# Patient Record
Sex: MALE | Race: WHITE
[De-identification: names, ages, dates, MRNs, and addresses within clinical notes are randomized per-mention and may not be internally consistent; named-entity substitution may affect disease eponyms.]

---

## 2019-06-17 ENCOUNTER — HOSPITAL ENCOUNTER (EMERGENCY)
Dept: HOSPITAL 8 - ED | Age: 59
LOS: 1 days | Discharge: HOME | End: 2019-06-18
Payer: MEDICARE

## 2019-06-17 VITALS — BODY MASS INDEX: 31.15 KG/M2 | HEIGHT: 70.5 IN | WEIGHT: 220.02 LBS

## 2019-06-17 VITALS — DIASTOLIC BLOOD PRESSURE: 86 MMHG | SYSTOLIC BLOOD PRESSURE: 130 MMHG

## 2019-06-17 DIAGNOSIS — J45.30: Primary | ICD-10-CM

## 2019-06-17 DIAGNOSIS — F17.200: ICD-10-CM

## 2019-06-17 PROCEDURE — 93005 ELECTROCARDIOGRAM TRACING: CPT

## 2019-06-17 PROCEDURE — 71046 X-RAY EXAM CHEST 2 VIEWS: CPT

## 2019-06-17 PROCEDURE — 99283 EMERGENCY DEPT VISIT LOW MDM: CPT

## 2019-06-17 NOTE — NUR
DR. BOYD HAS BEEN IN TO EVAL PT. AND DISCUSS POC.  PT. RESTING ON GURNEY WITH 
NADN.  EVEN, NON-LABORED RESPIRATIONS VISIBLE.  PT. HAS CALL LIGHT IN REACH.  
SKIN PWD.  PT. AWAITNG X-RAY.

## 2019-08-08 ENCOUNTER — HOSPITAL ENCOUNTER (OUTPATIENT)
Dept: HOSPITAL 8 - CFH | Age: 59
Discharge: HOME | End: 2019-08-08
Attending: NEUROLOGICAL SURGERY
Payer: MEDICARE

## 2019-08-08 DIAGNOSIS — M48.03: ICD-10-CM

## 2019-08-08 DIAGNOSIS — M47.813: Primary | ICD-10-CM

## 2019-08-08 PROCEDURE — 72050 X-RAY EXAM NECK SPINE 4/5VWS: CPT

## 2019-08-08 PROCEDURE — 72141 MRI NECK SPINE W/O DYE: CPT

## 2020-01-21 ENCOUNTER — HOSPITAL ENCOUNTER (EMERGENCY)
Dept: HOSPITAL 8 - ED | Age: 60
Discharge: HOME | End: 2020-01-21
Payer: MEDICARE

## 2020-01-21 VITALS — BODY MASS INDEX: 30.24 KG/M2 | HEIGHT: 70 IN | WEIGHT: 211.2 LBS

## 2020-01-21 VITALS — DIASTOLIC BLOOD PRESSURE: 79 MMHG | SYSTOLIC BLOOD PRESSURE: 116 MMHG

## 2020-01-21 DIAGNOSIS — M54.41: ICD-10-CM

## 2020-01-21 DIAGNOSIS — M54.42: Primary | ICD-10-CM

## 2020-01-21 DIAGNOSIS — F17.210: ICD-10-CM

## 2020-01-21 DIAGNOSIS — J45.909: ICD-10-CM

## 2020-01-21 PROCEDURE — 96372 THER/PROPH/DIAG INJ SC/IM: CPT

## 2020-01-21 PROCEDURE — 99283 EMERGENCY DEPT VISIT LOW MDM: CPT

## 2020-01-21 NOTE — NUR
PT STATES "MY SCIATICA IS BOTHERING ME AND I CAN'T SLEEP". PT STATES HE HAS A 
HISTORY OF LOW BACK PAIN AND DOES NOT HAVE A PCP AT THIS TIME.  per triage note

## 2020-01-25 ENCOUNTER — HOSPITAL ENCOUNTER (EMERGENCY)
Dept: HOSPITAL 8 - ED | Age: 60
Discharge: HOME | End: 2020-01-25
Payer: MEDICAID

## 2020-01-25 VITALS — HEIGHT: 68 IN | BODY MASS INDEX: 31.78 KG/M2 | WEIGHT: 209.66 LBS

## 2020-01-25 VITALS — SYSTOLIC BLOOD PRESSURE: 126 MMHG | DIASTOLIC BLOOD PRESSURE: 75 MMHG

## 2020-01-25 DIAGNOSIS — J45.909: ICD-10-CM

## 2020-01-25 DIAGNOSIS — F17.200: ICD-10-CM
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
## 2020-01-25 DIAGNOSIS — I10: ICD-10-CM
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
## 2020-01-25 DIAGNOSIS — S39.012A: Primary | ICD-10-CM

## 2020-01-25 DIAGNOSIS — Z76.0: ICD-10-CM

## 2020-01-25 PROCEDURE — 99283 EMERGENCY DEPT VISIT LOW MDM: CPT

## 2020-02-05 ENCOUNTER — HOSPITAL ENCOUNTER (EMERGENCY)
Dept: HOSPITAL 8 - ED | Age: 60
Discharge: HOME | End: 2020-02-05
Payer: MEDICARE

## 2020-02-05 VITALS — DIASTOLIC BLOOD PRESSURE: 83 MMHG | SYSTOLIC BLOOD PRESSURE: 136 MMHG

## 2020-02-05 VITALS — WEIGHT: 214.51 LBS | BODY MASS INDEX: 30.71 KG/M2 | HEIGHT: 70 IN

## 2020-02-05 DIAGNOSIS — J45.909: ICD-10-CM

## 2020-02-05 DIAGNOSIS — S39.012A: Primary | ICD-10-CM

## 2020-02-05 DIAGNOSIS — G89.29: ICD-10-CM
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
## 2020-02-05 PROCEDURE — 96372 THER/PROPH/DIAG INJ SC/IM: CPT

## 2020-02-05 PROCEDURE — 99283 EMERGENCY DEPT VISIT LOW MDM: CPT

## 2020-03-11 ENCOUNTER — HOSPITAL ENCOUNTER (EMERGENCY)
Dept: HOSPITAL 8 - ED | Age: 60
Discharge: HOME | End: 2020-03-11
Payer: MEDICARE

## 2020-03-11 VITALS — BODY MASS INDEX: 30.25 KG/M2 | HEIGHT: 71 IN | WEIGHT: 216.05 LBS

## 2020-03-11 VITALS — SYSTOLIC BLOOD PRESSURE: 133 MMHG | DIASTOLIC BLOOD PRESSURE: 82 MMHG

## 2020-03-11 DIAGNOSIS — M51.16: Primary | ICD-10-CM

## 2020-03-11 DIAGNOSIS — J45.909: ICD-10-CM

## 2020-03-11 DIAGNOSIS — F17.200: ICD-10-CM

## 2020-03-11 DIAGNOSIS — I10: ICD-10-CM

## 2020-03-11 PROCEDURE — 72110 X-RAY EXAM L-2 SPINE 4/>VWS: CPT

## 2020-03-11 PROCEDURE — 99283 EMERGENCY DEPT VISIT LOW MDM: CPT

## 2020-03-14 ENCOUNTER — HOSPITAL ENCOUNTER (EMERGENCY)
Dept: HOSPITAL 8 - ED | Age: 60
Discharge: HOME | End: 2020-03-14
Payer: MEDICARE

## 2020-03-14 VITALS — WEIGHT: 214.95 LBS | HEIGHT: 67 IN | BODY MASS INDEX: 33.74 KG/M2

## 2020-03-14 VITALS — SYSTOLIC BLOOD PRESSURE: 133 MMHG | DIASTOLIC BLOOD PRESSURE: 80 MMHG

## 2020-03-14 DIAGNOSIS — Z88.1: ICD-10-CM
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
## 2020-03-14 DIAGNOSIS — J45.909: ICD-10-CM
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
## 2020-03-14 DIAGNOSIS — I10: ICD-10-CM
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
## 2020-03-14 DIAGNOSIS — S39.012A: Primary | ICD-10-CM

## 2020-03-14 PROCEDURE — 99283 EMERGENCY DEPT VISIT LOW MDM: CPT

## 2020-03-14 PROCEDURE — 96372 THER/PROPH/DIAG INJ SC/IM: CPT

## 2020-03-14 NOTE — NUR
PT BROUGHT BACK FROM TRIAGE WITH CHIEF COMPLAINT OF LEFT LOWER BACK PAIN 
WORSENING OVER THREE DAYS. HERE AT [facility] ED 3/11.

## 2020-11-07 NOTE — NUR
Pt c/o "buzzing" in is head and states he has neck pain from titatnium rods. Pt 
denies chest pain, shortness of breath or N/V/diarrhea.

## 2023-03-03 ENCOUNTER — HOSPITAL ENCOUNTER (EMERGENCY)
Facility: MEDICAL CENTER | Age: 63
End: 2023-03-03
Attending: EMERGENCY MEDICINE
Payer: COMMERCIAL

## 2023-03-03 ENCOUNTER — APPOINTMENT (OUTPATIENT)
Dept: RADIOLOGY | Facility: MEDICAL CENTER | Age: 63
End: 2023-03-03
Attending: EMERGENCY MEDICINE
Payer: COMMERCIAL

## 2023-03-03 VITALS
WEIGHT: 192.68 LBS | HEART RATE: 92 BPM | RESPIRATION RATE: 18 BRPM | BODY MASS INDEX: 27.58 KG/M2 | DIASTOLIC BLOOD PRESSURE: 93 MMHG | HEIGHT: 70 IN | SYSTOLIC BLOOD PRESSURE: 136 MMHG | OXYGEN SATURATION: 96 % | TEMPERATURE: 97.5 F

## 2023-03-03 DIAGNOSIS — M54.12 CERVICAL RADICULOPATHY: ICD-10-CM

## 2023-03-03 DIAGNOSIS — M54.2 NECK PAIN: ICD-10-CM

## 2023-03-03 PROCEDURE — 99283 EMERGENCY DEPT VISIT LOW MDM: CPT

## 2023-03-03 PROCEDURE — 72040 X-RAY EXAM NECK SPINE 2-3 VW: CPT

## 2023-03-03 PROCEDURE — 700101 HCHG RX REV CODE 250: Performed by: EMERGENCY MEDICINE

## 2023-03-03 RX ORDER — LIDOCAINE 50 MG/G
1 PATCH TOPICAL EVERY 24 HOURS
Qty: 10 PATCH | Refills: 0 | Status: SHIPPED | OUTPATIENT
Start: 2023-03-03 | End: 2023-03-03 | Stop reason: SDUPTHER

## 2023-03-03 RX ORDER — METHYLPREDNISOLONE 4 MG/1
TABLET ORAL
Qty: 21 EACH | Refills: 0 | Status: SHIPPED | OUTPATIENT
Start: 2023-03-03 | End: 2023-03-03 | Stop reason: SDUPTHER

## 2023-03-03 RX ORDER — LIDOCAINE 50 MG/G
2 PATCH TOPICAL ONCE
Status: DISCONTINUED | OUTPATIENT
Start: 2023-03-03 | End: 2023-03-03 | Stop reason: HOSPADM

## 2023-03-03 RX ORDER — CYCLOBENZAPRINE HCL 10 MG
10 TABLET ORAL 3 TIMES DAILY PRN
Qty: 30 TABLET | Refills: 0 | Status: SHIPPED | OUTPATIENT
Start: 2023-03-03 | End: 2023-03-03 | Stop reason: SDUPTHER

## 2023-03-03 RX ORDER — LIDOCAINE 50 MG/G
1 PATCH TOPICAL EVERY 24 HOURS
Qty: 10 PATCH | Refills: 0 | Status: SHIPPED | OUTPATIENT
Start: 2023-03-03 | End: 2023-07-03

## 2023-03-03 RX ORDER — METHYLPREDNISOLONE 4 MG/1
TABLET ORAL
Qty: 21 EACH | Refills: 0 | Status: SHIPPED | OUTPATIENT
Start: 2023-03-03 | End: 2023-07-03

## 2023-03-03 RX ORDER — CYCLOBENZAPRINE HCL 10 MG
10 TABLET ORAL 3 TIMES DAILY PRN
Qty: 30 TABLET | Refills: 0 | Status: SHIPPED | OUTPATIENT
Start: 2023-03-03 | End: 2023-07-03

## 2023-03-03 RX ADMIN — LIDOCAINE 2 PATCH: 50 PATCH TOPICAL at 17:19

## 2023-03-03 ASSESSMENT — FIBROSIS 4 INDEX: FIB4 SCORE: 1.31

## 2023-03-04 NOTE — ED NOTES
MRI x-ray and imaging order paper was faxed and sent to MRI  Document showing confirmation of fax placed into chart, original order paper given to pt upon discharge     Pt aox4, vss, nad, ambulatory steady  Understood all dc info and when to seek medical care, no further questions

## 2023-03-04 NOTE — ED TRIAGE NOTES
"Chief Complaint   Patient presents with    Neck Pain     Described as pinching sensation. Pt states he's having \"electric pulse sensations\" through both arms and fingers.       /86   Pulse (!) 101   Temp 36.2 °C (97.1 °F) (Temporal)   Resp 18   Ht 1.778 m (5' 10\")   Wt 87.4 kg (192 lb 10.9 oz)   SpO2 95%   BMI 27.65 kg/m²     "

## 2023-03-04 NOTE — ED PROVIDER NOTES
"ED Provider Note    CHIEF COMPLAINT  Chief Complaint   Patient presents with    Neck Pain     Described as pinching sensation. Pt states he's having \"electric pulse sensations\" through both arms and fingers.       EXTERNAL RECORDS REVIEWED  Inpatient Notes that the patient had a C3-4 through C6 fusion with Dr. Espinosa in 2012    HPI/ROS  LIMITATION TO HISTORY   Select: : None  OUTSIDE HISTORIAN(S):  none    Brian Durham is a 62 y.o. male who presents eating that he has a pinching painful sensation in his neck and upper shoulders that started about 4 days ago.  He has been taking Tylenol and ibuprofen with minimal relief.  State patient states that he gets electric shock sensations down both of his arms into his hands and fingers.  He denies any weakness in his hands or loss of  strength.  He denies any leg weakness.  He denies any falls or trauma.  The patient states that he did have neck surgery about 10 years ago at this hospital where he had a titanium plate placed in his neck.  He is not sure exactly what kind of surgery was performed.  Patient states that he has no primary care physician for follow-up.    PAST MEDICAL HISTORY   has a past medical history of Arthritis, Cold (1/1/2012), Degenerative disc disease, Hepatitis B, Pain (12-30-11), and Psychiatric disorder.    SURGICAL HISTORY   has a past surgical history that includes other and cervical disk and fusion anterior (1/16/2012).    FAMILY HISTORY  No family history on file.    SOCIAL HISTORY  Social History     Tobacco Use    Smoking status: Every Day     Packs/day: 0.25     Years: 37.00     Pack years: 9.25     Types: Cigarettes     Last attempt to quit: 6/7/2012     Years since quitting: 10.7    Smokeless tobacco: Never    Tobacco comments:     1/2 pack a day.   Substance and Sexual Activity    Alcohol use: No    Drug use: No    Sexual activity: Not on file       CURRENT MEDICATIONS  Home Medications       Reviewed by Ezequiel Gillette R.N. " "(Registered Nurse) on 03/03/23 at 1813  Med List Status: Not Addressed     Medication Last Dose Status   amphetamine-dextroamphetamine (ADDERALL) 20 MG TABS  Active   DULoxetine HCl (CYMBALTA PO)  Active   haloperidol (HALDOL) 5 MG TABS  Active   hydrocodone-acetaminophen (NORCO) 7.5-325 MG per tablet  Active   ibuprofen (MOTRIN) 800 MG TABS  Active   therapeutic multivitamin-minerals (THERAGRAN-M) TABS  Active                    ALLERGIES  Allergies   Allergen Reactions    Paxil [Paroxetine]      \"swollen throat\"    Shellfish Allergy Rash     \"My heart - very painful.\"       PHYSICAL EXAM  VITAL SIGNS: BP (!) 136/93   Pulse 92   Temp 36.4 °C (97.5 °F) (Temporal)   Resp 18   Ht 1.778 m (5' 10\")   Wt 87.4 kg (192 lb 10.9 oz)   SpO2 96%   BMI 27.65 kg/m²    Constitutional: Well developed, Well nourished, No acute respiratory distress, Non-toxic appearance.   HENT: Normocephalic, Atraumatic, Bilateral external ears normal,  Eyes: Conjunctiva normal, No discharge. No icterus.  Neck: Normal range of motion. Supple.  Positive paraspinous muscle tenderness to the C-spine without C-spine tenderness bony step-offs or crepitance is midline  Lymphatic: No cervical lymphadenopathy noted.   Skin: Warm, Dry, No erythema, No rash.   Extremities: Intact distal pulses, No edema, No tenderness  Musculoskeletal: Good range of motion in all major joints. Normal gait.  Neurologic: Alert & oriented x 3. No focal deficits noted.  Patient has equal  5 out of 5 strength and sensation upper and lower extremities bilaterally  Psych: Alert normal affect      DIAGNOSTIC STUDIES / PROCEDURES  EKG  I have independently interpreted this EKG  none    LABS  none    RADIOLOGY  I have independently interpreted the diagnostic imaging associated with this visit and am waiting the final reading from the radiologist.   My preliminary interpretation is as follows: xray c spine: Hardware in place no fracture  Radiologist interpretation:   DX-CERVICAL " SPINE-2 OR 3 VIEWS   Final Result      1.  C4-C6 ACDF.   2.  C3-C4 and C7 moderate disc degeneration.   3.  No acute abnormality.            COURSE & MEDICAL DECISION MAKING    ED Observation Status? No; Patient does not meet criteria for ED Observation.     INITIAL ASSESSMENT, COURSE AND PLAN  Care Narrative: This is a 62-year-old male who has a history of previous neck surgery 10 years ago with a titanium plate in his neck complaining of pinching painful sensation in his neck with shooting pain down both of his arms for the last 4 days.  He has no falls or trauma.  On physical exam here he has no C-spine tenderness step-offs or crepitance or loss of upper extremity strength.  I have ordered a C-spine x-ray to rule out hardware dislocation or fracture.  I have also given the patient Lidoderm patches to his neck.  He could have worsening arthritis or disc disease in another area above or below his previous surgery.        ADDITIONAL PROBLEM LIST  arthritis  DISPOSITION AND DISCUSSIONS    6:01 PM upon recheck the patient feels improved after the lidocaine patches.  I will discharge him home with a Medrol Dosepak and more lidocaine patches.  Will write for an outpatient MRI of his neck and refer him to Dr. Brewster as well as neurosurgery on-call Dr. Mcdaniel for repeat evaluation of his symptoms.  Patient understands to return for worsening weakness of  strength or worsening symptoms.  I have discussed management of the patient with the following physicians and LEA's:  none    Discussion of management with other QHP or appropriate source(s): None     Escalation of care considered, and ultimately not performed:acute inpatient care management, however at this time, the patient is most appropriate for outpatient management    Barriers to care at this time, including but not limited to: Patient does not have established PCP.     Decision tools and prescription drugs considered including, but not limited to: Pain  Medications medrol and lidoderm .    The patient will return for new or worsening symptoms and is stable at the time of discharge.    The patient is referred to a primary physician for blood pressure management, diabetic screening, and for all other preventative health concerns.      DISPOSITION:  Patient will be discharged home in stable condition.    FOLLOW UP:  Jose Alejandro Mcdaniel M.D.  [address] [place] [address]  [phone]    Call in 2 days  to establish care, for recheck    Osvaldo Brewster M.D.  [address] [facility] [address]  [place] [id number] [phone]    In 1 day  to establish care, for recheck      OUTPATIENT MEDICATIONS:  Discharge Medication List as of 3/3/2023  6:24 PM      Flexeril, Lidoderm and Medrol Dosepak       FINAL DIAGNOSIS  1. Neck pain    2. Cervical radiculopathy           Electronically signed by: Shabnam Miller M.D., 3/3/2023 4:54 PM

## 2023-04-15 ENCOUNTER — APPOINTMENT (OUTPATIENT)
Dept: RADIOLOGY | Facility: MEDICAL CENTER | Age: 63
End: 2023-04-15
Attending: EMERGENCY MEDICINE
Payer: COMMERCIAL

## 2023-04-15 ENCOUNTER — HOSPITAL ENCOUNTER (EMERGENCY)
Facility: MEDICAL CENTER | Age: 63
End: 2023-04-15
Attending: EMERGENCY MEDICINE
Payer: COMMERCIAL

## 2023-04-15 VITALS
BODY MASS INDEX: 27.87 KG/M2 | TEMPERATURE: 98.2 F | RESPIRATION RATE: 18 BRPM | HEART RATE: 86 BPM | DIASTOLIC BLOOD PRESSURE: 70 MMHG | WEIGHT: 194.67 LBS | HEIGHT: 70 IN | OXYGEN SATURATION: 94 % | SYSTOLIC BLOOD PRESSURE: 118 MMHG

## 2023-04-15 DIAGNOSIS — S39.012A STRAIN OF LUMBAR REGION, INITIAL ENCOUNTER: ICD-10-CM

## 2023-04-15 DIAGNOSIS — S20.211A CONTUSION OF RIGHT CHEST WALL, INITIAL ENCOUNTER: ICD-10-CM

## 2023-04-15 LAB
ALBUMIN SERPL BCP-MCNC: 4.3 G/DL (ref 3.2–4.9)
ALBUMIN/GLOB SERPL: 1.4 G/DL
ALP SERPL-CCNC: 96 U/L (ref 30–99)
ALT SERPL-CCNC: 20 U/L (ref 2–50)
ANION GAP SERPL CALC-SCNC: 12 MMOL/L (ref 7–16)
AST SERPL-CCNC: 18 U/L (ref 12–45)
BASOPHILS # BLD AUTO: 0.5 % (ref 0–1.8)
BASOPHILS # BLD: 0.05 K/UL (ref 0–0.12)
BILIRUB SERPL-MCNC: 0.2 MG/DL (ref 0.1–1.5)
BUN SERPL-MCNC: 12 MG/DL (ref 8–22)
CALCIUM ALBUM COR SERPL-MCNC: 9.1 MG/DL (ref 8.5–10.5)
CALCIUM SERPL-MCNC: 9.3 MG/DL (ref 8.5–10.5)
CHLORIDE SERPL-SCNC: 101 MMOL/L (ref 96–112)
CO2 SERPL-SCNC: 25 MMOL/L (ref 20–33)
CREAT SERPL-MCNC: 0.81 MG/DL (ref 0.5–1.4)
EKG IMPRESSION: NORMAL
EOSINOPHIL # BLD AUTO: 0.23 K/UL (ref 0–0.51)
EOSINOPHIL NFR BLD: 2.4 % (ref 0–6.9)
ERYTHROCYTE [DISTWIDTH] IN BLOOD BY AUTOMATED COUNT: 43.1 FL (ref 35.9–50)
GFR SERPLBLD CREATININE-BSD FMLA CKD-EPI: 101 ML/MIN/1.73 M 2
GLOBULIN SER CALC-MCNC: 3 G/DL (ref 1.9–3.5)
GLUCOSE SERPL-MCNC: 90 MG/DL (ref 65–99)
HCT VFR BLD AUTO: 43.2 % (ref 42–52)
HGB BLD-MCNC: 14.8 G/DL (ref 14–18)
IMM GRANULOCYTES # BLD AUTO: 0.04 K/UL (ref 0–0.11)
IMM GRANULOCYTES NFR BLD AUTO: 0.4 % (ref 0–0.9)
LYMPHOCYTES # BLD AUTO: 2.57 K/UL (ref 1–4.8)
LYMPHOCYTES NFR BLD: 27.3 % (ref 22–41)
MCH RBC QN AUTO: 33 PG (ref 27–33)
MCHC RBC AUTO-ENTMCNC: 34.3 G/DL (ref 33.7–35.3)
MCV RBC AUTO: 96.2 FL (ref 81.4–97.8)
MONOCYTES # BLD AUTO: 1.11 K/UL (ref 0–0.85)
MONOCYTES NFR BLD AUTO: 11.8 % (ref 0–13.4)
NEUTROPHILS # BLD AUTO: 5.43 K/UL (ref 1.82–7.42)
NEUTROPHILS NFR BLD: 57.6 % (ref 44–72)
NRBC # BLD AUTO: 0 K/UL
NRBC BLD-RTO: 0 /100 WBC
PLATELET # BLD AUTO: 286 K/UL (ref 164–446)
PMV BLD AUTO: 8.1 FL (ref 9–12.9)
POTASSIUM SERPL-SCNC: 4.7 MMOL/L (ref 3.6–5.5)
PROT SERPL-MCNC: 7.3 G/DL (ref 6–8.2)
RBC # BLD AUTO: 4.49 M/UL (ref 4.7–6.1)
SODIUM SERPL-SCNC: 138 MMOL/L (ref 135–145)
TROPONIN T SERPL-MCNC: 15 NG/L (ref 6–19)
WBC # BLD AUTO: 9.4 K/UL (ref 4.8–10.8)

## 2023-04-15 PROCEDURE — 84484 ASSAY OF TROPONIN QUANT: CPT

## 2023-04-15 PROCEDURE — 80053 COMPREHEN METABOLIC PANEL: CPT

## 2023-04-15 PROCEDURE — 93005 ELECTROCARDIOGRAM TRACING: CPT

## 2023-04-15 PROCEDURE — 85025 COMPLETE CBC W/AUTO DIFF WBC: CPT

## 2023-04-15 PROCEDURE — 93005 ELECTROCARDIOGRAM TRACING: CPT | Performed by: EMERGENCY MEDICINE

## 2023-04-15 PROCEDURE — 96372 THER/PROPH/DIAG INJ SC/IM: CPT

## 2023-04-15 PROCEDURE — 72131 CT LUMBAR SPINE W/O DYE: CPT

## 2023-04-15 PROCEDURE — 99284 EMERGENCY DEPT VISIT MOD MDM: CPT

## 2023-04-15 PROCEDURE — 71250 CT THORAX DX C-: CPT

## 2023-04-15 PROCEDURE — 700111 HCHG RX REV CODE 636 W/ 250 OVERRIDE (IP): Performed by: EMERGENCY MEDICINE

## 2023-04-15 RX ORDER — KETOROLAC TROMETHAMINE 30 MG/ML
30 INJECTION, SOLUTION INTRAMUSCULAR; INTRAVENOUS ONCE
Status: COMPLETED | OUTPATIENT
Start: 2023-04-15 | End: 2023-04-15

## 2023-04-15 RX ADMIN — KETOROLAC TROMETHAMINE 30 MG: 30 INJECTION, SOLUTION INTRAMUSCULAR at 19:47

## 2023-04-15 ASSESSMENT — FIBROSIS 4 INDEX: FIB4 SCORE: 1.31

## 2023-04-15 ASSESSMENT — PAIN DESCRIPTION - PAIN TYPE: TYPE: ACUTE PAIN

## 2023-04-16 NOTE — DISCHARGE INSTRUCTIONS
Your scans here were normal.  There are no broken ribs, and no injuries to the bones in your back.  For your bruised chest wall and low back strain, you can alternate every 3 hours between Tylenol and ibuprofen, and use heat packs for your low back.  Follow-up with your primary care clinic.  If you do not have one, use one of the clinics listed here.

## 2023-04-16 NOTE — ED TRIAGE NOTES
"Chief Complaint   Patient presents with    Fall Less than 10 Feet     Pt fell three feet off a ladder yesterday morning. C/o low back and hip pain. Pt ambulatory but states it's hard to walk around.     Chest Wall Pain     C/o right sided chest pain for the last four days       /78   Pulse 91   Temp 36.6 °C (97.8 °F) (Temporal)   Resp 18   Ht 1.778 m (5' 10\")   Wt 88.3 kg (194 lb 10.7 oz)   SpO2 99%   BMI 27.93 kg/m²     "

## 2023-04-16 NOTE — ED NOTES
Pt discharged to ED Lobby. GCS 15. Pt in possession of belongings. Pt provided discharge education and information pertaining to medications and follow up appointments. Pt received copy of discharge instructions and verbalized understanding.     Vitals:    04/15/23 2104   BP: 118/70   Pulse: 86   Resp: 18   Temp: 36.8 °C (98.2 °F)   SpO2: 94%

## 2023-04-16 NOTE — ED PROVIDER NOTES
ER Provider Note    Scribed for Og Naidu M.d. by Rafat Briggs. 4/15/2023  7:20 PM    Primary Care Provider: Pcp Pt States None    CHIEF COMPLAINT  Chief Complaint   Patient presents with    Fall Less than 10 Feet     Pt fell three feet off a ladder yesterday morning. C/o low back and hip pain. Pt ambulatory but states it's hard to walk around.     Chest Wall Pain     C/o right sided chest pain for the last four days     EXTERNAL RECORDS REVIEWED  Outpatient Notes Patient was seen in the [facility] ED on 3/3/2023 for neck pain.      HPI/ROS  LIMITATION TO HISTORY   Select: : None  OUTSIDE HISTORIAN(S):  None    Brian Durham is a 62 y.o. male who presents to the ED for evaluation of a fall (about three feet) off of a ladder while at work onset yesterday morning. Patient is sleeping and resting comfortably upon arrival to bedside. Brian reports a history of chronic back pain, but states that this has worsened since the onset of his fall. He reports associated lower back pain, leg pain, and hip pain. He denies any new upper back pain. Patient adds that he having some right sided chest pain, but he was diagnosed with a respiratory infection about a week ago. Brian always has upper back pain, but denies any worsening fallowing his fall.     PAST MEDICAL HISTORY  Past Medical History:   Diagnosis Date    Arthritis     Cold 1/1/2012    2 weeks ago    Degenerative disc disease     Hepatitis B     Pain 12-30-11    neck, 6/10    Psychiatric disorder     bipolar, ADHD       SURGICAL HISTORY  Past Surgical History:   Procedure Laterality Date    CERVICAL DISK AND FUSION ANTERIOR  1/16/2012    Performed by REMY FRANKS at [facility] ORS    OTHER      benign tumor removed from chest       FAMILY HISTORY  History reviewed. No pertinent family history.    SOCIAL HISTORY   reports that he has been smoking cigarettes. He has a 9.25 pack-year smoking history. He has never used smokeless tobacco. He  "reports that he does not drink alcohol and does not use drugs.    CURRENT MEDICATIONS  Discharge Medication List as of 4/15/2023  9:05 PM        CONTINUE these medications which have NOT CHANGED    Details   methylPREDNISolone (MEDROL DOSEPAK) 4 MG Tablet Therapy Pack Take as directed, Disp-21 Each, R-0, Normal      lidocaine (LIDODERM) 5 % Patch Place 1 Patch on the skin every 24 hours., Disp-10 Patch, R-0, Normal      cyclobenzaprine (FLEXERIL) 10 mg Tab Take 1 Tablet by mouth 3 times a day as needed for Muscle Spasms., Disp-30 Tablet, R-0, Normal      ibuprofen (MOTRIN) 800 MG TABS Take 1 Tab by mouth every 8 hours as needed (pain)., Disp-20 Each, R-0, Print Rx Paper      hydrocodone-acetaminophen (NORCO) 7.5-325 MG per tablet Take 1 Tab by mouth every 6 hours as needed (pain)., Disp-12 Each, R-0, Print Rx Paper      amphetamine-dextroamphetamine (ADDERALL) 20 MG TABS Take 30 mg by mouth 2 Times a Day., Historical Med      DULoxetine HCl (CYMBALTA PO) Take 30 mg by mouth every day., Historical Med      haloperidol (HALDOL) 5 MG TABS Take 2.5 mg by mouth every day. \"1 in the morning and 1 in the afternoon\", Historical Med      therapeutic multivitamin-minerals (THERAGRAN-M) TABS Take 1 Tab by mouth every day., Historical Med             ALLERGIES  Paxil [paroxetine] and Shellfish allergy    PHYSICAL EXAM  VITAL SIGNS: /80   Pulse 85   Temp 36.6 °C (97.8 °F) (Temporal)   Resp 18   Ht 1.778 m (5' 10\")   Wt 88.3 kg (194 lb 10.7 oz)   SpO2 96%   BMI 27.93 kg/m²   Pulse ox interpretation: I interpret this pulse ox as normal.  Constitutional: Alert in no apparent distress.  HENT: No signs of trauma, Bilateral external ears normal, Nose normal.   Eyes: Pupils are equal and reactive, Conjunctiva normal, Non-icteric.   Neck: Normal range of motion, Supple, No stridor.    Cardiovascular: Normal peripheral perfusion  Thorax & Lungs: Unlabored respirations, equal chest expansion, no accessory muscle use  Abdomen: " Non-distended  Skin:  No erythema, No rash.   Back: Normal alignment and ROM  Extremities: No gross deformity  Musculoskeletal: Good range of motion in all major joints.   Neurologic: Alert, Normal motor function, No focal deficits noted.   Psychiatric: Affect normal, Judgment normal, Mood normal.     DIAGNOSTIC STUDIES    Labs:   Results for orders placed or performed during the hospital encounter of 04/15/23   CBC with Differential   Result Value Ref Range    WBC 9.4 4.8 - 10.8 K/uL    RBC 4.49 (L) 4.70 - 6.10 M/uL    Hemoglobin 14.8 14.0 - 18.0 g/dL    Hematocrit 43.2 42.0 - 52.0 %    MCV 96.2 81.4 - 97.8 fL    MCH 33.0 27.0 - 33.0 pg    MCHC 34.3 33.7 - 35.3 g/dL    RDW 43.1 35.9 - 50.0 fL    Platelet Count 286 164 - 446 K/uL    MPV 8.1 (L) 9.0 - 12.9 fL    Neutrophils-Polys 57.60 44.00 - 72.00 %    Lymphocytes 27.30 22.00 - 41.00 %    Monocytes 11.80 0.00 - 13.40 %    Eosinophils 2.40 0.00 - 6.90 %    Basophils 0.50 0.00 - 1.80 %    Immature Granulocytes 0.40 0.00 - 0.90 %    Nucleated RBC 0.00 /100 WBC    Neutrophils (Absolute) 5.43 1.82 - 7.42 K/uL    Lymphs (Absolute) 2.57 1.00 - 4.80 K/uL    Monos (Absolute) 1.11 (H) 0.00 - 0.85 K/uL    Eos (Absolute) 0.23 0.00 - 0.51 K/uL    Baso (Absolute) 0.05 0.00 - 0.12 K/uL    Immature Granulocytes (abs) 0.04 0.00 - 0.11 K/uL    NRBC (Absolute) 0.00 K/uL   Complete Metabolic Panel (CMP)   Result Value Ref Range    Sodium 138 135 - 145 mmol/L    Potassium 4.7 3.6 - 5.5 mmol/L    Chloride 101 96 - 112 mmol/L    Co2 25 20 - 33 mmol/L    Anion Gap 12.0 7.0 - 16.0    Glucose 90 65 - 99 mg/dL    Bun 12 8 - 22 mg/dL    Creatinine 0.81 0.50 - 1.40 mg/dL    Calcium 9.3 8.5 - 10.5 mg/dL    AST(SGOT) 18 12 - 45 U/L    ALT(SGPT) 20 2 - 50 U/L    Alkaline Phosphatase 96 30 - 99 U/L    Total Bilirubin 0.2 0.1 - 1.5 mg/dL    Albumin 4.3 3.2 - 4.9 g/dL    Total Protein 7.3 6.0 - 8.2 g/dL    Globulin 3.0 1.9 - 3.5 g/dL    A-G Ratio 1.4 g/dL   Troponins NOW   Result Value Ref Range     Troponin T 15 6 - 19 ng/L   CORRECTED CALCIUM   Result Value Ref Range    Correct Calcium 9.1 8.5 - 10.5 mg/dL   ESTIMATED GFR   Result Value Ref Range    GFR (CKD-EPI) 101 >60 mL/min/1.73 m 2   EKG   Result Value Ref Range    Report       [facility]    Test Date:  2023-04-15  Pt Name:    SADI GUERRERO                Department: ER  MRN:        [id number]                      Room:  Gender:     Male                         Technician: [id number]  :        1960                   Requested By:ER TRIAGE PROTOCOL  Order #:    [id number]                    Reading MD: JAYRO PONCE MD    Measurements  Intervals                                Axis  Rate:       89                           P:          76  NH:         147                          QRS:        69  QRSD:       83                           T:          48  QT:         355  QTc:        432    Interpretive Statements  Sinus rhythm  Compared to ECG 2011 12:51:49  No significant changes  Electronically Signed On 4- 20:49:29 PDT by JAYRO PONCE MD          EKG:   I have independently interpreted this EKG  12 Lead EKG: interpreted by me as above.      Radiology:   The attending emergency physician has independently interpreted the diagnostic imaging associated with this visit and am waiting the final reading from the radiologist.     CT-LSPINE W/O PLUS RECONS   Final Result         1. No acute fracture or malalignment appreciated in the lumbar spine         CT-CHEST (THORAX) W/O   Final Result         1. No displaced rib fracture.   2. No pulmonary contusion. No pleural effusion or pneumothorax.               Preliminary interpretation is a follows: No obvious acute injuries  Radiologist interpretation: Noted above.    COURSE & MEDICAL DECISION MAKING     ED Observation Status? Yes; I am placing the patient in to an observation status due to a diagnostic uncertainty as well as therapeutic intensity. Patient  placed in observation status at 7:29 PM, 4/15/2023.     Observation plan is as follows: Reevaluate patient after analyzing imaging.     Upon Reevaluation, the patient's condition has: Improved; and will be discharged.    Patient discharged from ED Observation status at 8:52 PM (4/15/2023).    INITIAL ASSESSMENT, COURSE AND PLAN  Care Narrative: 7:29 PM - Patient seen and examined at bedside. Discussed plan of care, including imaging his back to evaluate for any fractures. Patient agrees to the plan of care. The patient will be medicated with Toradol 30 mg. Ordered for EKG, Troponin, CMP, CBC with differential, CT-Chest, and CT-L spine without plus recons to evaluate his symptoms.      8:52 PM - I reevaluated the patient at bedside. I discussed the patient's diagnostic study results which show no acute fractures. I discussed plan for discharge and follow up as outlined below. The patient is stable for discharge at this time and will return for any new or worsening symptoms. Patient verbalizes understanding and support with my plan for discharge.      HTN/IDDM FOLLOW UP:  The patient is referred to a primary physician for blood pressure management, diabetic screening, and for all other preventive health concerns    ADDITIONAL PROBLEM LIST  History of chronic as well as acute low back pain, recent respiratory infection complicating current diagnosis.    DISPOSITION AND DISCUSSIONS  I have discussed management of the patient with the following physicians and LEA's:  None.  Escalation of care considered, and ultimately not performed: acute inpatient care management, however at this time, the patient is most appropriate for outpatient management.    Barriers to care at this time, including but not limited to: Patient does not have established PCP.     Decision tools and prescription drugs considered including, but not limited to: Pain Medications , but I think narcotics would increase risk of falls and injuries, and this  patient shows no behavioral signs of discomfort, and has no radiologic evidence of acute injury .    Patient will be discharged home.    FOLLOW UP:  [facility]  [address]  [facility] [address]        [facility] - Primary Care and Family Medicine  [address] [facility] [address]  [phone]        [facility]  [address]  [facility] [address]  [phone]          FINAL DIANGOSIS  1. Strain of lumbar region, initial encounter    2. Contusion of right chest wall, initial encounter           The note accurately reflects work and decisions made by me.  Og Naidu M.D.  4/15/2023  9:44 PM     Rafat ULLOA (Scribe), am scribing for, and in the presence of, Og Naidu M.D..    Electronically signed by: Rafat Briggs (Tayla), 4/15/2023    Og ULLOA M.D. personally performed the services described in this documentation, as scribed by Rafat Briggs in my presence, and it is both accurate and complete.

## 2023-04-16 NOTE — ED NOTES
Patient report received from SHANTI Domínguez, checked on pt bedside, pt resting in [facility]. AAO X4, respirations even and unlabored.

## 2023-07-03 ENCOUNTER — APPOINTMENT (OUTPATIENT)
Dept: RADIOLOGY | Facility: MEDICAL CENTER | Age: 63
DRG: 072 | End: 2023-07-03
Attending: EMERGENCY MEDICINE
Payer: COMMERCIAL

## 2023-07-03 ENCOUNTER — HOSPITAL ENCOUNTER (INPATIENT)
Facility: MEDICAL CENTER | Age: 63
LOS: 5 days | DRG: 072 | End: 2023-07-08
Attending: EMERGENCY MEDICINE | Admitting: INTERNAL MEDICINE
Payer: COMMERCIAL

## 2023-07-03 ENCOUNTER — APPOINTMENT (OUTPATIENT)
Dept: RADIOLOGY | Facility: MEDICAL CENTER | Age: 63
DRG: 072 | End: 2023-07-03
Attending: INTERNAL MEDICINE
Payer: COMMERCIAL

## 2023-07-03 DIAGNOSIS — G24.9 DYSTONIA: ICD-10-CM

## 2023-07-03 DIAGNOSIS — R41.0 ACUTE DELIRIUM: ICD-10-CM

## 2023-07-03 DIAGNOSIS — R53.81 DEBILITY: ICD-10-CM

## 2023-07-03 DIAGNOSIS — R41.0 DELIRIUM: ICD-10-CM

## 2023-07-03 DIAGNOSIS — F20.9 SCHIZOPHRENIA, UNSPECIFIED TYPE (HCC): ICD-10-CM

## 2023-07-03 LAB
ALBUMIN SERPL BCP-MCNC: 4.6 G/DL (ref 3.2–4.9)
ALBUMIN/GLOB SERPL: 1.6 G/DL
ALP SERPL-CCNC: 76 U/L (ref 30–99)
ALT SERPL-CCNC: 27 U/L (ref 2–50)
AMPHET UR QL SCN: POSITIVE
ANION GAP SERPL CALC-SCNC: 13 MMOL/L (ref 7–16)
APAP SERPL-MCNC: <5 UG/ML (ref 10–30)
APPEARANCE UR: CLEAR
AST SERPL-CCNC: 24 U/L (ref 12–45)
BARBITURATES UR QL SCN: NEGATIVE
BASOPHILS # BLD AUTO: 0.6 % (ref 0–1.8)
BASOPHILS # BLD: 0.06 K/UL (ref 0–0.12)
BENZODIAZ UR QL SCN: NEGATIVE
BILIRUB SERPL-MCNC: 0.6 MG/DL (ref 0.1–1.5)
BILIRUB UR QL STRIP.AUTO: NEGATIVE
BUN SERPL-MCNC: 19 MG/DL (ref 8–22)
BZE UR QL SCN: NEGATIVE
CALCIUM ALBUM COR SERPL-MCNC: 9.1 MG/DL (ref 8.5–10.5)
CALCIUM SERPL-MCNC: 9.6 MG/DL (ref 8.4–10.2)
CANNABINOIDS UR QL SCN: NEGATIVE
CHLORIDE SERPL-SCNC: 106 MMOL/L (ref 96–112)
CO2 SERPL-SCNC: 23 MMOL/L (ref 20–33)
COLOR UR: YELLOW
CREAT SERPL-MCNC: 0.81 MG/DL (ref 0.5–1.4)
EKG IMPRESSION: NORMAL
EOSINOPHIL # BLD AUTO: 0.23 K/UL (ref 0–0.51)
EOSINOPHIL NFR BLD: 2.3 % (ref 0–6.9)
ERYTHROCYTE [DISTWIDTH] IN BLOOD BY AUTOMATED COUNT: 41.9 FL (ref 35.9–50)
ETHANOL BLD-MCNC: <10.1 MG/DL
GFR SERPLBLD CREATININE-BSD FMLA CKD-EPI: 100 ML/MIN/1.73 M 2
GLOBULIN SER CALC-MCNC: 2.8 G/DL (ref 1.9–3.5)
GLUCOSE BLD STRIP.AUTO-MCNC: 95 MG/DL (ref 65–99)
GLUCOSE SERPL-MCNC: 130 MG/DL (ref 65–99)
GLUCOSE UR STRIP.AUTO-MCNC: NEGATIVE MG/DL
HCT VFR BLD AUTO: 45.5 % (ref 42–52)
HGB BLD-MCNC: 15.7 G/DL (ref 14–18)
IMM GRANULOCYTES # BLD AUTO: 0.02 K/UL (ref 0–0.11)
IMM GRANULOCYTES NFR BLD AUTO: 0.2 % (ref 0–0.9)
KETONES UR STRIP.AUTO-MCNC: NEGATIVE MG/DL
LEUKOCYTE ESTERASE UR QL STRIP.AUTO: NEGATIVE
LYMPHOCYTES # BLD AUTO: 3.5 K/UL (ref 1–4.8)
LYMPHOCYTES NFR BLD: 34.8 % (ref 22–41)
MCH RBC QN AUTO: 32.8 PG (ref 27–33)
MCHC RBC AUTO-ENTMCNC: 34.5 G/DL (ref 32.3–36.5)
MCV RBC AUTO: 95.2 FL (ref 81.4–97.8)
METHADONE UR QL SCN: NEGATIVE
MICRO URNS: NORMAL
MONOCYTES # BLD AUTO: 1.36 K/UL (ref 0–0.85)
MONOCYTES NFR BLD AUTO: 13.5 % (ref 0–13.4)
NEUTROPHILS # BLD AUTO: 4.89 K/UL (ref 1.82–7.42)
NEUTROPHILS NFR BLD: 48.6 % (ref 44–72)
NITRITE UR QL STRIP.AUTO: NEGATIVE
NRBC # BLD AUTO: 0 K/UL
NRBC BLD-RTO: 0 /100 WBC (ref 0–0.2)
OPIATES UR QL SCN: NEGATIVE
OXYCODONE UR QL SCN: NEGATIVE
PCP UR QL SCN: NEGATIVE
PH UR STRIP.AUTO: 5.5 [PH] (ref 5–8)
PLATELET # BLD AUTO: 295 K/UL (ref 164–446)
PMV BLD AUTO: 8.6 FL (ref 9–12.9)
POTASSIUM SERPL-SCNC: 4 MMOL/L (ref 3.6–5.5)
PROPOXYPH UR QL SCN: NEGATIVE
PROT SERPL-MCNC: 7.4 G/DL (ref 6–8.2)
PROT UR QL STRIP: NEGATIVE MG/DL
RBC # BLD AUTO: 4.78 M/UL (ref 4.7–6.1)
RBC UR QL AUTO: NEGATIVE
SALICYLATES SERPL-MCNC: <1 MG/DL (ref 15–25)
SODIUM SERPL-SCNC: 142 MMOL/L (ref 135–145)
SP GR UR STRIP.AUTO: >=1.03
WBC # BLD AUTO: 10.1 K/UL (ref 4.8–10.8)

## 2023-07-03 PROCEDURE — 36415 COLL VENOUS BLD VENIPUNCTURE: CPT

## 2023-07-03 PROCEDURE — 700111 HCHG RX REV CODE 636 W/ 250 OVERRIDE (IP): Performed by: EMERGENCY MEDICINE

## 2023-07-03 PROCEDURE — 80143 DRUG ASSAY ACETAMINOPHEN: CPT

## 2023-07-03 PROCEDURE — 96375 TX/PRO/DX INJ NEW DRUG ADDON: CPT

## 2023-07-03 PROCEDURE — 80307 DRUG TEST PRSMV CHEM ANLYZR: CPT

## 2023-07-03 PROCEDURE — 80053 COMPREHEN METABOLIC PANEL: CPT

## 2023-07-03 PROCEDURE — 82077 ASSAY SPEC XCP UR&BREATH IA: CPT

## 2023-07-03 PROCEDURE — 81003 URINALYSIS AUTO W/O SCOPE: CPT

## 2023-07-03 PROCEDURE — 80179 DRUG ASSAY SALICYLATE: CPT

## 2023-07-03 PROCEDURE — 96376 TX/PRO/DX INJ SAME DRUG ADON: CPT

## 2023-07-03 PROCEDURE — 700101 HCHG RX REV CODE 250: Performed by: INTERNAL MEDICINE

## 2023-07-03 PROCEDURE — 96366 THER/PROPH/DIAG IV INF ADDON: CPT

## 2023-07-03 PROCEDURE — 96365 THER/PROPH/DIAG IV INF INIT: CPT

## 2023-07-03 PROCEDURE — 85025 COMPLETE CBC W/AUTO DIFF WBC: CPT

## 2023-07-03 PROCEDURE — 770022 HCHG ROOM/CARE - ICU (200)

## 2023-07-03 PROCEDURE — 82962 GLUCOSE BLOOD TEST: CPT

## 2023-07-03 PROCEDURE — 700111 HCHG RX REV CODE 636 W/ 250 OVERRIDE (IP)

## 2023-07-03 PROCEDURE — 700105 HCHG RX REV CODE 258: Performed by: INTERNAL MEDICINE

## 2023-07-03 PROCEDURE — 93005 ELECTROCARDIOGRAM TRACING: CPT | Performed by: EMERGENCY MEDICINE

## 2023-07-03 PROCEDURE — 700111 HCHG RX REV CODE 636 W/ 250 OVERRIDE (IP): Mod: JZ | Performed by: EMERGENCY MEDICINE

## 2023-07-03 PROCEDURE — 99291 CRITICAL CARE FIRST HOUR: CPT

## 2023-07-03 PROCEDURE — 99291 CRITICAL CARE FIRST HOUR: CPT | Mod: AI | Performed by: INTERNAL MEDICINE

## 2023-07-03 RX ORDER — LORAZEPAM 2 MG/ML
2 INJECTION INTRAMUSCULAR ONCE
Status: COMPLETED | OUTPATIENT
Start: 2023-07-03 | End: 2023-07-03

## 2023-07-03 RX ORDER — SODIUM CHLORIDE 9 MG/ML
INJECTION, SOLUTION INTRAVENOUS CONTINUOUS
Status: DISCONTINUED | OUTPATIENT
Start: 2023-07-03 | End: 2023-07-04

## 2023-07-03 RX ORDER — LORAZEPAM 2 MG/ML
1 INJECTION INTRAMUSCULAR ONCE
Status: COMPLETED | OUTPATIENT
Start: 2023-07-03 | End: 2023-07-03

## 2023-07-03 RX ORDER — LUMATEPERONE 42 MG/1
42 CAPSULE ORAL DAILY
COMMUNITY

## 2023-07-03 RX ORDER — HYDROXYZINE HYDROCHLORIDE 25 MG/1
25 TABLET, FILM COATED ORAL EVERY 6 HOURS PRN
COMMUNITY

## 2023-07-03 RX ORDER — LABETALOL HYDROCHLORIDE 5 MG/ML
10 INJECTION, SOLUTION INTRAVENOUS EVERY 4 HOURS PRN
Status: DISCONTINUED | OUTPATIENT
Start: 2023-07-03 | End: 2023-07-08 | Stop reason: HOSPADM

## 2023-07-03 RX ORDER — ONDANSETRON 2 MG/ML
4 INJECTION INTRAMUSCULAR; INTRAVENOUS EVERY 4 HOURS PRN
Status: DISCONTINUED | OUTPATIENT
Start: 2023-07-03 | End: 2023-07-08 | Stop reason: HOSPADM

## 2023-07-03 RX ORDER — ONDANSETRON 4 MG/1
4 TABLET, ORALLY DISINTEGRATING ORAL EVERY 4 HOURS PRN
Status: DISCONTINUED | OUTPATIENT
Start: 2023-07-03 | End: 2023-07-08 | Stop reason: HOSPADM

## 2023-07-03 RX ORDER — MIDAZOLAM HYDROCHLORIDE 1 MG/ML
2 INJECTION INTRAMUSCULAR; INTRAVENOUS ONCE
Status: COMPLETED | OUTPATIENT
Start: 2023-07-03 | End: 2023-07-03

## 2023-07-03 RX ORDER — DIPHENHYDRAMINE HYDROCHLORIDE 50 MG/ML
50 INJECTION INTRAMUSCULAR; INTRAVENOUS ONCE
Status: COMPLETED | OUTPATIENT
Start: 2023-07-03 | End: 2023-07-03

## 2023-07-03 RX ORDER — CELECOXIB 100 MG/1
100 CAPSULE ORAL DAILY
COMMUNITY

## 2023-07-03 RX ORDER — BENZTROPINE MESYLATE 1 MG/ML
1 INJECTION INTRAMUSCULAR; INTRAVENOUS ONCE
Status: COMPLETED | OUTPATIENT
Start: 2023-07-03 | End: 2023-07-03

## 2023-07-03 RX ORDER — TRIHEXYPHENIDYL HYDROCHLORIDE 5 MG/1
5 TABLET ORAL 2 TIMES DAILY
COMMUNITY

## 2023-07-03 RX ORDER — BUPROPION HYDROCHLORIDE 150 MG/1
150 TABLET, EXTENDED RELEASE ORAL EVERY MORNING
COMMUNITY

## 2023-07-03 RX ORDER — MIDAZOLAM HYDROCHLORIDE 1 MG/ML
INJECTION INTRAMUSCULAR; INTRAVENOUS
Status: DISCONTINUED
Start: 2023-07-03 | End: 2023-07-03

## 2023-07-03 RX ORDER — DEXMEDETOMIDINE HYDROCHLORIDE 4 UG/ML
.1-1.5 INJECTION, SOLUTION INTRAVENOUS CONTINUOUS
Status: DISCONTINUED | OUTPATIENT
Start: 2023-07-03 | End: 2023-07-04

## 2023-07-03 RX ORDER — VORTIOXETINE 10 MG/1
10 TABLET, FILM COATED ORAL DAILY
COMMUNITY

## 2023-07-03 RX ADMIN — LORAZEPAM 2 MG: 2 INJECTION INTRAMUSCULAR; INTRAVENOUS at 21:30

## 2023-07-03 RX ADMIN — LORAZEPAM 2 MG: 2 INJECTION INTRAMUSCULAR; INTRAVENOUS at 18:29

## 2023-07-03 RX ADMIN — LORAZEPAM 1 MG: 2 INJECTION INTRAMUSCULAR; INTRAVENOUS at 19:58

## 2023-07-03 RX ADMIN — BENZTROPINE MESYLATE 1 MG: 1 INJECTION INTRAMUSCULAR; INTRAVENOUS at 20:15

## 2023-07-03 RX ADMIN — SODIUM CHLORIDE: 9 INJECTION, SOLUTION INTRAVENOUS at 22:15

## 2023-07-03 RX ADMIN — MIDAZOLAM HYDROCHLORIDE 1 MG: 1 INJECTION INTRAMUSCULAR; INTRAVENOUS at 21:45

## 2023-07-03 RX ADMIN — DIPHENHYDRAMINE HYDROCHLORIDE 50 MG: 50 INJECTION INTRAMUSCULAR; INTRAVENOUS at 19:07

## 2023-07-03 RX ADMIN — DEXMEDETOMIDINE HYDROCHLORIDE 0.2 MCG/KG/HR: 100 INJECTION, SOLUTION INTRAVENOUS at 21:37

## 2023-07-03 RX ADMIN — DIPHENHYDRAMINE HYDROCHLORIDE 50 MG: 50 INJECTION, SOLUTION INTRAMUSCULAR; INTRAVENOUS at 21:00

## 2023-07-03 RX ADMIN — MIDAZOLAM 1 MG: 1 INJECTION, SOLUTION INTRAMUSCULAR; INTRAVENOUS at 21:45

## 2023-07-03 ASSESSMENT — FIBROSIS 4 INDEX: FIB4 SCORE: 0.84

## 2023-07-04 ENCOUNTER — APPOINTMENT (OUTPATIENT)
Dept: RADIOLOGY | Facility: MEDICAL CENTER | Age: 63
DRG: 072 | End: 2023-07-04
Attending: INTERNAL MEDICINE
Payer: COMMERCIAL

## 2023-07-04 ENCOUNTER — APPOINTMENT (OUTPATIENT)
Dept: RADIOLOGY | Facility: MEDICAL CENTER | Age: 63
DRG: 072 | End: 2023-07-04
Attending: EMERGENCY MEDICINE
Payer: COMMERCIAL

## 2023-07-04 PROBLEM — R73.9 HYPERGLYCEMIA: Status: ACTIVE | Noted: 2023-07-04

## 2023-07-04 PROBLEM — E86.0 DEHYDRATION: Status: ACTIVE | Noted: 2023-07-04

## 2023-07-04 PROBLEM — F20.9 SCHIZOPHRENIA (HCC): Status: ACTIVE | Noted: 2023-07-04

## 2023-07-04 LAB
AMMONIA PLAS-SCNC: 30 UMOL/L (ref 11–45)
ANION GAP SERPL CALC-SCNC: 10 MMOL/L (ref 7–16)
BUN SERPL-MCNC: 17 MG/DL (ref 8–22)
CALCIUM SERPL-MCNC: 8.5 MG/DL (ref 8.4–10.2)
CHLORIDE SERPL-SCNC: 111 MMOL/L (ref 96–112)
CO2 SERPL-SCNC: 21 MMOL/L (ref 20–33)
CREAT SERPL-MCNC: 0.63 MG/DL (ref 0.5–1.4)
ERYTHROCYTE [DISTWIDTH] IN BLOOD BY AUTOMATED COUNT: 41.4 FL (ref 35.9–50)
GFR SERPLBLD CREATININE-BSD FMLA CKD-EPI: 108 ML/MIN/1.73 M 2
GLUCOSE SERPL-MCNC: 115 MG/DL (ref 65–99)
HCT VFR BLD AUTO: 42.1 % (ref 42–52)
HGB BLD-MCNC: 14.5 G/DL (ref 14–18)
MAGNESIUM SERPL-MCNC: 2 MG/DL (ref 1.5–2.5)
MCH RBC QN AUTO: 33 PG (ref 27–33)
MCHC RBC AUTO-ENTMCNC: 34.4 G/DL (ref 32.3–36.5)
MCV RBC AUTO: 95.9 FL (ref 81.4–97.8)
PLATELET # BLD AUTO: 265 K/UL (ref 164–446)
PMV BLD AUTO: 8.7 FL (ref 9–12.9)
POTASSIUM SERPL-SCNC: 3.7 MMOL/L (ref 3.6–5.5)
RBC # BLD AUTO: 4.39 M/UL (ref 4.7–6.1)
SODIUM SERPL-SCNC: 142 MMOL/L (ref 135–145)
T PALLIDUM AB SER QL IA: REACTIVE
TSH SERPL DL<=0.005 MIU/L-ACNC: 0.96 UIU/ML (ref 0.38–5.33)
WBC # BLD AUTO: 9.5 K/UL (ref 4.8–10.8)

## 2023-07-04 PROCEDURE — 700102 HCHG RX REV CODE 250 W/ 637 OVERRIDE(OP): Performed by: INTERNAL MEDICINE

## 2023-07-04 PROCEDURE — 71045 X-RAY EXAM CHEST 1 VIEW: CPT

## 2023-07-04 PROCEDURE — 99291 CRITICAL CARE FIRST HOUR: CPT | Performed by: INTERNAL MEDICINE

## 2023-07-04 PROCEDURE — 80048 BASIC METABOLIC PNL TOTAL CA: CPT

## 2023-07-04 PROCEDURE — 700105 HCHG RX REV CODE 258: Performed by: INTERNAL MEDICINE

## 2023-07-04 PROCEDURE — 770001 HCHG ROOM/CARE - MED/SURG/GYN PRIV*

## 2023-07-04 PROCEDURE — A9270 NON-COVERED ITEM OR SERVICE: HCPCS | Performed by: INTERNAL MEDICINE

## 2023-07-04 PROCEDURE — 84443 ASSAY THYROID STIM HORMONE: CPT

## 2023-07-04 PROCEDURE — 86592 SYPHILIS TEST NON-TREP QUAL: CPT

## 2023-07-04 PROCEDURE — 86780 TREPONEMA PALLIDUM: CPT

## 2023-07-04 PROCEDURE — 51798 US URINE CAPACITY MEASURE: CPT

## 2023-07-04 PROCEDURE — 700101 HCHG RX REV CODE 250: Performed by: INTERNAL MEDICINE

## 2023-07-04 PROCEDURE — 94760 N-INVAS EAR/PLS OXIMETRY 1: CPT

## 2023-07-04 PROCEDURE — 700111 HCHG RX REV CODE 636 W/ 250 OVERRIDE (IP): Performed by: INTERNAL MEDICINE

## 2023-07-04 PROCEDURE — 82140 ASSAY OF AMMONIA: CPT

## 2023-07-04 PROCEDURE — 700111 HCHG RX REV CODE 636 W/ 250 OVERRIDE (IP): Mod: JZ | Performed by: INTERNAL MEDICINE

## 2023-07-04 PROCEDURE — 83735 ASSAY OF MAGNESIUM: CPT

## 2023-07-04 PROCEDURE — 70450 CT HEAD/BRAIN W/O DYE: CPT

## 2023-07-04 PROCEDURE — 85027 COMPLETE CBC AUTOMATED: CPT

## 2023-07-04 RX ORDER — HYDROXYZINE HYDROCHLORIDE 25 MG/1
25 TABLET, FILM COATED ORAL EVERY 6 HOURS PRN
Status: DISCONTINUED | OUTPATIENT
Start: 2023-07-04 | End: 2023-07-08 | Stop reason: HOSPADM

## 2023-07-04 RX ORDER — LORAZEPAM 2 MG/ML
2 INJECTION INTRAMUSCULAR
Status: DISCONTINUED | OUTPATIENT
Start: 2023-07-04 | End: 2023-07-04

## 2023-07-04 RX ORDER — HALOPERIDOL 5 MG/ML
5 INJECTION INTRAMUSCULAR
Status: DISCONTINUED | OUTPATIENT
Start: 2023-07-04 | End: 2023-07-06

## 2023-07-04 RX ORDER — ENOXAPARIN SODIUM 100 MG/ML
40 INJECTION SUBCUTANEOUS DAILY
Status: DISCONTINUED | OUTPATIENT
Start: 2023-07-04 | End: 2023-07-08 | Stop reason: HOSPADM

## 2023-07-04 RX ADMIN — DEXMEDETOMIDINE HYDROCHLORIDE 0.9 MCG/KG/HR: 100 INJECTION, SOLUTION INTRAVENOUS at 09:44

## 2023-07-04 RX ADMIN — HALOPERIDOL LACTATE 5 MG: 5 INJECTION, SOLUTION INTRAMUSCULAR at 15:16

## 2023-07-04 RX ADMIN — SODIUM CHLORIDE: 9 INJECTION, SOLUTION INTRAVENOUS at 09:45

## 2023-07-04 RX ADMIN — SODIUM CHLORIDE: 9 INJECTION, SOLUTION INTRAVENOUS at 14:18

## 2023-07-04 RX ADMIN — SODIUM CHLORIDE: 9 INJECTION, SOLUTION INTRAVENOUS at 05:01

## 2023-07-04 RX ADMIN — LORAZEPAM 2 MG: 2 INJECTION INTRAMUSCULAR; INTRAVENOUS at 05:36

## 2023-07-04 RX ADMIN — SODIUM CHLORIDE: 9 INJECTION, SOLUTION INTRAVENOUS at 17:43

## 2023-07-04 RX ADMIN — SODIUM CHLORIDE: 9 INJECTION, SOLUTION INTRAVENOUS at 00:14

## 2023-07-04 RX ADMIN — SODIUM CHLORIDE: 9 INJECTION, SOLUTION INTRAVENOUS at 21:37

## 2023-07-04 RX ADMIN — DEXMEDETOMIDINE HYDROCHLORIDE 0.6 MCG/KG/HR: 100 INJECTION, SOLUTION INTRAVENOUS at 05:02

## 2023-07-04 RX ADMIN — HYDROXYZINE HYDROCHLORIDE 25 MG: 25 TABLET, FILM COATED ORAL at 16:03

## 2023-07-04 ASSESSMENT — PAIN DESCRIPTION - PAIN TYPE
TYPE: ACUTE PAIN

## 2023-07-04 ASSESSMENT — COGNITIVE AND FUNCTIONAL STATUS - GENERAL
EATING MEALS: A LOT
STANDING UP FROM CHAIR USING ARMS: A LOT
DRESSING REGULAR UPPER BODY CLOTHING: A LOT
DAILY ACTIVITIY SCORE: 12
HELP NEEDED FOR BATHING: A LOT
DRESSING REGULAR LOWER BODY CLOTHING: A LOT
SUGGESTED CMS G CODE MODIFIER DAILY ACTIVITY: CL
TOILETING: A LOT
CLIMB 3 TO 5 STEPS WITH RAILING: A LOT
WALKING IN HOSPITAL ROOM: A LOT
TURNING FROM BACK TO SIDE WHILE IN FLAT BAD: A LOT
MOBILITY SCORE: 12
PERSONAL GROOMING: A LOT
MOVING TO AND FROM BED TO CHAIR: A LOT
MOVING FROM LYING ON BACK TO SITTING ON SIDE OF FLAT BED: A LOT
SUGGESTED CMS G CODE MODIFIER MOBILITY: CL

## 2023-07-04 ASSESSMENT — FIBROSIS 4 INDEX
FIB4 SCORE: .939417387156001244
FIB4 SCORE: 1.05

## 2023-07-04 NOTE — PROGRESS NOTES
Handoff report give to SHANTI Whitaker. Pts clothing and shoes were transferred down with the pt at this time. Pts medications were also sent down to room 104 and given to the nurse.

## 2023-07-04 NOTE — ED NOTES
Med rec completed per meds at bedside.   Preferred pharmacy - [facility]     Carmen Chau, OzzieD

## 2023-07-04 NOTE — PROGRESS NOTES
4 Eyes Skin Assessment Completed by SHANTI Gomez and SHANTI Gallardo.    Head WDL  Ears WDL  Nose WDL  Mouth WDL  Neck WDL  Breast/Chest WDL  Shoulder Blades WDL  Spine WDL  (R) Arm/Elbow/Hand WDL  (L) Arm/Elbow/Hand WDL  Abdomen WDL  Groin WDL  Scrotum/Coccyx/Buttocks Redness and Blanching  (R) Leg WDL  (L) Leg WDL  (R) Heel/Foot/Toe WDL  (L) Heel/Foot/Toe WDL          Devices In Places ECG, Blood Pressure Cuff, Pulse Ox, SCD's, and Nasal Cannula      Interventions In Place NC W/Ear Foams, Pillows, Q2 Turns, Low Air Loss Mattress, Heels Loaded W/Pillows, and Pressure Redistribution Mattress    Possible Skin Injury No    Pictures Uploaded Into Epic N/A  Wound Consult Placed N/A  RN Wound Prevention Protocol Ordered No     Patient arrived to ICU via gurney. 4 point restraints continued with MD order. Precedex titrated down to 0.3 mcg/kg/hr. NS continued at 200/hr. Both IV patent. O2 weaned down to 2L NC. Q15 vitals in place. Patient changed to gown and applied socks and SCDs. Condom catheter in place. Patient noted to have pinpoint and sluggish pupils. Patient withdraws to pain. GCS of 7 upon initial assessment. Patient lightly sedated and unable to answer admission questions.

## 2023-07-04 NOTE — ED NOTES
Patient started to become restless again and agitated. Informed the assigned ER physician. Put patient on siderail pads and safety  socks but patient removed everything attached on him.

## 2023-07-04 NOTE — H&P
Hospital Medicine History & Physical Note    Date of Service  7/3/2023    Primary Care Physician  Pcp Pt States None    Consultants  None    Specialist Names: None    Code Status  Full Code    Chief Complaint  Chief Complaint   Patient presents with    ALOC     Per caregiver, pt has been mumbling incoherently, constant body movements, unable to sit still x 4 days       History of Presenting Illness  Brian Durham is a 62 y.o. male who presented 7/3/2023 with altered mental status.  Patient does have a history of schizophrenia, he is unable to give us any information on his medications however pharmacy did look in the multiple medications that appear new since April with 2 providers ordering them.  Patient does have a caregiver in a group home, states he has not slept in the last 4 nights, has been more more confused over the last couple days.  Initially upon arrival, he was agitated, difficulty holding still however he has drastically worsened.  Patient is significantly delirious.  Patient has received multiple medications at this point in time, remains agitated.  Unable to obtain any information from him, information obtained from the chart.  I did discuss the case including labs and imaging with the ER physician.    I discussed the plan of care with bedside RN.    Review of Systems  Review of Systems   Unable to perform ROS: Acuity of condition       Past Medical History   has a past medical history of Arthritis, Cold (1/1/2012), Degenerative disc disease, Hepatitis B, Pain (12-30-11), and Psychiatric disorder.    Surgical History   has a past surgical history that includes other and cervical disk and fusion anterior (1/16/2012).     Family History  family history is not on file.   Family history reviewed with patient. There is no family history that is pertinent to the chief complaint.     Social History   reports that he has been smoking cigarettes. He has been smoking an average of .25 packs per day. He  "has never used smokeless tobacco. He reports that he does not drink alcohol and does not use drugs.    Allergies  Allergies   Allergen Reactions    Paxil [Paroxetine]      \"swollen throat\"    Shellfish Allergy Rash     \"My heart - very painful.\"       Medications  Prior to Admission Medications   Prescriptions Last Dose Informant Patient Reported? Taking?   Lumateperone Tosylate (CAPLYTA) 42 MG Cap unk  Yes Yes   Sig: Take 42 mg by mouth every day.   Vortioxetine HBr (TRINTELLIX) 10 MG Tab unk  Yes Yes   Sig: Take 10 mg by mouth every day.   amphetamine-dextroamphetamine (ADDERALL) 20 MG TABS unk  Yes No   Sig: Take 30 mg by mouth every morning.   buPROPion SR (WELLBUTRIN-SR) 150 MG TABLET SR 12 HR sustained-release tablet unk  Yes Yes   Sig: Take 150 mg by mouth every morning.   celecoxib (CELEBREX) 100 MG Cap unk  Yes Yes   Sig: Take 100 mg by mouth every day.   hydrOXYzine HCl (ATARAX) 25 MG Tab unk  Yes Yes   Sig: Take 25 mg by mouth every 6 hours as needed for Itching.   ibuprofen (MOTRIN) 800 MG TABS unk  No No   Sig: Take 1 Tab by mouth every 8 hours as needed (pain).   trihexyphenidyl (ARTANE) 5 MG Tab unk  Yes Yes   Sig: Take 5 mg by mouth 2 times a day.      Facility-Administered Medications: None       Physical Exam  Temp:  [36.2 °C (97.2 °F)] 36.2 °C (97.2 °F)  Pulse:  [52-91] 88  Resp:  [19-22] 22  BP: (130-144)/() 139/74  SpO2:  [93 %-100 %] 100 %  Blood Pressure: 139/74   Temperature: 36.2 °C (97.2 °F)   Pulse: 88   Respiration: (!) 22   Pulse Oximetry: 100 %       Physical Exam  Vitals and nursing note reviewed.   Constitutional:       General: He is in acute distress.      Appearance: He is well-developed. He is diaphoretic. He is not toxic-appearing.   HENT:      Head: Normocephalic and atraumatic.      Right Ear: External ear normal.      Left Ear: External ear normal.      Nose: Nose normal. No congestion or rhinorrhea.      Mouth/Throat:      Mouth: Mucous membranes are dry.      Pharynx: " No oropharyngeal exudate.   Eyes:      General:         Right eye: No discharge.         Left eye: No discharge.   Neck:      Trachea: No tracheal deviation.   Cardiovascular:      Rate and Rhythm: Regular rhythm. Tachycardia present.      Heart sounds: No murmur heard.     No friction rub. No gallop.   Pulmonary:      Effort: Pulmonary effort is normal. No respiratory distress.      Breath sounds: Normal breath sounds. No stridor. No wheezing or rales.   Abdominal:      General: Bowel sounds are normal. There is no distension.      Palpations: Abdomen is soft.   Musculoskeletal:      Cervical back: Neck supple. No edema or erythema.      Right lower leg: No edema.      Left lower leg: No edema.   Lymphadenopathy:      Cervical: No cervical adenopathy.   Skin:     General: Skin is warm.      Findings: No erythema or rash.   Neurological:      Comments: Somnolent yet agitated, nonverbal    Psychiatric:         Speech: He is noncommunicative.         Behavior: Behavior is agitated.         Laboratory:  Recent Labs     07/03/23  1823   WBC 10.1   RBC 4.78   HEMOGLOBIN 15.7   HEMATOCRIT 45.5   MCV 95.2   MCH 32.8   MCHC 34.5   RDW 41.9   PLATELETCT 295   MPV 8.6*     Recent Labs     07/03/23  1823   SODIUM 142   POTASSIUM 4.0   CHLORIDE 106   CO2 23   GLUCOSE 130*   BUN 19   CREATININE 0.81   CALCIUM 9.6     Recent Labs     07/03/23  1823   ALTSGPT 27   ASTSGOT 24   ALKPHOSPHAT 76   TBILIRUBIN 0.6   GLUCOSE 130*         No results for input(s): NTPROBNP in the last 72 hours.      No results for input(s): TROPONINT in the last 72 hours.    Imaging:  CT-HEAD W/O    (Results Pending)   DX-CHEST-LIMITED (1 VIEW)    (Results Pending)       EKG:  I have personally reviewed the images and compared with prior images.    Assessment/Plan:  Justification for Admission Status  I anticipate this patient will require at least two midnights for appropriate medical management, necessitating inpatient admission because acutely  room    Patient will need a ICU (Adult and Pediatrics) bed on CARDIOLOGY service .  The need is secondary to acute delirium.    * Acute delirium- (present on admission)  Assessment & Plan  With severe acute encephalopathy, random movements suggesting dystonia  At this time, is difficult to ascertain what medications he is taking and for how long however it does appear there have been multiple new medications since April by 2 providers  We will hold all psychiatric medications at this time  Patient received multiple sedatives, starting to affect his respiratory status, will transition to Precedex drip  Patient did test positive for methamphetamine however he is on Adderall to be causing a false positive, however he did significantly worsen while in the ER, acutely, could potentially also abuse drugs  Discussed the case with pharmacy, will avoid antipsychotics overnight, will use Precedex drip with Ativan pushes, if a second drip is needed, will give low-dose Versed drip, if respiratory status worsens, certainly may need intubation but at this point time he is protecting his airway    Patient is critically ill.   The patient continues to have : acute delirium   The vital organ system that is effected is the: Neuro initially  If untreated there is a high chance of deterioration into: Worsening delirium  The critical care that I am providing today is: Precedex drip  The critical care that has been undertaken is medically complex.   There has been no overlap in critical care time.  Critical care time not including procedures, no overlap: 45 minutes    Dehydration- (present on admission)  Assessment & Plan  Significant dehydration, start IV fluids  Repeat BMP in the morning    Hyperglycemia- (present on admission)  Assessment & Plan  Mild, patient be n.p.o., no need for coverage at this time  Certainly would increase the risk of hypoglycemia and unable to adequately determine if there are any symptoms    Schizophrenia  (HCC)- (present on admission)  Assessment & Plan  Patient is on multiple medications at baseline, all of these will be held for now        VTE prophylaxis: SCDs/TEDs

## 2023-07-04 NOTE — CARE PLAN
The patient is Unstable - High likelihood or risk of patient condition declining or worsening    Shift Goals  Clinical Goals: restraint safety and ROM, wean off O2 and keep spO2>92%, monitor HR keep >55  Patient Goals: TIMOTEO  Family Goals: TIMOTEO    Progress made toward(s) clinical / shift goals:      Problem: Skin Integrity  Goal: Skin integrity is maintained or improved  Description: Target End Date:  Prior to discharge or change in level of care    Document interventions on Skin Risk/Joselito flowsheet groups and corresponding LDA    1.  Assess and monitor skin integrity, appearance and/or temperature  2.  Assess risk factors for impaired skin integrity and/or pressures ulcers  3.  Implement precautions to protect skin integrity in collaboration with interdisciplinary team  4.  Implement pressure ulcer prevention protocol if at risk for skin breakdown  5.  Confirm wound care consult if at risk for skin breakdown  6.  Ensure patient use of pressure relieving devices  (Low air loss bed, waffle overlay, heel protectors, ROHO cushion, etc)  Outcome: Progressing     Problem: Fall Risk  Goal: Patient will remain free from falls  Description: Target End Date:  Prior to discharge or change in level of care    Document interventions on the Nellie Malhotra Fall Risk Assessment    1.  Assess for fall risk factors  2.  Implement fall precautions  Outcome: Progressing     Problem: Hemodynamics  Goal: Patient's hemodynamics, fluid balance and neurologic status will be stable or improve  Description: Target End Date:  Prior to discharge or change in level of care    Document on Assessment and I/O flowsheet templates    1.  Monitor vital signs, pulse oximetry and cardiac monitor per provider order and/or policy  2.  Maintain blood pressure per provider order  3.  Hemodynamic monitoring per provider order  4.  Manage IV fluids and IV infusions  5.  Monitor intake and output  6.  Daily weights per unit policy or provider order  7.  Assess  peripheral pulses and capillary refill  8.  Assess color and body temperature  9.  Position patient for maximum circulation/cardiac output  10. Monitor for signs/symptoms of excessive bleeding  11. Assess mental status, restlessness and changes in level of consciousness  12. Monitor temperature and report fever or hypothermia to provider immediately. Consideration of targeted temperature management.  Outcome: Progressing     Problem: Respiratory  Goal: Patient will achieve/maintain optimum respiratory ventilation and gas exchange  Description: Target End Date:  Prior to discharge or change in level of care    Document on Assessment flowsheet    1.  Assess and monitor rate, rhythm, depth and effort of respiration  2.  Breath sounds assessed qshift and/or as needed  3.  Assess O2 saturation, administer/titrate oxygen as ordered  4.  Position patient for maximum ventilatory efficiency  5.  Turn, cough, and deep breath with splinting to improve effectiveness  6.  Collaborate with RT to administer medication/treatments per order  7.  Encourage use of incentive spirometer and encourage patient to cough after use and utilize splinting techniques if applicable  8.  Airway suctioning  9.  Monitor sputum production for changes in color, consistency and frequency  10. Perform frequent oral hygiene  11. Alternate physical activity with rest periods  Outcome: Progressing     Problem: Safety - Medical Restraint  Goal: Remains free of injury from restraints (Restraint for Interference with Medical Device)  Description: INTERVENTIONS:  1. Determine that other, less restrictive measures have been tried or would not be effective before applying the restraint  2. Evaluate the patient's condition at the time of restraint application  3. Educate patient/family regarding the reason for restraint  4. Q2H: Monitor safety, psychosocial status, comfort, circulation, respiratory status, LOC, nutrition and hydration  Outcome:  Progressing  Goal: Free from restraint(s) (Restraint for Interference with Medical Device)  Description: INTERVENTIONS:  1.  ONCE/SHIFT or MINIMUM Q12H: Assess and document the continuing need for restraints  2.  Q24H: Continued use of restraint requires LIP to perform face to face examination and written order  3.  Identify and implement measures to help patient regain control  4.  Educate patient/family on discontinuation criteria   5.  Assess patient's understanding and retention of education provided  6.  Assess readiness for release & initiate progressive release per protocol  7.  Identify and document criteria for restraints  Outcome: Not Progressing       Patient is not progressing towards the following goals:      Problem: Knowledge Deficit - Standard  Goal: Patient and family/care givers will demonstrate understanding of plan of care, disease process/condition, diagnostic tests and medications  Description: Target End Date:  1-3 days or as soon as patient condition allows    Document in Patient Education    1.  Patient and family/caregiver oriented to unit, equipment, visitation policy and means for communicating concern  2.  Complete/review Learning Assessment  3.  Assess knowledge level of disease process/condition, treatment plan, diagnostic tests and medications  4.  Explain disease process/condition, treatment plan, diagnostic tests and medications  Outcome: Not Progressing     Problem: Psychosocial  Goal: Patient's level of anxiety will decrease  Description: Target End Date:  1-3 days or as soon as patient condition allows    1.  Collaborate with patient and family/caregiver to identify triggers and develop strategies to cope with anxiety  2.  Implement stimuli reduction, calming techniques  3.  Pharmacologic management per provider order  4.  Encourage patient/family/care giver participation  5.  Collaborate with interdisciplinary team including Psychologist or Behavioral Health Team as  needed  Outcome: Not Progressing  Goal: Patient's ability to verbalize feelings about condition will improve  Description: Target End Date:  Prior to discharge or change in level of care    1.  Discuss coping with medical condition and its effects  2.  Encourage patient participation in care  3.  Encourage acknowledgement of body changes and accompanying emotions  4.  Perform depression screening  Outcome: Not Progressing  Goal: Patient's ability to re-evaluate and adapt role responsibilities will improve  Description: Target End Date:  Prior to discharge or change in level of care    1.  Assess family support  2.  Encourage support system participation in care  3.  Encouraged verbalization of feelings regarding caregiver responsibilities  4.  Discuss changes in role and responsibilities caused by patient's condition  Outcome: Not Progressing  Goal: Patient and family will demonstrate ability to cope with life altering diagnosis and/or procedure  Description: Target End Date:  1-3 days or as soon as patient condition allows    1. Expect initial shock and disbelief following diagnosis of cancer and traumatizing procedures (disfiguring surgery, colostomy, amputation).  2.  Assess patient and family/caregiver for stage of grief currently being experienced. Explain process as appropriate.  3.  Provide open, nonjudgmental environment. Use therapeutic communication skills of Active-Listening, acknowledgment, and so on.  4.  Encourage verbalization of thoughts or concerns and accept expressions of sadness, anger, rejection. Acknowledge normality of these feelings  5.  Be aware of mood swings, hostility, and other acting-out behavior. Set limits on inappropriate behavior, redirect negative thinking  6.  Be aware of debilitating depression. Ask patient direct questions about state of mind.  7.  Visit frequently and provide physical contact as appropriate, or provide frequent phone support as appropriate for setting. Arrange  for care provider and support person to stay with patient as needed  8.  Reinforce teaching regarding disease process and treatments and provide information as appropriate about dying. Be honest; do not give false hope while providing emotional support  9.  Review past life experiences, role changes, and coping skills. Talk about things that interest the patient  Outcome: Not Progressing  Goal: Spiritual and cultural needs incorporated into hospitalization  Description: Target End Date:  End of day 1    1.  Encourage verbalization of feelings, concerns, expectations and needs  2.  Collaborate with Case Management/  3.  Collaborate with Pastoral Care to meet spiritual needs  Outcome: Not Progressing     Problem: Safety - Medical Restraint  Goal: Free from restraint(s) (Restraint for Interference with Medical Device)  Description: INTERVENTIONS:  1.  ONCE/SHIFT or MINIMUM Q12H: Assess and document the continuing need for restraints  2.  Q24H: Continued use of restraint requires LIP to perform face to face examination and written order  3.  Identify and implement measures to help patient regain control  4.  Educate patient/family on discontinuation criteria   5.  Assess patient's understanding and retention of education provided  6.  Assess readiness for release & initiate progressive release per protocol  7.  Identify and document criteria for restraints  Outcome: Not Progressing

## 2023-07-04 NOTE — PROGRESS NOTES
12-hour chart check complete.    Monitor Summary  Rhythm: SB   Rate: 56-70  Ectopy: Rare PVC/PAC  Measurements: .18/.10/.44

## 2023-07-04 NOTE — ED NOTES
Started on Precedex infusion from 0.2 mcg/kg/hr then titrate it by 0.2 every 10mins until reach the max rate at 1.2 mcg/kg/hr.

## 2023-07-04 NOTE — PROGRESS NOTES
Update    Patient now awake and following commands  C/o anxiety   RPR is positive    Plan:  -Atarax PRN for anxiety  -resumed one of his home psych meds; others to be re-added sequentially  -PRN Haldol   -RPR was positive; confirmatory test sent reflexively, will need ID consult tomorrow    Dispo: med/surg    Rafa Qureshi D.O.

## 2023-07-04 NOTE — CARE PLAN
The patient is Watcher - Medium risk of patient condition declining or worsening    Shift Goals  Clinical Goals: Titrate down sedative medication  Patient Goals: TIMOTEO  Family Goals: TIMOTEO    Progress made toward(s) clinical / shift goals:    Problem: Skin Integrity  Goal: Skin integrity is maintained or improved  Description: Target End Date:  Prior to discharge or change in level of care    Document interventions on Skin Risk/Joselito flowsheet groups and corresponding LDA    1.  Assess and monitor skin integrity, appearance and/or temperature  2.  Assess risk factors for impaired skin integrity and/or pressures ulcers  3.  Implement precautions to protect skin integrity in collaboration with interdisciplinary team  4.  Implement pressure ulcer prevention protocol if at risk for skin breakdown  5.  Confirm wound care consult if at risk for skin breakdown  6.  Ensure patient use of pressure relieving devices  (Low air loss bed, waffle overlay, heel protectors, ROHO cushion, etc)  Outcome: Progressing     Problem: Fall Risk  Goal: Patient will remain free from falls  Description: Target End Date:  Prior to discharge or change in level of care    Document interventions on the Nellie Malhotra Fall Risk Assessment    1.  Assess for fall risk factors  2.  Implement fall precautions  Outcome: Progressing     Problem: Hemodynamics  Goal: Patient's hemodynamics, fluid balance and neurologic status will be stable or improve  Description: Target End Date:  Prior to discharge or change in level of care    Document on Assessment and I/O flowsheet templates    1.  Monitor vital signs, pulse oximetry and cardiac monitor per provider order and/or policy  2.  Maintain blood pressure per provider order  3.  Hemodynamic monitoring per provider order  4.  Manage IV fluids and IV infusions  5.  Monitor intake and output  6.  Daily weights per unit policy or provider order  7.  Assess peripheral pulses and capillary refill  8.  Assess color and  body temperature  9.  Position patient for maximum circulation/cardiac output  10. Monitor for signs/symptoms of excessive bleeding  11. Assess mental status, restlessness and changes in level of consciousness  12. Monitor temperature and report fever or hypothermia to provider immediately. Consideration of targeted temperature management.  Outcome: Progressing     Problem: Respiratory  Goal: Patient will achieve/maintain optimum respiratory ventilation and gas exchange  Description: Target End Date:  Prior to discharge or change in level of care    Document on Assessment flowsheet    1.  Assess and monitor rate, rhythm, depth and effort of respiration  2.  Breath sounds assessed qshift and/or as needed  3.  Assess O2 saturation, administer/titrate oxygen as ordered  4.  Position patient for maximum ventilatory efficiency  5.  Turn, cough, and deep breath with splinting to improve effectiveness  6.  Collaborate with RT to administer medication/treatments per order  7.  Encourage use of incentive spirometer and encourage patient to cough after use and utilize splinting techniques if applicable  8.  Airway suctioning  9.  Monitor sputum production for changes in color, consistency and frequency  10. Perform frequent oral hygiene  11. Alternate physical activity with rest periods  Outcome: Progressing     Problem: Risk for Aspiration  Goal: Patient's risk for aspiration will be absent or decrease  Description: Target End Date:  Prior to discharge or change in level of care    1.   Complete dysphagia screening on admission  2.   NPO until dysphagia screening complete or medically cleared  3.   Collaborate with Speech Therapy, Clinical Dietitian and interdisciplinary team  4.   Implement aspiration precautions  5.   Assist patient up to chair for meals  6.   Elevate head of bed 90 degrees if patient is unable to get out of bed  7.   Encourage small bites  8.   Ensure foods/liquids are of appropriate consistency  9.    Assess for any signs/symptoms of aspiration  10. Assess breath sounds and vital signs after oral intake  Outcome: Progressing     Problem: Urinary - Renal Perfusion  Goal: Ability to achieve and maintain adequate renal perfusion and functioning will improve  Description: Target End Date:  Prior to discharge or change in level of care    Document on I/O and Assessment flowsheet    1.  Urine output will remain greater than 0.5ml/Kg/HR  2.  Monitor amount and/or characteristics of urine per order/policy. Specific gravity per order/policy  3.  Assess signs and symptoms of renal dysfunction  Outcome: Progressing     Problem: Venous Thromboembolism (VTE) Prevention  Goal: The patient will remain free from venous thromboembolism (VTE)  Description: Target End Date:  Prior to discharge or change in level of care    1.  VTE prophylaxis assessed and initiated by day 1 or 2 of hospital admission (pharmacologic or mechanical). Contraindication documented by provider.  2.  Ensure patient wears mechanical prophylaxis when in bed or chair if ordered  3.  Remove mechanical prophylaxis at least once per shift for skin checks  4.  Encourage patient to perform ankle flex, foot rotation and knee exercises in addition to other prophylactic measures every hour while awake  5.  Encourage ambulation/mobilization at level directed by Physical Therapy in collaboration with interdisciplinary team  6.  Administer prophylactic medication per order  Outcome: Progressing       Patient is not progressing towards the following goals:      Problem: Knowledge Deficit - Standard  Goal: Patient and family/care givers will demonstrate understanding of plan of care, disease process/condition, diagnostic tests and medications  Description: Target End Date:  1-3 days or as soon as patient condition allows    Document in Patient Education    1.  Patient and family/caregiver oriented to unit, equipment, visitation policy and means for communicating  concern  2.  Complete/review Learning Assessment  3.  Assess knowledge level of disease process/condition, treatment plan, diagnostic tests and medications  4.  Explain disease process/condition, treatment plan, diagnostic tests and medications  Outcome: Not Progressing

## 2023-07-04 NOTE — ED NOTES
Patient started to response to precedex infusion. Saturating well at 96% on face mask of 8 L/min. Patient breathing thru his nose.

## 2023-07-04 NOTE — PROGRESS NOTES
"Critical Care Progress Note    Date of admission  7/3/2023    Chief Complaint  From Dr. Hood's note: \"Brian Durham is a 62 y.o. male who presented 7/3/2023 with altered mental status.  Patient does have a history of schizophrenia, he is unable to give us any information on his medications however pharmacy did look in the multiple medications that appear new since April with 2 providers ordering them.  Patient does have a caregiver in a group home, states he has not slept in the last 4 nights, has been more more confused over the last couple days.  Initially upon arrival, he was agitated, difficulty holding still however he has drastically worsened.  Patient is significantly delirious.  Patient has received multiple medications at this point in time, remains agitated.  Unable to obtain any information from him, information obtained from the chart.  I did discuss the case including labs and imaging with the ER physician.\"    Hospital Course  7/3- admitted to ICU for precedex drip    Interval Problem Update  Reviewed last 24 hour events:  24h events: Required heavy sedation overnight. A&Ox0.   Tubes, Lines, Drains: PIVs  Drips: Precedex 1.3  Nutrition: NPO  Notable lab trends: None  CXR: Interstitial prominence   CT head: unremarkable   Tmax: afebrile  ProCal: n/a  Antibiotics: n/a  Cultures: n/a  PPX: Enoxaparin  BM regimen: MiraLAX, senna-docusate, milk magnesia, bisacodyl  Last BM: prior to admit      Review of Systems  Review of Systems   Unable to perform ROS: Mental acuity        Vital Signs for last 24 hours   Temp:  [36.2 °C (97.2 °F)-36.7 °C (98.1 °F)] 36.2 °C (97.2 °F)  Pulse:  [52-96] 70  Resp:  [14-60] 20  BP: ()/() 104/67  SpO2:  [89 %-100 %] 92 %      Physical Exam   Physical Exam    Medications  Current Facility-Administered Medications   Medication Dose Route Frequency Provider Last Rate Last Admin    LORazepam (Ativan) injection 2 mg  2 mg Intravenous Q2HRS PRN Olman Thao, " "D.ODesiree   2 mg at 07/04/23 0536    dexmedetomidine (PRECEDEX) 400 mcg/100mL NS premix infusion  0.1-1.5 mcg/kg/hr (Ideal) Intravenous Continuous Olman Thao D.O. 23.7 mL/hr at 07/04/23 0639 1.3 mcg/kg/hr at 07/04/23 0639    NS infusion   Intravenous Continuous Olman Thao D.O. 200 mL/hr at 07/04/23 0501 New Bag at 07/04/23 0501    labetalol (Normodyne/Trandate) injection 10 mg  10 mg Intravenous Q4HRS PRN Olman Taho D.O.        ondansetron (Zofran) syringe/vial injection 4 mg  4 mg Intravenous Q4HRS PRN Olman Thao D.O.        ondansetron (Zofran ODT) dispertab 4 mg  4 mg Oral Q4HRS PRN Olman Thao D.O.           Fluids    Intake/Output Summary (Last 24 hours) at 7/4/2023 0723  Last data filed at 7/4/2023 0600  Gross per 24 hour   Intake 1453.36 ml   Output 800 ml   Net 653.36 ml       Laboratory          Recent Labs     07/03/23 1823 07/04/23  0255   SODIUM 142 142   POTASSIUM 4.0 3.7   CHLORIDE 106 111   CO2 23 21   BUN 19 17   CREATININE 0.81 0.63   CALCIUM 9.6 8.5     Recent Labs     07/03/23 1823 07/04/23  0255   ALTSGPT 27  --    ASTSGOT 24  --    ALKPHOSPHAT 76  --    TBILIRUBIN 0.6  --    GLUCOSE 130* 115*     Recent Labs     07/03/23 1823 07/04/23  0255   WBC 10.1 9.5   NEUTSPOLYS 48.60  --    LYMPHOCYTES 34.80  --    MONOCYTES 13.50*  --    EOSINOPHILS 2.30  --    BASOPHILS 0.60  --    ASTSGOT 24  --    ALTSGPT 27  --    ALKPHOSPHAT 76  --    TBILIRUBIN 0.6  --      Recent Labs     07/03/23 1823 07/04/23  0255   RBC 4.78 4.39*   HEMOGLOBIN 15.7 14.5   HEMATOCRIT 45.5 42.1   PLATELETCT 295 265       Imaging  CXR personally reviewed: Intervascular prominence may be secondary to vascular crowding from poor inspiratory effort/timing.       Assessment/Plan  Problem list:  #Metabolic encephalopathy - ?drug induced vs. Radha vs. Schizophrenia vs. Other  #Dehydration  #Hyperglycemia  #h/o Schizophrenia      * Acute delirium- (present on admission)  Assessment & Plan  From H&P: \"With " "severe acute encephalopathy, random movements suggesting dystonia  At this time, is difficult to ascertain what medications he is taking and for how long however it does appear there have been multiple new medications since April by 2 providers\"  CT head unremarkable  Patient did test positive for methamphetamine however he is on Adderall   Precedex drip - wean off today and start re-introducing home meds  PRN Haldol if becomes agitated; avoid benzodiazepines   Ammonia and TSH unremarkable; RPR pending     Hyperglycemia- (present on admission)  Assessment & Plan  Mild, patient be n.p.o., no need for coverage at this time  Certainly would increase the risk of hypoglycemia and unable to adequately determine if there are any symptoms     Schizophrenia (HCC)- (present on admission)  Assessment & Plan  Patient is on multiple medications at baseline, all of these will be held for now       I have performed a physical exam and reviewed and updated ROS and Plan today (7/4/2023). In review of yesterday's note (7/3/2023), there are no changes except as documented above.     The patient remains critically ill.  Critical care time = 71 minutes in directly providing and coordinating critical care and extensive data review.  No time overlap and excludes procedures.    Rafa Qureshi, DO  Staff Pulmonologist and Intensivist  [facility]     Please note that this dictation was created using voice recognition software. The accuracy of the dictation is limited to the abilities of the software. I have made every reasonable attempt to correct obvious errors, but I expect that there are errors of grammar and possibly content that I did not discover before finalizing the note.       "

## 2023-07-04 NOTE — ED TRIAGE NOTES
"Chief Complaint   Patient presents with    ALOC     Per caregiver, pt has been mumbling incoherently, constant body movements, unable to sit still x 4 days     /81   Pulse (!) 52   Temp 36.2 °C (97.2 °F) (Temporal)   Resp 19   Ht 1.778 m (5' 10\")   Wt 88.3 kg (194 lb 10.7 oz)   SpO2 97%   BMI 27.93 kg/m²       Pt ambulated to ED w/ caregiver, unable to speak clearly or address Triage questions, mumbling incoherently, having difficulty sitting still.  Hx obtained by caregiver, states has been going on for four days, states is completely abnormal behavior for pt.  Caregiver states pt does not take antibx, \"should not be drinking or taking drugs.\"  Caregiver states went to [facility] today for help, sent to ED for further care.    "

## 2023-07-04 NOTE — PROGRESS NOTES
0300 Back to unit from head CT and CXR, results in chart.    0500 Dr. Thao notified that patient is experiencing urinary retention. Bladder scan: 650 mL. Received orders for pizano catheter. Patient began to sit up in bed and aggressively pull on restraints and attempt to swing at staff. Dr. Thao ordered PRN ativan for severe agitation, restlessness, and aggressiveness. Patient started to be more calm 15 min after ativan administration. Pizano catheter insertion successful with no complications, 800 mL out. Patient's HR remains above 47, on continuous telemetry to monitor. Patient weaned to room air, O2>94%.

## 2023-07-04 NOTE — PROGRESS NOTES
Dr. Qureshi was informed that the pt is syphilis reactive. Dr. Qureshi stated he would be putting in a infectious disease consult. No lesions present at this time per protocol no isolation needed at this time.

## 2023-07-04 NOTE — ASSESSMENT & PLAN NOTE
With severe acute encephalopathy, random movements suggesting dystonia  At this time, is difficult to ascertain what medications he is taking and for how long however it does appear there have been multiple new medications since April by 2 providers  We will hold all psychiatric medications at this time  Patient received multiple sedatives, starting to affect his respiratory status, will transition to Precedex drip  Patient did test positive for methamphetamine however he is on Adderall to be causing a false positive, however he did significantly worsen while in the ER, acutely, could potentially also abuse drugs  Discussed the case with pharmacy, will avoid antipsychotics overnight, will use Precedex drip with Ativan pushes, if a second drip is needed, will give low-dose Versed drip, if respiratory status worsens, certainly may need intubation but at this point time he is protecting his airway    7/5: Patient was transferred out of the ICU yesterday.  The patient is alert and oriented today. The patient was started on his lumateperon yesterday. We will start bupropion and vortioxetine and monitor status.    7/6: I will start the patient on his home dose of Adderal.    7/7: Patient remains alert and oriented.  I have started the patient's home dose of Artane.  Continue to monitor closely.

## 2023-07-04 NOTE — ED NOTES
CT tech informed that patient was combative unable to do the scan. ER doctor informed, Hospitalist  informed and aware and charge also aware.

## 2023-07-04 NOTE — ED PROVIDER NOTES
ED Provider Note    CHIEF COMPLAINT  Chief Complaint   Patient presents with    ALOC     Per caregiver, pt has been mumbling incoherently, constant body movements, unable to sit still x 4 days       EXTERNAL RECORDS REVIEWED  External ED Note ER visit from [facility] and January of this year reviewed.  He was brought in by [facility] for making [place] of wanting to hurt himself.  He has a history of schizoaffective schizophrenia    Patient was seen at [facility] in April for chest wall pain and a fall.  History of smoker.    HPI/ROS  LIMITATION TO HISTORY   Select: Behavior  OUTSIDE HISTORIAN(S):  Caregiver      Brian Durham is a 62 y.o. male who presents for evaluation of altered mental status.  Patient has a history of schizophrenia however he is well controlled.  He takes his medications as prescribed he lives in a group home.  History is obtained from caregiver who is at bedside patient is responsive but really cannot participate in history.  Caregiver says he is really not slept for the last 4 nights.  Maybe an hour or 2 here and there.  He has access to his medications she does not feel that he has taken any erroneously as they are all accounted for.  Unknown if patient did any drugs in the last few days or not.  She does not think so but is unsure.    PAST MEDICAL HISTORY   has a past medical history of Arthritis, Cold (1/1/2012), Degenerative disc disease, Hepatitis B, Pain (12-30-11), and Psychiatric disorder.    SURGICAL HISTORY   has a past surgical history that includes other and cervical disk and fusion anterior (1/16/2012).    FAMILY HISTORY  History reviewed. No pertinent family history.    SOCIAL HISTORY  Social History     Tobacco Use    Smoking status: Every Day     Packs/day: 0.25     Types: Cigarettes    Smokeless tobacco: Never    Tobacco comments:     1/2 pack a day.   Substance and Sexual Activity    Alcohol use: No    Drug use: No    Sexual activity: Not on file  "      CURRENT MEDICATIONS  Home Medications       Reviewed by Carmen Chau PharmD (Pharmacist) on 07/03/23 at 1851  Med List Status: Complete     Medication Last Dose Status   amphetamine-dextroamphetamine (ADDERALL) 20 MG TABS unk Active   buPROPion SR (WELLBUTRIN-SR) 150 MG TABLET SR 12 HR sustained-release tablet unk Active   celecoxib (CELEBREX) 100 MG Cap unk Active   hydrOXYzine HCl (ATARAX) 25 MG Tab unk Active   ibuprofen (MOTRIN) 800 MG TABS unk Active   Lumateperone Tosylate (CAPLYTA) 42 MG Cap unk Active   trihexyphenidyl (ARTANE) 5 MG Tab unk Active   Vortioxetine HBr (TRINTELLIX) 10 MG Tab unk Active                    ALLERGIES  Allergies   Allergen Reactions    Paxil [Paroxetine]      \"swollen throat\"    Shellfish Allergy Rash     \"My heart - very painful.\"       PHYSICAL EXAM  VITAL SIGNS: /81   Pulse (!) 52   Temp 36.2 °C (97.2 °F) (Temporal)   Resp 19   Ht 1.778 m (5' 10\")   Wt 88.3 kg (194 lb 10.7 oz)   SpO2 97%   BMI 27.93 kg/m²    Constitutional: Alert, akathisia was noted throughout the body.  Patient is alert and responsive  HENT: Normocephalic, Atraumatic, Bilateral external ears normal. Nose normal.   Eyes:  Conjunctiva normal, non-icteric.   Heart: Regular rate and rhythm, no murmurs.   Lungs: Non-labored respirations, clear to auscultation  Abdomen: Soft nontender  Skin: Warm, Dry, No erythema, No rash.   Neurologic: Alert, Grossly non-focal.   Psychiatric: Altered constant full body movements but interactive akathisia is noted  Extremities: Intact distal pulses, No edema, No tenderness.       DIAGNOSTIC STUDIES / PROCEDURES  EKG  I have independently interpreted this EKG  Results for orders placed or performed during the hospital encounter of 07/03/23   EKG (NOW)   Result Value Ref Range    Report       [facility] Emergency Dept.    Test Date:  2023-07-03  Pt Name:    SADI GUERRERO                Department: EDSM  MRN:        [id number]                 "      [facility]  Gender:     Male                         Technician: [id number]  :        1960                   Requested By:JERICHO CISNEROS  Order #:    [id number]                    Reading MD: JERICHO CISNEROS    Measurements  Intervals                                Axis  Rate:       83                           P:          80  CT:         153                          QRS:        78  QRSD:       86                           T:          49  QT:         374  QTc:        440    Interpretive Statements  Sinus rhythm  Compared to ECG 04/15/2023 18:45:17  No significant changes  :Impression: Normal sinus QTc is normal.  QRS is normal.  Electronically Signed On 2023 22:21:07 PDT by JERICHO CISNEROS           LABS  Results for orders placed or performed during the hospital encounter of 23   URINE DRUG SCREEN (TRIAGE)   Result Value Ref Range    Amphetamines Urine Positive (A) Negative    Barbiturates Negative Negative    Benzodiazepines Negative Negative    Cocaine Metabolite Negative Negative    Methadone Negative Negative    Opiates Negative Negative    Oxycodone Negative Negative    Phencyclidine -Pcp Negative Negative    Propoxyphene Negative Negative    Cannabinoid Metab Negative Negative   CBC WITH DIFFERENTIAL   Result Value Ref Range    WBC 10.1 4.8 - 10.8 K/uL    RBC 4.78 4.70 - 6.10 M/uL    Hemoglobin 15.7 14.0 - 18.0 g/dL    Hematocrit 45.5 42.0 - 52.0 %    MCV 95.2 81.4 - 97.8 fL    MCH 32.8 27.0 - 33.0 pg    MCHC 34.5 32.3 - 36.5 g/dL    RDW 41.9 35.9 - 50.0 fL    Platelet Count 295 164 - 446 K/uL    MPV 8.6 (L) 9.0 - 12.9 fL    Neutrophils-Polys 48.60 44.00 - 72.00 %    Lymphocytes 34.80 22.00 - 41.00 %    Monocytes 13.50 (H) 0.00 - 13.40 %    Eosinophils 2.30 0.00 - 6.90 %    Basophils 0.60 0.00 - 1.80 %    Immature Granulocytes 0.20 0.00 - 0.90 %    Nucleated RBC 0.00 0.00 - 0.20 /100 WBC    Neutrophils (Absolute) 4.89 1.82 - 7.42 K/uL    Lymphs (Absolute) 3.50 1.00 - 4.80 K/uL     Monos (Absolute) 1.36 (H) 0.00 - 0.85 K/uL    Eos (Absolute) 0.23 0.00 - 0.51 K/uL    Baso (Absolute) 0.06 0.00 - 0.12 K/uL    Immature Granulocytes (abs) 0.02 0.00 - 0.11 K/uL    NRBC (Absolute) 0.00 K/uL   COMP METABOLIC PANEL   Result Value Ref Range    Sodium 142 135 - 145 mmol/L    Potassium 4.0 3.6 - 5.5 mmol/L    Chloride 106 96 - 112 mmol/L    Co2 23 20 - 33 mmol/L    Anion Gap 13.0 7.0 - 16.0    Glucose 130 (H) 65 - 99 mg/dL    Bun 19 8 - 22 mg/dL    Creatinine 0.81 0.50 - 1.40 mg/dL    Calcium 9.6 8.4 - 10.2 mg/dL    AST(SGOT) 24 12 - 45 U/L    ALT(SGPT) 27 2 - 50 U/L    Alkaline Phosphatase 76 30 - 99 U/L    Total Bilirubin 0.6 0.1 - 1.5 mg/dL    Albumin 4.6 3.2 - 4.9 g/dL    Total Protein 7.4 6.0 - 8.2 g/dL    Globulin 2.8 1.9 - 3.5 g/dL    A-G Ratio 1.6 g/dL   Acetaminophen Level   Result Value Ref Range    Acetaminophen -Tylenol <5.0 (L) 10.0 - 30.0 ug/mL   SALICYLATE LEVEL   Result Value Ref Range    Salicylates, Quant. <1.0 (L) 15.0 - 25.0 mg/dL   ETHYL ALCOHOL (BLOOD)   Result Value Ref Range    Diagnostic Alcohol <10.1 <10.1 mg/dL   CORRECTED CALCIUM   Result Value Ref Range    Correct Calcium 9.1 8.5 - 10.5 mg/dL   ESTIMATED GFR   Result Value Ref Range    GFR (CKD-EPI) 100 >60 mL/min/1.73 m 2   EKG (NOW)   Result Value Ref Range    Report       [facility] Emergency Dept.    Test Date:  2023  Pt Name:    SADI GUERRERO                Department: [facility]  MRN:        [id number]                      [facility]  Gender:     Male                         Technician: [id number]  :        1960                   Requested By:JERICHO CISNEROS  Order #:    [id number]                    Reading MD: JERICHO CISNEROS    Measurements  Intervals                                Axis  Rate:       83                           P:          80  NC:         153                          QRS:        78  QRSD:       86                           T:          49  QT:         374  QTc:         440    Interpretive Statements  Sinus rhythm  Compared to ECG 04/15/2023 18:45:17  No significant changes  :Impression: Normal sinus QTc is normal.  QRS is normal.  Electronically Signed On 07- 22:21:07 PDT by JERICHO CISNEROS     POCT glucose device results   Result Value Ref Range    POC Glucose, Blood 95 65 - 99 mg/dL           COURSE & MEDICAL DECISION MAKING    ED Observation Status? Yes; I am placing the patient in to an observation status due to a diagnostic uncertainty as well as therapeutic intensity. Patient placed in observation status at 6:00 PM, 7/3/2023.     Observation plan is as follows: labs and ekg    Upon Reevaluation, the patient's condition has: not improved; and will be escalated to hospitalization.    Patient discharged from ED Observation status at 10:50 PM (Time) 7/3/2023 (Date).     INITIAL ASSESSMENT, COURSE AND PLAN  Care Narrative: This is a 62-year-old gentleman with a history of schizophrenia presenting with altered mental status and acute agitation over the last 4 days.  On exam he initially could say words yes no and cooperate except he was constantly moving and appeared dystonic.  His medications were reviewed by myself I spoke to pharmacy reviewed his meds as well he is on multiple antipsychotics prescribed by 2 different prescribers.  It is unclear when these were started or if the prescriber know there are multiple prescribers.  He is in a group home he is getting his medications I was initially more concerned for extraparametal side effects from his medications.  He was not hypoglycemic.  EKG did not show prolonged QT.    He was initially given Ativan and Benadryl he had some improvement for short period of time but then got even acutely worse.  He got another dose of Ativan and Benadryl and was still writhing around kicking his legs so hard he needed soft restraints.  He bruised his legs on the side rails of the bed from hitting them quite hard.  He is at risk of hurting  himself hence the soft restraints were placed.  Labs are otherwise unremarkable.  His electrolytes and CBC are normal alcohol Tylenol salicylates are normal.  He does have a positive amphetamines on his drug green however he also takes Adderall.  This could be falsely positive.    He was given an additional 1 mg of Versed with really no improvement.  He was writhing around sweating becoming less responsive.  I talked to Dr. Thao, hospitalist for hospitalization in the ICU given his worsening agitation despite a large amount of sedative pain medications.  He placed him on a Precedex drip and he has significant improvement of his agitation on the Precedex drip.  I will obtain head CT on his way to the ICU although I am again more concerned for either ingestion or extraparametal effects of his medications.  I do think medications need to be stopped temporarily and he needs to metabolize these medications and reevaluate him.  He may need a psychiatric evaluation to review his medications.    CRITICAL CARE  The very real possibilty of a deterioration of this patient's condition required the highest level of my preparedness for sudden, emergent intervention.  I provided critical care services, which included medication orders, frequent reevaluations of the patient's condition and response to treatment, ordering and reviewing test results, and discussing the case with various consultants.  The critical care time associated with the care of the patient was 30 minutes. Review chart for interventions. This time is exclusive of any other billable procedures.          DISPOSITION AND DISCUSSIONS  I have discussed management of the patient with the following physicians and LEA's:  Keesha    Discussion of management with other [facility] or appropriate source(s): Pharmacy          FINAL DIAGNOSIS  1. Delirium    2. Dystonia           Electronically signed by: Bushra Mata M.D., 7/3/2023 5:55 PM

## 2023-07-04 NOTE — PROGRESS NOTES
12 hour chart check complete.    Monitor summary:    Rhythm: SB with BBB    Heart Rate: 49-58    Ectopy: rare PVCs and PACs    Measurements: 0.18/0.10/0.44

## 2023-07-04 NOTE — ASSESSMENT & PLAN NOTE
7/5: Patient was transferred out of the ICU yesterday.  The patient is alert and oriented today. The patient was started on his lumateperon yesterday. We will start bupropion and vortioxetine and monitor status.    7/6: We have started the patient's home dose of Adderall     7/7: We have started the patient's home dose of Artane.

## 2023-07-05 PROBLEM — A53.0 POSITIVE RPR TEST: Status: ACTIVE | Noted: 2023-07-05

## 2023-07-05 LAB — HIV 1+2 AB+HIV1 P24 AG SERPL QL IA: NORMAL

## 2023-07-05 PROCEDURE — 94760 N-INVAS EAR/PLS OXIMETRY 1: CPT

## 2023-07-05 PROCEDURE — A9270 NON-COVERED ITEM OR SERVICE: HCPCS | Performed by: INTERNAL MEDICINE

## 2023-07-05 PROCEDURE — 700111 HCHG RX REV CODE 636 W/ 250 OVERRIDE (IP): Mod: JZ | Performed by: INTERNAL MEDICINE

## 2023-07-05 PROCEDURE — 700102 HCHG RX REV CODE 250 W/ 637 OVERRIDE(OP): Performed by: INTERNAL MEDICINE

## 2023-07-05 PROCEDURE — 99233 SBSQ HOSP IP/OBS HIGH 50: CPT | Performed by: INTERNAL MEDICINE

## 2023-07-05 PROCEDURE — 87389 HIV-1 AG W/HIV-1&-2 AB AG IA: CPT

## 2023-07-05 PROCEDURE — 97166 OT EVAL MOD COMPLEX 45 MIN: CPT

## 2023-07-05 PROCEDURE — 770001 HCHG ROOM/CARE - MED/SURG/GYN PRIV*

## 2023-07-05 PROCEDURE — 36415 COLL VENOUS BLD VENIPUNCTURE: CPT

## 2023-07-05 RX ORDER — BUPROPION HYDROCHLORIDE 150 MG/1
150 TABLET, EXTENDED RELEASE ORAL EVERY MORNING
Status: DISCONTINUED | OUTPATIENT
Start: 2023-07-05 | End: 2023-07-08 | Stop reason: HOSPADM

## 2023-07-05 RX ADMIN — HYDROXYZINE HYDROCHLORIDE 25 MG: 25 TABLET, FILM COATED ORAL at 15:34

## 2023-07-05 RX ADMIN — HALOPERIDOL LACTATE 5 MG: 5 INJECTION, SOLUTION INTRAMUSCULAR at 01:09

## 2023-07-05 RX ADMIN — HYDROXYZINE HYDROCHLORIDE 25 MG: 25 TABLET, FILM COATED ORAL at 01:09

## 2023-07-05 RX ADMIN — BUPROPION HYDROCHLORIDE 150 MG: 150 TABLET, EXTENDED RELEASE ORAL at 08:28

## 2023-07-05 ASSESSMENT — COGNITIVE AND FUNCTIONAL STATUS - GENERAL
STANDING UP FROM CHAIR USING ARMS: A LITTLE
DAILY ACTIVITIY SCORE: 17
MOBILITY SCORE: 21
SUGGESTED CMS G CODE MODIFIER DAILY ACTIVITY: CK
WALKING IN HOSPITAL ROOM: A LITTLE
CLIMB 3 TO 5 STEPS WITH RAILING: A LITTLE
SUGGESTED CMS G CODE MODIFIER MOBILITY: CJ
TOILETING: A LITTLE
DAILY ACTIVITIY SCORE: 20
SUGGESTED CMS G CODE MODIFIER DAILY ACTIVITY: CJ
DRESSING REGULAR LOWER BODY CLOTHING: A LITTLE
EATING MEALS: A LITTLE
HELP NEEDED FOR BATHING: A LITTLE
DRESSING REGULAR UPPER BODY CLOTHING: A LITTLE
TOILETING: A LITTLE
PERSONAL GROOMING: A LITTLE
PERSONAL GROOMING: A LITTLE
DRESSING REGULAR LOWER BODY CLOTHING: A LITTLE
HELP NEEDED FOR BATHING: A LOT

## 2023-07-05 ASSESSMENT — ENCOUNTER SYMPTOMS
FEVER: 0
BLURRED VISION: 0
VOMITING: 0
DOUBLE VISION: 0
DIZZINESS: 0
FALLS: 0
CHILLS: 0
ABDOMINAL PAIN: 0
HEADACHES: 0
COUGH: 0
HEARTBURN: 0
SHORTNESS OF BREATH: 0
BACK PAIN: 0
NERVOUS/ANXIOUS: 0
PALPITATIONS: 0

## 2023-07-05 ASSESSMENT — PATIENT HEALTH QUESTIONNAIRE - PHQ9
4. FEELING TIRED OR HAVING LITTLE ENERGY: SEVERAL DAYS
8. MOVING OR SPEAKING SO SLOWLY THAT OTHER PEOPLE COULD HAVE NOTICED. OR THE OPPOSITE, BEING SO FIGETY OR RESTLESS THAT YOU HAVE BEEN MOVING AROUND A LOT MORE THAN USUAL: SEVERAL DAYS
SUM OF ALL RESPONSES TO PHQ9 QUESTIONS 1 AND 2: 0
1. LITTLE INTEREST OR PLEASURE IN DOING THINGS: SEVERAL DAYS
3. TROUBLE FALLING OR STAYING ASLEEP OR SLEEPING TOO MUCH: SEVERAL DAYS
9. THOUGHTS THAT YOU WOULD BE BETTER OFF DEAD, OR OF HURTING YOURSELF: NOT AT ALL
1. LITTLE INTEREST OR PLEASURE IN DOING THINGS: NOT AT ALL
7. TROUBLE CONCENTRATING ON THINGS, SUCH AS READING THE NEWSPAPER OR WATCHING TELEVISION: SEVERAL DAYS
2. FEELING DOWN, DEPRESSED, IRRITABLE, OR HOPELESS: SEVERAL DAYS
SUM OF ALL RESPONSES TO PHQ QUESTIONS 1-9: 7
2. FEELING DOWN, DEPRESSED, IRRITABLE, OR HOPELESS: NOT AT ALL
SUM OF ALL RESPONSES TO PHQ9 QUESTIONS 1 AND 2: 2
5. POOR APPETITE OR OVEREATING: NOT AT ALL
6. FEELING BAD ABOUT YOURSELF - OR THAT YOU ARE A FAILURE OR HAVE LET YOURSELF OR YOUR FAMILY DOWN: SEVERAL DAYS

## 2023-07-05 ASSESSMENT — GAIT ASSESSMENTS: DISTANCE (FEET): 100

## 2023-07-05 ASSESSMENT — PAIN DESCRIPTION - PAIN TYPE
TYPE: ACUTE PAIN;CHRONIC PAIN
TYPE: ACUTE PAIN;CHRONIC PAIN
TYPE: ACUTE PAIN

## 2023-07-05 ASSESSMENT — LIFESTYLE VARIABLES
AVERAGE NUMBER OF DAYS PER WEEK YOU HAVE A DRINK CONTAINING ALCOHOL: 0
TOTAL SCORE: 0
HOW MANY TIMES IN THE PAST YEAR HAVE YOU HAD 5 OR MORE DRINKS IN A DAY: 0
TOTAL SCORE: 0
ALCOHOL_USE: NO
EVER FELT BAD OR GUILTY ABOUT YOUR DRINKING: NO
EVER HAD A DRINK FIRST THING IN THE MORNING TO STEADY YOUR NERVES TO GET RID OF A HANGOVER: NO
ON A TYPICAL DAY WHEN YOU DRINK ALCOHOL HOW MANY DRINKS DO YOU HAVE: 0
SUBSTANCE_ABUSE: 0
HAVE PEOPLE ANNOYED YOU BY CRITICIZING YOUR DRINKING: NO
CONSUMPTION TOTAL: NEGATIVE
TOTAL SCORE: 0
HAVE YOU EVER FELT YOU SHOULD CUT DOWN ON YOUR DRINKING: NO

## 2023-07-05 ASSESSMENT — ACTIVITIES OF DAILY LIVING (ADL): TOILETING: INDEPENDENT

## 2023-07-05 NOTE — PROGRESS NOTES
Received report from night shift RN. Assumed pt care 0700  Pt is A&Ox4, resting comfortably in bed. Pt is calm and cooperative, no agitation noted. Pt intermittently rests in between interactions with nursing. Calm quiet, dimmed lights environment provided.   Plan of care reviewed for activities and goals this shift. Medication eduction provided.  Pt verbalizes understanding of plan of care for this shift. Provider at bedside this am and reviewed lab results, medications and plan of care for stay.   Pt on room air; no signs of SOB/respiratory distress.Pt denies pain and/or nausea at this this am.   Breakfast tray provided and pt tolerated 100% of meal.   Urinary foleys dc'd - pt tolerated well ( will monitor post void, urinal at bedside.)  Pt able to sit to edge of bed and stand, steady on feet.   Patients needs attended well. Fall precautions in place. Bed at lowest position. Call light and personal belongings within reach.   Hourly rounding in progress.Pt within visual sight of RN station  Fall prevention protocol in place.   1. Bed in low position  2. Call light within reach  3. Belongings and over bed table nearby  4. As needed assistive devices within reach   5. Appropriate hospital attire for fall prevention    Pt fall prevention education provided. Pt/Family verbalize understanding.

## 2023-07-05 NOTE — CARE PLAN
The patient is Stable - Low risk of patient condition declining or worsening    Shift Goals  Clinical Goals: monitor mentation  Patient Goals: remove pizano  Family Goals: TIMOTEO    Progress made toward(s) clinical / shift goals:    Problem: Fall Risk  Goal: Patient will remain free from falls  Outcome: Progressing  Note: Bed and strip alarm are active and in place.        Patient is not progressing towards the following goals:      Problem: Knowledge Deficit - Standard  Goal: Patient and family/care givers will demonstrate understanding of plan of care, disease process/condition, diagnostic tests and medications  Outcome: Not Progressing  Note: Patient is confused and requires reorientation. Questions encouraged and de-escalation techniques utilized.

## 2023-07-05 NOTE — DISCHARGE PLANNING
SW asked to complete NOK Search    Results Listed Below    Jenna Durham 1st Degree  : Sep 1975   Age: 47   [phone]   [email or web address]   [address] [place] [id number]  (2022 - CURRENT)     Jay Sorto  : 1969     Rafa Gil  : Dec 1945   Age: 77   [phone]   None found   [address] [place] [address]  (2020 - CURRENT)     VIVI updated RN Case manager

## 2023-07-05 NOTE — PROGRESS NOTES
Pt arrived to unit sleeping, non labored breathing Pulse ox stable.   Does not open eyes to voice or touch.   Removed soft wrist restraints - skin intact.    Right arm PIV patent IVF restarted. PIV site clean, dry inact. Bilateral SCDs on.   Bed alarm on and active bod strip alarm and bed alarm.   Fall prevention protocol in place.   1. Bed in low position  2. Call light within reach  3. Appropriate hospital attire for fall prevention    4. Close to RN station for close observation   Pt fall prevention education provided. Pt/Family verbalize understanding.   1830-Pt sleeping still does not open eyes to voice. Care giver at bedside for a short time.   VS remain stable 91% room air pulse ox.   Fall precaution in place for shift change

## 2023-07-05 NOTE — PROGRESS NOTES
4 Eyes Skin Assessment Completed by Stacy RN and Delano RN.    Head WDL  Ears WDL  Nose WDL  Mouth WDL  Neck WDL  Breast/Chest WDL  Shoulder Blades WDL  Spine WDL  (R) Arm/Elbow/Hand WDL  (L) Arm/Elbow/Hand WDL  Abdomen WDL  Groin WDL  Scrotum/Coccyx/Buttocks WDL  (R) Leg WDL  (L) Leg WDL  (R) Heel/Foot/Toe WDL  (L) Heel/Foot/Toe WDL      Devices In Places Pulse Ox and SCD's      Interventions In Place Pillows    Possible Skin Injury No    Pictures Uploaded Into Epic N/A  Wound Consult Placed N/A  RN Wound Prevention Protocol Ordered No

## 2023-07-05 NOTE — PROGRESS NOTES
1849 received bedside report and assumed patient care. Patient sleeping with unlabored breathing. The patient is not in restraints at this time.     2138 patient assessment completed. Patient responds to verbal stimulation, but becomes agitated and confused when woken. Patient needs are addressed at this time.     2348 patient sleeping with unlabored breathing.     0114 patient became increasing agitated and received medication.     0310 patient sleeping with unlabored breathing.     0528 patient sleeping with unlabored breathing.    LV 9-  [place]

## 2023-07-05 NOTE — PROGRESS NOTES
"Hospital Medicine Daily Progress Note    Date of Service  7/5/2023    Chief Complaint  Brian Durham is a 62 y.o. male admitted 7/3/2023 with altered mental status.    Hospital Course  As per chart review:  \"62 y.o. male who presented 7/3/2023 with altered mental status.  Patient does have a history of schizophrenia, he is unable to give us any information on his medications however pharmacy did look in the multiple medications that appear new since April with 2 providers ordering them.  Patient does have a caregiver in a group home, states he has not slept in the last 4 nights, has been more more confused over the last couple days.  Initially upon arrival, he was agitated, difficulty holding still however he has drastically worsened.  Patient is significantly delirious.  Patient has received multiple medications at this point in time, remains agitated.  Unable to obtain any information from him, information obtained from the chart.\"    Interval Problem Update  7/5: Patient was initially transferred to the ICU for further care, however the patient's mentation quickly improved.  Today the time of my evaluation the patient is alert and oriented.  Currently not complaining of pain.  It seems the patient had a RPR test positive, we are pending confirmatory test.  If positive we will consider consulting ID.  Continue to monitor closely.    I have discussed this patient's plan of care and discharge plan at IDT rounds today with Case Management, Nursing, Nursing leadership, and other members of the IDT team.    Consultants/Specialty  None for now.    Code Status  Full Code    Disposition  The patient is not medically cleared for discharge to home or a post-acute facility.      I have placed the appropriate orders for post-discharge needs.    Review of Systems  Review of Systems   Constitutional:  Negative for chills and fever.   HENT:  Negative for hearing loss and nosebleeds.    Eyes:  Negative for blurred vision " and double vision.   Respiratory:  Negative for cough and shortness of breath.    Cardiovascular:  Negative for chest pain and palpitations.   Gastrointestinal:  Negative for abdominal pain, heartburn and vomiting.   Genitourinary:  Negative for dysuria and urgency.   Musculoskeletal:  Negative for back pain and falls.   Skin:  Negative for itching and rash.   Neurological:  Negative for dizziness and headaches.   Psychiatric/Behavioral:  Negative for substance abuse. The patient is not nervous/anxious.    All other systems reviewed and are negative.       Physical Exam  Temp:  [37.2 °C (98.9 °F)-37.2 °C (99 °F)] 37.2 °C (99 °F)  Pulse:  [46-88] 88  Resp:  [14-47] 15  BP: (100-128)/(62-83) 114/68  SpO2:  [88 %-94 %] 93 %    Physical Exam  Vitals and nursing note reviewed.   Constitutional:       Appearance: Normal appearance.   HENT:      Head: Normocephalic and atraumatic.      Right Ear: External ear normal.      Left Ear: External ear normal.      Nose: Nose normal.      Mouth/Throat:      Mouth: Mucous membranes are moist.      Pharynx: Oropharynx is clear.      Comments: Poor dentition  Eyes:      General:         Right eye: No discharge.         Left eye: No discharge.      Extraocular Movements: Extraocular movements intact.      Pupils: Pupils are equal, round, and reactive to light.   Cardiovascular:      Rate and Rhythm: Normal rate and regular rhythm.      Heart sounds: No murmur heard.  Pulmonary:      Effort: Pulmonary effort is normal. No respiratory distress.      Breath sounds: Normal breath sounds.   Abdominal:      General: Abdomen is flat. Bowel sounds are normal. There is no distension.      Palpations: Abdomen is soft.      Tenderness: There is no abdominal tenderness.   Musculoskeletal:      Cervical back: Normal range of motion and neck supple.      Right lower leg: No edema.      Left lower leg: No edema.   Skin:     General: Skin is warm.   Neurological:      General: No focal deficit  present.      Mental Status: He is alert and oriented to person, place, and time.   Psychiatric:         Mood and Affect: Mood normal.         Behavior: Behavior normal.         Fluids    Intake/Output Summary (Last 24 hours) at 7/5/2023 0807  Last data filed at 7/5/2023 0542  Gross per 24 hour   Intake 1277.85 ml   Output 840 ml   Net 437.85 ml       Laboratory  Recent Labs     07/03/23 1823 07/04/23  0255   WBC 10.1 9.5   RBC 4.78 4.39*   HEMOGLOBIN 15.7 14.5   HEMATOCRIT 45.5 42.1   MCV 95.2 95.9   MCH 32.8 33.0   MCHC 34.5 34.4   RDW 41.9 41.4   PLATELETCT 295 265   MPV 8.6* 8.7*     Recent Labs     07/03/23 1823 07/04/23  0255   SODIUM 142 142   POTASSIUM 4.0 3.7   CHLORIDE 106 111   CO2 23 21   GLUCOSE 130* 115*   BUN 19 17   CREATININE 0.81 0.63   CALCIUM 9.6 8.5                   Imaging  DX-CHEST-PORTABLE (1 VIEW)   Final Result         1.  Interstitial pulmonary parenchymal prominence suggest chronic underlying lung disease, component of interstitial edema and/or infiltrates not excluded.      CT-HEAD W/O   Final Result         1.  No acute intracranial abnormality.              Assessment/Plan  * Acute delirium- (present on admission)  Assessment & Plan  With severe acute encephalopathy, random movements suggesting dystonia  At this time, is difficult to ascertain what medications he is taking and for how long however it does appear there have been multiple new medications since April by 2 providers  We will hold all psychiatric medications at this time  Patient received multiple sedatives, starting to affect his respiratory status, will transition to Precedex drip  Patient did test positive for methamphetamine however he is on Adderall to be causing a false positive, however he did significantly worsen while in the ER, acutely, could potentially also abuse drugs  Discussed the case with pharmacy, will avoid antipsychotics overnight, will use Precedex drip with Ativan pushes, if a second drip is needed,  will give low-dose Versed drip, if respiratory status worsens, certainly may need intubation but at this point time he is protecting his airway    7/5: Patient was transferred out of the ICU yesterday.  The patient is alert and oriented today. The patient was started on his lumateperon yesterday. We will start bupropion and vortioxetine and monitor status.    Positive RPR test  Assessment & Plan  We are pending confirmatory test  If positive we will consider consulting ID    Dehydration- (present on admission)  Assessment & Plan  We will encourage PO intake now    Hyperglycemia- (present on admission)  Assessment & Plan  Mild  Could be reactive  monitor    Schizophrenia (HCC)- (present on admission)  Assessment & Plan  7/5: Patient was transferred out of the ICU yesterday.  The patient is alert and oriented today. The patient was started on his lumateperon yesterday. We will start bupropion and vortioxetine and monitor status.         VTE prophylaxis: enoxaparin ppx    I have performed a physical exam and reviewed and updated ROS and Plan today (7/5/2023). In review of yesterday's note (7/4/2023), there are no changes except as documented above.      Spend least 51 minutes providing care for this patient.  This included face-to-face interview, physical examination.  Review of lab work including previous CBC, BMP and magnesium.  Review of previous notes including critical care notes.  Discussing with multidisciplinary team including nursing staff, case management.  Creating plan of care, reviewing orders.

## 2023-07-05 NOTE — DISCHARGE PLANNING
Care Transition Team Assessment    Information Source  Orientation Level: Oriented to place, Oriented to situation, Oriented to person  Information Given By:  (Chart Review/group home owner)  Informant's Name: Alida  Who is responsible for making decisions for patient? : Patient    Readmission Evaluation  Is this a readmission?: No    Elopement Risk  Legal Hold: No  Ambulatory or Self Mobile in Wheelchair: No-Not an Elopement Risk  Elopement Risk: Not at Risk for Elopement    Interdisciplinary Discharge Planning  Does Admitting Nurse Feel This Could be a Complex Discharge?: No  Primary Care Physician: Dr. Natasha West [address] [place], [place]  Lives with - Patient's Self Care Capacity: Attendant / Paid Care Giver  Patient or legal guardian wants to designate a caregiver: No  Support Systems: Other (Comments) (Group Home caregivers)  Housing / Facility: 2 Story Apartment / Condo (lives in ground apt.)  Name of Care Facility: [facility]  Do You Take your Prescribed Medications Regularly: Yes  Able to Return to Previous ADL's: Yes  Mobility Issues: No  Prior Services: None  Patient Prefers to be Discharged to:: Home  Assistance Needed: Yes  Durable Medical Equipment: Not Applicable    Discharge Preparedness  What is your plan after discharge?: [facility]  What are your discharge supports?: Other (comment) (Group home caregivers)  Prior Functional Level: Ambulatory, Independent with Activities of Daily Living, Needs Assist with Medication Management  Difficulity with ADLs: None  Difficulity with IADLs: Cooking, Driving, Laundry, Keeping track of finances, Managing medication, Shopping  Difficulity with IADL Comments: Group Home assists with all ADLs    Functional Assesment  Prior Functional Level: Ambulatory, Independent with Activities of Daily Living, Needs Assist with Medication Management    Finances  Financial Barriers to Discharge: No (Income $920/month)  Prescription Coverage: Yes    Vision / Hearing  Impairment  Vision Impairment : No  Hearing Impairment : No    Values / Beliefs / Concerns  Values / Beliefs Concerns : No    Advance Directive  Advance Directive?: None  Advance Directive offered?: AD Booklet refused    Domestic Abuse  Have you ever been the victim of abuse or violence?: No    Psychological Assessment  History of Substance Abuse: Other (comment) (unknown patient unable to answer)  History of Psychiatric Problems: Yes  Non-compliant with Treatment: No  Newly Diagnosed Illness: No    Discharge Risks or Barriers  Discharge risks or barriers?: Mental health  Patient risk factors: Mental health    Anticipated Discharge Information  Discharge Disposition: Discharged to home/self care (01)  Discharge Address: [facility] [address] [address]  Discharge Contact Phone Number: [phone]

## 2023-07-05 NOTE — THERAPY
"Occupational Therapy   Initial Evaluation     Patient Name: Brian Durham  Age:  62 y.o., Sex:  male  Medical Record #: [id number]  Today's Date: 7/5/2023     Precautions  Precautions: Fall Risk  Comments: fall risk 2/2 impaired balance and impulsivity    Assessment  Patient is 62 y.o. male admit 7/3 with AMS.  Pt with acute delirium, hyperglycemia, agitation requiring initial admit to ICU.  Pt has h/o Schizophrenia, lives in a group home.  Pt is currently limited by decreased functional mobility, activity tolerance, cognition, balance,  and pain which are affecting his ability to complete ADLs/IADLs at baseline. See grid for details. Pt will benefit from home with HH if he continues to progress well with ADL's and mobility. Will continue to follow.     Plan    Occupational Therapy Initial Treatment Plan   Treatment Interventions: Self Care / Activities of Daily Living, Adaptive Equipment, Neuro Re-Education / Balance, Therapeutic Exercises, Therapeutic Activity  Treatment Frequency: 3 Times per Week  Duration: Until Therapy Goals Met    DC Equipment Recommendations: Unable to determine at this time  Discharge Recommendations: Recommend home health for continued occupational therapy services (depending on progress and how much assist group home staff can provide)     Subjective    \"I just need to sleep\"     Objective       07/05/23 1028   Prior Living Situation   Prior Services Intermittent Physical Support for ADL Per Service   Housing / Facility Group Home   Lives with - Patient's Self Care Capacity Attendant / Paid Care Giver   Comments Pt limited historian.  Not able to elaborate on set-up of group home bathroom or stairs etc.  Denied using a device to walk previously, and reports being ind with self care tasks.   Prior Level of ADL Function   Self Feeding Independent   Grooming / Hygiene Independent   Bathing Independent   Dressing Independent   Toileting Independent   Comments per pt report, questionable " "accuracy   Prior Level of IADL Function   Medication Management Unable To Determine At This Time   Laundry Unable To Determine At This Time   Kitchen Mobility Unable To Determine At This Time   Finances Unable To Determine At This Time   Home Management Unable To Determine At This Time   Shopping Unable To Determine At This Time   Prior Level Of Mobility Independent Without Device in Home   Driving / Transportation Relatives / Others Provide Transportation   Occupation (Pre-Hospital Vocational) Not Employed   Leisure Interests Unable To Determine At This Time   Comments pt unable to provide this information   Precautions   Precautions Fall Risk   Comments fall risk 2/2 impaired balance and impulsivity   Vitals   O2 Delivery Device None - Room Air   Pain 0 - 10 Group   Location Back   Description Aching   Therapist Pain Assessment Prior to Activity;During Activity;Nurse Notified   Cognition    Cognition / Consciousness X   Speech/ Communication Slurred   Orientation Level   (Oriented to self,)   Level of Consciousness Responds to voice   Ability To Follow Commands 1 Step  (50-75% consistency)   Safety Awareness Impaired;Impulsive   Sequencing Impaired   Comments Tangential, impulsive with decreased safety awareness and insight to limitations.  Slurred, garbled speech difficult to understand at times. not following direct commands (\"brush your teeth\") but instead used mouthwash to rinse mouth. *RN reports pt told her earlier that he hadn't selpt well for days at home due to new roommate that snores and other noisy residents.  May benefit from some type of noise cancelling headphones or earplugs to wear at night if he would tolerate them   Active ROM Upper Body   Active ROM Upper Body  WDL   Strength Upper Body   Upper Body Strength  WDL   Coordination Upper Body   Comments grossly intact   Balance Assessment   Sitting Balance (Static) Fair +   Sitting Balance (Dynamic) Fair +   Standing Balance (Static) Fair - "   Standing Balance (Dynamic) Poor +   Weight Shift Sitting Fair   Weight Shift Standing Fair   Comments unsteady, ataxic.  Req close CGA for safety, refusing to use a device this session   Bed Mobility    Supine to Sit Modified Independent   Sit to Supine Modified Independent  (climbs in on hands and knees)   Scooting Modified Independent   ADL Assessment   Grooming Contact Guard Assist;Standing   Lower Body Dressing Standby Assist  (socks in long sitting in bed)   Toileting Contact Guard Assist  (stands up for hygiene post BM, unsteady on feet)   How much help from another person does the patient currently need...   Putting on and taking off regular lower body clothing? 3   Bathing (including washing, rinsing, and drying)? 2   Toileting, which includes using a toilet, bedpan, or urinal? 3   Putting on and taking off regular upper body clothing? 3   Taking care of personal grooming such as brushing teeth? 3   Eating meals? 3   6 Clicks Daily Activity Score 17   Functional Mobility   Sit to Stand Contact Guard Assist   Bed, Chair, Wheelchair Transfer Contact Guard Assist   Toilet Transfers Contact Guard Assist   Transfer Method Stand Step   Mobility Close CGA (Hand held) without device in room and in buchanan   Distance (Feet) 100   # of Times Distance was Traveled 1   Comments fall risk 2/2 impaired balance and impulsivity   Visual Perception   Comments appears WFL   Activity Tolerance   Sitting Edge of Bed 2 min   Standing 10 min   Comments fatigued   Patient / Family Goals   Patient / Family Goal #1 u/a to state   Short Term Goals   Short Term Goal # 1 pt will dress supervised in 3 visits   Short Term Goal # 2 pt will toilet supervised in 3 visits   Short Term Goal # 3 Pt will groom standing supervised 10 min in 3 visits   Education Group   Education Provided Role of Occupational Therapist   Role of Occupational Therapist Patient Response Patient;Acceptance;Explanation;Reinforcement Needed

## 2023-07-05 NOTE — ASSESSMENT & PLAN NOTE
We are pending confirmatory test  If positive we will consider consulting ID    7/6: Pending confirmatory test, we have ordered HIV, G/C test    7/7: Titer was reported as non-reactive, patient with positive antibiotics. We will discuss with ID the need for treatment. Monitor.

## 2023-07-05 NOTE — PROGRESS NOTES
Rounding     1925 Patient sleeping   2028 Patient sleeping   2226 vitals check   0007 Patient sleeping   0208 Patient sleeping   0432 patient sleeping   0542 Vitals check   0608 catheter emptied

## 2023-07-05 NOTE — DOCUMENTATION QUERY
[facility]                                                                       Query Response Note      PATIENT:               SADI GUERRERO  ACCT #:                  [id number]  MRN:                     [id number]  :                      1960  ADMIT DATE:       7/3/2023 5:43 PM  DISCH DATE:          RESPONDING  PROVIDER #:        [id number]           QUERY TEXT:    Patient with history of schizophrenia, presents with AMS and agitation x 4 days  Given Ativan/Benadryl for dystonia, which worsened symptoms.  On 7-8L O2 via oxymask while in the ED, had received multiple sedatives which affected respiratory status, transitioned to Precedex drip.  Once transferred to ICU quickly titrated back down to RA with SpO2 97%.  Based on the clinical indicators documented, can a diagnosis be made?      The patient's clinical indicators include:  7/3 ED arrival - RR 19, SpO2 97% on RA, HR 52  7/3 ED - RR [18-60], SpO2 [93 - 100%] on 7-8L oxymask   ICU @ 0010 SpO2 99% on 2L -> @ 0045 SpO2 97% on RA    ED RN note @ 2227 - Saturating well at 96% on face mask of 8 L/min  ED note - received multiple sedatives, starting to affect his respiratory status, will transition to Precedex drip    Treatment - up to 8L O2 oxymask    Risk factors - Administration of multiple sedatives, AMS    Thank you,  Emily Wyatt BSN  Clinical   Connect via [employer]  Options provided:   -- Acute respiratory failure with hypoxia, present after arrival   -- Hypoxia, present after arrival   -- Findings of no clinical significance   -- Other explanation, (please specify the other explanation)   -- Unable to determine      Query created by: Emily Wyatt on 2023 10:31 AM    RESPONSE TEXT:    Findings of no clinical significance          Electronically signed by:  DAVON LOPEZ MD 2023 11:00 AM

## 2023-07-05 NOTE — DISCHARGE PLANNING
Case Management Discharge Planning    Admission Date: 7/3/2023  GMLOS: 2.3  ALOS: 2    6-Clicks ADL Score: 17  6-Clicks Mobility Score: 12  PT and/or OT Eval ordered: Yes  Post-acute Referrals Ordered: No  Post-acute Choice Obtained: No  Has referral(s) been sent to post-acute provider:  No      Anticipated Discharge Dispo: Discharge Disposition: Discharged to home/self care (01)    DME Needed: No    Action(s) Taken: Updated Provider/Nurse on Discharge Plan    Escalations Completed: Provider and Bedside RN    Medically Clear: No    Next Steps: Patient discussed during morning IDT rounds with team. Patient is not medically cleared for discharge at this time. During chart review, CM was unable to find NOK or even caregiver information. CM reached out to RN to see if patient could recall caregiver information or where his living. CM reached out to ER CM to run NOK search. 3 names came back as possible NOK leads. CM called Jenna Durham at [phone], but she is not related to patient. No working numbers for other 2 persons listed. CM will continue to follow for discharge planning needs and will update with any new changes.     [address] [place] [address] Home   Call for discharge when ready   [phone]   Alida        Barriers to Discharge: Medical clearance    Is the patient up for discharge tomorrow: No

## 2023-07-05 NOTE — PROGRESS NOTES
Pt resting in bed. Woke up for lunch meal. Pt remains calm and cooperative through out this shift.  1500- pt a little restless, medications administered.    1600-Pt awake in bed. Encouraged fluids. Pt verbalizes that he he does not feel restless or anxious pr any agitation. He verbalizes that he is doing fine. Pt resting in bed. Fall precaustions in place. Declines to wear socks while in bed. All other fall precautions in place bed alarm active

## 2023-07-06 PROBLEM — E87.6 HYPOKALEMIA: Status: ACTIVE | Noted: 2023-07-06

## 2023-07-06 PROBLEM — D64.9 ANEMIA: Status: ACTIVE | Noted: 2023-07-06

## 2023-07-06 PROBLEM — E83.42 HYPOMAGNESEMIA: Status: ACTIVE | Noted: 2023-07-06

## 2023-07-06 LAB
ALBUMIN SERPL BCP-MCNC: 3.7 G/DL (ref 3.2–4.9)
ALBUMIN/GLOB SERPL: 1.6 G/DL
ALP SERPL-CCNC: 67 U/L (ref 30–99)
ALT SERPL-CCNC: 23 U/L (ref 2–50)
ANION GAP SERPL CALC-SCNC: 14 MMOL/L (ref 7–16)
AST SERPL-CCNC: 19 U/L (ref 12–45)
BILIRUB SERPL-MCNC: 0.4 MG/DL (ref 0.1–1.5)
BUN SERPL-MCNC: 11 MG/DL (ref 8–22)
C TRACH DNA SPEC QL NAA+PROBE: NEGATIVE
CALCIUM ALBUM COR SERPL-MCNC: 8.9 MG/DL (ref 8.5–10.5)
CALCIUM SERPL-MCNC: 8.7 MG/DL (ref 8.4–10.2)
CHLORIDE SERPL-SCNC: 107 MMOL/L (ref 96–112)
CO2 SERPL-SCNC: 20 MMOL/L (ref 20–33)
CREAT SERPL-MCNC: 0.76 MG/DL (ref 0.5–1.4)
ERYTHROCYTE [DISTWIDTH] IN BLOOD BY AUTOMATED COUNT: 41.8 FL (ref 35.9–50)
GFR SERPLBLD CREATININE-BSD FMLA CKD-EPI: 102 ML/MIN/1.73 M 2
GLOBULIN SER CALC-MCNC: 2.3 G/DL (ref 1.9–3.5)
GLUCOSE SERPL-MCNC: 89 MG/DL (ref 65–99)
HCT VFR BLD AUTO: 40.3 % (ref 42–52)
HGB BLD-MCNC: 13.8 G/DL (ref 14–18)
MAGNESIUM SERPL-MCNC: 1.8 MG/DL (ref 1.5–2.5)
MCH RBC QN AUTO: 32.7 PG (ref 27–33)
MCHC RBC AUTO-ENTMCNC: 34.2 G/DL (ref 32.3–36.5)
MCV RBC AUTO: 95.5 FL (ref 81.4–97.8)
N GONORRHOEA DNA SPEC QL NAA+PROBE: NEGATIVE
PLATELET # BLD AUTO: 268 K/UL (ref 164–446)
PMV BLD AUTO: 8.8 FL (ref 9–12.9)
POTASSIUM SERPL-SCNC: 3.3 MMOL/L (ref 3.6–5.5)
PROT SERPL-MCNC: 6 G/DL (ref 6–8.2)
RBC # BLD AUTO: 4.22 M/UL (ref 4.7–6.1)
RPR SER QL: NON REACTIVE
RPR SER QL: NON REACTIVE
SODIUM SERPL-SCNC: 141 MMOL/L (ref 135–145)
SPECIMEN SOURCE: NORMAL
T PALLIDUM AB SER QL AGGL: REACTIVE
WBC # BLD AUTO: 6.4 K/UL (ref 4.8–10.8)

## 2023-07-06 PROCEDURE — 80053 COMPREHEN METABOLIC PANEL: CPT

## 2023-07-06 PROCEDURE — 700102 HCHG RX REV CODE 250 W/ 637 OVERRIDE(OP): Performed by: INTERNAL MEDICINE

## 2023-07-06 PROCEDURE — A9270 NON-COVERED ITEM OR SERVICE: HCPCS | Performed by: INTERNAL MEDICINE

## 2023-07-06 PROCEDURE — 87491 CHLMYD TRACH DNA AMP PROBE: CPT

## 2023-07-06 PROCEDURE — 99233 SBSQ HOSP IP/OBS HIGH 50: CPT | Performed by: INTERNAL MEDICINE

## 2023-07-06 PROCEDURE — 97161 PT EVAL LOW COMPLEX 20 MIN: CPT

## 2023-07-06 PROCEDURE — 87591 N.GONORRHOEAE DNA AMP PROB: CPT

## 2023-07-06 PROCEDURE — 85027 COMPLETE CBC AUTOMATED: CPT

## 2023-07-06 PROCEDURE — 83735 ASSAY OF MAGNESIUM: CPT

## 2023-07-06 PROCEDURE — 36415 COLL VENOUS BLD VENIPUNCTURE: CPT

## 2023-07-06 PROCEDURE — 770001 HCHG ROOM/CARE - MED/SURG/GYN PRIV*

## 2023-07-06 PROCEDURE — 700111 HCHG RX REV CODE 636 W/ 250 OVERRIDE (IP): Performed by: INTERNAL MEDICINE

## 2023-07-06 PROCEDURE — 94760 N-INVAS EAR/PLS OXIMETRY 1: CPT

## 2023-07-06 RX ORDER — MAGNESIUM SULFATE 1 G/100ML
1 INJECTION INTRAVENOUS ONCE
Status: COMPLETED | OUTPATIENT
Start: 2023-07-06 | End: 2023-07-06

## 2023-07-06 RX ORDER — ALPRAZOLAM 1 MG/1
1 TABLET ORAL 4 TIMES DAILY
COMMUNITY

## 2023-07-06 RX ORDER — ALPRAZOLAM 0.5 MG/1
1 TABLET ORAL 2 TIMES DAILY
Status: DISCONTINUED | OUTPATIENT
Start: 2023-07-06 | End: 2023-07-08 | Stop reason: HOSPADM

## 2023-07-06 RX ORDER — ALPRAZOLAM 0.5 MG/1
1 TABLET ORAL 2 TIMES DAILY
Status: DISCONTINUED | OUTPATIENT
Start: 2023-07-06 | End: 2023-07-06

## 2023-07-06 RX ORDER — DEXTROAMPHETAMINE SACCHARATE, AMPHETAMINE ASPARTATE, DEXTROAMPHETAMINE SULFATE AND AMPHETAMINE SULFATE 2.5; 2.5; 2.5; 2.5 MG/1; MG/1; MG/1; MG/1
30 TABLET ORAL EVERY MORNING
Status: DISCONTINUED | OUTPATIENT
Start: 2023-07-06 | End: 2023-07-08 | Stop reason: HOSPADM

## 2023-07-06 RX ORDER — POTASSIUM CHLORIDE 20 MEQ/1
20 TABLET, EXTENDED RELEASE ORAL ONCE
Status: COMPLETED | OUTPATIENT
Start: 2023-07-06 | End: 2023-07-06

## 2023-07-06 RX ADMIN — MAGNESIUM SULFATE IN DEXTROSE 1 G: 10 INJECTION, SOLUTION INTRAVENOUS at 11:48

## 2023-07-06 RX ADMIN — BUPROPION HYDROCHLORIDE 150 MG: 150 TABLET, EXTENDED RELEASE ORAL at 04:42

## 2023-07-06 RX ADMIN — ALPRAZOLAM 1 MG: 0.5 TABLET ORAL at 13:33

## 2023-07-06 RX ADMIN — HYDROXYZINE HYDROCHLORIDE 25 MG: 25 TABLET, FILM COATED ORAL at 18:11

## 2023-07-06 RX ADMIN — HYDROXYZINE HYDROCHLORIDE 25 MG: 25 TABLET, FILM COATED ORAL at 11:28

## 2023-07-06 RX ADMIN — POTASSIUM CHLORIDE 20 MEQ: 1500 TABLET, EXTENDED RELEASE ORAL at 11:43

## 2023-07-06 RX ADMIN — DEXTROAMPHETAMINE SACCHARATE, AMPHETAMINE ASPARTATE, DEXTROAMPHETAMINE SULFATE, AMPHETAMINE SULFATE TABLETS, 10 MG,CLL 30 MG: 2.5; 2.5; 2.5; 2.5 TABLET ORAL at 11:42

## 2023-07-06 ASSESSMENT — ENCOUNTER SYMPTOMS
NERVOUS/ANXIOUS: 0
BLURRED VISION: 0
ABDOMINAL PAIN: 0
HEARTBURN: 0
DIZZINESS: 0
FALLS: 0
VOMITING: 0
BACK PAIN: 0
FEVER: 0
DOUBLE VISION: 0
COUGH: 0
PALPITATIONS: 0
HEADACHES: 0
SHORTNESS OF BREATH: 0
CHILLS: 0

## 2023-07-06 ASSESSMENT — PATIENT HEALTH QUESTIONNAIRE - PHQ9
7. TROUBLE CONCENTRATING ON THINGS, SUCH AS READING THE NEWSPAPER OR WATCHING TELEVISION: NOT AT ALL
8. MOVING OR SPEAKING SO SLOWLY THAT OTHER PEOPLE COULD HAVE NOTICED. OR THE OPPOSITE, BEING SO FIGETY OR RESTLESS THAT YOU HAVE BEEN MOVING AROUND A LOT MORE THAN USUAL: NOT AT ALL
1. LITTLE INTEREST OR PLEASURE IN DOING THINGS: NOT AT ALL
3. TROUBLE FALLING OR STAYING ASLEEP OR SLEEPING TOO MUCH: NOT AT ALL
9. THOUGHTS THAT YOU WOULD BE BETTER OFF DEAD, OR OF HURTING YOURSELF: NOT AT ALL
4. FEELING TIRED OR HAVING LITTLE ENERGY: NOT AT ALL
6. FEELING BAD ABOUT YOURSELF - OR THAT YOU ARE A FAILURE OR HAVE LET YOURSELF OR YOUR FAMILY DOWN: NOT AL ALL
5. POOR APPETITE OR OVEREATING: NOT AT ALL
SUM OF ALL RESPONSES TO PHQ QUESTIONS 1-9: 0
2. FEELING DOWN, DEPRESSED, IRRITABLE, OR HOPELESS: NOT AT ALL
SUM OF ALL RESPONSES TO PHQ9 QUESTIONS 1 AND 2: 0

## 2023-07-06 ASSESSMENT — COGNITIVE AND FUNCTIONAL STATUS - GENERAL
SUGGESTED CMS G CODE MODIFIER MOBILITY: CH
MOBILITY SCORE: 24

## 2023-07-06 ASSESSMENT — GAIT ASSESSMENTS
GAIT LEVEL OF ASSIST: SUPERVISED
DEVIATION: STEP TO
DISTANCE (FEET): 200

## 2023-07-06 ASSESSMENT — PAIN DESCRIPTION - PAIN TYPE
TYPE: ACUTE PAIN
TYPE: ACUTE PAIN

## 2023-07-06 ASSESSMENT — LIFESTYLE VARIABLES: SUBSTANCE_ABUSE: 0

## 2023-07-06 NOTE — PROGRESS NOTES
2205H Pt resting in bed, eyes closed, respirations even and unlabored      2345H Pt up to the bathroom    0120H Pt resting in bed,eyes closed, respirations even and unlabored, arouses easily    0354H Pt resting in bed,eyes closed, respirations even and unlabored, arouses easily    0600H Pt resting in bed,eyes closed, respirations even and unlabored, arouses easily

## 2023-07-06 NOTE — PROGRESS NOTES
Bedside report received from night RN. Assumed care of patient. Daily plan of care discussed. Pt resting comfortably in bed with no signs of distress noted. Call light and belongings within place. Pt refusing to wear non-slip socks this AM. 12 hour chart check complete. Hourly rounding in place.

## 2023-07-06 NOTE — THERAPY
Physical Therapy   Initial Evaluation     Patient Name: Brian Durham  Age:  62 y.o., Sex:  male  Medical Record #: [id number]  Today's Date: 7/6/2023     Precautions  Comments: (P) none    Assessment  Patient is 62 y.o. male with a diagnosis of acute delirium. Pt lives at a group home and is active.Pt is safe with bed mob,transfers and ambulation.He has no equipment or acute PT needs     Plan    DC Equipment Recommendations: (P) None  Discharge Recommendations: (P) Anticipate that the patient will have no further physical therapy needs after discharge from the hospital     07/06/23 1400   Charge Group   PT Evaluation PT Evaluation Low   Total Time Spent   PT Evaluation Time Spent (Mins) 25   Initial Contact Note    Initial Contact Note Order Received and Verified, Physical Therapy Evaluation in Progress with Full Report to Follow.   Precautions   Comments none   Prior Living Situation   Prior Services None   Housing / Facility Group Home   Equipment Owned None   Lives with - Patient's Self Care Capacity Attendant / Paid Care Giver   Prior Level of Functional Mobility   Bed Mobility Independent   Transfer Status Independent   Ambulation Independent   Ambulation Distance community amb   Assistive Devices Used None   History of Falls   History of Falls No   Cognition    Cognition / Consciousness WDL   Passive ROM Lower Body   Passive ROM Lower Body WDL   Active ROM Lower Body    Active ROM Lower Body  WDL   Strength Lower Body   Lower Body Strength  WDL   Sensation Lower Body   Lower Extremity Sensation   WDL   Coordination Lower Body    Coordination Lower Body  WDL   Balance Assessment   Sitting Balance (Static) Good   Sitting Balance (Dynamic) Good   Standing Balance (Static) Good   Standing Balance (Dynamic) Good   Weight Shift Sitting Good   Weight Shift Standing Good   Bed Mobility    Supine to Sit Independent   Sit to Supine Independent   Scooting Independent   Gait Analysis   Gait Level Of Assist  Supervised   Assistive Device None   Distance (Feet) 200   # of Times Distance was Traveled 1   Deviation Step To   Weight Bearing Status full   Functional Mobility   Sit to Stand Supervised   Bed, Chair, Wheelchair Transfer Supervised   Transfer Method Stand Step   How much difficulty does the patient currently have...   Turning over in bed (including adjusting bedclothes, sheets and blankets)? 4   Sitting down on and standing up from a chair with arms (e.g., wheelchair, bedside commode, etc.) 4   Moving from lying on back to sitting on the side of the bed? 4   How much help from another person does the patient currently need...   Moving to and from a bed to a chair (including a wheelchair)? 4   Need to walk in a hospital room? 4   Climbing 3-5 steps with a railing? 4   6 clicks Mobility Score 24   Activity Tolerance   Sitting Edge of Bed 5   Standing 10   Patient / Family Goals    Patient / Family Goal #1 not stated   Anticipated Discharge Equipment and Recommendations   DC Equipment Recommendations None   Discharge Recommendations Anticipate that the patient will have no further physical therapy needs after discharge from the hospital   Interdisciplinary Plan of Care Collaboration   IDT Collaboration with  Nursing   Session Information   Date / Session Number  7/6   Priority 0       Objective

## 2023-07-06 NOTE — PROGRESS NOTES
"0800 Hourly round complete. Pt given new gown and assisted to bathroom.    0900 Hourly round complete. Pt resting comfortably in bed with no signs of distress noted.    1000 Hourly round complete. Pt resting comfortably in bed with no signs of distress noted.    1100 Hourly round complete. Pt resting in bed, watching TV. Pt states his \"heart is racing\" and requesting medication for anxiety. Prn Atarax given.    1200 Hourly round complete. Pt reports that he feels \"calmed down\" after medication administration. Pt assisted up to bathroom.    1300 Hourly round complete. Pt requesting additional anti-anxiety medication. Pt stated he takes Xanax at home. Hospitalist informed. Med rec updated by pharmacy tech, new orders received.    1400 Hourly round complete. Pt reports subjective improvement in anxiety with administration of prn Xanax.    1500 Hourly round complete. Pt resting in bed with no needs identified.    1600 Hourly round complete. Pt assisted to bathroom.    1700 Hourly round complete. Set up shower for pt. Pt showered with no difficulty reported.    1800 Hourly round complete. Pt given prn Atarax for anxiety. Refused Lovenox.        "

## 2023-07-06 NOTE — PROGRESS NOTES
"Hospital Medicine Daily Progress Note    Date of Service  7/6/2023    Chief Complaint  Brian Durham is a 62 y.o. male admitted 7/3/2023 with altered mental status.    Hospital Course  As per chart review:  \"62 y.o. male who presented 7/3/2023 with altered mental status.  Patient does have a history of schizophrenia, he is unable to give us any information on his medications however pharmacy did look in the multiple medications that appear new since April with 2 providers ordering them.  Patient does have a caregiver in a group home, states he has not slept in the last 4 nights, has been more more confused over the last couple days.  Initially upon arrival, he was agitated, difficulty holding still however he has drastically worsened.  Patient is significantly delirious.  Patient has received multiple medications at this point in time, remains agitated.  Unable to obtain any information from him, information obtained from the chart.\"    Interval Problem Update  7/5: Patient was initially transferred to the ICU for further care, however the patient's mentation quickly improved.  Today the time of my evaluation the patient is alert and oriented.  Currently not complaining of pain.  It seems the patient had a RPR test positive, we are pending confirmatory test.  If positive we will consider consulting ID.  Continue to monitor closely.    7/6: Patient seen at bedside this morning.  Patient seems to be alert and oriented.  We will start the patient home dose of Adderall today.  We are pending confirmatory test of syphilis.  We have added HIV gonorrhea and chlamydia testing.  Continue to monitor closely.    I have discussed this patient's plan of care and discharge plan at IDT rounds today with Case Management, Nursing, Nursing leadership, and other members of the IDT team.    Consultants/Specialty  None for now.    Code Status  Full Code    Disposition  The patient is not medically cleared for discharge to home " or a post-acute facility.      I have placed the appropriate orders for post-discharge needs.    Review of Systems  Review of Systems   Constitutional:  Negative for chills and fever.   HENT:  Negative for hearing loss and nosebleeds.    Eyes:  Negative for blurred vision and double vision.   Respiratory:  Negative for cough and shortness of breath.    Cardiovascular:  Negative for chest pain and palpitations.   Gastrointestinal:  Negative for abdominal pain, heartburn and vomiting.   Genitourinary:  Negative for dysuria and urgency.   Musculoskeletal:  Negative for back pain and falls.   Skin:  Negative for itching and rash.   Neurological:  Negative for dizziness and headaches.   Psychiatric/Behavioral:  Negative for substance abuse. The patient is not nervous/anxious.    All other systems reviewed and are negative.       Physical Exam  Temp:  [36.6 °C (97.8 °F)-37.2 °C (98.9 °F)] 37 °C (98.6 °F)  Pulse:  [66-79] 75  Resp:  [14-18] 17  BP: ()/(63-70) 112/70  SpO2:  [91 %-97 %] 97 %    Physical Exam  Vitals and nursing note reviewed.   Constitutional:       Appearance: Normal appearance.   HENT:      Head: Normocephalic and atraumatic.      Right Ear: External ear normal.      Left Ear: External ear normal.      Nose: Nose normal.      Mouth/Throat:      Mouth: Mucous membranes are moist.      Pharynx: Oropharynx is clear.      Comments: Poor dentition  Eyes:      General:         Right eye: No discharge.         Left eye: No discharge.      Extraocular Movements: Extraocular movements intact.      Pupils: Pupils are equal, round, and reactive to light.   Cardiovascular:      Rate and Rhythm: Normal rate and regular rhythm.      Heart sounds: No murmur heard.  Pulmonary:      Effort: Pulmonary effort is normal. No respiratory distress.      Breath sounds: Normal breath sounds.   Abdominal:      General: Abdomen is flat. Bowel sounds are normal. There is no distension.      Palpations: Abdomen is soft.       Tenderness: There is no abdominal tenderness.   Musculoskeletal:      Cervical back: Normal range of motion and neck supple.      Right lower leg: No edema.      Left lower leg: No edema.   Skin:     General: Skin is warm.   Neurological:      General: No focal deficit present.      Mental Status: He is alert and oriented to person, place, and time.   Psychiatric:         Mood and Affect: Mood normal.         Behavior: Behavior normal.         Fluids    Intake/Output Summary (Last 24 hours) at 7/6/2023 1135  Last data filed at 7/6/2023 0900  Gross per 24 hour   Intake 1000 ml   Output --   Net 1000 ml         Laboratory  Recent Labs     07/03/23 1823 07/04/23 0255 07/06/23  0139   WBC 10.1 9.5 6.4   RBC 4.78 4.39* 4.22*   HEMOGLOBIN 15.7 14.5 13.8*   HEMATOCRIT 45.5 42.1 40.3*   MCV 95.2 95.9 95.5   MCH 32.8 33.0 32.7   MCHC 34.5 34.4 34.2   RDW 41.9 41.4 41.8   PLATELETCT 295 265 268   MPV 8.6* 8.7* 8.8*       Recent Labs     07/03/23 1823 07/04/23 0255 07/06/23  0139   SODIUM 142 142 141   POTASSIUM 4.0 3.7 3.3*   CHLORIDE 106 111 107   CO2 23 21 20   GLUCOSE 130* 115* 89   BUN 19 17 11   CREATININE 0.81 0.63 0.76   CALCIUM 9.6 8.5 8.7                     Imaging  DX-CHEST-PORTABLE (1 VIEW)   Final Result         1.  Interstitial pulmonary parenchymal prominence suggest chronic underlying lung disease, component of interstitial edema and/or infiltrates not excluded.      CT-HEAD W/O   Final Result         1.  No acute intracranial abnormality.                Assessment/Plan  * Acute delirium- (present on admission)  Assessment & Plan  With severe acute encephalopathy, random movements suggesting dystonia  At this time, is difficult to ascertain what medications he is taking and for how long however it does appear there have been multiple new medications since April by 2 providers  We will hold all psychiatric medications at this time  Patient received multiple sedatives, starting to affect his respiratory  status, will transition to Precedex drip  Patient did test positive for methamphetamine however he is on Adderall to be causing a false positive, however he did significantly worsen while in the ER, acutely, could potentially also abuse drugs  Discussed the case with pharmacy, will avoid antipsychotics overnight, will use Precedex drip with Ativan pushes, if a second drip is needed, will give low-dose Versed drip, if respiratory status worsens, certainly may need intubation but at this point time he is protecting his airway    7/5: Patient was transferred out of the ICU yesterday.  The patient is alert and oriented today. The patient was started on his lumateperon yesterday. We will start bupropion and vortioxetine and monitor status.    7/6: I will start the patient on his home dose of Adderal.    Anemia  Assessment & Plan  No signs of overt bleeding  monitor    Hypomagnesemia  Assessment & Plan  Replace IV today  monitor    Hypokalemia  Assessment & Plan  Replace as needed  monitor    Positive RPR test  Assessment & Plan  We are pending confirmatory test  If positive we will consider consulting ID    7/6: Pending confirmatory test, we have ordered HIV, G/C test    Dehydration- (present on admission)  Assessment & Plan  We will encourage PO intake now    Hyperglycemia- (present on admission)  Assessment & Plan  Mild  Could be reactive  Monitor    7/6: Resolved    Schizophrenia (HCC)- (present on admission)  Assessment & Plan  7/5: Patient was transferred out of the ICU yesterday.  The patient is alert and oriented today. The patient was started on his lumateperon yesterday. We will start bupropion and vortioxetine and monitor status.    7/6: We have started the patient's home dose of Adderall          VTE prophylaxis: enoxaparin ppx    I have performed a physical exam and reviewed and updated ROS and Plan today (7/6/2023). In review of yesterday's note (7/5/2023), there are no changes except as documented  above.      Spend least 52 minutes providing care for this patient.  This included face-to-face interview, physical examination.  Review of lab work including previous CBC, CMP and magnesium.  Dicussing with ID pharmacy. Discussing with multidisciplinary team including nursing staff, case management.  Creating plan of care, reviewing orders.

## 2023-07-06 NOTE — CARE PLAN
Problem: Knowledge Deficit - Standard  Goal: Patient and family/care givers will demonstrate understanding of plan of care, disease process/condition, diagnostic tests and medications  Outcome: Progressing     Problem: Fall Risk  Goal: Patient will remain free from falls  Outcome: Progressing     Problem: Safety - Medical Restraint  Goal: Remains free of injury from restraints (Restraint for Interference with Medical Device)  Outcome: Progressing     The patient is Stable - Low risk of patient condition declining or worsening    Shift Goals  Clinical Goals: Pt will remain free from falls or injury throughout shift  Patient Goals: Rest  Family Goals: n/a    Progress made toward(s) clinical / shift goals:  Pt remained alert and oriented throughout shift. Pt reports feeling intermittent anxiety; medicated with prn regimen. Pt able to ambulate to bathroom multiple times this shift with standby assist and minimal difficulty. Bed alarm remains activated.    Patient is not progressing towards the following goals: n/a

## 2023-07-06 NOTE — CARE PLAN
The patient is Stable - Low risk of patient condition declining or worsening    Shift Goals  Clinical Goals: free from injury  Patient Goals: sleept at least 8 hours  Family Goals: TIMOTEO    Progress made toward(s) clinical / shift goals:  Pt was seen sleeping in between rounds. Call light within reach. Bed alarm on. Fall precautions in placed.     Patient is not progressing towards the following goals: N/A

## 2023-07-07 PROBLEM — R53.81 DEBILITY: Status: ACTIVE | Noted: 2023-07-07

## 2023-07-07 LAB
ANION GAP SERPL CALC-SCNC: 11 MMOL/L (ref 7–16)
BUN SERPL-MCNC: 9 MG/DL (ref 8–22)
CALCIUM SERPL-MCNC: 8.8 MG/DL (ref 8.4–10.2)
CHLORIDE SERPL-SCNC: 105 MMOL/L (ref 96–112)
CO2 SERPL-SCNC: 23 MMOL/L (ref 20–33)
CREAT SERPL-MCNC: 0.74 MG/DL (ref 0.5–1.4)
ERYTHROCYTE [DISTWIDTH] IN BLOOD BY AUTOMATED COUNT: 40.7 FL (ref 35.9–50)
GFR SERPLBLD CREATININE-BSD FMLA CKD-EPI: 103 ML/MIN/1.73 M 2
GLUCOSE SERPL-MCNC: 96 MG/DL (ref 65–99)
HCT VFR BLD AUTO: 39.3 % (ref 42–52)
HGB BLD-MCNC: 13.5 G/DL (ref 14–18)
MAGNESIUM SERPL-MCNC: 1.9 MG/DL (ref 1.5–2.5)
MCH RBC QN AUTO: 32.5 PG (ref 27–33)
MCHC RBC AUTO-ENTMCNC: 34.4 G/DL (ref 32.3–36.5)
MCV RBC AUTO: 94.7 FL (ref 81.4–97.8)
PLATELET # BLD AUTO: 265 K/UL (ref 164–446)
PMV BLD AUTO: 8.6 FL (ref 9–12.9)
POTASSIUM SERPL-SCNC: 3.5 MMOL/L (ref 3.6–5.5)
RBC # BLD AUTO: 4.15 M/UL (ref 4.7–6.1)
SODIUM SERPL-SCNC: 139 MMOL/L (ref 135–145)
WBC # BLD AUTO: 7 K/UL (ref 4.8–10.8)

## 2023-07-07 PROCEDURE — 83735 ASSAY OF MAGNESIUM: CPT

## 2023-07-07 PROCEDURE — 94760 N-INVAS EAR/PLS OXIMETRY 1: CPT

## 2023-07-07 PROCEDURE — 80048 BASIC METABOLIC PNL TOTAL CA: CPT

## 2023-07-07 PROCEDURE — 36415 COLL VENOUS BLD VENIPUNCTURE: CPT

## 2023-07-07 PROCEDURE — 700102 HCHG RX REV CODE 250 W/ 637 OVERRIDE(OP): Performed by: INTERNAL MEDICINE

## 2023-07-07 PROCEDURE — 85027 COMPLETE CBC AUTOMATED: CPT

## 2023-07-07 PROCEDURE — A9270 NON-COVERED ITEM OR SERVICE: HCPCS | Performed by: INTERNAL MEDICINE

## 2023-07-07 PROCEDURE — 700111 HCHG RX REV CODE 636 W/ 250 OVERRIDE (IP): Performed by: INTERNAL MEDICINE

## 2023-07-07 PROCEDURE — 770001 HCHG ROOM/CARE - MED/SURG/GYN PRIV*

## 2023-07-07 PROCEDURE — 99233 SBSQ HOSP IP/OBS HIGH 50: CPT | Performed by: INTERNAL MEDICINE

## 2023-07-07 RX ORDER — POTASSIUM CHLORIDE 20 MEQ/1
40 TABLET, EXTENDED RELEASE ORAL ONCE
Status: COMPLETED | OUTPATIENT
Start: 2023-07-07 | End: 2023-07-07

## 2023-07-07 RX ORDER — MAGNESIUM SULFATE 1 G/100ML
1 INJECTION INTRAVENOUS ONCE
Status: COMPLETED | OUTPATIENT
Start: 2023-07-07 | End: 2023-07-07

## 2023-07-07 RX ORDER — TRIHEXYPHENIDYL HYDROCHLORIDE 2 MG/1
5 TABLET ORAL 2 TIMES DAILY
Status: DISCONTINUED | OUTPATIENT
Start: 2023-07-07 | End: 2023-07-08 | Stop reason: HOSPADM

## 2023-07-07 RX ADMIN — DEXTROAMPHETAMINE SACCHARATE, AMPHETAMINE ASPARTATE, DEXTROAMPHETAMINE SULFATE, AMPHETAMINE SULFATE TABLETS, 10 MG,CLL 30 MG: 2.5; 2.5; 2.5; 2.5 TABLET ORAL at 04:44

## 2023-07-07 RX ADMIN — ALPRAZOLAM 1 MG: 0.5 TABLET ORAL at 04:49

## 2023-07-07 RX ADMIN — BUPROPION HYDROCHLORIDE 150 MG: 150 TABLET, EXTENDED RELEASE ORAL at 04:44

## 2023-07-07 RX ADMIN — HYDROXYZINE HYDROCHLORIDE 25 MG: 25 TABLET, FILM COATED ORAL at 00:20

## 2023-07-07 RX ADMIN — HYDROXYZINE HYDROCHLORIDE 25 MG: 25 TABLET, FILM COATED ORAL at 20:25

## 2023-07-07 RX ADMIN — TRIHEXYPHENIDYL HYDROCHLORIDE 5 MG: 2 TABLET ORAL at 17:29

## 2023-07-07 RX ADMIN — TRIHEXYPHENIDYL HYDROCHLORIDE 5 MG: 2 TABLET ORAL at 09:34

## 2023-07-07 RX ADMIN — ALPRAZOLAM 1 MG: 0.5 TABLET ORAL at 17:29

## 2023-07-07 RX ADMIN — MAGNESIUM SULFATE HEPTAHYDRATE 1 G: 10 INJECTION, SOLUTION INTRAVENOUS at 09:25

## 2023-07-07 RX ADMIN — POTASSIUM CHLORIDE 40 MEQ: 1500 TABLET, EXTENDED RELEASE ORAL at 09:10

## 2023-07-07 ASSESSMENT — ENCOUNTER SYMPTOMS
FEVER: 0
DOUBLE VISION: 0
NERVOUS/ANXIOUS: 0
BLURRED VISION: 0
HEADACHES: 0
CHILLS: 0
COUGH: 0
SHORTNESS OF BREATH: 0
BACK PAIN: 0
ABDOMINAL PAIN: 0
VOMITING: 0
PALPITATIONS: 0
HEARTBURN: 0
FALLS: 0
DIZZINESS: 0

## 2023-07-07 ASSESSMENT — PAIN DESCRIPTION - PAIN TYPE
TYPE: ACUTE PAIN
TYPE: ACUTE PAIN

## 2023-07-07 ASSESSMENT — PATIENT HEALTH QUESTIONNAIRE - PHQ9
4. FEELING TIRED OR HAVING LITTLE ENERGY: NOT AT ALL
8. MOVING OR SPEAKING SO SLOWLY THAT OTHER PEOPLE COULD HAVE NOTICED. OR THE OPPOSITE, BEING SO FIGETY OR RESTLESS THAT YOU HAVE BEEN MOVING AROUND A LOT MORE THAN USUAL: NOT AT ALL
4. FEELING TIRED OR HAVING LITTLE ENERGY: NOT AT ALL
1. LITTLE INTEREST OR PLEASURE IN DOING THINGS: NOT AT ALL
9. THOUGHTS THAT YOU WOULD BE BETTER OFF DEAD, OR OF HURTING YOURSELF: NOT AT ALL
7. TROUBLE CONCENTRATING ON THINGS, SUCH AS READING THE NEWSPAPER OR WATCHING TELEVISION: NOT AT ALL
6. FEELING BAD ABOUT YOURSELF - OR THAT YOU ARE A FAILURE OR HAVE LET YOURSELF OR YOUR FAMILY DOWN: NOT AL ALL
SUM OF ALL RESPONSES TO PHQ9 QUESTIONS 1 AND 2: 0
7. TROUBLE CONCENTRATING ON THINGS, SUCH AS READING THE NEWSPAPER OR WATCHING TELEVISION: NOT AT ALL
5. POOR APPETITE OR OVEREATING: NOT AT ALL
9. THOUGHTS THAT YOU WOULD BE BETTER OFF DEAD, OR OF HURTING YOURSELF: NOT AT ALL
SUM OF ALL RESPONSES TO PHQ9 QUESTIONS 1 AND 2: 0
3. TROUBLE FALLING OR STAYING ASLEEP OR SLEEPING TOO MUCH: NOT AT ALL
SUM OF ALL RESPONSES TO PHQ QUESTIONS 1-9: 0
6. FEELING BAD ABOUT YOURSELF - OR THAT YOU ARE A FAILURE OR HAVE LET YOURSELF OR YOUR FAMILY DOWN: NOT AL ALL
SUM OF ALL RESPONSES TO PHQ QUESTIONS 1-9: 0
3. TROUBLE FALLING OR STAYING ASLEEP OR SLEEPING TOO MUCH: NOT AT ALL
2. FEELING DOWN, DEPRESSED, IRRITABLE, OR HOPELESS: NOT AT ALL
2. FEELING DOWN, DEPRESSED, IRRITABLE, OR HOPELESS: NOT AT ALL
8. MOVING OR SPEAKING SO SLOWLY THAT OTHER PEOPLE COULD HAVE NOTICED. OR THE OPPOSITE, BEING SO FIGETY OR RESTLESS THAT YOU HAVE BEEN MOVING AROUND A LOT MORE THAN USUAL: NOT AT ALL
1. LITTLE INTEREST OR PLEASURE IN DOING THINGS: NOT AT ALL
5. POOR APPETITE OR OVEREATING: NOT AT ALL

## 2023-07-07 ASSESSMENT — LIFESTYLE VARIABLES: SUBSTANCE_ABUSE: 0

## 2023-07-07 NOTE — CARE PLAN
The patient is Stable - Low risk of patient condition declining or worsening    Shift Goals  Clinical Goals: Safety, free from falls  Patient Goals: rest and comfort  Family Goals: NA    Progress made toward(s) clinical / shift goals: Pt is up self to the bathroom and back to bed. Pt remained safe through the shift. Hourly rounding in place.     Patient is not progressing towards the following goals:NA

## 2023-07-07 NOTE — PROGRESS NOTES
Bedside report received from night RN. Assumed care of patient. Alert and oriented, wakes easily to voice. VSS. On RA, no SOB/respiratory distress noted. Daily plan of care discussed. Pt denies intolerable pain at this time. Call light within reach. Hourly rounding in place.     0925 IV ABT started  1113 Pt lying in bed watching TV  1316 Pt sitting in bed still working on his lunch tray    1536 pt in the bathroom having a shower    1615 Pt seen in  bed resting watching TV   1733 Pt sitting in bed resting, medications given per MAR    1900 Report given to NOC RN  *Chart check done.

## 2023-07-07 NOTE — CARE PLAN
The patient is Stable - Low risk of patient condition declining or worsening    Shift Goals  Clinical Goals: free from injury  Patient Goals: sleep at least 8 hours  Family Goals: n/a    Progress made toward(s) clinical / shift goals:  Pt ambulated to bathroom, tolerated well. Pt received Atarax as prn dose during this shift. Call light within reach. Fall precautions in placed. Hourly rounding in progress    Patient is not progressing towards the following goals:N/A

## 2023-07-07 NOTE — PROGRESS NOTES
2200H Pt awake, watching tv, O2 sat 94% on room air    2347H Pt resting in bed, watching tv    0215H Pt resting in bed,eyes closed, respirations even and unlabored, arouses easily    0408H Pt resting in bed,eyes closed, respirations even and unlabored, arouses easily    0600H Pt resting in bed,eyes closed, respirations even and unlabored, arouses easily    0715H Bedside report given

## 2023-07-07 NOTE — PROGRESS NOTES
"Hospital Medicine Daily Progress Note    Date of Service  7/7/2023    Chief Complaint  Brian Durham is a 62 y.o. male admitted 7/3/2023 with altered mental status.    Hospital Course  As per chart review:  \"62 y.o. male who presented 7/3/2023 with altered mental status.  Patient does have a history of schizophrenia, he is unable to give us any information on his medications however pharmacy did look in the multiple medications that appear new since April with 2 providers ordering them.  Patient does have a caregiver in a group home, states he has not slept in the last 4 nights, has been more more confused over the last couple days.  Initially upon arrival, he was agitated, difficulty holding still however he has drastically worsened.  Patient is significantly delirious.  Patient has received multiple medications at this point in time, remains agitated.  Unable to obtain any information from him, information obtained from the chart.\"    Interval Problem Update  7/5: Patient was initially transferred to the ICU for further care, however the patient's mentation quickly improved.  Today the time of my evaluation the patient is alert and oriented.  Currently not complaining of pain.  It seems the patient had a RPR test positive, we are pending confirmatory test.  If positive we will consider consulting ID.  Continue to monitor closely.    7/6: Patient seen at bedside this morning.  Patient seems to be alert and oriented.  We will start the patient home dose of Adderall today.  We are pending confirmatory test of syphilis.  We have added HIV gonorrhea and chlamydia testing.  Continue to monitor closely.    7/7: Patient seen at bedside this morning.  Titer of RPR seems to be nonreactive.  Patient with positive antibodies.  We will discuss with ID the need of further treatment.  The patient states that he had syphilis back in the 80s and treated with penicillin.  We have added the patient's home dose of Artane.  " Continue to monitor closely.    I have discussed this patient's plan of care and discharge plan at IDT rounds today with Case Management, Nursing, Nursing leadership, and other members of the IDT team.    Consultants/Specialty  None for now.    Code Status  Full Code    Disposition  The patient is not medically cleared for discharge to home or a post-acute facility.      I have placed the appropriate orders for post-discharge needs.    Review of Systems  Review of Systems   Constitutional:  Negative for chills and fever.   HENT:  Negative for hearing loss and nosebleeds.    Eyes:  Negative for blurred vision and double vision.   Respiratory:  Negative for cough and shortness of breath.    Cardiovascular:  Negative for chest pain and palpitations.   Gastrointestinal:  Negative for abdominal pain, heartburn and vomiting.   Genitourinary:  Negative for dysuria and urgency.   Musculoskeletal:  Negative for back pain and falls.   Skin:  Negative for itching and rash.   Neurological:  Negative for dizziness and headaches.   Psychiatric/Behavioral:  Negative for substance abuse. The patient is not nervous/anxious.    All other systems reviewed and are negative.       Physical Exam  Temp:  [36.4 °C (97.5 °F)-37.1 °C (98.7 °F)] 36.4 °C (97.5 °F)  Pulse:  [70-78] 70  Resp:  [16-17] 17  BP: (111-129)/(70-77) 111/70  SpO2:  [93 %-97 %] 93 %    Physical Exam  Vitals and nursing note reviewed.   Constitutional:       Appearance: Normal appearance.   HENT:      Head: Normocephalic and atraumatic.      Right Ear: External ear normal.      Left Ear: External ear normal.      Nose: Nose normal.      Mouth/Throat:      Mouth: Mucous membranes are moist.      Pharynx: Oropharynx is clear.      Comments: Poor dentition  Eyes:      General:         Right eye: No discharge.         Left eye: No discharge.      Extraocular Movements: Extraocular movements intact.      Pupils: Pupils are equal, round, and reactive to light.    Cardiovascular:      Rate and Rhythm: Normal rate and regular rhythm.      Heart sounds: No murmur heard.  Pulmonary:      Effort: Pulmonary effort is normal. No respiratory distress.      Breath sounds: Normal breath sounds.   Abdominal:      General: Abdomen is flat. Bowel sounds are normal. There is no distension.      Palpations: Abdomen is soft.      Tenderness: There is no abdominal tenderness.   Musculoskeletal:      Cervical back: Normal range of motion and neck supple.      Right lower leg: No edema.      Left lower leg: No edema.   Skin:     General: Skin is warm.   Neurological:      General: No focal deficit present.      Mental Status: He is alert and oriented to person, place, and time.   Psychiatric:         Mood and Affect: Mood normal.         Behavior: Behavior normal.         Fluids    Intake/Output Summary (Last 24 hours) at 7/7/2023 0825  Last data filed at 7/7/2023 0453  Gross per 24 hour   Intake 1100 ml   Output --   Net 1100 ml         Laboratory  Recent Labs     07/06/23 0139 07/07/23  0109   WBC 6.4 7.0   RBC 4.22* 4.15*   HEMOGLOBIN 13.8* 13.5*   HEMATOCRIT 40.3* 39.3*   MCV 95.5 94.7   MCH 32.7 32.5   MCHC 34.2 34.4   RDW 41.8 40.7   PLATELETCT 268 265   MPV 8.8* 8.6*       Recent Labs     07/06/23  0139 07/07/23  0109   SODIUM 141 139   POTASSIUM 3.3* 3.5*   CHLORIDE 107 105   CO2 20 23   GLUCOSE 89 96   BUN 11 9   CREATININE 0.76 0.74   CALCIUM 8.7 8.8                     Imaging  DX-CHEST-PORTABLE (1 VIEW)   Final Result         1.  Interstitial pulmonary parenchymal prominence suggest chronic underlying lung disease, component of interstitial edema and/or infiltrates not excluded.      CT-HEAD W/O   Final Result         1.  No acute intracranial abnormality.                Assessment/Plan  * Acute delirium- (present on admission)  Assessment & Plan  With severe acute encephalopathy, random movements suggesting dystonia  At this time, is difficult to ascertain what medications he  is taking and for how long however it does appear there have been multiple new medications since April by 2 providers  We will hold all psychiatric medications at this time  Patient received multiple sedatives, starting to affect his respiratory status, will transition to Precedex drip  Patient did test positive for methamphetamine however he is on Adderall to be causing a false positive, however he did significantly worsen while in the ER, acutely, could potentially also abuse drugs  Discussed the case with pharmacy, will avoid antipsychotics overnight, will use Precedex drip with Ativan pushes, if a second drip is needed, will give low-dose Versed drip, if respiratory status worsens, certainly may need intubation but at this point time he is protecting his airway    7/5: Patient was transferred out of the ICU yesterday.  The patient is alert and oriented today. The patient was started on his lumateperon yesterday. We will start bupropion and vortioxetine and monitor status.    7/6: I will start the patient on his home dose of Adderal.    7/7: Patient remains alert and oriented.  I have started the patient's home dose of Artane.  Continue to monitor closely.    Anemia  Assessment & Plan  No signs of overt bleeding  monitor    Hypomagnesemia  Assessment & Plan  7/7: Replace IV today  monitor    Hypokalemia  Assessment & Plan  7/7: Replace as needed  monitor    Positive RPR test  Assessment & Plan  We are pending confirmatory test  If positive we will consider consulting ID    7/6: Pending confirmatory test, we have ordered HIV, G/C test    7/7: Titer was reported as non-reactive, patient with positive antibiotics. We will discuss with ID the need for treatment. Monitor.    Dehydration- (present on admission)  Assessment & Plan  We will encourage PO intake now    Hyperglycemia- (present on admission)  Assessment & Plan  Mild  Could be reactive  Monitor    7/6: Resolved    Schizophrenia (HCC)- (present on  admission)  Assessment & Plan  7/5: Patient was transferred out of the ICU yesterday.  The patient is alert and oriented today. The patient was started on his lumateperon yesterday. We will start bupropion and vortioxetine and monitor status.    7/6: We have started the patient's home dose of Adderall     7/7: We have started the patient's home dose of Artane.         VTE prophylaxis: enoxaparin ppx    I have performed a physical exam and reviewed and updated ROS and Plan today (7/7/2023). In review of yesterday's note (7/6/2023), there are no changes except as documented above.      Spend least 51 minutes providing care for this patient.  This included face-to-face interview, physical examination.  Review of lab work including previous CBC, BMP and magnesium.  Consulting ID. Discussing with multidisciplinary team including nursing staff, case management.  Creating plan of care, reviewing orders.

## 2023-07-07 NOTE — FACE TO FACE
Face to Face Supporting Documentation - Home Health    The encounter with this patient was in whole or in part the primary reason for home health admission.    Date of encounter:   Patient:                    MRN:                       YOB: 2021  Brian Durham  [id number]  1960     Home health to see patient for:  Skilled Nursing care for assessment, interventions & education, Physical Therapy evaluation and treatment, and Occupational therapy evaluation and treatment    Skilled need for:  Exacerbation of Chronic Disease State Schizophrenia with debility    Skilled nursing interventions to include:  Comment: as per PT/OT    Homebound status evidenced by:  Needs the assistance of another person in order to leave the home. Leaving home requires a considerable and taxing effort. There is a normal inability to leave the home.    Community Physician to provide follow up care: Pcp Pt States None     Optional Interventions? Yes, Details: as per PT/OT      I certify the face to face encounter for this home health care referral meets the CMS requirements and the encounter/clinical assessment with the patient was, in whole, or in part, for the medical condition(s) listed above, which is the primary reason for home health care. Based on my clinical findings: the service(s) are medically necessary, support the need for home health care, and the homebound criteria are met.  I certify that this patient has had a face to face encounter by the physician assistant working collaboratively with me.  Nick Darling M.D. - [facility]: [phone]

## 2023-07-08 VITALS
OXYGEN SATURATION: 99 % | HEIGHT: 70 IN | HEART RATE: 75 BPM | RESPIRATION RATE: 15 BRPM | BODY MASS INDEX: 27.71 KG/M2 | DIASTOLIC BLOOD PRESSURE: 69 MMHG | TEMPERATURE: 97.6 F | SYSTOLIC BLOOD PRESSURE: 113 MMHG | WEIGHT: 193.56 LBS

## 2023-07-08 LAB
ANION GAP SERPL CALC-SCNC: 10 MMOL/L (ref 7–16)
BUN SERPL-MCNC: 10 MG/DL (ref 8–22)
CALCIUM SERPL-MCNC: 9.2 MG/DL (ref 8.4–10.2)
CHLORIDE SERPL-SCNC: 106 MMOL/L (ref 96–112)
CO2 SERPL-SCNC: 24 MMOL/L (ref 20–33)
CREAT SERPL-MCNC: 0.68 MG/DL (ref 0.5–1.4)
GFR SERPLBLD CREATININE-BSD FMLA CKD-EPI: 106 ML/MIN/1.73 M 2
GLUCOSE SERPL-MCNC: 76 MG/DL (ref 65–99)
MAGNESIUM SERPL-MCNC: 2.1 MG/DL (ref 1.5–2.5)
POTASSIUM SERPL-SCNC: 4.1 MMOL/L (ref 3.6–5.5)
SODIUM SERPL-SCNC: 140 MMOL/L (ref 135–145)

## 2023-07-08 PROCEDURE — A9270 NON-COVERED ITEM OR SERVICE: HCPCS | Performed by: INTERNAL MEDICINE

## 2023-07-08 PROCEDURE — 80048 BASIC METABOLIC PNL TOTAL CA: CPT

## 2023-07-08 PROCEDURE — 94760 N-INVAS EAR/PLS OXIMETRY 1: CPT

## 2023-07-08 PROCEDURE — 99239 HOSP IP/OBS DSCHRG MGMT >30: CPT | Performed by: INTERNAL MEDICINE

## 2023-07-08 PROCEDURE — 700102 HCHG RX REV CODE 250 W/ 637 OVERRIDE(OP): Performed by: INTERNAL MEDICINE

## 2023-07-08 PROCEDURE — 36415 COLL VENOUS BLD VENIPUNCTURE: CPT

## 2023-07-08 PROCEDURE — 83735 ASSAY OF MAGNESIUM: CPT

## 2023-07-08 RX ADMIN — BUPROPION HYDROCHLORIDE 150 MG: 150 TABLET, EXTENDED RELEASE ORAL at 04:26

## 2023-07-08 RX ADMIN — ALPRAZOLAM 1 MG: 0.5 TABLET ORAL at 04:25

## 2023-07-08 RX ADMIN — HYDROXYZINE HYDROCHLORIDE 25 MG: 25 TABLET, FILM COATED ORAL at 02:26

## 2023-07-08 RX ADMIN — TRIHEXYPHENIDYL HYDROCHLORIDE 5 MG: 2 TABLET ORAL at 04:23

## 2023-07-08 RX ADMIN — DEXTROAMPHETAMINE SACCHARATE, AMPHETAMINE ASPARTATE, DEXTROAMPHETAMINE SULFATE, AMPHETAMINE SULFATE TABLETS, 10 MG,CLL 30 MG: 2.5; 2.5; 2.5; 2.5 TABLET ORAL at 04:27

## 2023-07-08 ASSESSMENT — PATIENT HEALTH QUESTIONNAIRE - PHQ9
2. FEELING DOWN, DEPRESSED, IRRITABLE, OR HOPELESS: NOT AT ALL
1. LITTLE INTEREST OR PLEASURE IN DOING THINGS: NOT AT ALL
SUM OF ALL RESPONSES TO PHQ9 QUESTIONS 1 AND 2: 0

## 2023-07-08 ASSESSMENT — PAIN DESCRIPTION - PAIN TYPE: TYPE: ACUTE PAIN

## 2023-07-08 NOTE — PROGRESS NOTES
Pt discharged with MD order. Discharge instructions, prescriptions and follow-up appointments reviewed. Pt verbalized understanding. Copies of discharge papers given, all questions answered.  Pt ambulatory and refused to be transported via wheelchair. Escorted to ER entrance with CNA.

## 2023-07-08 NOTE — PROGRESS NOTES
Bedside report received from night RN. Assumed care of patient. Alert and oriented, wakes easily to voice. VSS. Daily plan of care discussed. Pt denies pain, nausea and vomiting this time. Fall and safety precautions in place Hourly rounding ongoing.     0900 Called scheduling office to set up appt for pt. Left VM    0923 pt sitting in bed. Discharge instructions discussed.      0945 Pt lying in bed, watching TV.    1230 Pt sitting in bed, eating lunch.    1345 Pt in bed still awaiting for his transport.

## 2023-07-08 NOTE — DISCHARGE SUMMARY
"Discharge Summary    CHIEF COMPLAINT ON ADMISSION  Chief Complaint   Patient presents with    ALOC     Per caregiver, pt has been mumbling incoherently, constant body movements, unable to sit still x 4 days       Reason for Admission  ALOC     Admission Date  7/3/2023    CODE STATUS  Full Code    HPI & HOSPITAL COURSE  As per chart review:  \"62 y.o. male who presented 7/3/2023 with altered mental status.  Patient does have a history of schizophrenia, he is unable to give us any information on his medications however pharmacy did look in the multiple medications that appear new since April with 2 providers ordering them.  Patient does have a caregiver in a group home, states he has not slept in the last 4 nights, has been more more confused over the last couple days.  Initially upon arrival, he was agitated, difficulty holding still however he has drastically worsened.  Patient is significantly delirious.  Patient has received multiple medications at this point in time, remains agitated.  Unable to obtain any information from him, information obtained from the chart.\"    Patient was admitted for further management and care.  Initially the patient's altered mental status quickly resolved.  The patient does take several psychiatric medications.  Upon further interview with the patient he mentions that he has not been sleeping well at home because he has a new roommate that does not let him sleep.  Suspect this is the reason of the patient's altered mental status.    While hospitalized we will reintroduce slowly his psychiatric medications and he is tolerating them well.  The patient remains alert and oriented.  We have ordered home health.    During hospitalization the patient tested positive for RPR, he eventually tested positive for antibodies.  Upon further questioning it seems that the patient had syphilis back in the 80s.  I discussed case with ID and we do not need to do any further treatment at this time.  This " is most likely due to his previous syphilis infection.    The patient feels much better this morning.  He would like to go home.  We will discharge patient home, he will require close follow-up with PCP and psychiatry as an outpatient.  It is unclear if the patient takes his medication from his PCP or psychiatry so we have placed referral to psychiatry.  We have also ordered home health.    Therefore, he is discharged in fair and stable condition to home with close outpatient follow-up.    The patient met 2-midnight criteria for an inpatient stay at the time of discharge.    Discharge Date  07/08/2023    FOLLOW UP ITEMS POST DISCHARGE  Patient will require close follow-up with PCP and psychiatry as an outpatient.    DISCHARGE DIAGNOSES  Principal Problem:    Acute delirium (POA: Yes)  Active Problems:    Schizophrenia (HCC) (POA: Yes)    Hyperglycemia (POA: Yes)    Dehydration (POA: Yes)    Positive RPR test (POA: Unknown)    Hypokalemia (POA: Unknown)    Hypomagnesemia (POA: Unknown)    Anemia (POA: Unknown)    Debility (POA: Unknown)  Resolved Problems:    * No resolved hospital problems. *      FOLLOW UP  Future Appointments   Date Time Provider Department Center   7/12/2023  9:00 AM Natasha West M.D. [facility]     Primary Care Provider    Schedule an appointment as soon as possible for a visit        MEDICATIONS ON DISCHARGE     Medication List        CONTINUE taking these medications        Instructions   ALPRAZolam 1 MG Tabs  Commonly known as: Xanax   Take 1 mg by mouth 4 times a day.  Dose: 1 mg     amphetamine-dextroamphetamine 20 MG Tabs  Commonly known as: Adderall   Take 30 mg by mouth every morning.  Dose: 30 mg     buPROPion  MG Tb12 sustained-release tablet  Commonly known as: Wellbutrin-SR   Take 150 mg by mouth every morning.  Dose: 150 mg     Caplyta 42 MG Caps  Generic drug: Lumateperone Tosylate   Take 42 mg by mouth every day.  Dose: 42 mg     celecoxib 100 MG Caps  Commonly  "known as: CeleBREX   Take 100 mg by mouth every day.  Dose: 100 mg     hydrOXYzine HCl 25 MG Tabs  Commonly known as: Atarax   Take 25 mg by mouth every 6 hours as needed for Itching.  Dose: 25 mg     ibuprofen 800 MG Tabs  Commonly known as: Motrin   Take 1 Tab by mouth every 8 hours as needed (pain).  Dose: 800 mg     trihexyphenidyl 5 MG Tabs  Commonly known as: Artane   Take 5 mg by mouth 2 times a day.  Dose: 5 mg     Trintellix 10 MG Tabs  Generic drug: Vortioxetine HBr   Take 10 mg by mouth every day.  Dose: 10 mg              Allergies  Allergies   Allergen Reactions    Paxil [Paroxetine]      \"swollen throat\"    Shellfish Allergy Rash     \"My heart - very painful.\"       DIET  Orders Placed This Encounter   Procedures    Diet Order Diet: Level 7 - Easy to Chew; Liquid level: Level 0 - Thin     Standing Status:   Standing     Number of Occurrences:   1     Order Specific Question:   Diet:     Answer:   Level 7 - Easy to Chew [22]     Order Specific Question:   Liquid level     Answer:   Level 0 - Thin       ACTIVITY  As tolerated.  Weight bearing as tolerated    CONSULTATIONS  None    PROCEDURES      DX-CHEST-PORTABLE (1 VIEW)   Final Result         1.  Interstitial pulmonary parenchymal prominence suggest chronic underlying lung disease, component of interstitial edema and/or infiltrates not excluded.      CT-HEAD W/O   Final Result         1.  No acute intracranial abnormality.              LABORATORY  Lab Results   Component Value Date    SODIUM 140 07/08/2023    POTASSIUM 4.1 07/08/2023    CHLORIDE 106 07/08/2023    CO2 24 07/08/2023    GLUCOSE 76 07/08/2023    BUN 10 07/08/2023    CREATININE 0.68 07/08/2023    CREATININE 0.7 05/08/2007        Lab Results   Component Value Date    WBC 7.0 07/07/2023    HEMOGLOBIN 13.5 (L) 07/07/2023    HEMATOCRIT 39.3 (L) 07/07/2023    PLATELETCT 265 07/07/2023        Total time of the discharge process exceeds 36 minutes.  "

## 2023-07-08 NOTE — CARE PLAN
The patient is Stable - Low risk of patient condition declining or worsening    Shift Goals  Clinical Goals: sleep at least 8 hours, free from injury  Patient Goals: sleep at least 8 hours  Family Goals: NA    Progress made toward(s) clinical / shift goals:  Pt was seen watching tv. Pt stated he is a bit anxious tonight, Atarax given as prn dose. Fall precautions in placed. Hourly rounding in progress.     Patient is not progressing towards the following goals:

## 2023-07-08 NOTE — DISCHARGE PLANNING
Case Management Discharge Planning    Admission Date: 7/3/2023  GMLOS: 2.3  ALOS: 5    6-Clicks ADL Score: 20  6-Clicks Mobility Score: 24      Anticipated Discharge Dispo: Discharge Disposition: D/T to home under HHA care in anticipation of covered skilled care (06)  Discharge Address: [address] [address]  Discharge Contact Phone Number: [phone]    DME Needed: No    Action(s) Taken: Spoke to pt at bedside regarding HH choice. Received verbal approval for [facility]. Choice form completed and [facility]ed to [facility].    RNCM spoke to Alida from pt's group home to inform of DC today. Per Alida, she can  pt at about 1100 this morning.    Escalations Completed: None    Medically Clear: Yes    Next Steps: f/u with [facility] regarding acceptance    Barriers to Discharge: None

## 2023-07-08 NOTE — PROGRESS NOTES
Updated GH Alida about pt's med, Vortioxetine , as it ran out of supply and we don't have supply in the hospital.

## 2023-07-08 NOTE — PROGRESS NOTES
2200H Pt awake, watching tv, O2 sat 94%    0011H Pt resting in bed, watching tv    0215H Pt resting in bed, eating snacks, phlebotomist at bedside    0349H Pt resting in bed,eyes closed, respirations even and unlabored, arouses easily    0610H Pt resting in bed,eyes closed, respirations even and unlabored, arouses easily O2 sat at 91%       0710H Bedside report given

## 2023-07-10 ENCOUNTER — PATIENT OUTREACH (OUTPATIENT)
Dept: SCHEDULING | Facility: IMAGING CENTER | Age: 63
End: 2023-07-10
Payer: COMMERCIAL

## 2024-04-01 ENCOUNTER — HOSPITAL ENCOUNTER (EMERGENCY)
Facility: MEDICAL CENTER | Age: 64
End: 2024-04-01
Attending: EMERGENCY MEDICINE
Payer: MEDICARE

## 2024-04-01 VITALS
OXYGEN SATURATION: 94 % | HEART RATE: 74 BPM | RESPIRATION RATE: 18 BRPM | HEIGHT: 67 IN | DIASTOLIC BLOOD PRESSURE: 96 MMHG | BODY MASS INDEX: 34.26 KG/M2 | WEIGHT: 218.26 LBS | SYSTOLIC BLOOD PRESSURE: 154 MMHG | TEMPERATURE: 98.4 F

## 2024-04-01 DIAGNOSIS — G89.29 CHRONIC BILATERAL LOW BACK PAIN WITHOUT SCIATICA: ICD-10-CM

## 2024-04-01 DIAGNOSIS — M54.50 CHRONIC BILATERAL LOW BACK PAIN WITHOUT SCIATICA: ICD-10-CM

## 2024-04-01 DIAGNOSIS — K43.9 VENTRAL HERNIA WITHOUT OBSTRUCTION OR GANGRENE: ICD-10-CM

## 2024-04-01 PROCEDURE — 700102 HCHG RX REV CODE 250 W/ 637 OVERRIDE(OP): Mod: UD | Performed by: EMERGENCY MEDICINE

## 2024-04-01 PROCEDURE — 99284 EMERGENCY DEPT VISIT MOD MDM: CPT

## 2024-04-01 PROCEDURE — A9270 NON-COVERED ITEM OR SERVICE: HCPCS | Mod: UD | Performed by: EMERGENCY MEDICINE

## 2024-04-01 RX ORDER — IBUPROFEN 600 MG/1
600 TABLET ORAL ONCE
Status: DISCONTINUED | OUTPATIENT
Start: 2024-04-01 | End: 2024-04-01 | Stop reason: HOSPADM

## 2024-04-01 RX ORDER — ACETAMINOPHEN 325 MG/1
650 TABLET ORAL ONCE
Status: COMPLETED | OUTPATIENT
Start: 2024-04-01 | End: 2024-04-01

## 2024-04-01 RX ADMIN — ACETAMINOPHEN 650 MG: 325 TABLET, FILM COATED ORAL at 12:15

## 2024-04-01 ASSESSMENT — FIBROSIS 4 INDEX: FIB4 SCORE: 0.97

## 2024-04-01 ASSESSMENT — PAIN DESCRIPTION - PAIN TYPE: TYPE: ACUTE PAIN

## 2024-04-01 NOTE — ED PROVIDER NOTES
"ED Provider Note    CHIEF COMPLAINT  Chief Complaint   Patient presents with    Back Pain     Low back and neck pain, 6/10 aching pain, previous C4-C6 spine fracture with titanium plate in 2012 and pt states \"is too tight\", no Rx pain meds       EXTERNAL RECORDS REVIEWED  External ED Note countless emergency department notes from neighboring facilities for various complaints including schizophrenia and amphetamine abuse    HPI/ROS  LIMITATION TO HISTORY     OUTSIDE HISTORIAN(S):      Brian Durham is a 63 y.o. male who presents to the emergency department with chief complaint of abdominal pain.  Patient reports to me that he has complaint of a bulge of his anterior abdomen that occasionally hurts.  Reported to triage nurse low back pain as well as neck tightness.  Previous history of ACDF.  Patient reports no numbness no tingling no weakness in extremities no fevers has had normal p.o. intake normal bowel movements no other acute symptom change or concern at this time.    PAST MEDICAL HISTORY   has a past medical history of Arthritis, Cold (1/1/2012), Degenerative disc disease, Hepatitis B, Pain (12-30-11), and Psychiatric disorder.    SURGICAL HISTORY   has a past surgical history that includes other and cervical disk and fusion anterior (1/16/2012).    FAMILY HISTORY  History reviewed. No pertinent family history.    SOCIAL HISTORY  Social History     Tobacco Use    Smoking status: Former     Current packs/day: 0.25     Types: Cigarettes    Smokeless tobacco: Never    Tobacco comments:     1/2 pack a day.   Vaping Use    Vaping Use: Never used   Substance and Sexual Activity    Alcohol use: No    Drug use: No    Sexual activity: Not on file       CURRENT MEDICATIONS  Home Medications       Reviewed by Diane Higgins R.N. (Registered Nurse) on 04/01/24 at 1030  Med List Status: Partial     Medication Last Dose Status   ALPRAZolam (XANAX) 1 MG Tab  Active   amphetamine-dextroamphetamine (ADDERALL) 20 MG " "TABS  Active   buPROPion SR (WELLBUTRIN-SR) 150 MG TABLET SR 12 HR sustained-release tablet  Active   celecoxib (CELEBREX) 100 MG Cap  Active   hydrOXYzine HCl (ATARAX) 25 MG Tab  Active   ibuprofen (MOTRIN) 800 MG TABS  Active   Lumateperone Tosylate (CAPLYTA) 42 MG Cap  Active   trihexyphenidyl (ARTANE) 5 MG Tab  Active   Vortioxetine HBr (TRINTELLIX) 10 MG Tab  Active                    ALLERGIES  Allergies   Allergen Reactions    Paxil [Paroxetine]      \"swollen throat\"    Shellfish Allergy Rash     \"My heart - very painful.\"       PHYSICAL EXAM  VITAL SIGNS: BP (!) 134/97   Pulse 89   Temp 36.4 °C (97.5 °F) (Temporal)   Resp (!) 22   Ht 1.702 m (5' 7\")   Wt 99 kg (218 lb 4.1 oz)   SpO2 95%   BMI 34.18 kg/m²      Pulse ox interpretation: I interpret this pulse ox as normal.  Constitutional: Alert and oriented x 3, no acute distress  HEENT: Atraumatic normocephalic, pupils are equal round reactive to light extraocular movements are intact. The nares is clear, external ears are normal, mouth shows moist mucous membranes normal dentition for age  Neck: Supple, no JVD no tracheal deviation, no midline or paraspinal tenderness  Cardiovascular: Regular rate and rhythm no murmur rub or gallop 2+ pulses peripherally x4  Thorax & Lungs: No respiratory distress, no wheezes rales or rhonchi, No chest tenderness.   GI: Palpable easily reducible ventral hernia, nontender no distention positive bowel sounds  Skin: Warm dry no acute rash or lesion  Musculoskeletal: Moving all extremities with full range and 5 of 5 strength no acute  deformity no lumbar midline tenderness or step-off  Neurologic: Cranial nerves III through XII are grossly intact no sensory deficit no cerebellar dysfunction   Psychiatric: Appropriate affect for situation at this time      EKG/LABS    I have independently interpreted this EKG    RADIOLOGY  I have independently interpreted the diagnostic imaging associated with this visit and am waiting the " "final reading from the radiologist.   My preliminary interpretation is as follows:     Radiologist interpretation:  No orders to display       COURSE & MEDICAL DECISION MAKING    ASSESSMENT, COURSE AND PLAN  Care Narrative: Patient presents with a variety of complaints.  His exam is extremely benign he has an easily reducible ventral hernia varying complaints throughout time in the ER.  Given a dose of Tylenol and ibuprofen feeling improved will require follow-up with primary care for establishment of care and monitoring of multiple chronic issues however no emergent condition at this time requiring further intervention or evaluation discharged in stable and improved condition.            ADDITIONAL PROBLEMS MANAGED      DISPOSITION AND DISCUSSIONS  I have discussed management of the patient with the following physicians and LEA's:      Discussion of management with other [facility] or appropriate source(s):      Escalation of care considered, and ultimately not performed:diagnostic imaging    Barriers to care at this time, including but not limited to: Patient does not have established PCP.     Decision tools and prescription drugs considered including, but not limited to: .  BP (!) 154/96   Pulse 74   Temp 36.9 °C (98.4 °F) (Temporal)   Resp 18   Ht 1.702 m (5' 7\")   Wt 99 kg (218 lb 4.1 oz)   SpO2 94%   BMI 34.18 kg/m²     [address] [address]  [phone]    for establishment of primary care, for blood pressure management    [facility], Emergency Dept  [address] [facility] [address] [phone]    in 12-24 hours if symptoms persist, immediately If symptoms worsen, or if you develop any other symptoms or concerns      FINAL DIAGNOSIS  1. Chronic bilateral low back pain without sciatica    2. Ventral hernia without obstruction or gangrene           Electronically signed by: Fred Jaquez M.D      "

## 2024-04-01 NOTE — ED NOTES
Patient ambulated to [facility] without incident. Primary assessment complete. Patient oriented to care area. Chart up for ERP.

## 2024-04-01 NOTE — ED TRIAGE NOTES
"Chief Complaint   Patient presents with    Back Pain     Low back and neck pain, 6/10 aching pain, previous C4-C6 spine fracture with titanium plate in 2012 and pt states \"is too tight\", no Rx pain meds        Ambulated to triage for above complaint. Hx DDD, pt states coming to ER for pain meds    Pt educated of triage process and informed to contact staff if situation changes.    BP (!) 134/97   Pulse 89   Temp 36.4 °C (97.5 °F) (Temporal)   Resp (!) 22   Ht 1.702 m (5' 7\")   Wt 99 kg (218 lb 4.1 oz)   SpO2 95%   BMI 34.18 kg/m²      "

## 2024-05-28 ENCOUNTER — HOSPITAL ENCOUNTER (EMERGENCY)
Facility: MEDICAL CENTER | Age: 64
End: 2024-05-28
Attending: EMERGENCY MEDICINE
Payer: MEDICARE

## 2024-05-28 ENCOUNTER — APPOINTMENT (OUTPATIENT)
Dept: RADIOLOGY | Facility: MEDICAL CENTER | Age: 64
End: 2024-05-28
Attending: EMERGENCY MEDICINE
Payer: MEDICARE

## 2024-05-28 ENCOUNTER — PHARMACY VISIT (OUTPATIENT)
Dept: PHARMACY | Facility: MEDICAL CENTER | Age: 64
End: 2024-05-28
Payer: COMMERCIAL

## 2024-05-28 VITALS
OXYGEN SATURATION: 94 % | TEMPERATURE: 99.1 F | WEIGHT: 198.19 LBS | RESPIRATION RATE: 18 BRPM | DIASTOLIC BLOOD PRESSURE: 66 MMHG | HEIGHT: 71 IN | SYSTOLIC BLOOD PRESSURE: 99 MMHG | BODY MASS INDEX: 27.75 KG/M2 | HEART RATE: 98 BPM

## 2024-05-28 VITALS
DIASTOLIC BLOOD PRESSURE: 85 MMHG | HEIGHT: 70 IN | TEMPERATURE: 98 F | HEART RATE: 71 BPM | WEIGHT: 199.52 LBS | RESPIRATION RATE: 14 BRPM | SYSTOLIC BLOOD PRESSURE: 122 MMHG | BODY MASS INDEX: 28.56 KG/M2 | OXYGEN SATURATION: 96 %

## 2024-05-28 DIAGNOSIS — J98.01 COUGH DUE TO BRONCHOSPASM: ICD-10-CM

## 2024-05-28 DIAGNOSIS — G89.29 OTHER CHRONIC PAIN: ICD-10-CM

## 2024-05-28 DIAGNOSIS — M25.50 ARTHRALGIA, UNSPECIFIED JOINT: ICD-10-CM

## 2024-05-28 LAB
FLUAV RNA SPEC QL NAA+PROBE: NEGATIVE
FLUBV RNA SPEC QL NAA+PROBE: NEGATIVE
RSV RNA SPEC QL NAA+PROBE: NEGATIVE
SARS-COV-2 RNA RESP QL NAA+PROBE: NOTDETECTED

## 2024-05-28 PROCEDURE — RXMED WILLOW AMBULATORY MEDICATION CHARGE: Performed by: EMERGENCY MEDICINE

## 2024-05-28 PROCEDURE — 99282 EMERGENCY DEPT VISIT SF MDM: CPT

## 2024-05-28 PROCEDURE — 71045 X-RAY EXAM CHEST 1 VIEW: CPT

## 2024-05-28 PROCEDURE — 99283 EMERGENCY DEPT VISIT LOW MDM: CPT | Mod: 25

## 2024-05-28 PROCEDURE — 0241U HCHG SARS-COV-2 COVID-19 NFCT DS RESP RNA 4 TRGT ED POC: CPT

## 2024-05-28 RX ORDER — ALBUTEROL SULFATE 90 UG/1
2 AEROSOL, METERED RESPIRATORY (INHALATION) EVERY 4 HOURS PRN
Qty: 18 G | Refills: 0 | Status: SHIPPED | OUTPATIENT
Start: 2024-05-28

## 2024-05-28 RX ORDER — PREDNISONE 20 MG/1
60 TABLET ORAL DAILY
Qty: 15 TABLET | Refills: 0 | Status: SHIPPED | OUTPATIENT
Start: 2024-05-28 | End: 2024-06-02

## 2024-05-28 ASSESSMENT — FIBROSIS 4 INDEX
FIB4 SCORE: 0.97
FIB4 SCORE: 0.97

## 2024-05-28 NOTE — ED TRIAGE NOTES
"Chief Complaint   Patient presents with    Back Pain     Pt reports back pain.    Cough     Pt reports that he has been coughing recently, it is worse when he is around smoke.     Pt presents with multiple complaints, arm, leg, knee, neck, back pain. Denies chest pain but states that he is coughing when he is exposed to \"dirty smoke.\"    Covid test performed in triage.     Pt is alert and oriented, speaking in full sentences, follows commands. Resperations are even and unlabored.      Pt placed in lobby. Pt educated on triage process. Pt encouraged to alert staff for any changes.     Patient and staff wearing appropriate PPE.    BP (!) 140/76   Pulse 85   Temp 35.9 °C (96.6 °F) (Temporal)   Resp 16   Ht 1.778 m (5' 10\")   Wt 90.5 kg (199 lb 8.3 oz)   SpO2 94%      "

## 2024-05-28 NOTE — ED NOTES
Bedside report received from off going RN/tech: Carolyne, assumed care of patient.  POC discussed with patient. Call light within reach, all needs addressed at this time.       Fall risk interventions in place: Place socks on patient and Place fall risk sign on patient's door (all applicable per [facility] Fall risk assessment)   Continuous monitoring: Pulse Ox or Blood Pressure  IVF/IV medications: Not Applicable   Oxygen: Room Air  Bedside sitter: Not Applicable   Isolation: Not Applicable

## 2024-05-28 NOTE — ED NOTES
Patient ambulatory from ED lobby to green 23 with steady gait, agree with triage note, changing into hospital gown, chart up for ERP to see.

## 2024-05-28 NOTE — ED PROVIDER NOTES
ED Provider Note    CHIEF COMPLAINT  Chief Complaint   Patient presents with    Back Pain     Pt reports back pain.    Cough     Pt reports that he has been coughing recently, it is worse when he is around smoke.       EXTERNAL RECORDS REVIEWED  Patient's last encounter was an ED visit on April 1, 2024 he was seen for back pain.  Noted history of C4-C6 fracture with titanium plate placed in 2012.  Multiple prior encounters for various complaints including schizophrenia and methamphetamine abuse.    HPI/ROS  LIMITATION TO HISTORY   Select: : None  OUTSIDE HISTORIAN(S):  None    Brian Durham is a 63 y.o. male who presents to the emergency department with a chief complaint of a cough.  Patient also reports chronic symptoms of knee and back pain which she has had for many years.  He is not oxygen dependent and satting well on room air.  He does have a notable cough.  He states a few days ago, his bed fell over on him, it is a metal bed and he suffered abrasions to his bilateral shins but those are healing well and his tetanus is up-to-date.    PAST MEDICAL HISTORY   has a past medical history of Arthritis, Cold (1/1/2012), Degenerative disc disease, Hepatitis B, Pain (12-30-11), and Psychiatric disorder.    SURGICAL HISTORY   has a past surgical history that includes other and cervical disk and fusion anterior (1/16/2012).    FAMILY HISTORY  No family history on file.    SOCIAL HISTORY  Social History     Tobacco Use    Smoking status: Former     Current packs/day: 0.25     Types: Cigarettes    Smokeless tobacco: Never    Tobacco comments:     1/2 pack a day.   Vaping Use    Vaping status: Never Used   Substance and Sexual Activity    Alcohol use: No    Drug use: No    Sexual activity: Not on file       CURRENT MEDICATIONS  Home Medications       Reviewed by Yassine Richardson R.N. (Registered Nurse) on 05/28/24 at 3375  Med List Status: Not Addressed     Medication Last Dose Status   ALPRAZolam (XANAX) 1 MG Tab   "Active   amphetamine-dextroamphetamine (ADDERALL) 20 MG TABS  Active   buPROPion SR (WELLBUTRIN-SR) 150 MG TABLET SR 12 HR sustained-release tablet  Active   celecoxib (CELEBREX) 100 MG Cap  Active   hydrOXYzine HCl (ATARAX) 25 MG Tab  Active   ibuprofen (MOTRIN) 800 MG TABS  Active   Lumateperone Tosylate (CAPLYTA) 42 MG Cap  Active   trihexyphenidyl (ARTANE) 5 MG Tab  Active   Vortioxetine HBr (TRINTELLIX) 10 MG Tab  Active                    ALLERGIES  Allergies   Allergen Reactions    Paxil [Paroxetine]      \"swollen throat\"    Shellfish Allergy Rash     \"My heart - very painful.\"       PHYSICAL EXAM  VITAL SIGNS: /85   Pulse 71   Temp 36.7 °C (98 °F) (Temporal)   Resp 14   Ht 1.778 m (5' 10\")   Wt 90.5 kg (199 lb 8.3 oz)   SpO2 96%   BMI 28.63 kg/m²    Vitals reviewed.  Constitutional: Patient is oriented to person, place, and time. Appears well-developed and well-nourished. No distress.    Head: Normocephalic and atraumatic.   Mouth/Throat: Oropharynx is clear and moist  Eyes: Conjunctivae are normal.  Neck: Normal range of motion.  Cardiovascular: Normal rate, regular rhythm and normal heart sounds.   Pulmonary/Chest: Effort normal and breath sounds normal. No respiratory distress, no wheezes, rhonchi, or rales. Cough om exam.   Abdominal: Soft. Bowel sounds are normal. There is no tenderness  Musculoskeletal: No edema and no tenderness.   Neurological: No cranial nerve deficits. Normal gait. No focal deficits.   Skin: Skin is warm and dry. No erythema. No pallor.   Psychiatric: Patient has a normal mood and affect.     EKG/LABS  Results for orders placed or performed during the hospital encounter of 05/28/24   POC CoV-2, FLU A/B, RSV by PCR   Result Value Ref Range    POC Influenza A RNA, PCR Negative Negative    POC Influenza B RNA, PCR Negative Negative    POC RSV, by PCR Negative Negative    POC SARS-CoV-2, PCR NotDetected        I have independently interpreted this " EKG    RADIOLOGY/PROCEDURES   I have independently interpreted the diagnostic imaging associated with this visit and am waiting the final reading from the radiologist.   My preliminary interpretation is as follows: NAPD, no change from prior    Radiologist interpretation:  DX-CHEST-PORTABLE (1 VIEW)   Final Result         1.  Interstitial pulmonary parenchymal prominence suggest chronic underlying lung disease, component of interstitial edema and/or infiltrates not excluded.        COURSE & MEDICAL DECISION MAKING    ASSESSMENT, COURSE AND PLAN  Care Narrative:     This is a pleasant 63-year-old male who presents with cough and chronic pain complaints.  He is not requesting any treatment for his chronic knee and back pain.  He has reassuring vital signs.  He is afebrile.  He does have a notable wet cough.  I ordered x-ray for further evaluation.  He is concerned about lung pathology.  Patient was swabbed per nursing protocols and is negative for COVID, influenza and RSV.    7:25 AM patient reevaluated the bedside.  He is resting and appears comfortable.  We discussed x-ray findings which show no new infiltrate, patient is relieved by this.  I do think he would benefit from an MDI and he has used one in the past and it has been helpful.  He is well-appearing overall and nontoxic with reassuring vital signs.  He reports improvement, he is discharged home in stable condition.    ADDITIONAL PROBLEMS MANAGED    DISPOSITION AND DISCUSSIONS  I have discussed management of the patient with the following physicians and LEA's:  None    Discussion of management with other Q or appropriate source(s): None     Escalation of care considered, and ultimately not performed:Laboratory analysis    Barriers to care at this time, including but not limited to: Patient does not have established PCP.     Decision tools and prescription drugs considered including, but not limited to: None.    FINAL DIAGNOSIS  1. Cough due to bronchospasm     2. Other chronic pain           Electronically signed by: Lala Granados D.O., 5/28/2024 5:38 AM

## 2024-05-28 NOTE — ED NOTES
Pt d/c home ambulatory. Pt given d/c instructions and signed d/c paper. Pt educated on follow up plan and medication usage, pt verbalized understanding of d/c instructions.  PT vs stable. Pt had all belongings at d/c.

## 2024-05-29 ENCOUNTER — HOSPITAL ENCOUNTER (EMERGENCY)
Facility: MEDICAL CENTER | Age: 64
End: 2024-05-29
Attending: EMERGENCY MEDICINE
Payer: MEDICARE

## 2024-05-29 VITALS
HEIGHT: 71 IN | TEMPERATURE: 97.8 F | BODY MASS INDEX: 27.62 KG/M2 | HEART RATE: 101 BPM | WEIGHT: 197.31 LBS | OXYGEN SATURATION: 91 % | SYSTOLIC BLOOD PRESSURE: 109 MMHG | DIASTOLIC BLOOD PRESSURE: 53 MMHG | RESPIRATION RATE: 18 BRPM

## 2024-05-29 DIAGNOSIS — R25.2 MUSCLE CRAMPS: ICD-10-CM

## 2024-05-29 LAB
ALBUMIN SERPL BCP-MCNC: 4.3 G/DL (ref 3.2–4.9)
ALBUMIN/GLOB SERPL: 1.7 G/DL
ALP SERPL-CCNC: 69 U/L (ref 30–99)
ALT SERPL-CCNC: 28 U/L (ref 2–50)
ANION GAP SERPL CALC-SCNC: 17 MMOL/L (ref 7–16)
AST SERPL-CCNC: 24 U/L (ref 12–45)
BILIRUB SERPL-MCNC: 0.5 MG/DL (ref 0.1–1.5)
BUN SERPL-MCNC: 9 MG/DL (ref 8–22)
CALCIUM ALBUM COR SERPL-MCNC: 9.2 MG/DL (ref 8.5–10.5)
CALCIUM SERPL-MCNC: 9.4 MG/DL (ref 8.5–10.5)
CHLORIDE SERPL-SCNC: 105 MMOL/L (ref 96–112)
CO2 SERPL-SCNC: 20 MMOL/L (ref 20–33)
CREAT SERPL-MCNC: 0.74 MG/DL (ref 0.5–1.4)
GFR SERPLBLD CREATININE-BSD FMLA CKD-EPI: 103 ML/MIN/1.73 M 2
GLOBULIN SER CALC-MCNC: 2.6 G/DL (ref 1.9–3.5)
GLUCOSE SERPL-MCNC: 160 MG/DL (ref 65–99)
POTASSIUM SERPL-SCNC: 3.9 MMOL/L (ref 3.6–5.5)
PROT SERPL-MCNC: 6.9 G/DL (ref 6–8.2)
SODIUM SERPL-SCNC: 142 MMOL/L (ref 135–145)

## 2024-05-29 PROCEDURE — 700102 HCHG RX REV CODE 250 W/ 637 OVERRIDE(OP): Mod: UD | Performed by: EMERGENCY MEDICINE

## 2024-05-29 PROCEDURE — 80053 COMPREHEN METABOLIC PANEL: CPT

## 2024-05-29 PROCEDURE — 99283 EMERGENCY DEPT VISIT LOW MDM: CPT

## 2024-05-29 PROCEDURE — A9270 NON-COVERED ITEM OR SERVICE: HCPCS | Mod: UD | Performed by: EMERGENCY MEDICINE

## 2024-05-29 PROCEDURE — 36415 COLL VENOUS BLD VENIPUNCTURE: CPT

## 2024-05-29 RX ORDER — DIPHENHYDRAMINE HCL 25 MG
50 TABLET ORAL ONCE
Status: COMPLETED | OUTPATIENT
Start: 2024-05-29 | End: 2024-05-29

## 2024-05-29 RX ADMIN — DIPHENHYDRAMINE HYDROCHLORIDE 50 MG: 25 TABLET ORAL at 16:51

## 2024-05-29 ASSESSMENT — FIBROSIS 4 INDEX: FIB4 SCORE: 0.97

## 2024-05-29 NOTE — ED NOTES
Patient given discharge instructions/prescriptions sent to pharmacy of choosing/home care instructions explained, patient verbalized understanding of instructions given/patient understands importance of follow up, patient ambulatory to the pharmacy.

## 2024-05-29 NOTE — ED TRIAGE NOTES
Chief Complaint   Patient presents with    Cramps     Pt states sx for days but worse today.     Pt seen here yesterday for same

## 2024-05-29 NOTE — DISCHARGE INSTRUCTIONS
Take Benadryl 50 mg every 6 hours as needed  Follow-up with your psychiatrist as scheduled and return if any problems or worsening

## 2024-05-29 NOTE — ED TRIAGE NOTES
Chief Complaint   Patient presents with    Muscle Pain     Pt has pain and cramping to bilateral hands and cramping to shoulders today.       Pt to triage with steady gait for above complaint. Presents with pain to hands/joints and shoulders. Pt states he feels as though this is an arthritic flare up.    Pt back to lobby, educated on triage process and encourage to alert staff of any changes.     Vitals:    05/28/24 2116   BP: 121/70   Pulse: 98   Resp: 18   Temp: 36.9 °C (98.4 °F)   SpO2: 93%

## 2024-05-29 NOTE — ED NOTES
PT ambulated back to room with steady gait. PT placed on monitor, call light within reach, and chart is up for ERP.

## 2024-05-29 NOTE — ED PROVIDER NOTES
ER Provider Note    Scribed for Dr. Tia Peterson D.O. by Rafat Briggs. 5/29/2024  3:03 PM    Primary Care Provider: Pcp Pt States None    CHIEF COMPLAINT  Chief Complaint   Patient presents with    Cramps     Pt states sx for days but worse today.       EXTERNAL RECORDS REVIEWED  Outpatient Notes Patient was last seen in the emergency department yesterday for a cough and chronic pain to the knees and back. He was discharged at that encounter but was seen again in the following hour for evaluation of cramping to his hands and shoulders. Patient was prescribed prednisone at that encounter.       HPI/ROS  LIMITATION TO HISTORY   Select: : None    OUTSIDE HISTORIAN(S):  None.    Brian Durham is a 63 y.o. male who presents to the ED for evaluation of upper extremity cramping onset four days ago. Patient reports that his cramps have been in his upper arms today, but have occurred throughout his body. He states that he has also been having delusions of something going into his arm, but acknowledges that this is not real, and just an odd sensation. Patient has been taking potassium pills with minimal cramp relief. He has medication for psychiatric conditions, but has not been taking all of them due to side effects. He has an inhaler that he uses as prescribed, and takes Clonazepam for sleep aid.     PAST MEDICAL HISTORY  Past Medical History:   Diagnosis Date    Arthritis     Cold 1/1/2012    2 weeks ago    Degenerative disc disease     Hepatitis B     Pain 12-30-11    neck, 6/10    Psychiatric disorder     bipolar, ADHD       SURGICAL HISTORY  Past Surgical History:   Procedure Laterality Date    CERVICAL DISK AND FUSION ANTERIOR  1/16/2012    Performed by REMY FRANKS at [facility]    OTHER      benign tumor removed from chest       FAMILY HISTORY  None reported today.     SOCIAL HISTORY   reports that he has quit smoking. His smoking use included cigarettes. He has never used smokeless  "tobacco. He reports that he does not drink alcohol and does not use drugs.    CURRENT MEDICATIONS  Previous Medications    ALBUTEROL 108 (90 BASE) MCG/ACT AERO SOLN INHALATION AEROSOL    Inhale 2 Puffs every four hours as needed (cough due to bronchospasm).    ALPRAZOLAM (XANAX) 1 MG TAB    Take 1 mg by mouth 4 times a day.    AMPHETAMINE-DEXTROAMPHETAMINE (ADDERALL) 20 MG TABS    Take 30 mg by mouth every morning.    BUPROPION SR (WELLBUTRIN-SR) 150 MG TABLET SR 12 HR SUSTAINED-RELEASE TABLET    Take 150 mg by mouth every morning.    CELECOXIB (CELEBREX) 100 MG CAP    Take 100 mg by mouth every day.    HYDROXYZINE HCL (ATARAX) 25 MG TAB    Take 25 mg by mouth every 6 hours as needed for Itching.    IBUPROFEN (MOTRIN) 800 MG TABS    Take 1 Tab by mouth every 8 hours as needed (pain).    LUMATEPERONE TOSYLATE (CAPLYTA) 42 MG CAP    Take 42 mg by mouth every day.    PREDNISONE (DELTASONE) 20 MG TAB    Take 3 Tablets by mouth every day for 5 days.    TRIHEXYPHENIDYL (ARTANE) 5 MG TAB    Take 5 mg by mouth 2 times a day.    VORTIOXETINE HBR (TRINTELLIX) 10 MG TAB    Take 10 mg by mouth every day.       ALLERGIES  Paxil [paroxetine] and Shellfish allergy    PHYSICAL EXAM  /71   Pulse 99   Temp 36.6 °C (97.9 °F) (Temporal)   Resp 16   Ht 1.791 m (5' 10.5\")   Wt 89.5 kg (197 lb 5 oz)   SpO2 93%   BMI 27.91 kg/m²   Constitutional: Patient is well developed, well nourished. Non-toxic appearing. No acute physical distress.   HENT: Normocephalic,  nares are patent and clear oral mucosa is moist.  Eyes: PERRL, EOMI, Conjunctiva without erythema or exudates.   Cardiovascular: Normal heart rate and Regular rhythm. No murmur  Thorax & Lungs: Clear and equal breath sounds with good excursion.  Abdomen: Bowel sounds normal in all four quadrants. Soft,nontender  Skin: Warm, Dry    Extremities: Peripheral pulses 4/4 No rashes or tenderness.  Musculoskeletal: Normal range of motion in all major joints. No tenderness to " palpation or major deformities noted.  Neurologic: Alert & oriented x 3, Normal motor function, Normal sensory function,   Psychiatric: Tangential, paranoid, friendly.     DIAGNOSTIC STUDIES & PROCEDURES    Labs:   Results for orders placed or performed during the hospital encounter of 05/29/24   COMP METABOLIC PANEL   Result Value Ref Range    Sodium 142 135 - 145 mmol/L    Potassium 3.9 3.6 - 5.5 mmol/L    Chloride 105 96 - 112 mmol/L    Co2 20 20 - 33 mmol/L    Anion Gap 17.0 (H) 7.0 - 16.0    Glucose 160 (H) 65 - 99 mg/dL    Bun 9 8 - 22 mg/dL    Creatinine 0.74 0.50 - 1.40 mg/dL    Calcium 9.4 8.5 - 10.5 mg/dL    Correct Calcium 9.2 8.5 - 10.5 mg/dL    AST(SGOT) 24 12 - 45 U/L    ALT(SGPT) 28 2 - 50 U/L    Alkaline Phosphatase 69 30 - 99 U/L    Total Bilirubin 0.5 0.1 - 1.5 mg/dL    Albumin 4.3 3.2 - 4.9 g/dL    Total Protein 6.9 6.0 - 8.2 g/dL    Globulin 2.6 1.9 - 3.5 g/dL    A-G Ratio 1.7 g/dL   ESTIMATED GFR   Result Value Ref Range    GFR (CKD-EPI) 103 >60 mL/min/1.73 m 2      All labs reviewed by me.    COURSE & MEDICAL DECISION MAKING    ED Observation Status? No; Patient does not meet criteria for ED Observation.     INITIAL ASSESSMENT AND PLAN  Care Narrative:       3:03 PM - Patient seen and evaluated at bedside. Patient presents today with cramping to his upper extremities. He is in no physical distress, but has delusions and a history of bipolar disorder. Discussed plan of care, including performing lab work for evaluation. Patient agrees to plan of care. Ordered CMP to evaluate.  Labs show normal potassium, sodium and calcium.  At this time this could be related to his psychiatric medications so I will treat him with Benadryl and I told him to take it every 6 hours.  He is only to take it as needed, warm compresses to his muscles and follow-up with his psychiatrist for readjustment of his medications.  He is discharged in stable and improved condition.    4:47 PM - I reevaluated the patient at  bedside. I discussed the patient's diagnostic study results which show no abnormalities on blood work, but I explained that he will be given benadryl. I discussed plan for discharge and follow up as outlined below. The patient is stable for discharge at this time and will return for any new or worsening symptoms. Patient verbalizes understanding and support with my plan for discharge.                     DISPOSITION AND DISCUSSIONS  I have discussed management of the patient with the following physicians and LEA's: None.     Discussion of management with other [facility] or appropriate source(s): None     Barriers to care at this time, including but not limited to: Patient does not have established PCP.     Decision tools and prescription drugs considered including, but not limited to:  Benadryl .    The patient will return for new or worsening symptoms and is stable at the time of discharge.    DISPOSITION:  Patient will be discharged home in stable condition.    FOLLOW UP:  [address] [place] [id number]  [phone]  Schedule an appointment as soon as possible for a visit in 1 week  For recheck      OUTPATIENT MEDICATIONS:  New Prescriptions    No medications on file        FINAL IMPRESSION   1. Muscle cramps         Rafat ULLOA (Tayla), am scribing for, and in the presence of, Tia Peterson D.O..    Electronically signed by: Rafat Briggs (Tayla), 5/29/2024    Tia ULLOA D.O. personally performed the services described in this documentation, as scribed by Rafat Briggs in my presence, and it is both accurate and complete.    The note accurately reflects work and decisions made by me.  Tia Peterson D.O.  5/29/2024  10:41 PM

## 2024-05-29 NOTE — ED PROVIDER NOTES
ER Provider Note    Scribed for Jack Monae M.D. by Princess Levy. 5/28/2024   10:00 PM    Primary Care Provider: None noted    CHIEF COMPLAINT  Chief Complaint   Patient presents with    Muscle Pain     Pt has pain and cramping to bilateral hands and cramping to shoulders today.     EXTERNAL RECORDS REVIEWED  Review of records shows that he has been seen two times recently at [place]. He was seen and discharged home 3 hours ago for a cough and back pain.     HPI/ROS  Brian Durham is a 63 y.o. male with a history of arthritis who presents to the ED for evaluation of bilateral hand and arm cramping and pain onset today. He states he is having muscle spasms whenever he tries to move his his fingers or shoulder. No alleviating factors attempted.     PAST MEDICAL HISTORY  Past Medical History:   Diagnosis Date    Arthritis     Cold 1/1/2012    2 weeks ago    Degenerative disc disease     Hepatitis B     Pain 12-30-11    neck, 6/10    Psychiatric disorder     bipolar, ADHD       SURGICAL HISTORY  Past Surgical History:   Procedure Laterality Date    CERVICAL DISK AND FUSION ANTERIOR  1/16/2012    Performed by REMY FRANKS at SURGERY [facility] ORS    OTHER      benign tumor removed from chest       FAMILY HISTORY  None noted    SOCIAL HISTORY   reports that he has quit smoking. His smoking use included cigarettes. He has never used smokeless tobacco. He reports that he does not drink alcohol and does not use drugs.    CURRENT MEDICATIONS  Previous Medications    ALBUTEROL 108 (90 BASE) MCG/ACT AERO SOLN INHALATION AEROSOL    Inhale 2 Puffs every four hours as needed (cough due to bronchospasm).    ALPRAZOLAM (XANAX) 1 MG TAB    Take 1 mg by mouth 4 times a day.    AMPHETAMINE-DEXTROAMPHETAMINE (ADDERALL) 20 MG TABS    Take 30 mg by mouth every morning.    BUPROPION SR (WELLBUTRIN-SR) 150 MG TABLET SR 12 HR SUSTAINED-RELEASE TABLET    Take 150 mg by mouth every morning.    CELECOXIB (CELEBREX) 100 MG  "CAP    Take 100 mg by mouth every day.    HYDROXYZINE HCL (ATARAX) 25 MG TAB    Take 25 mg by mouth every 6 hours as needed for Itching.    IBUPROFEN (MOTRIN) 800 MG TABS    Take 1 Tab by mouth every 8 hours as needed (pain).    LUMATEPERONE TOSYLATE (CAPLYTA) 42 MG CAP    Take 42 mg by mouth every day.    TRIHEXYPHENIDYL (ARTANE) 5 MG TAB    Take 5 mg by mouth 2 times a day.    VORTIOXETINE HBR (TRINTELLIX) 10 MG TAB    Take 10 mg by mouth every day.       ALLERGIES  Allergies   Allergen Reactions    Paxil [Paroxetine]      \"swollen throat\"    Shellfish Allergy Rash     \"My heart - very painful.\"        PHYSICAL EXAM  /70   Pulse 98   Temp 36.9 °C (98.4 °F) (Temporal)   Resp 18   Ht 1.803 m (5' 11\")   Wt 89.9 kg (198 lb 3.1 oz)   SpO2 93%   BMI 27.64 kg/m²    Nursing note and vitals reviewed.  Constitutional: Well-developed and well-nourished. No distress.   HENT: Head is normocephalic and atraumatic. Oropharynx is clear and moist without exudate or erythema.   Eyes: Pupils are equal, round, and reactive to light. Conjunctiva are normal.   Cardiovascular: Normal rate and regular rhythm. No murmur heard. Normal radial pulses.  Pulmonary/Chest: Breath sounds normal. No wheezes or rales.   Abdominal: Soft and non-tender. No distention    Musculoskeletal: Extremities exhibit normal range of motion without edema or tenderness.   Neurological: Awake, alert and oriented to person, place, and time. No focal deficits noted.  Skin: Skin is warm and dry. No rash.   Psychiatric: Normal mood and affect. Appropriate for clinical situation    Diffuse arthritic changes of the hands    INITIAL ASSESSMENT AND PLAN    10:00 PM - Patient was evaluated at bedside for muscle spasms. He is noted to be seen earlier to have a cough and now have arthralgias. This is likely related to a viral syndrome. Will treat with Prednisone. Patient verbalizes understanding and agreement to this plan of care.      DISPOSITION AND " DISCUSSIONS    Escalation of care considered, and ultimately not performed: diagnostic imaging.    Barriers to care at this time, including but not limited to: Patient does not have established PCP and Patient is homeless.     Decision tools and prescription drugs considered including, but not limited to: Prednisone.    DISPOSITION:  Patient will be discharged home in stable condition.    FOLLOW UP:  [facility], Emergency Dept  [address] [id number] [phone]    If symptoms worsen      OUTPATIENT MEDICATIONS:  New Prescriptions    PREDNISONE (DELTASONE) 20 MG TAB    Take 3 Tablets by mouth every day for 5 days.       FINAL DIAGNOSIS  1. Arthralgia, unspecified joint         Princess ULLOA (Tayla), am scribing for, and in the presence of, Jack Monae M.D..    Electronically signed by: Princess Levy (Tayla), 5/28/2024    Jack ULLOA M.D. personally performed the services described in this documentation, as scribed by Princess Levy in my presence, and it is both accurate and complete.      The note accurately reflects work and decisions made by me.  Jack Monae M.D.  5/28/2024  10:56 PM

## 2024-05-30 NOTE — ED NOTES
Discharged to follow up with pcp and behavioral health provider as scheduled. Otc benadryl as needed. Continue previous home medications as prescribed.

## 2024-06-13 ENCOUNTER — HOSPITAL ENCOUNTER (EMERGENCY)
Facility: MEDICAL CENTER | Age: 64
End: 2024-06-13
Attending: EMERGENCY MEDICINE
Payer: MEDICARE

## 2024-06-13 VITALS
WEIGHT: 197 LBS | DIASTOLIC BLOOD PRESSURE: 91 MMHG | TEMPERATURE: 97.8 F | BODY MASS INDEX: 28.2 KG/M2 | HEIGHT: 70 IN | HEART RATE: 87 BPM | RESPIRATION RATE: 16 BRPM | OXYGEN SATURATION: 97 % | SYSTOLIC BLOOD PRESSURE: 139 MMHG

## 2024-06-13 DIAGNOSIS — Z13.9 ENCOUNTER FOR MEDICAL SCREENING EXAMINATION: ICD-10-CM

## 2024-06-13 LAB — GLUCOSE BLD STRIP.AUTO-MCNC: 103 MG/DL (ref 65–99)

## 2024-06-13 PROCEDURE — 99284 EMERGENCY DEPT VISIT MOD MDM: CPT

## 2024-06-13 PROCEDURE — 82962 GLUCOSE BLOOD TEST: CPT

## 2024-06-13 PROCEDURE — A9270 NON-COVERED ITEM OR SERVICE: HCPCS | Mod: UD | Performed by: EMERGENCY MEDICINE

## 2024-06-13 PROCEDURE — 700102 HCHG RX REV CODE 250 W/ 637 OVERRIDE(OP): Mod: UD | Performed by: EMERGENCY MEDICINE

## 2024-06-13 RX ORDER — OLANZAPINE 5 MG/1
10 TABLET ORAL ONCE
Status: COMPLETED | OUTPATIENT
Start: 2024-06-13 | End: 2024-06-13

## 2024-06-13 RX ADMIN — OLANZAPINE 10 MG: 5 TABLET, FILM COATED ORAL at 10:32

## 2024-06-13 ASSESSMENT — FIBROSIS 4 INDEX: FIB4 SCORE: 1.06

## 2024-06-13 NOTE — ED TRIAGE NOTES
"Chief Complaint   Patient presents with    ALOC     PT was seen [place], estimated 10' drop. No trauma noted to head or body, PT does express some discomfort in LLE, CMS intact. PT denies SI, states he just wanted a bath. PT erratic in behavior, unable or unwilling to answer most triage questions, does not follow most commands. FSBS 103 upon arrival to ED.       BIB EMS to [facility], pt on monitor, provided call bell and in gown. Wet clothing bagged, warm blankets provided.    Medications given en route:None    /83   Pulse 81   Temp 36.6 °C (97.8 °F) (Temporal)   Resp (!) 21   Ht 1.778 m (5' 10\")   Wt 89.4 kg (197 lb)   SpO2 95%   BMI 28.27 kg/m²     "

## 2024-06-13 NOTE — ED NOTES
Patient moved to G36, report received from Henny MOSER, assuming care.  Patient resting on gurney, restless, erratic behavior, tangential speech and difficult to assess at times.  Patient does deny SI at this time.

## 2024-06-13 NOTE — ED PROVIDER NOTES
"ED Provider Note    CHIEF COMPLAINT  Chief Complaint   Patient presents with    ALOC     PT was seen jumping from bridge into river, estimated 10' drop. No trauma noted to head or body, PT does express some discomfort in LLE, CMS intact. PT denies SI, states he just wanted a bath. PT erratic in behavior, unable or unwilling to answer most triage questions, does not follow most commands. FSBS 103 upon arrival to ED.       HPI  Brian Durham is a 63 y.o. male who presents for evaluation after being witnessed to jump off a 10 foot bridge into the [facility].  Patient did not have any apparent harm to himself but appeared disoriented and was transferred for further evaluation.  Patient notes that he was \"trying to take a bath\" but appears disoriented to time and situation otherwise.  He notes he is not suicidal and has no pain.  EXTERNAL RECORDS REVIEWED  Reviewed five emergency department visits in May of this year.  ROS  Constitutional: Denies fevers or chills.  Feels \"thirsty\"  Skin: No rashes  HEENT: No sore throat, or runny nose  Neck: No neck pain  Chest: No pain or rashes  Pulm: No shortness of breath, cough, wheezing, stridor, or pain with inspiration/expiration  Gastrointestinal: No nausea, vomiting, diarrhea, or abdominal pain.  Genitourinary: No dysuria or hematuria  Musculoskeletal: No pain, swelling, or focal weakness  Neurologic: No sensory or focal motor changes to extremities.  Right  Psych: Nonsuicidal           LIMITATION TO HISTORY   None   OUTSIDE HISTORIAN(S):  none        PAST FAM HISTORY  No family history on file.    PAST MEDICAL HISTORY   has a past medical history of Arthritis, Cold (1/1/2012), Degenerative disc disease, Hepatitis B, Pain (12-30-11), and Psychiatric disorder.    SOCIAL HISTORY  Social History     Tobacco Use    Smoking status: Former     Current packs/day: 0.25     Types: Cigarettes    Smokeless tobacco: Never    Tobacco comments:     1/2 pack a day.   Vaping Use    " "Vaping status: Never Used   Substance and Sexual Activity    Alcohol use: No    Drug use: No    Sexual activity: Not on file       SURGICAL HISTORY   has a past surgical history that includes other and cervical disk and fusion anterior (1/16/2012).    CURRENT MEDICATIONS  Home Medications       Reviewed by Chayo Sanders R.N. (Registered Nurse) on 06/13/24 at 0956  Med List Status: Partial     Medication Last Dose Status   albuterol 108 (90 Base) MCG/ACT Aero Soln inhalation aerosol  Active   ALPRAZolam (XANAX) 1 MG Tab  Active   amphetamine-dextroamphetamine (ADDERALL) 20 MG TABS  Active   buPROPion SR (WELLBUTRIN-SR) 150 MG TABLET SR 12 HR sustained-release tablet  Active   celecoxib (CELEBREX) 100 MG Cap  Active   hydrOXYzine HCl (ATARAX) 25 MG Tab  Active   ibuprofen (MOTRIN) 800 MG TABS  Active   Lumateperone Tosylate (CAPLYTA) 42 MG Cap  Active   trihexyphenidyl (ARTANE) 5 MG Tab  Active   Vortioxetine HBr (TRINTELLIX) 10 MG Tab  Active                     ALLERGIES  Allergies   Allergen Reactions    Paxil [Paroxetine]      \"swollen throat\"    Shellfish Allergy Rash     \"My heart - very painful.\"       PHYSICAL EXAM  VITAL SIGNS: BP (!) 139/91   Pulse 87   Temp 36.6 °C (97.8 °F) (Temporal)   Resp 16   Ht 1.778 m (5' 10\")   Wt 89.4 kg (197 lb)   SpO2 97%   BMI 28.27 kg/m²    Gen: Alert in no apparent distress.  Attentive, slightly restless.  HEENT: No signs of trauma, Bilateral external ears normal, Nose normal. Conjunctiva normal, Non-icteric.  Pupils equal  Neck:  No tenderness, Supple, No masses  Lymphatic: No cervical lymphadenopathy noted.   Cardiovascular: Regular rate and rhythm.  Capillary refill less than 3 seconds to all extremities, 2+ distal pulses.  Thorax & Lungs: Normal breath sounds, No respiratory distress, No wheezing bilateral chest rise  Abdomen: Bowel sounds normal, Soft, No tenderness, No masses, No pulsatile masses. No Guarding or rebound  Skin: Warm, Dry, No erythema, No rash " noted to exposed areas.   Back: No bony tenderness, No CVA tenderness.   Extremities: Intact distal pulses, No edema  Neurologic: Alert , no facial droop, grossly normal coordination and strength    INITIAL IMPRESSION  Patient arrives for evaluation after [place].  Fall was apparently about 10 feet and the wrist was operatively.  EMS was called and transported patient out of concern for for the active self but noted no other findings.  Patient denies feeling suicidal or wanting to harm himself but appears disoriented to time.  Patient has been seen multiple times last month and is noted to have methamphetamine use disorder as well as schizophrenia.  He has other medical issues but is unclear if he is taking any of his medications.  He denies any symptoms currently and will be treated presumptively for a schizophrenic decline, although he will likely be dischargeable she as he is very close to his baseline at this point.  There are no obvious findings to suggest injury or recent illness.  I do not feel labs or imaging will benefit him or .    ED observation? No        ASSESSMENT, COURSE AND PLAN  Care Narrative: Reevaluated at 12:19 PM.  Patient is putting on his close and wants to leave.  He is coordinating his extremities well and appears nondistressed.  As he denied any suicidal intent I suspect that his actions today are related to his underlying schizophrenia.  He does appear to be at baseline and does not meet criteria for an involuntary hold.  He is able to follow commands and is cooperative.  He stated no intent to harm himself and I feel he is safe for discharge although he was encouraged to follow-up with outpatient psychiatric services.              ADDITIONAL PROBLEMS MANAGED  Schizophrenia        I have discussed management of the patient with the following physicians and LEA's: None    Escalation of care considered, and ultimately not performed: Consider labs  and imaging but felt to be unnecessary.  Considered ED behavioral health evaluation but, again, patient appeared to be at baseline and is very unlikely to benefit from the consultation.  He unfortunately may or may not be taking his medications but he does not appear grossly psychotic and is stating no intent for self-harm or harming of others.  His disorientation to time and disorganized description of situation is likely the result of intermittently medicated schizophrenia.  He is better after Zyprexa and again states understanding that he needs to take his medications as prescribed.      Barriers to care at this time, including but not limited to: .  None    Decision tools and Rx drugs considered including, but not limited to : Antipsychotics    Discussion of management with other [facility] or appropriate source(s): None    The patient will not drink alcohol nor drive with prescribed medications. The patient will return for worsening symptoms and is stable at the time of discharge. The patient verbalizes understanding and will comply.    FINAL IMPRESSION  1. Encounter for medical screening examination        Electronically signed by: Sadiq Mckeon M.D., 6/13/2024 10:01 AM

## 2024-06-13 NOTE — ED NOTES
Pt alert sitting up on gurney. Unable to understand what pt is saying at this time, appears to be at baseline.

## 2024-06-13 NOTE — ED NOTES
Patient A&O.  Stood and dressed himself and stated he was ready for discharge.  His plan to go back to the [facility] campus.  ERP notified.  Pt ready for discharge.  Patient left prior to receiving discharge instructions.

## 2024-08-06 ENCOUNTER — PHARMACY VISIT (OUTPATIENT)
Dept: PHARMACY | Facility: MEDICAL CENTER | Age: 64
End: 2024-08-06
Payer: COMMERCIAL

## 2024-08-06 ENCOUNTER — HOSPITAL ENCOUNTER (EMERGENCY)
Facility: MEDICAL CENTER | Age: 64
End: 2024-08-06
Attending: EMERGENCY MEDICINE
Payer: MEDICARE

## 2024-08-06 VITALS
HEART RATE: 67 BPM | DIASTOLIC BLOOD PRESSURE: 67 MMHG | TEMPERATURE: 97.5 F | SYSTOLIC BLOOD PRESSURE: 114 MMHG | BODY MASS INDEX: 26.23 KG/M2 | WEIGHT: 183.2 LBS | OXYGEN SATURATION: 97 % | RESPIRATION RATE: 16 BRPM | HEIGHT: 70 IN

## 2024-08-06 DIAGNOSIS — L29.9 ITCHING: ICD-10-CM

## 2024-08-06 PROCEDURE — 700102 HCHG RX REV CODE 250 W/ 637 OVERRIDE(OP): Mod: UD | Performed by: EMERGENCY MEDICINE

## 2024-08-06 PROCEDURE — RXMED WILLOW AMBULATORY MEDICATION CHARGE: Performed by: EMERGENCY MEDICINE

## 2024-08-06 PROCEDURE — 99283 EMERGENCY DEPT VISIT LOW MDM: CPT

## 2024-08-06 PROCEDURE — A9270 NON-COVERED ITEM OR SERVICE: HCPCS | Mod: UD | Performed by: EMERGENCY MEDICINE

## 2024-08-06 RX ORDER — HYDROXYZINE HYDROCHLORIDE 25 MG/1
25 TABLET, FILM COATED ORAL EVERY 6 HOURS PRN
Qty: 30 TABLET | Refills: 0 | Status: SHIPPED | OUTPATIENT
Start: 2024-08-06

## 2024-08-06 RX ORDER — HYDROXYZINE HYDROCHLORIDE 25 MG/1
25 TABLET, FILM COATED ORAL ONCE
Status: COMPLETED | OUTPATIENT
Start: 2024-08-06 | End: 2024-08-06

## 2024-08-06 RX ADMIN — HYDROXYZINE HYDROCHLORIDE 25 MG: 25 TABLET, FILM COATED ORAL at 14:40

## 2024-08-06 ASSESSMENT — FIBROSIS 4 INDEX: FIB4 SCORE: 1.06

## 2024-08-06 NOTE — ED PROVIDER NOTES
ER Provider Note    Og Naidu M.D.. 8/6/2024  2:25 PM    Primary Care Provider: Pcp Pt States None    CHIEF COMPLAINT  Chief Complaint   Patient presents with    Itchy     Pt c/o generalized itching.  Pt showed his left calf which shows a possible rash with scabs from itching.      EXTERNAL RECORDS REVIEWED  Too numerous to count local area emergency department visits for sequela of homelessness and poorly managed mental health challenges.  Records show a history of presentations for generalized itching, for which she has been prescribed Atarax in the past.    HPI/ROS  LIMITATION TO HISTORY   This patient is a very limited historian.    OUTSIDE HISTORIAN(S):      Brian Durham is a 63 y.o. male who presents to the ED complaining of generalized itching, showing his left calf, with which have shallow nonpenetrating excoriations from itching.  There is no associated infection.  He does not complain of fevers.  There is no skin breakdown other than some small old dry scabs.    PAST MEDICAL HISTORY  Past Medical History:   Diagnosis Date    Arthritis     Cold 1/1/2012    2 weeks ago    Degenerative disc disease     Hepatitis B     Pain 12-30-11    neck, 6/10    Psychiatric disorder     bipolar, ADHD       SURGICAL HISTORY  Past Surgical History:   Procedure Laterality Date    CERVICAL DISK AND FUSION ANTERIOR  1/16/2012    Performed by REMY FRANKS at SURGERY [facility] ORS    OTHER      benign tumor removed from chest       FAMILY HISTORY  No family history on file.    SOCIAL HISTORY   reports that he has quit smoking. His smoking use included cigarettes. He has never used smokeless tobacco. He reports that he does not drink alcohol and does not use drugs.    CURRENT MEDICATIONS  Discharge Medication List as of 8/6/2024  2:42 PM        CONTINUE these medications which have NOT CHANGED    Details   albuterol 108 (90 Base) MCG/ACT Aero Soln inhalation aerosol Inhale 2 Puffs every four hours as needed  "(cough due to bronchospasm)., Disp-18 g, R-0, Normal      ALPRAZolam (XANAX) 1 MG Tab Take 1 mg by mouth 4 times a day., Historical Med      Lumateperone Tosylate (CAPLYTA) 42 MG Cap Take 42 mg by mouth every day., Historical Med      Vortioxetine HBr (TRINTELLIX) 10 MG Tab Take 10 mg by mouth every day., Historical Med      trihexyphenidyl (ARTANE) 5 MG Tab Take 5 mg by mouth 2 times a day., Historical Med      buPROPion SR (WELLBUTRIN-SR) 150 MG TABLET SR 12 HR sustained-release tablet Take 150 mg by mouth every morning., Historical Med      celecoxib (CELEBREX) 100 MG Cap Take 100 mg by mouth every day., Historical Med      ibuprofen (MOTRIN) 800 MG TABS Take 1 Tab by mouth every 8 hours as needed (pain)., Disp-20 Each, R-0, Print Rx Paper      amphetamine-dextroamphetamine (ADDERALL) 20 MG TABS Take 30 mg by mouth every morning., Historical Med             ALLERGIES  Paxil [paroxetine] and Shellfish allergy    PHYSICAL EXAM  VITAL SIGNS: /67   Pulse 67   Temp 36.4 °C (97.5 °F) (Temporal)   Resp 16   Ht 1.778 m (5' 10\")   Wt 83.1 kg (183 lb 3.2 oz)   SpO2 97%   BMI 26.29 kg/m²   Pulse ox interpretation: I interpret this pulse ox as normal.  Constitutional: Disheveled.  Alert in no apparent distress.  HENT: No signs of trauma, Bilateral external ears normal, Nose normal.   Eyes: Pupils are equal and reactive, Conjunctiva normal, Non-icteric.   Neck: Normal range of motion, Supple, No stridor.    Cardiovascular: Normal peripheral perfusion  Thorax & Lungs: Unlabored respirations, equal chest expansion, no accessory muscle use  Abdomen: Non-distended  Skin: Superficial nonpenetrating excoriations to the left calf.  No signs of infestation or infection or abscess.  A few scattered small dry scabs, likely from excoriation.  Back: Normal alignment and ROM  Extremities: No gross deformity  Musculoskeletal: Good range of motion in all major joints.   Neurologic: Alert, Normal motor function, No focal " deficits noted.   Psychiatric: Affect normal, Judgment normal, Mood normal.      DIAGNOSTIC STUDIES    Labs:   Labs Reviewed - No data to display    EKG:   I have independently interpreted this EKG  Results for orders placed or performed during the hospital encounter of 23   EKG (NOW)   Result Value Ref Range    Report       [facility] Emergency Dept.    Test Date:  2023  Pt Name:    SADI GUERRERO                Department: EDS  MRN:        [id number]                      [facility]  Gender:     Male                         Technician: [id number]  :        1960                   Requested By:JERICHO CISNEROS  Order #:    [id number]                    Reading MD: JERICHO CISNEROS    Measurements  Intervals                                Axis  Rate:       83                           P:          80  MA:         153                          QRS:        78  QRSD:       86                           T:          49  QT:         374  QTc:        440    Interpretive Statements  Sinus rhythm  Compared to ECG 04/15/2023 18:45:17  No significant changes  :Impression: Normal sinus QTc is normal.  QRS is normal.  Electronically Signed On 2023 22:21:07 PDT by JERICHO CISNEROS           Radiology:   The attending emergency physician has independently interpreted the diagnostic imaging associated with this visit and am waiting the final reading from the radiologist.           COURSE & MEDICAL DECISION MAKING     INITIAL ASSESSMENT, COURSE AND PLAN  Care Narrative:         2:25 PM Patient presents to the ED with generalized itching, for which she has presented in the past.  This may be hygiene related, as he is homeless.  There is no evidence of infection or systemic illness.  There is no obvious infestation.  He will be treated symptomatically with Atarax, and prescribed Atarax for discharge and is courage to establish primary care locally, which it does not appear he has ever done.      ADDITIONAL PROBLEM MANAGED  Homelessness, and poorly managed psychiatric illness complicate this visit.    DISPOSITION AND DISCUSSIONS    Escalation of care considered, and ultimately not performed: Laboratory analysis.  Considered, but there is no evidence of systemic illness.    Barriers to care at this time, including but not limited to: Patient does not have established PCP and Patient is homeless.     Decision tools and prescription drugs considered including, but not limited to: Medication modification was performed at discharge to prescribe a course of Atarax for generalized itching .     The patient will return for new or worsening symptoms and is stable at the time of discharge.    The patient is referred to a primary physician for blood pressure management, diabetic screening, and for all other preventative health concerns.      DISPOSITION:  Patient will be discharged home in stable condition.      OUTPATIENT MEDICATIONS:  Discharge Medication List as of 8/6/2024  2:42 PM            FINAL DIAGNOSIS  1. Itching            Og ULLOA M.D. (Tayla), am scribing for, and in the presence of, Og Naidu M.D..    Electronically signed by: Og Naidu M.D. (Tayla), 8/6/2024    Og ULLOA M.D. personally performed the services described in this documentation, as scribed by Og Naidu M.D. in my presence, and it is both accurate and complete.

## 2024-08-06 NOTE — ED TRIAGE NOTES
"Chief Complaint   Patient presents with    Itchy     Pt c/o generalized itching.  Pt showed his left calf which shows a possible rash with scabs from itching.      Pt ambulatory from lobby with steady gait.  Pt difficult to understand in triage. Pt mumbles and rambles off topic.     Pt is alert and oriented, speaking in full sentences, follows commands and responds appropriately to questions. Resp are even and unlabored.      Pt placed in lobby. Pt educated on triage process. Pt encouraged to alert staff for any changes.     Patient and staff wearing appropriate PPE.    /87   Pulse 93   Temp 36.1 °C (97 °F) (Temporal)   Resp 16   Ht 1.778 m (5' 10\")   Wt 83.1 kg (183 lb 3.2 oz)   SpO2 95%      "

## 2024-08-06 NOTE — ED NOTES
Discharge teaching and paperwork provided regarding and all questions/concerns answered. VSS, assessment stable. Given information regarding Rx. Patient discharged to the care of self and ambulated out of the ED.

## 2024-08-22 ENCOUNTER — HOSPITAL ENCOUNTER (EMERGENCY)
Facility: MEDICAL CENTER | Age: 64
End: 2024-08-22
Attending: STUDENT IN AN ORGANIZED HEALTH CARE EDUCATION/TRAINING PROGRAM
Payer: MEDICARE

## 2024-08-22 ENCOUNTER — PHARMACY VISIT (OUTPATIENT)
Dept: PHARMACY | Facility: MEDICAL CENTER | Age: 64
End: 2024-08-22
Payer: COMMERCIAL

## 2024-08-22 VITALS
RESPIRATION RATE: 16 BRPM | HEIGHT: 70 IN | BODY MASS INDEX: 27.24 KG/M2 | DIASTOLIC BLOOD PRESSURE: 83 MMHG | SYSTOLIC BLOOD PRESSURE: 117 MMHG | WEIGHT: 190.26 LBS | TEMPERATURE: 97.1 F | OXYGEN SATURATION: 93 % | HEART RATE: 72 BPM

## 2024-08-22 DIAGNOSIS — L22 DIAPER DERMATITIS: ICD-10-CM

## 2024-08-22 DIAGNOSIS — R19.7 DIARRHEA, UNSPECIFIED TYPE: ICD-10-CM

## 2024-08-22 LAB
ALBUMIN SERPL BCP-MCNC: 3.7 G/DL (ref 3.2–4.9)
ALBUMIN/GLOB SERPL: 1.5 G/DL
ALP SERPL-CCNC: 72 U/L (ref 30–99)
ALT SERPL-CCNC: 28 U/L (ref 2–50)
ANION GAP SERPL CALC-SCNC: 11 MMOL/L (ref 7–16)
AST SERPL-CCNC: 22 U/L (ref 12–45)
BASOPHILS # BLD AUTO: 0.5 % (ref 0–1.8)
BASOPHILS # BLD: 0.05 K/UL (ref 0–0.12)
BILIRUB SERPL-MCNC: 0.2 MG/DL (ref 0.1–1.5)
BUN SERPL-MCNC: 10 MG/DL (ref 8–22)
CALCIUM ALBUM COR SERPL-MCNC: 8.8 MG/DL (ref 8.5–10.5)
CALCIUM SERPL-MCNC: 8.6 MG/DL (ref 8.5–10.5)
CHLORIDE SERPL-SCNC: 109 MMOL/L (ref 96–112)
CO2 SERPL-SCNC: 23 MMOL/L (ref 20–33)
CREAT SERPL-MCNC: 0.71 MG/DL (ref 0.5–1.4)
EOSINOPHIL # BLD AUTO: 0.32 K/UL (ref 0–0.51)
EOSINOPHIL NFR BLD: 3.5 % (ref 0–6.9)
ERYTHROCYTE [DISTWIDTH] IN BLOOD BY AUTOMATED COUNT: 45 FL (ref 35.9–50)
GFR SERPLBLD CREATININE-BSD FMLA CKD-EPI: 104 ML/MIN/1.73 M 2
GLOBULIN SER CALC-MCNC: 2.4 G/DL (ref 1.9–3.5)
GLUCOSE SERPL-MCNC: 93 MG/DL (ref 65–99)
HCT VFR BLD AUTO: 41.1 % (ref 42–52)
HGB BLD-MCNC: 13.7 G/DL (ref 14–18)
IMM GRANULOCYTES # BLD AUTO: 0.03 K/UL (ref 0–0.11)
IMM GRANULOCYTES NFR BLD AUTO: 0.3 % (ref 0–0.9)
LYMPHOCYTES # BLD AUTO: 2.49 K/UL (ref 1–4.8)
LYMPHOCYTES NFR BLD: 27 % (ref 22–41)
MCH RBC QN AUTO: 32.7 PG (ref 27–33)
MCHC RBC AUTO-ENTMCNC: 33.3 G/DL (ref 32.3–36.5)
MCV RBC AUTO: 98.1 FL (ref 81.4–97.8)
MONOCYTES # BLD AUTO: 0.98 K/UL (ref 0–0.85)
MONOCYTES NFR BLD AUTO: 10.6 % (ref 0–13.4)
NEUTROPHILS # BLD AUTO: 5.36 K/UL (ref 1.82–7.42)
NEUTROPHILS NFR BLD: 58.1 % (ref 44–72)
NRBC # BLD AUTO: 0.03 K/UL
NRBC BLD-RTO: 0.3 /100 WBC (ref 0–0.2)
PLATELET # BLD AUTO: 276 K/UL (ref 164–446)
PMV BLD AUTO: 8.4 FL (ref 9–12.9)
POTASSIUM SERPL-SCNC: 4.1 MMOL/L (ref 3.6–5.5)
PROT SERPL-MCNC: 6.1 G/DL (ref 6–8.2)
RBC # BLD AUTO: 4.19 M/UL (ref 4.7–6.1)
SODIUM SERPL-SCNC: 143 MMOL/L (ref 135–145)
WBC # BLD AUTO: 9.2 K/UL (ref 4.8–10.8)

## 2024-08-22 PROCEDURE — A9270 NON-COVERED ITEM OR SERVICE: HCPCS | Mod: UD | Performed by: STUDENT IN AN ORGANIZED HEALTH CARE EDUCATION/TRAINING PROGRAM

## 2024-08-22 PROCEDURE — 85025 COMPLETE CBC W/AUTO DIFF WBC: CPT

## 2024-08-22 PROCEDURE — 80053 COMPREHEN METABOLIC PANEL: CPT

## 2024-08-22 PROCEDURE — 36415 COLL VENOUS BLD VENIPUNCTURE: CPT

## 2024-08-22 PROCEDURE — RXMED WILLOW AMBULATORY MEDICATION CHARGE: Performed by: STUDENT IN AN ORGANIZED HEALTH CARE EDUCATION/TRAINING PROGRAM

## 2024-08-22 PROCEDURE — 99284 EMERGENCY DEPT VISIT MOD MDM: CPT

## 2024-08-22 PROCEDURE — 700102 HCHG RX REV CODE 250 W/ 637 OVERRIDE(OP): Mod: UD | Performed by: STUDENT IN AN ORGANIZED HEALTH CARE EDUCATION/TRAINING PROGRAM

## 2024-08-22 PROCEDURE — 700111 HCHG RX REV CODE 636 W/ 250 OVERRIDE (IP): Mod: JZ,UD | Performed by: STUDENT IN AN ORGANIZED HEALTH CARE EDUCATION/TRAINING PROGRAM

## 2024-08-22 PROCEDURE — 96372 THER/PROPH/DIAG INJ SC/IM: CPT

## 2024-08-22 PROCEDURE — 700105 HCHG RX REV CODE 258: Mod: UD | Performed by: STUDENT IN AN ORGANIZED HEALTH CARE EDUCATION/TRAINING PROGRAM

## 2024-08-22 RX ORDER — NYSTATIN 100000 U/G
1 CREAM TOPICAL 3 TIMES DAILY
Qty: 30 G | Refills: 0 | Status: SHIPPED | OUTPATIENT
Start: 2024-08-22 | End: 2024-09-21

## 2024-08-22 RX ORDER — SODIUM CHLORIDE, SODIUM LACTATE, POTASSIUM CHLORIDE, CALCIUM CHLORIDE 600; 310; 30; 20 MG/100ML; MG/100ML; MG/100ML; MG/100ML
INJECTION, SOLUTION INTRAVENOUS ONCE
Status: COMPLETED | OUTPATIENT
Start: 2024-08-22 | End: 2024-08-22

## 2024-08-22 RX ORDER — DICYCLOMINE HYDROCHLORIDE 10 MG/ML
20 INJECTION INTRAMUSCULAR ONCE
Status: COMPLETED | OUTPATIENT
Start: 2024-08-22 | End: 2024-08-22

## 2024-08-22 RX ORDER — DICYCLOMINE HCL 20 MG
20 TABLET ORAL EVERY 6 HOURS
Qty: 120 TABLET | Refills: 0 | Status: SHIPPED | OUTPATIENT
Start: 2024-08-22

## 2024-08-22 RX ORDER — OLANZAPINE 5 MG/1
10 TABLET ORAL ONCE
Status: COMPLETED | OUTPATIENT
Start: 2024-08-22 | End: 2024-08-22

## 2024-08-22 RX ADMIN — DICYCLOMINE HYDROCHLORIDE 20 MG: 10 INJECTION INTRAMUSCULAR at 02:53

## 2024-08-22 RX ADMIN — OLANZAPINE 10 MG: 5 TABLET, FILM COATED ORAL at 02:56

## 2024-08-22 RX ADMIN — SODIUM CHLORIDE, POTASSIUM CHLORIDE, SODIUM LACTATE AND CALCIUM CHLORIDE: 600; 310; 30; 20 INJECTION, SOLUTION INTRAVENOUS at 02:52

## 2024-08-22 ASSESSMENT — FIBROSIS 4 INDEX: FIB4 SCORE: 1.06

## 2024-08-22 NOTE — ED PROVIDER NOTES
ED Provider Note    CHIEF COMPLAINT  Chief Complaint   Patient presents with    Diarrhea     X2 days, denies pain to abd. States pain to rectum. Denies blood in stool. Denies abx use.        EXTERNAL RECORDS REVIEWED  Inpatient Notes discharge summary on 7/8/2023 for altered mental status and a history of schizophrenia    HPI/ROS  LIMITATION TO HISTORY   Select: : None  OUTSIDE HISTORIAN(S):   [place]    Brian Fred Durham is a 63 y.o. male who presents with diarrhea for 2 days.  Patient endorses chronic visual hallucinations consistent with schizophrenia.  Patient denies abdominal pain.  Patient denies black or bloody stools.  Patient denies fever chills body aches or sweats.    PAST MEDICAL HISTORY   has a past medical history of Arthritis, Cold (1/1/2012), Degenerative disc disease, Hepatitis B, Pain (12-30-11), and Psychiatric disorder.    SURGICAL HISTORY   has a past surgical history that includes other and cervical disk and fusion anterior (1/16/2012).    FAMILY HISTORY  History reviewed. No pertinent family history.    SOCIAL HISTORY  Social History     Tobacco Use    Smoking status: Former     Current packs/day: 0.25     Types: Cigarettes    Smokeless tobacco: Never    Tobacco comments:     1/2 pack a day.   Vaping Use    Vaping status: Never Used   Substance and Sexual Activity    Alcohol use: Yes    Drug use: No    Sexual activity: Not on file       CURRENT MEDICATIONS  Home Medications       Reviewed by Alida Waddell R.N. (Registered Nurse) on 08/22/24 at 0130  Med List Status: Partial     Medication Last Dose Status   albuterol 108 (90 Base) MCG/ACT Aero Soln inhalation aerosol  Active   ALPRAZolam (XANAX) 1 MG Tab  Active   amphetamine-dextroamphetamine (ADDERALL) 20 MG TABS  Active   buPROPion SR (WELLBUTRIN-SR) 150 MG TABLET SR 12 HR sustained-release tablet  Active   celecoxib (CELEBREX) 100 MG Cap  Active   hydrOXYzine HCl (ATARAX) 25 MG Tab  Active   ibuprofen  "(MOTRIN) 800 MG TABS  Active   Lumateperone Tosylate (CAPLYTA) 42 MG Cap  Active   trihexyphenidyl (ARTANE) 5 MG Tab  Active   Vortioxetine HBr (TRINTELLIX) 10 MG Tab  Active                    ALLERGIES  Allergies   Allergen Reactions    Paxil [Paroxetine]      \"swollen throat\"    Shellfish Allergy Rash     \"My heart - very painful.\"       PHYSICAL EXAM  VITAL SIGNS: /69   Pulse 83   Temp 36.2 °C (97.1 °F) (Temporal)   Resp 18   Ht 1.778 m (5' 10\")   Wt 86.3 kg (190 lb 4.1 oz)   SpO2 95%   BMI 27.30 kg/m²    Vitals and nursing note reviewed.   Constitutional:       Comments: Patient is lying in bed supine, pleasant, conversant, speaking in complete sentences   HENT:      Head: Normocephalic and atraumatic.   Eyes:      Extraocular Movements: Extraocular movements intact.      Conjunctiva/sclera: Conjunctivae normal.      Pupils: Pupils are equal, round, and reactive to light.   Cardiovascular:      Pulses: Normal pulses.      Comments: HR 78  Pulmonary:      Effort: Pulmonary effort is normal. No respiratory distress.   Abdominal:      Comments: Abdomen is soft, non-tender, non-distended, non-rigid, no rebound, guarding, masses, no McBurney's point tenderness, no peritoneal signs, negative Rovsing sign, negative Bowman sign.  No CVA tenderness to palpation. Benign abdomen.   Musculoskeletal:         General: No swelling. Normal range of motion.      Cervical back: Normal range of motion. No rigidity.   Skin:     General: Skin is warm and dry.      Capillary Refill: Capillary refill takes less than 2 seconds.   Neurological:      Mental Status: Alert.       Radiologist interpretation:  No orders to display       COURSE & MEDICAL DECISION MAKING    ASSESSMENT, COURSE AND PLAN  Care Narrative: CBC to evaluate for acute anemia and leukocytosis.  CMP to evaluate for acute electrolyte abnormality, acute kidney injury, acute liver failure or dysfunction.  Zyprexa for chronic schizophrenia, Bentyl for diarrhea " and IV fluids for potential dehydration.  Abdominal exam is benign, no evidence of appendicitis, diverticulitis, small bowel obstruction or perforation.    Electronically signed by: Juventino Patino M.D., 8/22/2024 2:49 AM    CMP demonstrates no evidence of acute kidney injury, acute electrolyte abnormality, acute liver failure, CBC demonstrates no evidence of acute anemia or leukocytosis.  Patient feeling better at this time, discharged with Butt paste and Bentyl for symptom control.    Repeat physical exam benign.  I doubt any serious emergency process at this time.  Patient and/or family, friends given strict return precautions for worsening symptoms and care instructions. They have demonstrated understanding of discharge instructions through teach back mechanism. Advised PCP follow-up in 1-2 days.  Patient/family/friend expresses understanding and agrees to plan.    This dictation has been created using voice recognition software. I am continuously working with the software to minimize the number of voice recognition errors and I have made every attempt to manually correct the errors within my dictation. However errors  related to this voice recognition software may still exist and should be interpreted within the appropriate context.     Electronically signed by: Juventino Patino M.D., 8/22/2024 3:57 AM      Hydration: Based on the patient's presentation of Dehydration the patient was given IV fluids. IV Hydration was used because oral hydration was not adequate alone. Upon recheck following hydration, the patient was improved.              DISPOSITION AND DISCUSSIONS    Escalation of care considered, and ultimately not performed:diagnostic imaging    Barriers to care at this time, including but not limited to: Patient does not have established PCP.     Decision tools and prescription drugs considered including, but not limited to: Antibiotics not indicated in the absence of bacterial infection  .    FINAL DIAGNOSIS  1. Diarrhea, unspecified type    2. Diaper dermatitis         Electronically signed by: Juventino Patino M.D., 8/22/2024 2:47 AM

## 2024-08-22 NOTE — ED NOTES
Reviewed discharge instructions, pt verbalized understanding of follow up with PCP . All Rx/OTC medications reviewed with pt who voices understanding of medication use. Pt encouraged to return to the ED for SOB, fever, or any other new/concerning symptoms.

## 2024-08-22 NOTE — ED NOTES
Pharmacy called to inform us that they do not have the formulation of the Nystatin cream-Lidocaine jelly-zinc oxide cream (Magic Butt Paste) listed on discharge instructions. ERP no longer available. Pt informed that medication is OTC and provided with barrier cream.

## 2024-08-22 NOTE — ED NOTES
Security at bedside to assist the pt with  dischage. Fresh underwear, barrier cream, discharge instructions, and all belongings with pt on wheelchair at discharge.

## 2024-08-22 NOTE — ED TRIAGE NOTES
"Chief Complaint   Patient presents with    Diarrhea     X2 days, denies pain to abd. States pain to rectum. Denies blood in stool. Denies abx use.        Pt ambulated with steady gait to triage. Pt A&Ox4, on room air. +etoh     BIB [facility] from [facility].     Pt to lobby . Pt educated on alerting staff in changes to condition. Pt verbalized understanding.     BP (!) 118/97   Pulse 78   Temp 36.3 °C (97.4 °F) (Temporal)   Resp 20   Ht 1.778 m (5' 10\")   Wt 86.3 kg (190 lb 4.1 oz)   SpO2 96%   BMI 27.30 kg/m²     "

## 2024-08-30 ENCOUNTER — HOSPITAL ENCOUNTER (EMERGENCY)
Facility: MEDICAL CENTER | Age: 64
End: 2024-08-31
Attending: EMERGENCY MEDICINE
Payer: MEDICARE

## 2024-08-30 VITALS
TEMPERATURE: 97 F | DIASTOLIC BLOOD PRESSURE: 66 MMHG | HEART RATE: 74 BPM | OXYGEN SATURATION: 95 % | WEIGHT: 185.85 LBS | SYSTOLIC BLOOD PRESSURE: 115 MMHG | RESPIRATION RATE: 16 BRPM | HEIGHT: 70 IN | BODY MASS INDEX: 26.61 KG/M2

## 2024-08-30 DIAGNOSIS — F29 CHRONIC PSYCHOSIS (HCC): ICD-10-CM

## 2024-08-30 LAB — POC BREATHALIZER: 0 PERCENT (ref 0–0.01)

## 2024-08-30 PROCEDURE — 99284 EMERGENCY DEPT VISIT MOD MDM: CPT

## 2024-08-30 PROCEDURE — A9270 NON-COVERED ITEM OR SERVICE: HCPCS | Mod: UD | Performed by: EMERGENCY MEDICINE

## 2024-08-30 PROCEDURE — 302970 POC BREATHALIZER: Performed by: EMERGENCY MEDICINE

## 2024-08-30 PROCEDURE — 700102 HCHG RX REV CODE 250 W/ 637 OVERRIDE(OP): Mod: UD | Performed by: EMERGENCY MEDICINE

## 2024-08-30 RX ORDER — LORAZEPAM 1 MG/1
1 TABLET ORAL ONCE
Status: COMPLETED | OUTPATIENT
Start: 2024-08-30 | End: 2024-08-30

## 2024-08-30 RX ORDER — QUETIAPINE FUMARATE 100 MG/1
300 TABLET, FILM COATED ORAL ONCE
Status: COMPLETED | OUTPATIENT
Start: 2024-08-30 | End: 2024-08-30

## 2024-08-30 RX ADMIN — VORTIOXETINE 10 MG: 10 TABLET, FILM COATED ORAL at 22:28

## 2024-08-30 RX ADMIN — LORAZEPAM 1 MG: 1 TABLET ORAL at 21:05

## 2024-08-30 RX ADMIN — QUETIAPINE 300 MG: 100 TABLET, FILM COATED ORAL at 22:28

## 2024-08-30 ASSESSMENT — FIBROSIS 4 INDEX: FIB4 SCORE: 0.92

## 2024-08-30 ASSESSMENT — PAIN DESCRIPTION - PAIN TYPE: TYPE: CHRONIC PAIN

## 2024-08-30 ASSESSMENT — LIFESTYLE VARIABLES: DO YOU DRINK ALCOHOL: NO

## 2024-08-31 ENCOUNTER — PHARMACY VISIT (OUTPATIENT)
Dept: PHARMACY | Facility: MEDICAL CENTER | Age: 64
End: 2024-08-31
Payer: COMMERCIAL

## 2024-08-31 PROCEDURE — RXMED WILLOW AMBULATORY MEDICATION CHARGE: Performed by: EMERGENCY MEDICINE

## 2024-08-31 RX ORDER — QUETIAPINE FUMARATE 200 MG/1
200 TABLET, FILM COATED ORAL 2 TIMES DAILY
Qty: 60 TABLET | Refills: 3 | Status: SHIPPED | OUTPATIENT
Start: 2024-08-31
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
## 2024-08-31 NOTE — ED NOTES
Bedside report received from off going RN/tech: lalit, assumed care of patient.  POC discussed with patient. Call light within reach, all needs addressed at this time.       Fall risk interventions in place: Keep floor surfaces clean and dry and Accompanied to restroom (all applicable per [place] Fall risk assessment)   Continuous monitoring: Not Applicable   IVF/IV medications: Not Applicable   Oxygen: Room Air  Bedside sitter: Not Applicable   Isolation: Not Applicable
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
## 2024-08-31 NOTE — ED NOTES
Pt discharged to home. Pt was given follow up instructions and prescriptions . Pt verbalized understanding of all instructions for discharge and is ambulatory out of ED with steady gait. AOx4     Attending and PA/NP shared services statement (NON-critical care): Attending and PA/NP shared services statement (NON-critical care): Attending and PA/NP shared services statement (NON-critical care):

## 2024-11-01 ENCOUNTER — HOSPITAL ENCOUNTER (EMERGENCY)
Facility: MEDICAL CENTER | Age: 64
End: 2024-11-01
Attending: STUDENT IN AN ORGANIZED HEALTH CARE EDUCATION/TRAINING PROGRAM
Payer: MEDICARE

## 2024-11-01 VITALS
TEMPERATURE: 98.2 F | WEIGHT: 182.32 LBS | SYSTOLIC BLOOD PRESSURE: 106 MMHG | RESPIRATION RATE: 18 BRPM | OXYGEN SATURATION: 91 % | HEIGHT: 71 IN | DIASTOLIC BLOOD PRESSURE: 74 MMHG | HEART RATE: 82 BPM | BODY MASS INDEX: 25.52 KG/M2

## 2024-11-01 DIAGNOSIS — G89.29 CHRONIC RIGHT-SIDED LOW BACK PAIN WITH RIGHT-SIDED SCIATICA: ICD-10-CM

## 2024-11-01 DIAGNOSIS — R19.7 DIARRHEA, UNSPECIFIED TYPE: ICD-10-CM

## 2024-11-01 DIAGNOSIS — M54.41 CHRONIC RIGHT-SIDED LOW BACK PAIN WITH RIGHT-SIDED SCIATICA: ICD-10-CM

## 2024-11-01 PROCEDURE — 700102 HCHG RX REV CODE 250 W/ 637 OVERRIDE(OP): Mod: UD | Performed by: STUDENT IN AN ORGANIZED HEALTH CARE EDUCATION/TRAINING PROGRAM

## 2024-11-01 PROCEDURE — 99283 EMERGENCY DEPT VISIT LOW MDM: CPT

## 2024-11-01 PROCEDURE — A9270 NON-COVERED ITEM OR SERVICE: HCPCS | Mod: UD | Performed by: STUDENT IN AN ORGANIZED HEALTH CARE EDUCATION/TRAINING PROGRAM

## 2024-11-01 PROCEDURE — 700101 HCHG RX REV CODE 250: Performed by: STUDENT IN AN ORGANIZED HEALTH CARE EDUCATION/TRAINING PROGRAM

## 2024-11-01 RX ORDER — CYCLOBENZAPRINE HCL 10 MG
10 TABLET ORAL ONCE
Status: COMPLETED | OUTPATIENT
Start: 2024-11-01 | End: 2024-11-01

## 2024-11-01 RX ORDER — ACETAMINOPHEN 325 MG/1
650 TABLET ORAL ONCE
Status: COMPLETED | OUTPATIENT
Start: 2024-11-01 | End: 2024-11-01

## 2024-11-01 RX ORDER — ACETAMINOPHEN 325 MG/1
325 TABLET ORAL EVERY 4 HOURS PRN
Qty: 30 TABLET | Refills: 0 | Status: SHIPPED | OUTPATIENT
Start: 2024-11-01

## 2024-11-01 RX ORDER — LOPERAMIDE HYDROCHLORIDE 2 MG/1
2 CAPSULE ORAL 4 TIMES DAILY PRN
Qty: 30 CAPSULE | Refills: 0 | Status: SHIPPED | OUTPATIENT
Start: 2024-11-01

## 2024-11-01 RX ORDER — LIDOCAINE 4 G/G
1 PATCH TOPICAL EVERY 24 HOURS
Status: DISCONTINUED | OUTPATIENT
Start: 2024-11-01 | End: 2024-11-01 | Stop reason: HOSPADM

## 2024-11-01 RX ORDER — CYCLOBENZAPRINE HCL 10 MG
10 TABLET ORAL 3 TIMES DAILY PRN
Qty: 30 TABLET | Refills: 0 | Status: SHIPPED | OUTPATIENT
Start: 2024-11-01 | End: 2024-11-16

## 2024-11-01 RX ORDER — LIDOCAINE 4 G/G
1 PATCH TOPICAL EVERY 24 HOURS
Qty: 10 PATCH | Refills: 0 | Status: SHIPPED | OUTPATIENT
Start: 2024-11-01

## 2024-11-01 RX ADMIN — ACETAMINOPHEN 650 MG: 325 TABLET ORAL at 20:50

## 2024-11-01 RX ADMIN — LIDOCAINE PAIN RELIEF 1 PATCH: 560 PATCH TOPICAL at 20:50

## 2024-11-01 RX ADMIN — CYCLOBENZAPRINE 10 MG: 10 TABLET, FILM COATED ORAL at 20:50

## 2024-11-01 ASSESSMENT — PAIN DESCRIPTION - PAIN TYPE
TYPE: ACUTE PAIN
TYPE: ACUTE PAIN
TYPE: ACUTE PAIN;CHRONIC PAIN

## 2024-11-01 ASSESSMENT — FIBROSIS 4 INDEX: FIB4 SCORE: 0.93

## 2024-11-01 NOTE — ED TRIAGE NOTES
"Chief Complaint   Patient presents with    Diarrhea     Pt reports diarrhea x 5 days. Denies abdominal pain or N/V    Back Pain     X 2 months, pt denies injury or trauma. Report it hurts worse when he is moving around.        63 yo M to triage for above complaint.      Pt placed in lobby. Pt educated on triage process. Pt encouraged to alert staff for any changes.     Patient and staff wearing appropriate PPE    /73   Pulse 68   Temp 36.6 °C (97.8 °F) (Temporal)   Resp 16   Ht 1.791 m (5' 10.5\")   Wt 82.7 kg (182 lb 5.1 oz)   SpO2 95%   BMI 25.79 kg/m²     "

## 2024-11-02 NOTE — ED PROVIDER NOTES
ER Provider Note    Scribed for Cherri Jones D.o. by Deonte Madrid. 11/1/2024  8:02 PM    Primary Care Provider: Pcp Pt States None    CHIEF COMPLAINT   Chief Complaint   Patient presents with    Diarrhea     Pt reports diarrhea x 5 days. Denies abdominal pain or N/V    Back Pain     X 2 months, pt denies injury or trauma. Report it hurts worse when he is moving around.      EXTERNAL RECORDS REVIEWED  Patient was last seen here on 8/30/24 for a psychiatric evaluation.    HPI/ROS  LIMITATION TO HISTORY   Select: : None  OUTSIDE HISTORIAN(S):  None    Brian Durham is a 64 y.o. male who presents to the ED complaining of diarrhea onset 5 days ago. Denies any blood in his feces. He notes that he has had issues in the last 5 days with having accidental bowel movements. Denies any urinary incontinence. Denies any eating abnormalities. Denies fever.    He also reports that he hurt his back a few months ago, and it is debilitating for him. Denies any falls or injuries recently. His pain is exacerbated by movement. Patient notes that he has numbness in his right lower extremity on the lateral side that runs down to his foot. Denies numbness in groin, no urinary retention, no fevers. Denies using any medications to alleviate his symptoms. Denies saddle anesthesia.    PAST MEDICAL HISTORY  Past Medical History:   Diagnosis Date    ADHD     Arthritis     Cold 01/01/2012    2 weeks ago    Degenerative disc disease     Hepatitis B     Pain 12/30/2011    neck, 6/10    Psychiatric disorder     bipolar, ADHD    Schizophrenia (HCC)        SURGICAL HISTORY  Past Surgical History:   Procedure Laterality Date    CERVICAL DISK AND FUSION ANTERIOR  1/16/2012    Performed by REMY FRANKS at SURGERY [facility] ORS    OTHER      benign tumor removed from chest       FAMILY HISTORY  No family history noted.    SOCIAL HISTORY   reports that he has quit smoking. His smoking use included cigarettes. He has never used  "smokeless tobacco. He reports that he does not currently use alcohol. He reports that he does not use drugs.    CURRENT MEDICATIONS  Previous Medications    ALBUTEROL 108 (90 BASE) MCG/ACT AERO SOLN INHALATION AEROSOL    Inhale 2 Puffs every four hours as needed (cough due to bronchospasm).    ALPRAZOLAM (XANAX) 1 MG TAB    Take 1 mg by mouth 4 times a day.    AMPHETAMINE-DEXTROAMPHETAMINE (ADDERALL) 20 MG TABS    Take 30 mg by mouth every morning.    BUPROPION SR (WELLBUTRIN-SR) 150 MG TABLET SR 12 HR SUSTAINED-RELEASE TABLET    Take 150 mg by mouth every morning.    CELECOXIB (CELEBREX) 100 MG CAP    Take 100 mg by mouth every day.    DICYCLOMINE (BENTYL) 20 MG TAB    Take 1 Tablet by mouth every 6 hours.    HYDROXYZINE HCL (ATARAX) 25 MG TAB    Take 1 Tablet by mouth every 6 hours as needed for Itching.    IBUPROFEN (MOTRIN) 800 MG TABS    Take 1 Tab by mouth every 8 hours as needed (pain).    LUMATEPERONE TOSYLATE (CAPLYTA) 42 MG CAP    Take 42 mg by mouth every day.    QUETIAPINE (SEROQUEL) 200 MG TAB    Take 1 Tablet by mouth 2 times a day.    TRIHEXYPHENIDYL (ARTANE) 5 MG TAB    Take 5 mg by mouth 2 times a day.    VORTIOXETINE HBR (TRINTELLIX) 10 MG TAB    Take 10 mg by mouth every day.       ALLERGIES  Paxil [paroxetine] and Shellfish allergy    PHYSICAL EXAM  /73   Pulse 81   Temp 36.6 °C (97.8 °F) (Temporal)   Resp 17   Ht 1.791 m (5' 10.5\")   Wt 82.7 kg (182 lb 5.1 oz)   SpO2 96%   BMI 25.79 kg/m²   Pulse oximetry interpretation: I interpret the pulse oximetry as normal.  Constitutional: Awake and alert. No acute distress.  Head: NCAT.  HEENT: Normal Conjunctiva. PERRLA.  Neck: Grossly normal range of motion. Airway midline.  Cardiovascular: Normal heart rate, Normal rhythm.  Thorax & Lungs: No respiratory distress. Clear to Auscultation bilaterally.  Abdomen: Normal inspection. Nontender. Nondistended  Skin: No obvious rash.  Back: No midline tenderness, No CVA tenderness. "   Musculoskeletal: No obvious deformity. Moves all extremities Well.  Neurologic: A&Ox3.  Muscle strength 5 out of 5 bilaterally in lower extremities.  Ambulates with normal gait.  Psychiatric: Mood and affect are appropriate for situation.     COURSE & MEDICAL DECISION MAKING     ASSESSMENT, COURSE AND PLAN  Care Narrative:   64-year-old male history of psychiatric disorder here for 2 complaints, diarrhea for 5 days without other associated symptoms and back pain for the past 2 months with a component of right leg radicular symptoms and no red flag signs or symptoms  Afebrile normal vitals    8:02 PM - Patient seen and examined at bedside. Patient is a 64 y.o. male presenting with diarrhea onset 5 days ago and back pain onset 2 months ago.  On exam he has no midline tenderness, he has no signs or symptoms to suggest cauda equina or epidural abscess.  Will medicate to treat his pain. Differentials include: Back pain, epidural abscess, cauda equina,   Infectious diarrhea, foodborne illness.      He is feeling better after interventions including medication.  I did consider imaging of the spine but he has intact strength and no red flag signs or symptoms to suggest a back emergency.    8:32 PM - Patient was reevaluated at bedside. Discussed the plan for discharge, patient is agreeable to the plan.        ADDITIONAL PROBLEM LIST  none    DISPOSITION AND DISCUSSIONS  I have discussed management of the patient with the following physicians and LEA's:  None    Discussion of management with other [facility] or appropriate source(s): None     Escalation of care considered, and ultimately not performed: acute inpatient care management, however at this time, the patient is most appropriate for outpatient management.    Barriers to care at this time, including but not limited to: Patient does not have established PCP.     Decision tools and prescription drugs considered including, but not limited to:  None .     The patient will  return for new or worsening symptoms and is stable at the time of discharge.    The patient is referred to a primary physician for blood pressure management, diabetic screening, and for all other preventative health concerns.    DISPOSITION:  Patient will be discharged home in stable condition.    FOLLOW UP:  [address]  [place] [address]  [phone]          OUTPATIENT MEDICATIONS:  Discharge Medication List as of 11/1/2024  8:44 PM        START taking these medications    Details   cyclobenzaprine (FLEXERIL) 10 mg Tab Take 1 Tablet by mouth 3 times a day as needed for Moderate Pain., Disp-30 Tablet, R-0, Normal      lidocaine (ASPERFLEX) 4 % Patch Place 1 Patch on the skin every 24 hours., Disp-10 Patch, R-0, Normal      acetaminophen (TYLENOL) 325 MG Tab Take 1 Tablet by mouth every four hours as needed for Moderate Pain., Disp-30 Tablet, R-0, Normal      loperamide (IMODIUM) 2 MG Cap Take 1 Capsule by mouth 4 times a day as needed for Diarrhea., Disp-30 Capsule, R-0, Normal              FINAL DIANGOSIS  1. Diarrhea, unspecified type    2. Chronic right-sided low back pain with right-sided sciatica       Deonte ULLOA (Tayla), am scribing for, and in the presence of, Cherri Jones D.O..    Electronically signed by: Deonte Madrid (Tayla), 11/1/2024    Cherri ULLOA D.O. personally performed the services described in this documentation, as scribed by Deonte Madrid in my presence, and it is both accurate and complete.      The note accurately reflects work and decisions made by me.  Cherri Jones D.O.  11/2/2024  1:18 AM

## 2024-11-02 NOTE — ED NOTES
Assumed care of patient, patient bedside report received from SHANTI Adkins . Pt AAO X 4 , respirations even and unlabored, on room air . Introduced self as pt RN, POC discussed, call light in reach, Fall risk interventions in place.

## 2024-11-02 NOTE — ED NOTES
Pt discharged . GCS 15.  pt in possession of belongings. Pt provided discharge education and information pertaining to medications and follow up appointments. Pt received copy of discharge instructions and verbalized understanding.

## 2024-11-02 NOTE — DISCHARGE INSTRUCTIONS
Please take medications as prescribed for your back pain.  Please take the medications for diarrhea as needed and expect symptoms to resolve in the next few days.

## 2024-11-07 ENCOUNTER — PHARMACY VISIT (OUTPATIENT)
Dept: PHARMACY | Facility: MEDICAL CENTER | Age: 64
End: 2024-11-07
Payer: COMMERCIAL

## 2024-11-07 ENCOUNTER — HOSPITAL ENCOUNTER (EMERGENCY)
Facility: MEDICAL CENTER | Age: 64
End: 2024-11-07
Attending: EMERGENCY MEDICINE
Payer: MEDICARE

## 2024-11-07 VITALS
BODY MASS INDEX: 25.75 KG/M2 | WEIGHT: 182 LBS | TEMPERATURE: 97 F | SYSTOLIC BLOOD PRESSURE: 119 MMHG | DIASTOLIC BLOOD PRESSURE: 69 MMHG | RESPIRATION RATE: 14 BRPM | HEART RATE: 82 BPM | OXYGEN SATURATION: 95 %

## 2024-11-07 DIAGNOSIS — Z59.00 HOMELESSNESS: ICD-10-CM

## 2024-11-07 DIAGNOSIS — M79.18 MUSCULOSKELETAL PAIN: ICD-10-CM

## 2024-11-07 PROCEDURE — A9270 NON-COVERED ITEM OR SERVICE: HCPCS | Mod: UD | Performed by: EMERGENCY MEDICINE

## 2024-11-07 PROCEDURE — 700102 HCHG RX REV CODE 250 W/ 637 OVERRIDE(OP): Mod: UD | Performed by: EMERGENCY MEDICINE

## 2024-11-07 PROCEDURE — RXMED WILLOW AMBULATORY MEDICATION CHARGE: Performed by: EMERGENCY MEDICINE

## 2024-11-07 PROCEDURE — 99283 EMERGENCY DEPT VISIT LOW MDM: CPT

## 2024-11-07 RX ORDER — IBUPROFEN 600 MG/1
600 TABLET, FILM COATED ORAL EVERY 6 HOURS PRN
Qty: 20 TABLET | Refills: 0 | Status: SHIPPED | OUTPATIENT
Start: 2024-11-07

## 2024-11-07 RX ORDER — IBUPROFEN 600 MG/1
600 TABLET, FILM COATED ORAL ONCE
Status: COMPLETED | OUTPATIENT
Start: 2024-11-07 | End: 2024-11-07

## 2024-11-07 RX ADMIN — IBUPROFEN 600 MG: 600 TABLET, FILM COATED ORAL at 11:58

## 2024-11-07 SDOH — ECONOMIC STABILITY - HOUSING INSECURITY: HOMELESSNESS UNSPECIFIED: Z59.00

## 2024-11-07 ASSESSMENT — FIBROSIS 4 INDEX: FIB4 SCORE: 0.93

## 2024-11-07 NOTE — ED TRIAGE NOTES
Chief Complaint   Patient presents with    Low Back Pain    Arm Pain    Leg Pain     BIB EMS. Pt recently moved to [facility] after [facility] [place] closed. Pt reports left low back pain, bilateral arm and leg pain. Pt also reports vague nausea. Requesting pain medication.

## 2024-11-07 NOTE — ED NOTES
All discharge instructions given to pt.   Pt verbalized understanding of all discharge instructions.   All questions answered. All personal belongings sent with pt. Pt ambulatory to [facility].

## 2024-11-07 NOTE — ED PROVIDER NOTES
ER Provider Note    Scribed for Jack Monae M.D. by Maryann Rosales. 11/7/2024   11:51 AM    Primary Care Provider: None noted    CHIEF COMPLAINT  Chief Complaint   Patient presents with    Low Back Pain    Arm Pain    Leg Pain     EXTERNAL RECORDS REVIEWED  Care everywhere Multiple ED visits, related to psychiatric.    HPI/ROS  LIMITATION TO HISTORY   Select: : None  OUTSIDE HISTORIAN(S):  None    Brian Durham is a 64 y.o. male who presents to the ED for evaluation of feet pain onset 1.5 weeks ago. Patient reports weakness, back pain, and feet pain. 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 He does not have a bed to sleep on, exacerbating his back pain.     PAST MEDICAL HISTORY  Past Medical History:   Diagnosis Date    ADHD     Arthritis     Cold 01/01/2012    2 weeks ago    Degenerative disc disease     Hepatitis B     Pain 12/30/2011    neck, 6/10    Psychiatric disorder     bipolar, ADHD    Schizophrenia (HCC)        SURGICAL HISTORY  Past Surgical History:   Procedure Laterality Date    CERVICAL DISK AND FUSION ANTERIOR  1/16/2012    Performed by REMY FRANKS at SURGERY [facility] ORS    OTHER      benign tumor removed from chest       FAMILY HISTORY  None noted     SOCIAL HISTORY   reports that he has quit smoking. His smoking use included cigarettes. He has never used smokeless tobacco. He reports that he does not currently use alcohol. He reports that he does not use drugs.    CURRENT MEDICATIONS  Previous Medications    ACETAMINOPHEN (TYLENOL) 325 MG TAB    Take 1 Tablet by mouth every four hours as needed for Moderate Pain.    ALBUTEROL 108 (90 BASE) MCG/ACT AERO SOLN INHALATION AEROSOL    Inhale 2 Puffs every four hours as needed (cough due to bronchospasm).    ALPRAZOLAM (XANAX) 1 MG TAB    Take 1 mg by mouth 4 times a day.    AMPHETAMINE-DEXTROAMPHETAMINE (ADDERALL) 20 MG TABS    Take 30 mg by mouth every morning.    BUPROPION SR (WELLBUTRIN-SR) 150 MG TABLET SR 12 HR SUSTAINED-RELEASE  "TABLET    Take 150 mg by mouth every morning.    CELECOXIB (CELEBREX) 100 MG CAP    Take 100 mg by mouth every day.    CYCLOBENZAPRINE (FLEXERIL) 10 MG TAB    Take 1 Tablet by mouth 3 times a day as needed for Moderate Pain.    DICYCLOMINE (BENTYL) 20 MG TAB    Take 1 Tablet by mouth every 6 hours.    HYDROXYZINE HCL (ATARAX) 25 MG TAB    Take 1 Tablet by mouth every 6 hours as needed for Itching.    IBUPROFEN (MOTRIN) 800 MG TABS    Take 1 Tab by mouth every 8 hours as needed (pain).    LIDOCAINE (ASPERFLEX) 4 % PATCH    Place 1 Patch on the skin every 24 hours.    LOPERAMIDE (IMODIUM) 2 MG CAP    Take 1 Capsule by mouth 4 times a day as needed for Diarrhea.    LUMATEPERONE TOSYLATE (CAPLYTA) 42 MG CAP    Take 42 mg by mouth every day.    QUETIAPINE (SEROQUEL) 200 MG TAB    Take 1 Tablet by mouth 2 times a day.    TRIHEXYPHENIDYL (ARTANE) 5 MG TAB    Take 5 mg by mouth 2 times a day.    VORTIOXETINE HBR (TRINTELLIX) 10 MG TAB    Take 10 mg by mouth every day.       ALLERGIES  Allergies   Allergen Reactions    Paxil [Paroxetine]      \"swollen throat\"    Shellfish Allergy Rash     \"My heart - very painful.\"        PHYSICAL EXAM  /59   Pulse 88   Temp 36.1 °C (97 °F) (Temporal)   Resp 14   Wt 82.6 kg (182 lb)   SpO2 96%   BMI 25.75 kg/m²      Nursing note and vitals reviewed.  Nursing note and vitals reviewed.  Constitutional: No distress.   HENT: Head is atraumatic. Oropharynx is moist.   Eyes: Conjunctivae are normal. Pupils are equal, round, and reactive to light.   Cardiovascular: Normal peripheral perfusion  Respiratory: No respiratory distress.   Musculoskeletal: Mild tenderness in the cervical spinature  Neurological: Alert. No focal deficits noted.    Skin: No rash.   Psych: Appropriate for clinical situation    INITIAL ASSESSMENT AND PLAN    11:51 AM - Patient was evaluated at bedside for back pain, feet pain, and weakness.  Feel this is because the patient is at the [facility].  Apparently does " not have a bed.  Has been sitting up most of the night.  Patient verbalizes understanding and support with my plan of care. I discussed plan for discharge and follow up as outlined below. I will discharge him with Motrin 600 mg PO.The patient is stable for discharge at this time and will return for any new or worsening symptoms. Patient verbalizes understanding and support with my plan for discharge.          DISPOSITION AND DISCUSSIONS    I have discussed management of the patient with the following physicians and LEA's:  None    Discussion of management with other QHP or appropriate source(s): None     Barriers to care at this time, including but not limited to: Patient is homeless.     The patient will return for new or worsening symptoms and is stable at the time of discharge.    DISPOSITION:  Patient will be discharged home in stable condition.    FOLLOW UP:  [facility], Emergency Dept  [address] [place] [id number] [phone]    If symptoms worsen      OUTPATIENT MEDICATIONS:  New Prescriptions    IBUPROFEN (MOTRIN) 600 MG TAB    Take 1 Tablet by mouth every 6 hours as needed for Moderate Pain.         FINAL DIAGNOSIS  1. Musculoskeletal pain    2. Homelessness         Maryann ULLOA (Tayla), am scribing for, and in the presence of, Jack Monae M.D..    Electronically signed by: Maryann East), 11/7/2024    Jack ULLOA M.D. personally performed the services described in this documentation, as scribed by Maryann Rosales in my presence, and it is both accurate and complete.      The note accurately reflects work and decisions made by me.  Jack Monae M.D.  11/7/2024  1:21 PM

## 2024-11-09 NOTE — DOCUMENTATION QUERY
[facility]                                                                       Query Response Note      PATIENT:               SADI GUERRERO  ACCT #:                  [id number]  MRN:                     [id number]  :                      1960  ADMIT DATE:       2024 5:43 PM  DISCH DATE:        2024 9:01 PM  RESPONDING  PROVIDER #:        [id number]           QUERY TEXT:    There is conflicting documentation with regards to the diagnosis of sciatica laterality.    Back pain with right sided symptoms are documented in the HPI of the ED Note.  Low back pain with left sided sciatica is documented as the final diagnosis.    Please provide the most accurate diagnosis based upon the clinical indicators and treatment.      **Note: If the appropriate response is not listed below, please respond with a new note.    Nate Schober, CCS [email or web address]    The patient's Clinical Indicators include:  Clinical Indicators: Per the HPI on the ED Note, the patient presents with low back pain with numbness in the right lower leg down to the foot.  The PE describes strength is 5/5 in bilateral lower extremities with no gait abnormalities.  The final impression has a diagnosis of low back pain with Left sided sciatica. No imaging performed.    Treatment: Patient given acetaminophen and lidocaine to treat the back pain with sciatica.    Risk Factors: N/A  Options provided:   -- Sciatica is on the right side   -- Sciatica is on the left side   -- Sciatica is bilateral   -- Unable to determine      Query created by: Schober, Nate on 2024 2:43 PM    RESPONSE TEXT:    Sciatica is on the right side          Electronically signed by:  TESS FUNK 2024 5:02 PM

## 2024-11-16 ENCOUNTER — HOSPITAL ENCOUNTER (EMERGENCY)
Facility: MEDICAL CENTER | Age: 64
End: 2024-11-16
Attending: EMERGENCY MEDICINE
Payer: MEDICARE

## 2024-11-16 ENCOUNTER — PHARMACY VISIT (OUTPATIENT)
Dept: PHARMACY | Facility: MEDICAL CENTER | Age: 64
End: 2024-11-16
Payer: COMMERCIAL

## 2024-11-16 VITALS
HEIGHT: 70 IN | BODY MASS INDEX: 26.57 KG/M2 | RESPIRATION RATE: 20 BRPM | WEIGHT: 185.63 LBS | OXYGEN SATURATION: 93 % | TEMPERATURE: 98.2 F | SYSTOLIC BLOOD PRESSURE: 98 MMHG | HEART RATE: 89 BPM | DIASTOLIC BLOOD PRESSURE: 71 MMHG

## 2024-11-16 DIAGNOSIS — S16.1XXA STRAIN OF NECK MUSCLE, INITIAL ENCOUNTER: ICD-10-CM

## 2024-11-16 DIAGNOSIS — J03.90 TONSILLITIS: ICD-10-CM

## 2024-11-16 LAB
FLUAV RNA SPEC QL NAA+PROBE: NEGATIVE
FLUBV RNA SPEC QL NAA+PROBE: NEGATIVE
RSV RNA SPEC QL NAA+PROBE: NEGATIVE
S PYO DNA SPEC NAA+PROBE: DETECTED
SARS-COV-2 RNA RESP QL NAA+PROBE: NOTDETECTED

## 2024-11-16 PROCEDURE — RXMED WILLOW AMBULATORY MEDICATION CHARGE: Performed by: EMERGENCY MEDICINE

## 2024-11-16 PROCEDURE — A9270 NON-COVERED ITEM OR SERVICE: HCPCS | Mod: UD | Performed by: EMERGENCY MEDICINE

## 2024-11-16 PROCEDURE — 0241U HCHG SARS-COV-2 COVID-19 NFCT DS RESP RNA 4 TRGT ED POC: CPT

## 2024-11-16 PROCEDURE — 99284 EMERGENCY DEPT VISIT MOD MDM: CPT

## 2024-11-16 PROCEDURE — 700102 HCHG RX REV CODE 250 W/ 637 OVERRIDE(OP): Mod: UD | Performed by: EMERGENCY MEDICINE

## 2024-11-16 PROCEDURE — 87651 STREP A DNA AMP PROBE: CPT

## 2024-11-16 RX ORDER — AMOXICILLIN 500 MG/1
1000 CAPSULE ORAL ONCE
Status: COMPLETED | OUTPATIENT
Start: 2024-11-16 | End: 2024-11-16

## 2024-11-16 RX ORDER — CYCLOBENZAPRINE HCL 10 MG
10 TABLET ORAL ONCE
Status: COMPLETED | OUTPATIENT
Start: 2024-11-16 | End: 2024-11-16

## 2024-11-16 RX ORDER — CYCLOBENZAPRINE HCL 5 MG
5-10 TABLET ORAL 3 TIMES DAILY PRN
Qty: 20 TABLET | Refills: 0 | Status: SHIPPED | OUTPATIENT
Start: 2024-11-16

## 2024-11-16 RX ORDER — IBUPROFEN 600 MG/1
600 TABLET, FILM COATED ORAL ONCE
Status: COMPLETED | OUTPATIENT
Start: 2024-11-16 | End: 2024-11-16

## 2024-11-16 RX ORDER — AMOXICILLIN 500 MG/1
1000 CAPSULE ORAL DAILY
Qty: 20 CAPSULE | Refills: 0 | Status: ACTIVE | OUTPATIENT
Start: 2024-11-16 | End: 2024-11-26

## 2024-11-16 RX ADMIN — IBUPROFEN 600 MG: 600 TABLET, FILM COATED ORAL at 16:20

## 2024-11-16 RX ADMIN — AMOXICILLIN 1000 MG: 500 CAPSULE ORAL at 17:50

## 2024-11-16 RX ADMIN — CYCLOBENZAPRINE 10 MG: 10 TABLET, FILM COATED ORAL at 16:20

## 2024-11-16 ASSESSMENT — PAIN DESCRIPTION - PAIN TYPE
TYPE: ACUTE PAIN
TYPE: ACUTE PAIN

## 2024-11-16 ASSESSMENT — FIBROSIS 4 INDEX: FIB4 SCORE: 0.93

## 2024-11-16 NOTE — ED TRIAGE NOTES
"Chief Complaint   Patient presents with    Sore Throat    Neck Pain     Onset couple weeks ago. Pt taking Aspirin at home, last dose this morning. Pt with pressured speech and flight of ideas in triage.    Blood Pressure: 100/68, Pulse: 100, Respiration: 20, Temperature: 36.8 °C (98.2 °F), Height: 177.8 cm (5' 10\"), Weight: 84.2 kg (185 lb 10 oz), BMI (Calculated): 26.63, BSA (Calculated): 2, Pulse Oximetry: 96 %    Pt is alert, oriented, and follows commands. Pt speaking in full sentences and responds appropriately to questions. No acute distress noted in triage and respirations are even and unlabored.      Pt placed in lobby and educated on triage process. Pt encouraged to alert staff for any changes in condition.   "

## 2024-11-17 NOTE — ED PROVIDER NOTES
"ER Provider Note    Scribed for Sanfrod Stapleton M.D. by Virgilio Dhaliwal. 11/16/2024   4:06 PM    Primary Care Provider: Pcp Pt States None    CHIEF COMPLAINT  Chief Complaint   Patient presents with    Sore Throat    Neck Pain     EXTERNAL RECORDS REVIEWED  External ED Note Patient has multiple ER visits, last being seen on 11/11/24 at [facility] for low back pain.    HPI/ROS  LIMITATION TO HISTORY   Select: Behavior  OUTSIDE HISTORIAN(S):  None    Brian Durham is a 64 y.o. male who presents to the ED for evaluation of neck pain and upper back pain that is chronic. Patient was difficult to understand during initial questioning, but reports that he cannot relax due to the pain. Confirms runny nose and sore throat. Patient was agitated and stated that \"no one listens\" to him.    PAST MEDICAL HISTORY  Past Medical History:   Diagnosis Date    ADHD     Arthritis     Cold 01/01/2012    2 weeks ago    Degenerative disc disease     Hepatitis B     Pain 12/30/2011    neck, 6/10    Psychiatric disorder     bipolar, ADHD    Schizophrenia (HCC)      SURGICAL HISTORY  Past Surgical History:   Procedure Laterality Date    CERVICAL DISK AND FUSION ANTERIOR  1/16/2012    Performed by REMY FRANKS at SURGERY [facility] ORS    OTHER      benign tumor removed from chest     FAMILY HISTORY  History reviewed. No pertinent family history.    SOCIAL HISTORY   reports that he has been smoking cigarettes. He has never used smokeless tobacco. He reports that he does not currently use alcohol. He reports that he does not use drugs.    CURRENT MEDICATIONS  Discharge Medication List as of 11/16/2024  5:44 PM        CONTINUE these medications which have NOT CHANGED    Details   !! ibuprofen (MOTRIN) 600 MG Tab Take 1 Tablet by mouth every 6 hours as needed for Moderate Pain., Disp-20 Tablet, R-0, Normal      lidocaine (ASPERFLEX) 4 % Patch Place 1 Patch on the skin every 24 hours., Disp-10 Patch, R-0, Normal      acetaminophen " "(TYLENOL) 325 MG Tab Take 1 Tablet by mouth every four hours as needed for Moderate Pain., Disp-30 Tablet, R-0, Normal      loperamide (IMODIUM) 2 MG Cap Take 1 Capsule by mouth 4 times a day as needed for Diarrhea., Disp-30 Capsule, R-0, Normal      QUEtiapine (SEROQUEL) 200 MG Tab Take 1 Tablet by mouth 2 times a day., Disp-60 Tablet, R-3, Normal      dicyclomine (BENTYL) 20 MG Tab Take 1 Tablet by mouth every 6 hours., Disp-120 Tablet, R-0, Normal      hydrOXYzine HCl (ATARAX) 25 MG Tab Take 1 Tablet by mouth every 6 hours as needed for Itching., Disp-30 Tablet, R-0, Normal      albuterol 108 (90 Base) MCG/ACT Aero Soln inhalation aerosol Inhale 2 Puffs every four hours as needed (cough due to bronchospasm)., Disp-18 g, R-0, Normal      ALPRAZolam (XANAX) 1 MG Tab Take 1 mg by mouth 4 times a day., Historical Med      Lumateperone Tosylate (CAPLYTA) 42 MG Cap Take 42 mg by mouth every day., Historical Med      Vortioxetine HBr (TRINTELLIX) 10 MG Tab Take 10 mg by mouth every day., Historical Med      trihexyphenidyl (ARTANE) 5 MG Tab Take 5 mg by mouth 2 times a day., Historical Med      buPROPion SR (WELLBUTRIN-SR) 150 MG TABLET SR 12 HR sustained-release tablet Take 150 mg by mouth every morning., Historical Med      celecoxib (CELEBREX) 100 MG Cap Take 100 mg by mouth every day., Historical Med      !! ibuprofen (MOTRIN) 800 MG TABS Take 1 Tab by mouth every 8 hours as needed (pain)., Disp-20 Each, R-0, Print Rx Paper      amphetamine-dextroamphetamine (ADDERALL) 20 MG TABS Take 30 mg by mouth every morning., Historical Med       !! - Potential duplicate medications found. Please discuss with provider.        ALLERGIES  Allergies   Allergen Reactions    Paxil [Paroxetine]      \"swollen throat\"    Shellfish Allergy Rash     \"My heart - very painful.\"      PHYSICAL EXAM  /68   Pulse 100   Temp 36.8 °C (98.2 °F) (Temporal)   Resp 20   Ht 1.778 m (5' 10\")   Wt 84.2 kg (185 lb 10 oz)   SpO2 96%   BMI " 26.63 kg/m²    Constitutional: Well developed, Well nourished, Mild distress,    HENT: Normocephalic, Atraumatic, Oropharynx has post nasal drainage.  Eyes: PERRLA, EOMI, Conjunctiva normal, No discharge.   Neck: No tenderness, Supple, No stridor.   Cardiovascular: Normal heart rate, Normal rhythm.   Thorax & Lungs: Clear to auscultation bilaterally, No respiratory distress.   Abdomen: Soft, No tenderness, No masses.   Skin: Warm, Dry, No rash.    Musculoskeletal: No major deformities noted. Slight tenderness to cervical paraspinal and trapezius muscles.   Neurologic: Awake, alert. Moves all extremities spontaneously.  Psychiatric: Affect normal, Judgment normal, Mood normal.     DIAGNOSTIC STUDIES    Labs:   Results for orders placed or performed during the hospital encounter of 11/16/24   POC CoV-2, FLU A/B, RSV by PCR    Collection Time: 11/16/24  3:49 PM   Result Value Ref Range    POC Influenza A RNA, PCR Negative Negative    POC Influenza B RNA, PCR Negative Negative    POC RSV, by PCR Negative Negative    POC SARS-CoV-2, PCR NotDetected NotDetected   Group A Strep by PCR    Collection Time: 11/16/24  4:26 PM    Specimen: Throat   Result Value Ref Range    Group A Strep by PCR DETECTED (A) Not Detected     COURSE & MEDICAL DECISION MAKING     INITIAL ASSESSMENT, COURSE AND PLAN    Care Narrative: Patient with history of psychosis, multiple ER visits is coming in secondary to sore throat, or upper neck and back pain.  COVID is negative, strep is positive, will put the patient on amoxicillin and muscle relaxers.  Have the patient return with worsening symptoms.    4:09 PM - Patient was seen and evaluated at bedside. Patient presents to the ED for evaluation of neck pain and a sore throat.  After my exam, I discussed with the patient the plan of care, which includes treating the patient with medication for their symptoms, as well as obtaining lab work for further evaluation. Patient understands and verbalizes  agreement to plan of care. Patient will be treated with Flexeril 10 mg oral and Motrin 600 mg oral. Ordered Group A Strep by PCR and CoV-2 Flu A/B and RSV PCR to evaluate.       DISPOSITION AND DISCUSSIONS    I have discussed management of the patient with the following physicians and LEA's:  None    Discussion of management with other QHP or appropriate source(s): None     Escalation of care considered, and ultimately not performed: Laboratory analysis.    Barriers to care at this time, including but not limited to: Patient does not have established PCP.      The patient will return for new or worsening symptoms and is stable at the time of discharge.    The patient is referred to a primary physician for blood pressure management, diabetic screening, and for all other preventative health concerns.        DISPOSITION:  Patient will be discharged home in stable condition.    FOLLOW UP:  [facility], Emergency Dept  [address] [place] [id number] [phone]    If symptoms worsen      OUTPATIENT MEDICATIONS:  Discharge Medication List as of 11/16/2024  5:44 PM        START taking these medications    Details   amoxicillin (AMOXIL) 500 MG Cap Take 2 Capsules by mouth every day for 10 days., Disp-20 Capsule, R-0, Normal               FINAL DIAGNOSIS  1. Tonsillitis    2. Strain of neck muscle, initial encounter         IVirgilio (Scribe), am scribing for, and in the presence of, Sanford Stapleton M.D..    Electronically signed by: Virgilio Dhlaiwal (Tayla), 11/16/2024    Sanford ULLOA M.D. personally performed the services described in this documentation, as scribed by Virgilio Dhaliwal in my presence, and it is both accurate and complete.      The note accurately reflects work and decisions made by me.  Sanford Stapleton M.D.  11/16/2024  6:21 PM

## 2024-12-23 ENCOUNTER — HOSPITAL ENCOUNTER (EMERGENCY)
Facility: MEDICAL CENTER | Age: 64
End: 2024-12-23
Attending: STUDENT IN AN ORGANIZED HEALTH CARE EDUCATION/TRAINING PROGRAM
Payer: MEDICARE

## 2024-12-23 ENCOUNTER — APPOINTMENT (OUTPATIENT)
Dept: RADIOLOGY | Facility: MEDICAL CENTER | Age: 64
End: 2024-12-23
Attending: STUDENT IN AN ORGANIZED HEALTH CARE EDUCATION/TRAINING PROGRAM
Payer: MEDICARE

## 2024-12-23 VITALS
BODY MASS INDEX: 26.83 KG/M2 | HEART RATE: 74 BPM | SYSTOLIC BLOOD PRESSURE: 150 MMHG | WEIGHT: 187.39 LBS | OXYGEN SATURATION: 94 % | TEMPERATURE: 97 F | RESPIRATION RATE: 18 BRPM | HEIGHT: 70 IN | DIASTOLIC BLOOD PRESSURE: 94 MMHG

## 2024-12-23 DIAGNOSIS — M54.50 CHRONIC LOW BACK PAIN, UNSPECIFIED BACK PAIN LATERALITY, UNSPECIFIED WHETHER SCIATICA PRESENT: ICD-10-CM

## 2024-12-23 DIAGNOSIS — G89.29 CHRONIC LOW BACK PAIN, UNSPECIFIED BACK PAIN LATERALITY, UNSPECIFIED WHETHER SCIATICA PRESENT: ICD-10-CM

## 2024-12-23 PROCEDURE — 700102 HCHG RX REV CODE 250 W/ 637 OVERRIDE(OP): Mod: UD | Performed by: STUDENT IN AN ORGANIZED HEALTH CARE EDUCATION/TRAINING PROGRAM

## 2024-12-23 PROCEDURE — 72100 X-RAY EXAM L-S SPINE 2/3 VWS: CPT

## 2024-12-23 PROCEDURE — A9270 NON-COVERED ITEM OR SERVICE: HCPCS | Mod: UD | Performed by: STUDENT IN AN ORGANIZED HEALTH CARE EDUCATION/TRAINING PROGRAM

## 2024-12-23 PROCEDURE — 99283 EMERGENCY DEPT VISIT LOW MDM: CPT

## 2024-12-23 RX ORDER — METHOCARBAMOL 750 MG/1
750 TABLET, FILM COATED ORAL 4 TIMES DAILY
Qty: 120 TABLET | Refills: 0 | Status: SHIPPED | OUTPATIENT
Start: 2024-12-23

## 2024-12-23 RX ORDER — METHOCARBAMOL 500 MG/1
500 TABLET, FILM COATED ORAL ONCE
Status: COMPLETED | OUTPATIENT
Start: 2024-12-23 | End: 2024-12-23

## 2024-12-23 RX ORDER — ACETAMINOPHEN 325 MG/1
650 TABLET ORAL ONCE
Status: COMPLETED | OUTPATIENT
Start: 2024-12-23 | End: 2024-12-23

## 2024-12-23 RX ORDER — IBUPROFEN 600 MG/1
600 TABLET, FILM COATED ORAL ONCE
Status: COMPLETED | OUTPATIENT
Start: 2024-12-23 | End: 2024-12-23

## 2024-12-23 RX ADMIN — IBUPROFEN 600 MG: 600 TABLET, FILM COATED ORAL at 17:01

## 2024-12-23 RX ADMIN — ACETAMINOPHEN 650 MG: 325 TABLET ORAL at 17:01

## 2024-12-23 RX ADMIN — METHOCARBAMOL 500 MG: 500 TABLET ORAL at 17:01

## 2024-12-23 ASSESSMENT — FIBROSIS 4 INDEX: FIB4 SCORE: 0.93

## 2024-12-23 NOTE — ED TRIAGE NOTES
"Chief Complaint   Patient presents with    Low Back Pain     Patient reports low back pain x 1 year. Patient reports \"I walk so much that my whole body hurts really\"       63 yo male to triage for above complaint.     Pt is alert and oriented, speaking in full sentences, follows commands and responds appropriately to questions.     Patient placed back in lobby and educated on triage process. Asked to inform RN of any changes.    /78   Pulse 78   Temp 36.1 °C (97 °F) (Temporal)   Resp 18   Ht 1.778 m (5' 10\")   Wt 85 kg (187 lb 6.3 oz)   SpO2 98%   BMI 26.89 kg/m²     "

## 2024-12-24 NOTE — ED NOTES
"Pt ambulated to room 18 with steady gait. Pt placed on monitors. Chart up for ERP. Pt reports that the pain is in his \"balls.\" Pt gestured to genitalia.  "

## 2024-12-24 NOTE — ED PROVIDER NOTES
"ED Provider Note    CHIEF COMPLAINT  Chief Complaint   Patient presents with    Low Back Pain     Patient reports low back pain x 1 year. Patient reports \"I walk so much that my whole body hurts really\"       EXTERNAL RECORDS REVIEWED  Patient was seen in the ER on 16 November for sore throat and neck pain.  He is also being seen at the [facility] emergency department in November for lower back pain.    HPI/ROS  LIMITATION TO HISTORY   Patient is tangential and poor historian  OUTSIDE HISTORIAN(S):  None    Brian Durham is a 64 y.o. male who presents with lower back pain.  Patient is a very poor historian and quite tangential.  He reports pain started a year ago.  He says it is worse with ambulation but is still able to walk.  Patient denies any numbness or weakness.   Denies changes to bladder or bowel habits, no acute urinary retention, incontinence or bowel incontinence.  No history of spine infection as far as he is aware.  He does have a previous neck surgery.  He denies any history of diabetes or immunocompromise state.  No IVDU. No fever.     PAST MEDICAL HISTORY   has a past medical history of ADHD, Arthritis, Cold (01/01/2012), Degenerative disc disease, Hepatitis B, Pain (12/30/2011), Psychiatric disorder, and Schizophrenia ([facility]).    SURGICAL HISTORY   has a past surgical history that includes other and cervical disk and fusion anterior (1/16/2012).    FAMILY HISTORY  No family history on file.    SOCIAL HISTORY  Social History     Tobacco Use    Smoking status: Some Days     Current packs/day: 0.25     Types: Cigarettes    Smokeless tobacco: Never    Tobacco comments:     1/2 pack a day.   Vaping Use    Vaping status: Never Used   Substance and Sexual Activity    Alcohol use: Not Currently    Drug use: No    Sexual activity: Not on file       CURRENT MEDICATIONS  Home Medications       Reviewed by Nanda Palacio R.N. (Registered Nurse) on 12/23/24 at 1523  Med List Status: <None> " "    Medication Last Dose Status   acetaminophen (TYLENOL) 325 MG Tab  Active   albuterol 108 (90 Base) MCG/ACT Aero Soln inhalation aerosol  Active   ALPRAZolam (XANAX) 1 MG Tab  Active   amphetamine-dextroamphetamine (ADDERALL) 20 MG TABS  Active   buPROPion SR (WELLBUTRIN-SR) 150 MG TABLET SR 12 HR sustained-release tablet  Active   celecoxib (CELEBREX) 100 MG Cap  Active   cyclobenzaprine (FLEXERIL) 5 mg tablet  Active   dicyclomine (BENTYL) 20 MG Tab  Active   hydrOXYzine HCl (ATARAX) 25 MG Tab  Active   ibuprofen (MOTRIN) 600 MG Tab  Active   ibuprofen (MOTRIN) 800 MG TABS  Active   lidocaine (ASPERFLEX) 4 % Patch  Active   loperamide (IMODIUM) 2 MG Cap  Active   Lumateperone Tosylate (CAPLYTA) 42 MG Cap  Active   QUEtiapine (SEROQUEL) 200 MG Tab  Active   trihexyphenidyl (ARTANE) 5 MG Tab  Active   Vortioxetine HBr (TRINTELLIX) 10 MG Tab  Active                    ALLERGIES  Allergies   Allergen Reactions    Paxil [Paroxetine]      \"swollen throat\"    Shellfish Allergy Rash     \"My heart - very painful.\"       PHYSICAL EXAM  VITAL SIGNS: BP (!) 150/94   Pulse 74   Temp 36.1 °C (97 °F) (Temporal)   Resp 18   Ht 1.778 m (5' 10\")   Wt 85 kg (187 lb 6.3 oz)   SpO2 94%   BMI 26.89 kg/m²    Constitutional: Awake and alert. Nontoxic  HENT:  Grossly normal  Eyes: Grossly normal  Neck: Normal range of motion  Cardiovascular: Normal heart rate   Thorax & Lungs: No respiratory distress  Abdomen: Nontender  Back. No midline tenderness.  Tenderness in the bilateral paraspinal regions bilaterally of lower lumbar spine. Full ROM.  Neuro: Sensation to gross touch intact including in saddle region. Strength 5/5 in all extremities.   Skin:  No pathologic rash.   Extremities: Well perfused  Psychiatric: Affect normal      RADIOLOGY/PROCEDURES   I have independently interpreted the diagnostic imaging associated with this visit and am waiting the final reading from the radiologist.   My preliminary interpretation is as " follows: No fracture    Radiologist interpretation:  DX-LUMBAR SPINE-2 OR 3 VIEWS   Final Result      1.  Mild osteoarthritic changes of the lumbar spine.      2.  No evidence of acute lumbar spine fracture and/or subluxation.          COURSE & MEDICAL DECISION MAKING    ASSESSMENT, COURSE AND PLAN  Care Narrative: This is a 64-year-old with chronic low back pain.  He is quite a poor historian and is tangential.  He has normal vital signs and is neurologically intact.  He has no known risk factors for epidural abscess such as IV drug use or fever and seems unlikely in this clinical context.  He is not on anticoagulation and doubt epidural hematoma.  He has no bladder or bowel dysfunction, saddle anesthesia or other red flags to suggest acute spinal cord impingement or cauda equina syndrome.  He has no abdominal mass and doubt aortic rupture.  X-ray does not show evidence of fracture, malignancy or other concerning finding.  He was treated with Tylenol, ibuprofen and Robaxin.  His back pain is quite chronic and I recommend that he follow-up as an outpatient.  He is able to ambulate without difficulty.  I will provide prescription for Robaxin.  Patient feels improved.  He is able to ambulate and discharged from the emergency department with no further questions.  He understands to return for any numbness, weakness, changes in bladder bowel habits, worsening pain or other concern    DISPOSITION AND DISCUSSIONS  I have discussed management of the patient with the following physicians and LEA's:  None    Discussion of management with other Q or appropriate source(s): None     Escalation of care considered, and ultimately not performed:Laboratory analysis he has no signs of systemic illness    Barriers to care at this time, including but not limited to:  None .     Decision tools and prescription drugs considered including, but not limited to: Pain Medications Non narcotic OTC medications .    FINAL DIAGNOSIS  1.  Chronic low back pain, unspecified back pain laterality, unspecified whether sciatica present Chronic        Electronically signed by: Latricia Lemus M.D., 12/23/2024 4:29 PM

## 2024-12-24 NOTE — DISCHARGE INSTRUCTIONS
You were seen in the emergency department (ED) today for low back pain. Acute low back pain is the second most common reason for a physician visit and affects 80% to 85% of people over their lifetime. Most episodes of low back pain are not serious and resolve within weeks with conservative therapy.    There are many causes of low back pain. Most of the time, the pain is caused by conditions such as a muscle strain, inflammation or a bulging disc that cannot be identified on an X-ray or CT scan. Diagnostic imaging does not accurately identify the cause of most low back pain and therefore does not help guide therapy or improve the time to recovery.  Today, you are not exhibiting any worrisome symptoms or physical exam findings that suggest a need for an emergent MRI.   This does not mean that you may not require an MRI as an outpatient in the future if your pain persists or if you develop additional neurologic symptoms.  It is extremely important for you to follow up with your outpatient provider for further examination and discussion regarding treatment and imaging, if necessary.    For pain, take Ibuprofen (Motrin, Advil) 600 mg up to every six hours  mg up to every eight hours if your stomach can take it. Instead of Ibuprofen, you can choose Naproxen (Aleve) and take 250-500 mg twice daily. Take Ibuprofen or Naproxen with food to lessen stomach irritation. Also take Acetaminophen (Tylenol) up to 1gm every six hours in addition to Ibuprofen or Naproxen. (Maximum Tylenol 4 gm/day).  I suggest alternating between the ibuprofen and Tylenol every 4 hours for both optimal pain relief and adequate spacing of each medication. In addition, I am prescribing lidocaine patches, twice daily (put one on in the morning and replace with another at night).  Lastly, I am prescribing a muscle relaxer for breakthrough pain only. This means if you are still in lots of discomfort despite taking the above as scheduled, you can take  a muscle relaxer. But DO NOT drink of drive or operate machinery when taking the muscle relaxer.      Please return to the Emergency Department if you have any worsening symptoms or any new concerning symptoms including but not limited to: urinary retention, loss of bowel or bladder control, decreased or change in sensation in saddle/genital region or anywhere, change in strength, fever, chills or feelings of un-wellness. Please also return if you have significant fall or trauma  especially if you are age 65 or have osteoporosis.

## 2024-12-24 NOTE — ED NOTES
Discharge instructions given to pt. Prescriptions sent to pt's pharmacy. Pt educated, verbalizes understanding. All belongings accounted for. Pt will ambulate out of ED with steady gait once dressed.

## 2025-01-03 ENCOUNTER — APPOINTMENT (OUTPATIENT)
Dept: RADIOLOGY | Facility: MEDICAL CENTER | Age: 65
End: 2025-01-03
Attending: STUDENT IN AN ORGANIZED HEALTH CARE EDUCATION/TRAINING PROGRAM
Payer: MEDICARE

## 2025-01-03 ENCOUNTER — HOSPITAL ENCOUNTER (EMERGENCY)
Facility: MEDICAL CENTER | Age: 65
End: 2025-01-03
Attending: STUDENT IN AN ORGANIZED HEALTH CARE EDUCATION/TRAINING PROGRAM
Payer: MEDICARE

## 2025-01-03 VITALS
HEIGHT: 70 IN | RESPIRATION RATE: 19 BRPM | WEIGHT: 188 LBS | TEMPERATURE: 97 F | BODY MASS INDEX: 26.92 KG/M2 | HEART RATE: 76 BPM | OXYGEN SATURATION: 94 % | SYSTOLIC BLOOD PRESSURE: 122 MMHG | DIASTOLIC BLOOD PRESSURE: 86 MMHG

## 2025-01-03 DIAGNOSIS — I67.1 CEREBRAL ANEURYSM: ICD-10-CM

## 2025-01-03 DIAGNOSIS — Z59.00 HOMELESSNESS: ICD-10-CM

## 2025-01-03 DIAGNOSIS — R47.81 SLURRED SPEECH: ICD-10-CM

## 2025-01-03 LAB
ALBUMIN SERPL BCP-MCNC: 4.2 G/DL (ref 3.2–4.9)
ALBUMIN/GLOB SERPL: 1.7 G/DL
ALP SERPL-CCNC: 59 U/L (ref 30–99)
ALT SERPL-CCNC: 16 U/L (ref 2–50)
ANION GAP SERPL CALC-SCNC: 10 MMOL/L (ref 7–16)
AST SERPL-CCNC: 24 U/L (ref 12–45)
BASOPHILS # BLD AUTO: 0.4 % (ref 0–1.8)
BASOPHILS # BLD: 0.03 K/UL (ref 0–0.12)
BILIRUB SERPL-MCNC: 0.4 MG/DL (ref 0.1–1.5)
BUN SERPL-MCNC: 14 MG/DL (ref 8–22)
CALCIUM ALBUM COR SERPL-MCNC: 8.7 MG/DL (ref 8.5–10.5)
CALCIUM SERPL-MCNC: 8.9 MG/DL (ref 8.5–10.5)
CHLORIDE SERPL-SCNC: 106 MMOL/L (ref 96–112)
CO2 SERPL-SCNC: 25 MMOL/L (ref 20–33)
CREAT SERPL-MCNC: 0.79 MG/DL (ref 0.5–1.4)
EOSINOPHIL # BLD AUTO: 0.07 K/UL (ref 0–0.51)
EOSINOPHIL NFR BLD: 1 % (ref 0–6.9)
ERYTHROCYTE [DISTWIDTH] IN BLOOD BY AUTOMATED COUNT: 45.9 FL (ref 35.9–50)
ETHANOL BLD-MCNC: <10.1 MG/DL
GFR SERPLBLD CREATININE-BSD FMLA CKD-EPI: 100 ML/MIN/1.73 M 2
GLOBULIN SER CALC-MCNC: 2.5 G/DL (ref 1.9–3.5)
GLUCOSE SERPL-MCNC: 85 MG/DL (ref 65–99)
HCT VFR BLD AUTO: 42.1 % (ref 42–52)
HGB BLD-MCNC: 13.9 G/DL (ref 14–18)
IMM GRANULOCYTES # BLD AUTO: 0.02 K/UL (ref 0–0.11)
IMM GRANULOCYTES NFR BLD AUTO: 0.3 % (ref 0–0.9)
LYMPHOCYTES # BLD AUTO: 2.86 K/UL (ref 1–4.8)
LYMPHOCYTES NFR BLD: 42.6 % (ref 22–41)
MCH RBC QN AUTO: 32.2 PG (ref 27–33)
MCHC RBC AUTO-ENTMCNC: 33 G/DL (ref 32.3–36.5)
MCV RBC AUTO: 97.5 FL (ref 81.4–97.8)
MONOCYTES # BLD AUTO: 1.02 K/UL (ref 0–0.85)
MONOCYTES NFR BLD AUTO: 15.2 % (ref 0–13.4)
NEUTROPHILS # BLD AUTO: 2.71 K/UL (ref 1.82–7.42)
NEUTROPHILS NFR BLD: 40.5 % (ref 44–72)
NRBC # BLD AUTO: 0 K/UL
NRBC BLD-RTO: 0 /100 WBC (ref 0–0.2)
PLATELET # BLD AUTO: 240 K/UL (ref 164–446)
PMV BLD AUTO: 8.7 FL (ref 9–12.9)
POTASSIUM SERPL-SCNC: 3.9 MMOL/L (ref 3.6–5.5)
PROT SERPL-MCNC: 6.7 G/DL (ref 6–8.2)
RBC # BLD AUTO: 4.32 M/UL (ref 4.7–6.1)
SODIUM SERPL-SCNC: 141 MMOL/L (ref 135–145)
TROPONIN T SERPL-MCNC: 15 NG/L (ref 6–19)
WBC # BLD AUTO: 6.7 K/UL (ref 4.8–10.8)

## 2025-01-03 PROCEDURE — 85025 COMPLETE CBC W/AUTO DIFF WBC: CPT

## 2025-01-03 PROCEDURE — 36415 COLL VENOUS BLD VENIPUNCTURE: CPT

## 2025-01-03 PROCEDURE — 80053 COMPREHEN METABOLIC PANEL: CPT

## 2025-01-03 PROCEDURE — 700117 HCHG RX CONTRAST REV CODE 255: Performed by: STUDENT IN AN ORGANIZED HEALTH CARE EDUCATION/TRAINING PROGRAM

## 2025-01-03 PROCEDURE — 70496 CT ANGIOGRAPHY HEAD: CPT

## 2025-01-03 PROCEDURE — 84484 ASSAY OF TROPONIN QUANT: CPT

## 2025-01-03 PROCEDURE — 70498 CT ANGIOGRAPHY NECK: CPT

## 2025-01-03 PROCEDURE — 82077 ASSAY SPEC XCP UR&BREATH IA: CPT

## 2025-01-03 PROCEDURE — 99284 EMERGENCY DEPT VISIT MOD MDM: CPT

## 2025-01-03 RX ADMIN — IOHEXOL 80 ML: 350 INJECTION, SOLUTION INTRAVENOUS at 04:46

## 2025-01-03 SDOH — ECONOMIC STABILITY - HOUSING INSECURITY: HOMELESSNESS UNSPECIFIED: Z59.00

## 2025-01-03 ASSESSMENT — PAIN DESCRIPTION - PAIN TYPE: TYPE: ACUTE PAIN

## 2025-01-03 ASSESSMENT — FIBROSIS 4 INDEX: FIB4 SCORE: 0.93

## 2025-01-03 NOTE — ED PROVIDER NOTES
ER Provider Note    Scribed for Cherri Jones D.o. by Paul Looney. 1/3/2025  3:32 AM    Primary Care Provider: Pcp Pt States None    CHIEF COMPLAINT   Chief Complaint   Patient presents with    Slurred Speech     EXTERNAL RECORDS REVIEWED  External ED Note [facility] ER visit for tinea infection in 12/27    HPI/ROS  LIMITATION TO HISTORY   None  OUTSIDE HISTORIAN(S):  None    Brian Durham is a 64 y.o. male who presents to the ED via EMS complaining of abnormal speech onset 10:30 PM last night. Per the nursing note the patient began having slurred speech last night and seemed diaphoretic, which prompted staff at [facility] to call EMS for transport to the ED. Patient notes that his speech is not usually slurred or coarse. Denies any vision abnormalities but states associated pain to his right leg. The patient denies drinking any alcohol tonight.     PAST MEDICAL HISTORY  Past Medical History:   Diagnosis Date    ADHD     Arthritis     Cold 01/01/2012    2 weeks ago    Degenerative disc disease     Hepatitis B     Pain 12/30/2011    neck, 6/10    Psychiatric disorder     bipolar, ADHD    Schizophrenia (HCC)      SURGICAL HISTORY  Past Surgical History:   Procedure Laterality Date    CERVICAL DISK AND FUSION ANTERIOR  1/16/2012    Performed by REMY FRANKS at SURGERY [facility] ORS    OTHER      benign tumor removed from chest     FAMILY HISTORY  No family history noted.    SOCIAL HISTORY   reports that he has been smoking cigarettes. He has never used smokeless tobacco. He reports that he does not currently use alcohol. He reports that he does not use drugs.    CURRENT MEDICATIONS  Previous Medications    ACETAMINOPHEN (TYLENOL) 325 MG TAB    Take 1 Tablet by mouth every four hours as needed for Moderate Pain.    ALBUTEROL 108 (90 BASE) MCG/ACT AERO SOLN INHALATION AEROSOL    Inhale 2 Puffs every four hours as needed (cough due to bronchospasm).    ALPRAZOLAM (XANAX) 1 MG TAB    Take 1 mg by  "mouth 4 times a day.    AMPHETAMINE-DEXTROAMPHETAMINE (ADDERALL) 20 MG TABS    Take 30 mg by mouth every morning.    BUPROPION SR (WELLBUTRIN-SR) 150 MG TABLET SR 12 HR SUSTAINED-RELEASE TABLET    Take 150 mg by mouth every morning.    CELECOXIB (CELEBREX) 100 MG CAP    Take 100 mg by mouth every day.    CYCLOBENZAPRINE (FLEXERIL) 5 MG TABLET    Take 1-2 Tablets by mouth 3 times a day as needed for Mild Pain or Moderate Pain.    DICYCLOMINE (BENTYL) 20 MG TAB    Take 1 Tablet by mouth every 6 hours.    HYDROXYZINE HCL (ATARAX) 25 MG TAB    Take 1 Tablet by mouth every 6 hours as needed for Itching.    IBUPROFEN (MOTRIN) 600 MG TAB    Take 1 Tablet by mouth every 6 hours as needed for Moderate Pain.    IBUPROFEN (MOTRIN) 800 MG TABS    Take 1 Tab by mouth every 8 hours as needed (pain).    LIDOCAINE (ASPERFLEX) 4 % PATCH    Place 1 Patch on the skin every 24 hours.    LOPERAMIDE (IMODIUM) 2 MG CAP    Take 1 Capsule by mouth 4 times a day as needed for Diarrhea.    LUMATEPERONE TOSYLATE (CAPLYTA) 42 MG CAP    Take 42 mg by mouth every day.    METHOCARBAMOL (ROBAXIN) 750 MG TAB    Take 1 Tablet by mouth 4 times a day.    QUETIAPINE (SEROQUEL) 200 MG TAB    Take 1 Tablet by mouth 2 times a day.    TRIHEXYPHENIDYL (ARTANE) 5 MG TAB    Take 5 mg by mouth 2 times a day.    VORTIOXETINE HBR (TRINTELLIX) 10 MG TAB    Take 10 mg by mouth every day.     ALLERGIES  Paxil [paroxetine] and Shellfish allergy    PHYSICAL EXAM  /88   Pulse 74   Temp 36.6 °C (97.9 °F) (Temporal)   Resp 18   Ht 1.778 m (5' 10\")   Wt 85.3 kg (188 lb)   SpO2 98%   BMI 26.98 kg/m²     Pulse oximetry interpretation: I interpret the pulse oximetry as normal.  Constitutional: Awake and alert. Well appearing and no acute distress. Coarse voice.  Head: NCAT.  HEENT: Normal Conjunctiva. PERRLA. Tms without erhythema or bulging bilaterally.  Neck: Grossly normal range of motion. Airway midline.  Cardiovascular: Normal heart rate, Normal " rhythm.  Thorax & Lungs: No respiratory distress. Clear to Auscultation bilaterally. No intercostal retractions, belly breathing or nasal flaring.  Abdomen: Normal inspection. Nontender. Nondistended  Skin: No obvious rash.  Back: No tenderness, No CVA tenderness.   Musculoskeletal: No obvious deformity. Moves all extremities Well.  Neurologic: Age appropriate mentation and level of alertness. Slurring words, Difficult to understand but answering questions appropriately. Moves all extremities.   strength is 5 out of 5 bilaterally, ambulates with mild unsteady gait.  Facial features are symmetric and cranial nerves III through XII grossly intact.  Psychiatric: Mood and affect are appropriate for situation.    DIAGNOSTIC STUDIES    EKG/LABS  Results for orders placed or performed during the hospital encounter of 01/03/25   CBC WITH DIFFERENTIAL    Collection Time: 01/03/25  3:34 AM   Result Value Ref Range    WBC 6.7 4.8 - 10.8 K/uL    RBC 4.32 (L) 4.70 - 6.10 M/uL    Hemoglobin 13.9 (L) 14.0 - 18.0 g/dL    Hematocrit 42.1 42.0 - 52.0 %    MCV 97.5 81.4 - 97.8 fL    MCH 32.2 27.0 - 33.0 pg    MCHC 33.0 32.3 - 36.5 g/dL    RDW 45.9 35.9 - 50.0 fL    Platelet Count 240 164 - 446 K/uL    MPV 8.7 (L) 9.0 - 12.9 fL    Neutrophils-Polys 40.50 (L) 44.00 - 72.00 %    Lymphocytes 42.60 (H) 22.00 - 41.00 %    Monocytes 15.20 (H) 0.00 - 13.40 %    Eosinophils 1.00 0.00 - 6.90 %    Basophils 0.40 0.00 - 1.80 %    Immature Granulocytes 0.30 0.00 - 0.90 %    Nucleated RBC 0.00 0.00 - 0.20 /100 WBC    Neutrophils (Absolute) 2.71 1.82 - 7.42 K/uL    Lymphs (Absolute) 2.86 1.00 - 4.80 K/uL    Monos (Absolute) 1.02 (H) 0.00 - 0.85 K/uL    Eos (Absolute) 0.07 0.00 - 0.51 K/uL    Baso (Absolute) 0.03 0.00 - 0.12 K/uL    Immature Granulocytes (abs) 0.02 0.00 - 0.11 K/uL    NRBC (Absolute) 0.00 K/uL   COMP METABOLIC PANEL    Collection Time: 01/03/25  3:34 AM   Result Value Ref Range    Sodium 141 135 - 145 mmol/L    Potassium 3.9 3.6  - 5.5 mmol/L    Chloride 106 96 - 112 mmol/L    Co2 25 20 - 33 mmol/L    Anion Gap 10.0 7.0 - 16.0    Glucose 85 65 - 99 mg/dL    Bun 14 8 - 22 mg/dL    Creatinine 0.79 0.50 - 1.40 mg/dL    Calcium 8.9 8.5 - 10.5 mg/dL    Correct Calcium 8.7 8.5 - 10.5 mg/dL    AST(SGOT) 24 12 - 45 U/L    ALT(SGPT) 16 2 - 50 U/L    Alkaline Phosphatase 59 30 - 99 U/L    Total Bilirubin 0.4 0.1 - 1.5 mg/dL    Albumin 4.2 3.2 - 4.9 g/dL    Total Protein 6.7 6.0 - 8.2 g/dL    Globulin 2.5 1.9 - 3.5 g/dL    A-G Ratio 1.7 g/dL   TROPONIN    Collection Time: 01/03/25  3:34 AM   Result Value Ref Range    Troponin T 15 6 - 19 ng/L   DIAGNOSTIC ALCOHOL    Collection Time: 01/03/25  3:34 AM   Result Value Ref Range    Diagnostic Alcohol <10.1 <10.1 mg/dL   ESTIMATED GFR    Collection Time: 01/03/25  3:34 AM   Result Value Ref Range    GFR (CKD-EPI) 100 >60 mL/min/1.73 m 2   I have independently interpreted this EKG    RADIOLOGY/PROCEDURES   The attending emergency physician has independently interpreted the diagnostic imaging associated with this visit and am waiting the final reading from the radiologist.   My preliminary interpretation is a follows: no LVO  Radiologist interpretation:  CT-CTA NECK WITH & W/O-POST PROCESSING   Final Result         1. No significant carotid or vertebral stenosis.   2. Severe emphysema.      CT-CTA HEAD WITH & W/O-POST PROCESS   Final Result         1. No acute intracranial artery occlusion or significant stenosis.   2. Positive for 2 mm left intracranial ICA aneurysm.        COURSE & MEDICAL DECISION MAKING     ASSESSMENT, COURSE AND PLAN  Care Narrative:     3:32 AM - 64 y.o. YO male presents with slurred speech from [facility]  Afebrile and hypertensive but otherwise normal vitals   On exam he has no focal neurologic deficits.  He is hard to understand but unclear if this is his baseline.  He is edentulous which may also be contributing  Differentials considered but not exhaustive list including:  Intoxication, stroke, dysarthria, edentulous    He denies drugs and alcohol.  Labs are largely unremarkable  CTAs were obtained given unexplained slurring, he has no large vessel occlusion.  Unlikely small vessel ischemic event and patient is not hypertensive or fitting with typical stroke picture.  She does have an incidental aneurysm which she was informed of and provided education on this.    He is ambulating without assistant.  He is able to eat and drink without any dysphagia.  Suspicious that this may be his baseline although not clear on chart review of outside ER visits.  Patient endorses no acute complaints on reassessment.  He is agreeable with plan for discharge    ADDITIONAL PROBLEM LIST  none    DISPOSITION AND DISCUSSIONS  I have discussed management of the patient with the following physicians and LEA's:  None    Discussion of management with other [facility] or appropriate source(s): None     Escalation of care considered, and ultimately not performed: Laboratory analysis, diagnostic imaging, and acute inpatient care management, however at this time, the patient is most appropriate for outpatient management.    Barriers to care at this time, including but not limited to: Patient does not have established PCP and Patient is homeless.     Decision tools and prescription drugs considered including, but not limited to:  None .    The patient will return for new or worsening symptoms and is stable at the time of discharge.    DISPOSITION:  Patient will be discharged home in stable condition.    FOLLOW UP:  [address]  [facility] [address]  [phone]    FINAL DIAGNOSIS  1. Slurred speech    2. Cerebral aneurysm    3. Homelessness       Paul ULLOA (Tayla), am scribing for, and in the presence of, Cherri Jones D.O..    Electronically signed by: Paul East), 1/3/2025    Cherri ULLOA D.O. personally performed the services described in this  documentation, as scribed by Paul Looney in my presence, and it is both accurate and complete.     The note accurately reflects work and decisions made by me.  Cherri Jones D.O.  1/3/2025  6:28 AM

## 2025-01-03 NOTE — ED TRIAGE NOTES
Chief Complaint   Patient presents with    Slurred Speech       Pt to triage [facility] EMS  for above complaint. Presents with slurred speech that started 10;30 last night; Per EMS report; staff from [facility] reported that patient was very sweaty and started to have slurry speech at that time. Patient denies any alcohol tonight and this RN can't barely understand patient.    NIH done; positive only to speech due to slurring.    CN informed ; roomed to G39    Pt back to lobby, educated on triage process and encourage to alert staff of any changes.     Vitals:    01/03/25 0250   BP: 130/88   Pulse: 74   Resp: 18   Temp: 36.6 °C (97.9 °F)   SpO2: 98%          
Syncope

## 2025-01-03 NOTE — ED NOTES
Called the cab voucher, will be available within 10-15 minutes at ER entrance, notified to the security  Pt discharged to home. Discharge paperwork provided. Education provided by ERP. Reinforced discharge instructions.  Pt was given follow up instructions and prescriptions.  Pt verbalized understanding of all instructions for discharge.   Patient went out of the ER via wheelchair, alert and oriented x 4, with all belongings

## 2025-01-24 ENCOUNTER — HOSPITAL ENCOUNTER (INPATIENT)
Facility: MEDICAL CENTER | Age: 65
LOS: 3 days | DRG: 923 | End: 2025-01-27
Attending: EMERGENCY MEDICINE | Admitting: INTERNAL MEDICINE
Payer: MEDICARE

## 2025-01-24 ENCOUNTER — APPOINTMENT (OUTPATIENT)
Dept: RADIOLOGY | Facility: MEDICAL CENTER | Age: 65
DRG: 923 | End: 2025-01-24
Attending: EMERGENCY MEDICINE
Payer: MEDICARE

## 2025-01-24 DIAGNOSIS — T68.XXXA HYPOTHERMIA, INITIAL ENCOUNTER: ICD-10-CM

## 2025-01-24 DIAGNOSIS — I48.91 RAPID ATRIAL FIBRILLATION (HCC): ICD-10-CM

## 2025-01-24 PROBLEM — E87.20 ACIDOSIS, UNSPECIFIED: Status: ACTIVE | Noted: 2025-01-24

## 2025-01-24 PROBLEM — D72.829 LEUKOCYTOSIS: Status: ACTIVE | Noted: 2025-01-24

## 2025-01-24 PROBLEM — J43.9 EMPHYSEMA OF LUNG (HCC): Status: ACTIVE | Noted: 2025-01-24

## 2025-01-24 LAB
ALBUMIN SERPL BCP-MCNC: 4.3 G/DL (ref 3.2–4.9)
ALBUMIN/GLOB SERPL: 1.5 G/DL
ALP SERPL-CCNC: 70 U/L (ref 30–99)
ALT SERPL-CCNC: 25 U/L (ref 2–50)
ANION GAP SERPL CALC-SCNC: 23 MMOL/L (ref 7–16)
AST SERPL-CCNC: 56 U/L (ref 12–45)
BASOPHILS # BLD AUTO: 0.9 % (ref 0–1.8)
BASOPHILS # BLD: 0.12 K/UL (ref 0–0.12)
BILIRUB SERPL-MCNC: 0.7 MG/DL (ref 0.1–1.5)
BUN SERPL-MCNC: 18 MG/DL (ref 8–22)
CALCIUM ALBUM COR SERPL-MCNC: 9.1 MG/DL (ref 8.5–10.5)
CALCIUM SERPL-MCNC: 9.3 MG/DL (ref 8.5–10.5)
CHLORIDE SERPL-SCNC: 103 MMOL/L (ref 96–112)
CO2 SERPL-SCNC: 13 MMOL/L (ref 20–33)
CREAT SERPL-MCNC: 0.96 MG/DL (ref 0.5–1.4)
EKG IMPRESSION: NORMAL
EKG IMPRESSION: NORMAL
EOSINOPHIL # BLD AUTO: 0.11 K/UL (ref 0–0.51)
EOSINOPHIL NFR BLD: 0.8 % (ref 0–6.9)
ERYTHROCYTE [DISTWIDTH] IN BLOOD BY AUTOMATED COUNT: 43.5 FL (ref 35.9–50)
GFR SERPLBLD CREATININE-BSD FMLA CKD-EPI: 89 ML/MIN/1.73 M 2
GLOBULIN SER CALC-MCNC: 2.8 G/DL (ref 1.9–3.5)
GLUCOSE SERPL-MCNC: 135 MG/DL (ref 65–99)
HCT VFR BLD AUTO: 43.4 % (ref 42–52)
HGB BLD-MCNC: 14.8 G/DL (ref 14–18)
LYMPHOCYTES # BLD AUTO: 4.28 K/UL (ref 1–4.8)
LYMPHOCYTES NFR BLD: 31 % (ref 22–41)
MAGNESIUM SERPL-MCNC: 2.6 MG/DL (ref 1.5–2.5)
MANUAL DIFF BLD: NORMAL
MCH RBC QN AUTO: 32.2 PG (ref 27–33)
MCHC RBC AUTO-ENTMCNC: 34.1 G/DL (ref 32.3–36.5)
MCV RBC AUTO: 94.6 FL (ref 81.4–97.8)
MONOCYTES # BLD AUTO: 0.12 K/UL (ref 0–0.85)
MONOCYTES NFR BLD AUTO: 0.9 % (ref 0–13.4)
MORPHOLOGY BLD-IMP: NORMAL
NEUTROPHILS # BLD AUTO: 9.16 K/UL (ref 1.82–7.42)
NEUTROPHILS NFR BLD: 66.4 % (ref 44–72)
NRBC # BLD AUTO: 0 K/UL
NRBC BLD-RTO: 0 /100 WBC (ref 0–0.2)
PLATELET # BLD AUTO: 325 K/UL (ref 164–446)
PLATELET BLD QL SMEAR: NORMAL
PMV BLD AUTO: 8.7 FL (ref 9–12.9)
POTASSIUM SERPL-SCNC: 5.2 MMOL/L (ref 3.6–5.5)
PROT SERPL-MCNC: 7.1 G/DL (ref 6–8.2)
RBC # BLD AUTO: 4.59 M/UL (ref 4.7–6.1)
RBC BLD AUTO: NORMAL
SODIUM SERPL-SCNC: 139 MMOL/L (ref 135–145)
WBC # BLD AUTO: 13.8 K/UL (ref 4.8–10.8)

## 2025-01-24 PROCEDURE — 99291 CRITICAL CARE FIRST HOUR: CPT

## 2025-01-24 PROCEDURE — 700111 HCHG RX REV CODE 636 W/ 250 OVERRIDE (IP): Mod: JZ

## 2025-01-24 PROCEDURE — 93005 ELECTROCARDIOGRAM TRACING: CPT | Mod: TC | Performed by: EMERGENCY MEDICINE

## 2025-01-24 PROCEDURE — 72125 CT NECK SPINE W/O DYE: CPT

## 2025-01-24 PROCEDURE — 92610 EVALUATE SWALLOWING FUNCTION: CPT

## 2025-01-24 PROCEDURE — 99291 CRITICAL CARE FIRST HOUR: CPT | Performed by: INTERNAL MEDICINE

## 2025-01-24 PROCEDURE — 80053 COMPREHEN METABOLIC PANEL: CPT

## 2025-01-24 PROCEDURE — 83735 ASSAY OF MAGNESIUM: CPT

## 2025-01-24 PROCEDURE — 36415 COLL VENOUS BLD VENIPUNCTURE: CPT

## 2025-01-24 PROCEDURE — 51702 INSERT TEMP BLADDER CATH: CPT

## 2025-01-24 PROCEDURE — 93005 ELECTROCARDIOGRAM TRACING: CPT | Mod: TC | Performed by: INTERNAL MEDICINE

## 2025-01-24 PROCEDURE — 700105 HCHG RX REV CODE 258: Performed by: INTERNAL MEDICINE

## 2025-01-24 PROCEDURE — A9270 NON-COVERED ITEM OR SERVICE: HCPCS

## 2025-01-24 PROCEDURE — 85007 BL SMEAR W/DIFF WBC COUNT: CPT

## 2025-01-24 PROCEDURE — 700105 HCHG RX REV CODE 258

## 2025-01-24 PROCEDURE — 71045 X-RAY EXAM CHEST 1 VIEW: CPT

## 2025-01-24 PROCEDURE — 303105 HCHG CATHETER EXTRA

## 2025-01-24 PROCEDURE — 770022 HCHG ROOM/CARE - ICU (200)

## 2025-01-24 PROCEDURE — 700105 HCHG RX REV CODE 258: Performed by: EMERGENCY MEDICINE

## 2025-01-24 PROCEDURE — 700102 HCHG RX REV CODE 250 W/ 637 OVERRIDE(OP)

## 2025-01-24 PROCEDURE — 70450 CT HEAD/BRAIN W/O DYE: CPT

## 2025-01-24 PROCEDURE — 85027 COMPLETE CBC AUTOMATED: CPT

## 2025-01-24 RX ORDER — SODIUM CHLORIDE, SODIUM LACTATE, POTASSIUM CHLORIDE, AND CALCIUM CHLORIDE .6; .31; .03; .02 G/100ML; G/100ML; G/100ML; G/100ML
500 INJECTION, SOLUTION INTRAVENOUS ONCE
Status: COMPLETED | OUTPATIENT
Start: 2025-01-24 | End: 2025-01-24

## 2025-01-24 RX ORDER — SODIUM CHLORIDE 9 MG/ML
1000 INJECTION, SOLUTION INTRAVENOUS ONCE
Status: COMPLETED | OUTPATIENT
Start: 2025-01-24 | End: 2025-01-24

## 2025-01-24 RX ORDER — DEXTROSE MONOHYDRATE 50 MG/ML
INJECTION, SOLUTION INTRAVENOUS CONTINUOUS
Status: DISCONTINUED | OUTPATIENT
Start: 2025-01-24 | End: 2025-01-24

## 2025-01-24 RX ORDER — SODIUM CHLORIDE, SODIUM LACTATE, POTASSIUM CHLORIDE, CALCIUM CHLORIDE 600; 310; 30; 20 MG/100ML; MG/100ML; MG/100ML; MG/100ML
INJECTION, SOLUTION INTRAVENOUS CONTINUOUS
Status: DISCONTINUED | OUTPATIENT
Start: 2025-01-24 | End: 2025-01-26

## 2025-01-24 RX ORDER — POLYETHYLENE GLYCOL 3350 17 G/17G
1 POWDER, FOR SOLUTION ORAL
Status: DISCONTINUED | OUTPATIENT
Start: 2025-01-24 | End: 2025-01-27 | Stop reason: HOSPADM

## 2025-01-24 RX ORDER — ENOXAPARIN SODIUM 100 MG/ML
40 INJECTION SUBCUTANEOUS DAILY
Status: DISCONTINUED | OUTPATIENT
Start: 2025-01-24 | End: 2025-01-27 | Stop reason: HOSPADM

## 2025-01-24 RX ORDER — AMOXICILLIN 250 MG
2 CAPSULE ORAL EVERY EVENING
Status: DISCONTINUED | OUTPATIENT
Start: 2025-01-24 | End: 2025-01-27 | Stop reason: HOSPADM

## 2025-01-24 RX ORDER — ACETAMINOPHEN 325 MG/1
650 TABLET ORAL EVERY 6 HOURS PRN
Status: DISCONTINUED | OUTPATIENT
Start: 2025-01-24 | End: 2025-01-27 | Stop reason: HOSPADM

## 2025-01-24 RX ORDER — ALBUTEROL SULFATE 90 UG/1
2 INHALANT RESPIRATORY (INHALATION) EVERY 4 HOURS PRN
Status: DISCONTINUED | OUTPATIENT
Start: 2025-01-24 | End: 2025-01-27 | Stop reason: HOSPADM

## 2025-01-24 RX ORDER — LABETALOL HYDROCHLORIDE 5 MG/ML
10 INJECTION, SOLUTION INTRAVENOUS EVERY 4 HOURS PRN
Status: DISCONTINUED | OUTPATIENT
Start: 2025-01-24 | End: 2025-01-27 | Stop reason: HOSPADM

## 2025-01-24 RX ADMIN — SODIUM CHLORIDE 1000 ML: 9 INJECTION, SOLUTION INTRAVENOUS at 08:56

## 2025-01-24 RX ADMIN — SODIUM CHLORIDE 1000 ML: 9 INJECTION, SOLUTION INTRAVENOUS at 11:03

## 2025-01-24 RX ADMIN — SODIUM CHLORIDE, POTASSIUM CHLORIDE, SODIUM LACTATE AND CALCIUM CHLORIDE: 600; 310; 30; 20 INJECTION, SOLUTION INTRAVENOUS at 14:39

## 2025-01-24 RX ADMIN — SODIUM CHLORIDE 1000 ML: 9 INJECTION, SOLUTION INTRAVENOUS at 09:51

## 2025-01-24 RX ADMIN — SODIUM CHLORIDE, POTASSIUM CHLORIDE, SODIUM LACTATE AND CALCIUM CHLORIDE 500 ML: 600; 310; 30; 20 INJECTION, SOLUTION INTRAVENOUS at 13:00

## 2025-01-24 RX ADMIN — ENOXAPARIN SODIUM 40 MG: 100 INJECTION SUBCUTANEOUS at 18:09

## 2025-01-24 RX ADMIN — SENNOSIDES AND DOCUSATE SODIUM 2 TABLET: 50; 8.6 TABLET ORAL at 18:09

## 2025-01-24 SDOH — ECONOMIC STABILITY: TRANSPORTATION INSECURITY
IN THE PAST 12 MONTHS, HAS LACK OF RELIABLE TRANSPORTATION KEPT YOU FROM MEDICAL APPOINTMENTS, MEETINGS, WORK OR FROM GETTING THINGS NEEDED FOR DAILY LIVING?: PATIENT UNABLE TO ANSWER

## 2025-01-24 SDOH — ECONOMIC STABILITY: TRANSPORTATION INSECURITY
IN THE PAST 12 MONTHS, HAS THE LACK OF TRANSPORTATION KEPT YOU FROM MEDICAL APPOINTMENTS OR FROM GETTING MEDICATIONS?: PATIENT UNABLE TO ANSWER

## 2025-01-24 ASSESSMENT — ENCOUNTER SYMPTOMS: PALPITATIONS: 0

## 2025-01-24 ASSESSMENT — PAIN DESCRIPTION - PAIN TYPE
TYPE: ACUTE PAIN
TYPE: ACUTE PAIN

## 2025-01-24 ASSESSMENT — SOCIAL DETERMINANTS OF HEALTH (SDOH)
WITHIN THE PAST 12 MONTHS, YOU WORRIED THAT YOUR FOOD WOULD RUN OUT BEFORE YOU GOT THE MONEY TO BUY MORE: PATIENT UNABLE TO ANSWER
WITHIN THE PAST 12 MONTHS, THE FOOD YOU BOUGHT JUST DIDN'T LAST AND YOU DIDN'T HAVE MONEY TO GET MORE: PATIENT UNABLE TO ANSWER
IN THE PAST 12 MONTHS, HAS THE ELECTRIC, GAS, OIL, OR WATER COMPANY THREATENED TO SHUT OFF SERVICE IN YOUR HOME?: PATIENT UNABLE TO ANSWER

## 2025-01-24 ASSESSMENT — FIBROSIS 4 INDEX
FIB4 SCORE: 2.14
FIB4 SCORE: 1.55

## 2025-01-24 NOTE — H&P
[facility] Internal Medicine History & Physical Note    Date of Service  1/24/2025    R Team: R ICU Gold Team   Attending: Emmanuel Diallo D.o.  Senior Resident: Dr. Paulie Angel  Contact Number: [phone]    Primary Care Physician  Pcp Pt States None      Code Status  Full Code    Chief Complaint  Chief Complaint   Patient presents with    Cold Exposure     [place], denies LOC, +HS, -thinners        History of Presenting Illness (HPI):   Brian Durham is a 64 y.o. male who presented 1/24/2025 with hypothermia, A-fib with RVR.  Patient is a very limited historian and most history is able to be obtained from chart review, ER notes, etc.  He has a past medical history of degenerative disc disease, hepatitis C, psychiatric disorder (bipolar, ADHD, schizophrenia).  States he takes no home medications.  He states that [place]  [place]  States that he was able to pull himself out but ever since that point had significant chills, cold sensations.  ER course is significant for patient's significant tenderness hypothermia of 32.3, elevated heart rates of atrial fibrillation ranging up into the 150s.  However patient is saturating well on room air, but significant hypotension noted 80s over 50s.    I discussed the plan of care with patient and bedside RN.    Review of Systems  Review of Systems   Unable to perform ROS: Other   Cardiovascular:  Negative for chest pain, palpitations and leg swelling.   Slurred speech which is consistent with prior records, mental status and acuity    Past Medical History   has a past medical history of ADHD, Arthritis, Cold (01/01/2012), Degenerative disc disease, Emphysema of lung (HCC) (1/24/2025), Hepatitis B, Pain (12/30/2011), Psychiatric disorder, and Schizophrenia ([facility]).    Surgical History   has a past surgical history that includes other and cervical disk  "and fusion anterior (1/16/2012).     Family History  family history is not on file.   Family history reviewed with patient.     Social History  Unable to obtain due to limited historian, challenging communication  Social History     Socioeconomic History    Marital status:      Spouse name: Not on file    Number of children: Not on file    Years of education: Not on file    Highest education level: Not on file   Occupational History    Not on file   Tobacco Use    Smoking status: Some Days     Current packs/day: 0.25     Types: Cigarettes    Smokeless tobacco: Never    Tobacco comments:     1/2 pack a day.   Vaping Use    Vaping status: Never Used   Substance and Sexual Activity    Alcohol use: Not Currently    Drug use: No    Sexual activity: Not on file   Other Topics Concern    Not on file   Social History Narrative    Not on file     Social Drivers of Health     Financial Resource Strain: Not on file   Food Insecurity: Patient Unable To Answer (1/24/2025)    Hunger Vital Sign     Worried About Running Out of Food in the Last Year: Patient unable to answer     Ran Out of Food in the Last Year: Patient unable to answer   Transportation Needs: Patient Unable To Answer (1/24/2025)    PRAPARE - Transportation     Lack of Transportation (Medical): Patient unable to answer     Lack of Transportation (Non-Medical): Patient unable to answer   Physical Activity: Not on file   Stress: Not on file   Social Connections: Not on file   Intimate Partner Violence: Not on file   Housing Stability: Patient Unable To Answer (1/24/2025)    Housing Stability Vital Sign     Unable to Pay for Housing in the Last Year: Patient unable to answer     Number of Times Moved in the Last Year: Not on file     Homeless in the Last Year: Patient unable to answer         Allergies  Allergies   Allergen Reactions    Paroxetine Swelling     Throat     Shellfish Allergy Rash     \"My heart - very painful.\"       Medications  Prior to " Admission Medications   Prescriptions Last Dose Informant Patient Reported? Taking?   trihexyphenidyl (ARTANE) 5 MG Tab   Yes No   Sig: Take 5 mg by mouth 2 times a day.      Facility-Administered Medications: None       Physical Exam  Temp:  [32.3 °C (90.1 °F)-36.1 °C (97 °F)] 36.1 °C (97 °F)  Pulse:  [] 75  Resp:  [8-46] 8  BP: ()/(57-81) 116/76  SpO2:  [90 %-100 %] 97 %  Blood Pressure: (!) 85/62   Temperature: (!) 35.7 °C (96.3 °F)   Pulse: (!) 142   Respiration: 18   Pulse Oximetry: 93 %       Physical Exam  Vitals and nursing note reviewed.   Constitutional:       General: He is not in acute distress.     Appearance: He is ill-appearing. He is not diaphoretic.   HENT:      Head: Normocephalic and atraumatic.      Right Ear: External ear normal.      Left Ear: External ear normal.   Cardiovascular:      Rate and Rhythm: Tachycardia present. Rhythm irregular.      Heart sounds: Normal heart sounds. No murmur heard.  Pulmonary:      Effort: Pulmonary effort is normal. No respiratory distress.      Breath sounds: Normal breath sounds.   Abdominal:      General: Abdomen is flat. There is no distension.      Tenderness: There is no abdominal tenderness. There is no guarding or rebound.      Hernia: No hernia is present.   Musculoskeletal:         General: Swelling present. No deformity.      Right lower leg: Edema present.      Left lower leg: Edema present.   Skin:     Coloration: Skin is not jaundiced.      Comments: Significant lesions on the lower extremities especially the feet.  Abrasions, cuts, bruising.  Cap refill appears normal but unable to feel clear pulses either dorsalis or tibial   Neurological:      Mental Status: He is alert.         Laboratory:  Recent Labs     01/24/25  0846   WBC 13.8*   RBC 4.59*   HEMOGLOBIN 14.8   HEMATOCRIT 43.4   MCV 94.6   MCH 32.2   MCHC 34.1   RDW 43.5   PLATELETCT 325   MPV 8.7*     Recent Labs     01/24/25  0846   SODIUM 139   POTASSIUM 5.2   CHLORIDE 103  "  CO2 13*   GLUCOSE 135*   BUN 18   CREATININE 0.96   CALCIUM 9.3     Recent Labs     01/24/25  0846   ALTSGPT 25   ASTSGOT 56*   ALKPHOSPHAT 70   TBILIRUBIN 0.7   GLUCOSE 135*         No results for input(s): \"NTPROBNP\" in the last 72 hours.      No results for input(s): \"TROPONINT\" in the last 72 hours.    Imaging:  CT-CSPINE WITHOUT PLUS RECONS   Final Result      No acute fracture or dislocation cervical spine.      Postsurgical and degenerative changes.      Severe emphysematous changes of the lung apices.      CT-HEAD W/O   Final Result      No acute intracranial abnormality.                                 DX-CHEST-PORTABLE (1 VIEW)   Final Result      Chronic interstitial changes. No new consolidation or pleural effusion.      EC-ECHOCARDIOGRAM COMPLETE W/O CONT    (Results Pending)       EKG:  I have personally reviewed the images and compared with prior images.    Assessment/Plan:  Problem Representation:   Brian Durham is a 64-year-old male with past medical history of schizophrenia, financial insecurity.  He presents with significant hypothermia secondary to falling and the [place].  I anticipate this patient will require at least two midnights for appropriate medical management, necessitating inpatient admission because hypothermia, atrial fibrillation with rapid ventricular response    Patient will need a ICU (Adult and Pediatrics) bed on MEDICAL service .  The need is secondary to hypothermia protocol.    * Hypothermia due to cold environment- (present on admission)  Assessment & Plan  Patient fell in the [place].  Initial temperature of 32.3  Hypothermia protocol.  Tesha hustephanieer, status post 3-1/2 L LR boluses given in the emergency room in ICU.    Appears resolved at this time.   Observing in the ICU overnight.        Atrial fibrillation with rapid ventricular response (HCC)- (present on admission)  Assessment & Plan  Rate of 150s in the emergency room.   Added amiodarone bolus and drip.  " However this was discontinued as this problem seemed to resolve with temperature control.  Obtaining formal echocardiogram.  No other echocardiograms on record.  POCUS exam appears grossly normal however both short and long axis have difficult windows with rib shadows.  Subcostal view shows 2 cm IVC that does not collapse with inspiration.  Images are placed in RICU POCUS machine needing to further review.  Maintenance fluid at 75 mL/h    Emphysema of lung (HCC)- (present on admission)  Assessment & Plan  Noted apical emphysema of the lung on CT scans on admission.  Does not appear to be in exasperation.  As needed albuterol.    Acidosis, unspecified- (present on admission)  Assessment & Plan  Bicarb 13 on admission.  Likely secondary to hypothermia, breathing changes  Continuing to monitor.    Leukocytosis  Assessment & Plan  Leukocytosis of 13 on admission.  Appears mild likely due to an acute phase reaction.        VTE prophylaxis: SCDs/TEDs and enoxaparin ppx     Drysol Counseling:  I discussed with the patient the risks of drysol/aluminum chloride including but not limited to skin rash, itching, irritation, burning.

## 2025-01-24 NOTE — ED TRIAGE NOTES
Chief Complaint   Patient presents with    Cold Exposure     [place], denies LOC, +HS, -thinners

## 2025-01-24 NOTE — CARE PLAN
The patient is Watcher - Medium risk of patient condition declining or worsening    Shift Goals  Clinical Goals: hemodynamic stability  Patient Goals: Rest  Family Goals: TIMOTEO      Problem: Knowledge Deficit - Standard  Goal: Patient and family/care givers will demonstrate understanding of plan of care, disease process/condition, diagnostic tests and medications  Outcome: Progressing     Problem: Skin Integrity  Goal: Skin integrity is maintained or improved  Outcome: Progressing     Problem: Fall Risk  Goal: Patient will remain free from falls  Outcome: Progressing     Problem: Pain - Standard  Goal: Alleviation of pain or a reduction in pain to the patient’s comfort goal  Outcome: Progressing

## 2025-01-24 NOTE — ED NOTES
Pharmacy Medication Reconciliation    ~Medication Reconciliation updated and complete per patient at bedside   ~Allergies reviewed and updated           ~Patient reports NO Prescription or OTC medications
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
## 2025-01-24 NOTE — PROGRESS NOTES
4 Eyes Skin Assessment Completed by Raimundo RN and Shaun RN.    Head WDL  Ears redness blanching  Nose WDL  Mouth WDL  Neck WDL  Breast/Chest Redness and Blanching  Shoulder Blades WDL  Spine WDL  (R) Arm/Elbow/Hand Scar to upper arm/elbow  (L) Arm/Elbow/Hand WDL  Abdomen WDL  Groin Redness and Excoriation  Scrotum/Coccyx/Buttocks Redness and Non-Blanching abrasion to left buttock  (R) Leg Redness and Abrasion to knee and generalized redness to hip area  (L) Leg Redness and Abrasion to knee and generalized redness to hip area  (R) Heel/Foot/Toe Redness, Non-Blanching, Discoloration, and Swelling multiple abrasions to feet and toes and ankle right hip abrasion  (L) Heel/Foot/Toe Redness, Non-Blanching, Discoloration, Bruising, and Edema multiple abrasions to feet and toes and ankle          Devices In Places ECG, Blood Pressure Cuff, Pulse Ox, Rothman, and SCD's      Interventions In Place TAP System, Pillows, Q2 Turns, and Heels Loaded W/Pillows    Possible Skin Injury Yes    Pictures Uploaded Into Epic Yes  Wound Consult Placed Yes  RN Wound Prevention Protocol Ordered Yes
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
## 2025-01-25 ENCOUNTER — APPOINTMENT (OUTPATIENT)
Dept: CARDIOLOGY | Facility: MEDICAL CENTER | Age: 65
DRG: 923 | End: 2025-01-25
Payer: MEDICARE

## 2025-01-25 PROBLEM — G93.40 ENCEPHALOPATHY ACUTE: Status: ACTIVE | Noted: 2025-01-25

## 2025-01-25 LAB
ALBUMIN SERPL BCP-MCNC: 3.5 G/DL (ref 3.2–4.9)
ALBUMIN/GLOB SERPL: 1.6 G/DL
ALP SERPL-CCNC: 55 U/L (ref 30–99)
ALT SERPL-CCNC: 43 U/L (ref 2–50)
ANION GAP SERPL CALC-SCNC: 14 MMOL/L (ref 7–16)
AST SERPL-CCNC: 105 U/L (ref 12–45)
BASOPHILS # BLD AUTO: 0.3 % (ref 0–1.8)
BASOPHILS # BLD: 0.03 K/UL (ref 0–0.12)
BILIRUB SERPL-MCNC: 0.7 MG/DL (ref 0.1–1.5)
BUN SERPL-MCNC: 13 MG/DL (ref 8–22)
CALCIUM ALBUM COR SERPL-MCNC: 8.6 MG/DL (ref 8.5–10.5)
CALCIUM SERPL-MCNC: 8.2 MG/DL (ref 8.5–10.5)
CHLORIDE SERPL-SCNC: 106 MMOL/L (ref 96–112)
CO2 SERPL-SCNC: 20 MMOL/L (ref 20–33)
CREAT SERPL-MCNC: 0.58 MG/DL (ref 0.5–1.4)
EOSINOPHIL # BLD AUTO: 0.05 K/UL (ref 0–0.51)
EOSINOPHIL NFR BLD: 0.6 % (ref 0–6.9)
ERYTHROCYTE [DISTWIDTH] IN BLOOD BY AUTOMATED COUNT: 44.5 FL (ref 35.9–50)
GFR SERPLBLD CREATININE-BSD FMLA CKD-EPI: 110 ML/MIN/1.73 M 2
GLOBULIN SER CALC-MCNC: 2.2 G/DL (ref 1.9–3.5)
GLUCOSE SERPL-MCNC: 80 MG/DL (ref 65–99)
HCT VFR BLD AUTO: 35.8 % (ref 42–52)
HGB BLD-MCNC: 12.5 G/DL (ref 14–18)
IMM GRANULOCYTES # BLD AUTO: 0.02 K/UL (ref 0–0.11)
IMM GRANULOCYTES NFR BLD AUTO: 0.2 % (ref 0–0.9)
LYMPHOCYTES # BLD AUTO: 1.75 K/UL (ref 1–4.8)
LYMPHOCYTES NFR BLD: 19.6 % (ref 22–41)
MAGNESIUM SERPL-MCNC: 1.8 MG/DL (ref 1.5–2.5)
MCH RBC QN AUTO: 32.8 PG (ref 27–33)
MCHC RBC AUTO-ENTMCNC: 34.9 G/DL (ref 32.3–36.5)
MCV RBC AUTO: 94 FL (ref 81.4–97.8)
MONOCYTES # BLD AUTO: 1.03 K/UL (ref 0–0.85)
MONOCYTES NFR BLD AUTO: 11.6 % (ref 0–13.4)
NEUTROPHILS # BLD AUTO: 6.03 K/UL (ref 1.82–7.42)
NEUTROPHILS NFR BLD: 67.7 % (ref 44–72)
NRBC # BLD AUTO: 0 K/UL
NRBC BLD-RTO: 0 /100 WBC (ref 0–0.2)
PHOSPHATE SERPL-MCNC: 2.5 MG/DL (ref 2.5–4.5)
PLATELET # BLD AUTO: 230 K/UL (ref 164–446)
PMV BLD AUTO: 8.7 FL (ref 9–12.9)
POTASSIUM SERPL-SCNC: 4.2 MMOL/L (ref 3.6–5.5)
PROT SERPL-MCNC: 5.7 G/DL (ref 6–8.2)
RBC # BLD AUTO: 3.81 M/UL (ref 4.7–6.1)
SODIUM SERPL-SCNC: 140 MMOL/L (ref 135–145)
WBC # BLD AUTO: 8.9 K/UL (ref 4.8–10.8)

## 2025-01-25 PROCEDURE — 700101 HCHG RX REV CODE 250: Performed by: HOSPITALIST

## 2025-01-25 PROCEDURE — 700105 HCHG RX REV CODE 258: Performed by: INTERNAL MEDICINE

## 2025-01-25 PROCEDURE — 99233 SBSQ HOSP IP/OBS HIGH 50: CPT | Performed by: INTERNAL MEDICINE

## 2025-01-25 PROCEDURE — 700102 HCHG RX REV CODE 250 W/ 637 OVERRIDE(OP): Performed by: HOSPITALIST

## 2025-01-25 PROCEDURE — 99223 1ST HOSP IP/OBS HIGH 75: CPT | Mod: AI | Performed by: HOSPITALIST

## 2025-01-25 PROCEDURE — 770000 HCHG ROOM/CARE - INTERMEDIATE ICU *

## 2025-01-25 PROCEDURE — 80053 COMPREHEN METABOLIC PANEL: CPT

## 2025-01-25 PROCEDURE — 84100 ASSAY OF PHOSPHORUS: CPT

## 2025-01-25 PROCEDURE — 700111 HCHG RX REV CODE 636 W/ 250 OVERRIDE (IP): Mod: JZ

## 2025-01-25 PROCEDURE — 85025 COMPLETE CBC W/AUTO DIFF WBC: CPT

## 2025-01-25 PROCEDURE — 700105 HCHG RX REV CODE 258: Performed by: HOSPITALIST

## 2025-01-25 PROCEDURE — A9270 NON-COVERED ITEM OR SERVICE: HCPCS | Performed by: HOSPITALIST

## 2025-01-25 PROCEDURE — 83735 ASSAY OF MAGNESIUM: CPT

## 2025-01-25 RX ORDER — DEXMEDETOMIDINE HYDROCHLORIDE 4 UG/ML
.1-1.5 INJECTION, SOLUTION INTRAVENOUS CONTINUOUS
Status: DISCONTINUED | OUTPATIENT
Start: 2025-01-25 | End: 2025-01-27

## 2025-01-25 RX ORDER — SODIUM CHLORIDE, SODIUM LACTATE, POTASSIUM CHLORIDE, AND CALCIUM CHLORIDE .6; .31; .03; .02 G/100ML; G/100ML; G/100ML; G/100ML
1000 INJECTION, SOLUTION INTRAVENOUS ONCE
Status: COMPLETED | OUTPATIENT
Start: 2025-01-25 | End: 2025-01-25

## 2025-01-25 RX ORDER — GAUZE BANDAGE 2" X 2"
100 BANDAGE TOPICAL DAILY
Status: DISCONTINUED | OUTPATIENT
Start: 2025-01-25 | End: 2025-01-27 | Stop reason: HOSPADM

## 2025-01-25 RX ORDER — LORAZEPAM 1 MG/1
1 TABLET ORAL EVERY 6 HOURS
Status: DISCONTINUED | OUTPATIENT
Start: 2025-01-25 | End: 2025-01-27

## 2025-01-25 RX ADMIN — LORAZEPAM 1 MG: 1 TABLET ORAL at 15:22

## 2025-01-25 RX ADMIN — LORAZEPAM 1 MG: 1 TABLET ORAL at 08:44

## 2025-01-25 RX ADMIN — ENOXAPARIN SODIUM 40 MG: 100 INJECTION SUBCUTANEOUS at 18:14

## 2025-01-25 RX ADMIN — SODIUM CHLORIDE, POTASSIUM CHLORIDE, SODIUM LACTATE AND CALCIUM CHLORIDE 1000 ML: 600; 310; 30; 20 INJECTION, SOLUTION INTRAVENOUS at 12:45

## 2025-01-25 RX ADMIN — SODIUM CHLORIDE, POTASSIUM CHLORIDE, SODIUM LACTATE AND CALCIUM CHLORIDE: 600; 310; 30; 20 INJECTION, SOLUTION INTRAVENOUS at 13:36

## 2025-01-25 RX ADMIN — DEXMEDETOMIDINE HYDROCHLORIDE 0.2 MCG/KG/HR: 100 INJECTION, SOLUTION INTRAVENOUS at 08:46

## 2025-01-25 RX ADMIN — LORAZEPAM 1 MG: 1 TABLET ORAL at 19:53

## 2025-01-25 RX ADMIN — SODIUM CHLORIDE, POTASSIUM CHLORIDE, SODIUM LACTATE AND CALCIUM CHLORIDE: 600; 310; 30; 20 INJECTION, SOLUTION INTRAVENOUS at 10:37

## 2025-01-25 RX ADMIN — SODIUM CHLORIDE, POTASSIUM CHLORIDE, SODIUM LACTATE AND CALCIUM CHLORIDE: 600; 310; 30; 20 INJECTION, SOLUTION INTRAVENOUS at 03:55

## 2025-01-25 RX ADMIN — Medication 100 MG: at 08:44

## 2025-01-25 ASSESSMENT — FIBROSIS 4 INDEX
FIB4 SCORE: 4.32

## 2025-01-25 ASSESSMENT — PAIN DESCRIPTION - PAIN TYPE
TYPE: ACUTE PAIN

## 2025-01-25 NOTE — DISCHARGE PLANNING
"Case Management Discharge Planning      Action(s) Taken: RNCM attempted to meet with patient at bedside to obtain information for potential [facility]. When asked if patient had any family, patient stated \"over there\".     RNCM confirmed Marleni Coelho is patient's grandmother; unable to leave  at number listed in patient's chart    Previous admissions state patient came from [facility]; RNCM left  with Rosa [phone]    Escalations Completed: None    Medically Clear: No    Next Steps: CM to follow up with IDT regarding DCP needs/barriers; locate [facility]    Barriers to Discharge: Medical clearance    Is the patient up for discharge tomorrow: No          [facility] search was unsuccessful; VM left for [facility] ([place]);   "

## 2025-01-25 NOTE — CARE PLAN
The patient is Watcher - Medium risk of patient condition declining or worsening    Shift Goals  Clinical Goals: Pt will remain hemodynamically stable during shift  Patient Goals: rest  Family Goals: not present at this time    Progress made toward(s) clinical / shift goals:      Problem: Knowledge Deficit - Standard  Goal: Patient and family/care givers will demonstrate understanding of plan of care, disease process/condition, diagnostic tests and medications  Outcome: Progressing  Note: Pt educated regarding plan of care and medications. All questions answered.      Problem: Fall Risk  Goal: Patient will remain free from falls  Outcome: Progressing  Note: Fall precautions in place. Bed in lowest position. Non-skid socks in place. Personal possessions within reach. Mobility sign on door. Bed-alarm on. Call light within reach. Pt educated regarding fall prevention and states understanding.      Problem: Safety - Medical Restraint  Goal: Remains free of injury from restraints (Restraint for Interference with Medical Device)  Outcome: Progressing  Flowsheets (Taken 1/25/2025 1046)  Addressed this shift: Remains free of injury from restraints (restraint for interference with medical device):   Determine that other, less restrictive measures have been tried or would not be effective before applying the restraint   Evaluate the patient's condition at the time of restraint application   Inform patient/family regarding the reason for restraint   Every 2 hours: Monitor safety, psychosocial status, comfort, nutrition and hydration
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
## 2025-01-25 NOTE — CARE PLAN
Problem: Knowledge Deficit - Standard  Goal: Patient and family/care givers will demonstrate understanding of plan of care, disease process/condition, diagnostic tests and medications  Outcome: Progressing  Note: Discussed POC and medications with patient.  Patient verbalized understanding.       Problem: Pain - Standard  Goal: Alleviation of pain or a reduction in pain to the patient’s comfort goal  Outcome: Progressing  Note: POC regarding pain management discussed with pt.  Pt will be medicated for pain per MAR     The patient is Stable - Low risk of patient condition declining or worsening    Shift Goals  Clinical Goals: hemodynamic stability  Patient Goals: Rest  Family Goals: TIMOTEO

## 2025-01-25 NOTE — ASSESSMENT & PLAN NOTE
Known history of schizophrenia unclear if he is on any medications  1/26:  Starting low dose Abilify as it is once a day and hope it will help.

## 2025-01-25 NOTE — ASSESSMENT & PLAN NOTE
1/26:  After admit he had severe encephalopathy with agitation requiring soft wrist restraints and initiation of a Precedex drip  It is unclear if he has a history of alcohol abuse though will give benzodiazepines if so in order to mitigate seizures  Detox meds  Supportive care IV fluids  Hx of schizophrnia  Check RPR, UA, TSH

## 2025-01-25 NOTE — ASSESSMENT & PLAN NOTE
Rate of 150s in the emergency room.   Added amiodarone bolus and drip.  However this was discontinued as this problem seemed to resolve with temperature control.  Refrain from treatment for now he does not need an echocardiogram  Resolved.

## 2025-01-25 NOTE — PROGRESS NOTES
Report received. Assumed care. Pt in bed awake, agitated. A/O x2, forgetful with slurred speech (baseline). Bilateral soft wrist restraints in place, verified order from MD and placement. Denies pain, SOB. Assessment complete.  Explained importance of calling before getting OOB. Call light and belongings within reach. Bed alarm on. Bed in the lowest position. Treaded socks in place. Hourly rounding in progress. Will continue to monitor .

## 2025-01-25 NOTE — PROGRESS NOTES
4 Eyes Skin Assessment Completed by SHANTI Thompson and SHANTI Gaming.    Head WDL  Ears WDL  Nose WDL  Mouth WDL  Neck WDL  Breast/Chest Redness and Abrasion  Shoulder Blades Redness  Spine WDL  (R) Arm/Elbow/Hand Redness, Abrasion, and Discoloration  (L) Arm/Elbow/Hand Redness, Abrasion, and Discoloration  Abdomen Redness and Abrasion  Groin Redness, Excoriation, and Abrasion  Scrotum/Coccyx/Buttocks Redness and Excoriation  (R) Leg Redness, Non-Blanching, Bruising, and Abrasion  (L) Leg Redness, Non-Blanching, Bruising, and Abrasion  (R) Heel/Foot/Toe Redness, Non-Blanching, Discoloration, Ulcer(s), and Scab  (L) Heel/Foot/Toe Redness, Non-Blanching, Discoloration, and Ulcer(s)                                                Devices In Places ECG, Blood Pressure Cuff, Pulse Ox, and Rothman      Interventions In Place Heel Mepilex, Sacral Mepilex, Pillows, Q2 Turns, Low Air Loss Mattress, Heels Loaded W/Pillows, and Pressure Redistribution Mattress    Possible Skin Injury Yes    Pictures Uploaded Into Epic Yes  Wound Consult Placed Yes  RN Wound Prevention Protocol Ordered No

## 2025-01-25 NOTE — PROGRESS NOTES
Report given to Jay MOSER. Pt is transferred to T635 via bed with Charge RN and CCT without incident.

## 2025-01-25 NOTE — PROGRESS NOTES
Pt arrived to unit via hospital bed at 0900.Pt is A&Ox2 to self and place only. Pt was reoriented. Pt is on dex at 0.2 mcg/kg/hr and is in bilateral non-violent wrist restraints. Tele-monitor placed and monitor room notified. All questions answered at this time. Call light within reach; fall precautions in place.

## 2025-01-25 NOTE — ASSESSMENT & PLAN NOTE
On admission patient fell in the [place].  Initial temperature of 32.3  Hypothermia protocol.  Status post Tesha hugger, status post 3-1/2 L LR boluses given in the emergency room in ICU.    He was admitted to intensive care unit and now has normal temperature.

## 2025-01-25 NOTE — PROGRESS NOTES
"Pulmonary Progress Note    Date of admission  1/24/2025    Chief Complaint  64 y.o. male admitted 1/24/2025 with hypothermia    Hospital Course  \"Brian Durham is a 64 y.o. male who presented 1/24/2025 with hypothermia, A-fib with RVR.  Patient is a very limited historian and most history is able to be obtained from chart review, ER notes, etc.  He has a past medical history of degenerative disc disease, hepatitis C, psychiatric disorder (bipolar, ADHD, schizophrenia).  States he takes no home medications.  He states [place]  [place]  States that he was able to pull himself out but ever since that point had significant chills, cold sensations.  ER course is significant for patient's significant tenderness hypothermia of 32.3, elevated heart rates of atrial fibrillation ranging up into the 150s.  However patient is saturating well on room air, but significant hypotension noted 80s over 50s.\"    Interval Problem Update  Reviewed last 24 hour events:  Temperature normalized but now in alcohol [facility]    Review of Systems  Review of Systems   Unable to perform ROS: Acuity of condition        Vital Signs for last 24 hours   Temp:  [36.8 °C (98.3 °F)] 36.8 °C (98.3 °F)  Pulse:  [56-92] 56  Resp:  [8-46] 14  BP: ()/(52-81) 121/65  SpO2:  [92 %-99 %] 99 %    Hemodynamic parameters for last 24 hours       Respiratory Information for the last 24 hours       Physical Exam   Physical Exam  Constitutional:       Appearance: He is ill-appearing.   HENT:      Head: Normocephalic and atraumatic.      Mouth/Throat:      Mouth: Mucous membranes are dry.   Eyes:      Extraocular Movements: Extraocular movements intact.   Cardiovascular:      Rate and Rhythm: Tachycardia present.   Pulmonary:      Breath sounds: No wheezing.   Musculoskeletal:      Cervical back: Normal range of motion.   Skin:     Findings: Bruising present.      Comments: " Multiple superficial cuts on feet   Neurological:      Mental Status: He is alert.      Comments: Initially calm and nodded yes and no appropriately and moving all 4  Then became very agitated   Psychiatric:      Comments: Unable to assess         Medications  Current Facility-Administered Medications   Medication Dose Route Frequency Provider Last Rate Last Admin    dexmedetomidine (Precedex) 400 mcg/100mL infusion  0.1-1.5 mcg/kg/hr (Ideal) Intravenous Continuous Jluis Cortes M.D. 5.5 mL/hr at 01/25/25 1238 0.3 mcg/kg/hr at 01/25/25 1238    LORazepam (Ativan) tablet 1 mg  1 mg Oral Q6HR Jluis Cortes M.D.   1 mg at 01/25/25 0844    thiamine (Vitamin B-1) tablet 100 mg  100 mg Oral DAILY Jluis Cortes M.D.   100 mg at 01/25/25 0844    enoxaparin (Lovenox) inj 40 mg  40 mg Subcutaneous DAILY AT 1800 SHAHANA SantanaODesiree   40 mg at 01/24/25 1809    acetaminophen (Tylenol) tablet 650 mg  650 mg Oral Q6HRS PRN SHAHANA SantanaO.        senna-docusate (Pericolace Or Senokot S) 8.6-50 MG per tablet 2 Tablet  2 Tablet Oral Q EVENING SHAHANA SantanaO.   2 Tablet at 01/24/25 1809    And    polyethylene glycol/lytes (Miralax) Packet 1 Packet  1 Packet Oral QDAY PRN SHAHANA SantanaO.        labetalol (Normodyne/Trandate) injection 10 mg  10 mg Intravenous Q4HRS PRN SHAHANA SantanaO.        lactated ringers infusion   Intravenous Continuous Emmanuel Diallo D.O. 75 mL/hr at 01/25/25 1037 New Bag at 01/25/25 1037    albuterol inhaler 2 Puff  2 Puff Inhalation Q4HRS PRN SHAHANA SantanaO.           Fluids    Intake/Output Summary (Last 24 hours) at 1/25/2025 1315  Last data filed at 1/25/2025 1100  Gross per 24 hour   Intake 480 ml   Output 1600 ml   Net -1120 ml       Laboratory          Recent Labs     01/24/25  0846 01/25/25  0353 01/25/25  0923   SODIUM 139 140  --    POTASSIUM 5.2 4.2  --    CHLORIDE 103 106  --    CO2 13* 20  --    BUN 18 13  --    CREATININE 0.96 0.58  --    MAGNESIUM 2.6*  --  1.8    PHOSPHORUS  --   --  2.5   CALCIUM 9.3 8.2*  --      Recent Labs     01/24/25  0846 01/25/25  0353   ALTSGPT 25 43   ASTSGOT 56* 105*   ALKPHOSPHAT 70 55   TBILIRUBIN 0.7 0.7   GLUCOSE 135* 80     Recent Labs     01/24/25  0846 01/25/25  0353   WBC 13.8* 8.9   NEUTSPOLYS 66.40 67.70   LYMPHOCYTES 31.00 19.60*   MONOCYTES 0.90 11.60   EOSINOPHILS 0.80 0.60   BASOPHILS 0.90 0.30   ASTSGOT 56* 105*   ALTSGPT 25 43   ALKPHOSPHAT 70 55   TBILIRUBIN 0.7 0.7     Recent Labs     01/24/25  0846 01/25/25  0353   RBC 4.59* 3.81*   HEMOGLOBIN 14.8 12.5*   HEMATOCRIT 43.4 35.8*   PLATELETCT 325 230       Imaging  NO I or E    Assessment/Plan  * Hypothermia due to cold environment- (present on admission)  Assessment & Plan  Fall in [place]  Now normothermia    Encephalopathy acute- (present on admission)  Assessment & Plan  Due to etoh withdrawal  Alcohol withdrawal protocl  CIWA monitoring    Emphysema of lung (HCC)- (present on admission)  Assessment & Plan  Prn nebs  Would benefit from spiriva but too confused to use     Atrial fibrillation with rapid ventricular response (HCC)- (present on admission)  Assessment & Plan  Resolved  Transiently needing amiodarone    Schizophrenia (HCC)- (present on admission)  Assessment & Plan  Known hx  Homeless and not on meds         VTE:  Lovenox  Ulcer: Not Indicated  Lines: None    I have performed a physical exam and reviewed and updated ROS and Plan today (1/25/2025). In review of yesterday's note (1/24/2025), there are no changes except as documented above.     Discussed patient condition and risk of morbidity and/or mortality with Hospitalist, RN, RT, Pharmacy, and Charge nurse / hot rounds

## 2025-01-25 NOTE — CONSULTS
Hospital Medicine Consultation    Date of Service  1/25/2025    Referring Physician  Izabela Jean M.D.    Consulting Physician  Jluis Cortes M.D.    Reason for Consultation  detox    History of Presenting Illness  64 y.o. male who presented 1/24/2025 with hypothermia.  Mr. Durham has a past medical history of schizophrenia and emphysema that apparently had the misfortune of falling [place] the [place].  He is brought to the emergency room with hypothermia.  His initial temperature was 32 degrees Celsius.  Emergency room he was in atrial fibrillation with rapid ventricular sponsor rate in the 150s.  His initial blood pressure was low in the 80s over 50s.  He was given IV fluids, warming measures and admitted to the intensive care unit.  This morning he is in a sinus rhythm and has been warmed.  He is now awake and extremely agitated requiring restraints and initiation of an IV Precedex drip.    Review of Systems  Review of Systems   Unable to perform ROS: Mental acuity       Past Medical History   has a past medical history of ADHD, Arthritis, Cold (01/01/2012), Degenerative disc disease, Emphysema of lung (HCC) (1/24/2025), Hepatitis B, Pain (12/30/2011), Psychiatric disorder, and Schizophrenia ([facility]).    He has no past medical history of CAD (coronary artery disease), Infectious disease, Liver disease, or Seizure disorder ([facility]).    Surgical History   has a past surgical history that includes other and cervical disk and fusion anterior (1/16/2012).    Family History  family history is not on file.    Social History   reports that he has been smoking cigarettes. He has never used smokeless tobacco. He reports that he does not currently use alcohol. He reports that he does not use drugs.    Medications  Prior to Admission Medications   Prescriptions Last Dose Informant Patient Reported? Taking?   trihexyphenidyl (ARTANE) 5 MG Tab   Yes No   Sig: Take 5 mg by mouth 2 times a day.      Facility-Administered  "Medications: None       Allergies  Allergies   Allergen Reactions    Paroxetine Swelling     Throat     Shellfish Allergy Rash     \"My heart - very painful.\"       Physical Exam  Temp:  [32.3 °C (90.1 °F)-36.1 °C (97 °F)] 36.1 °C (97 °F)  Pulse:  [] 69  Resp:  [8-46] 18  BP: ()/(52-81) 103/59  SpO2:  [90 %-100 %] 98 %    Physical Exam  Constitutional:       General: He is in acute distress.      Appearance: He is ill-appearing and toxic-appearing.      Comments: Dirty and disheveled    HENT:      Mouth/Throat:      Mouth: Mucous membranes are dry.      Comments: Edentulous   Cardiovascular:      Rate and Rhythm: Tachycardia present.   Pulmonary:      Breath sounds: Normal breath sounds.   Skin:     Comments: Extensive scabs feet and legs with abrasions legs and right hip/buttocks   Psychiatric:      Comments: Agitated and yelling out         Fluids      Laboratory  Recent Labs     01/24/25  0846 01/25/25  0353   WBC 13.8* 8.9   RBC 4.59* 3.81*   HEMOGLOBIN 14.8 12.5*   HEMATOCRIT 43.4 35.8*   MCV 94.6 94.0   MCH 32.2 32.8   MCHC 34.1 34.9   RDW 43.5 44.5   PLATELETCT 325 230   MPV 8.7* 8.7*     Recent Labs     01/24/25  0846 01/25/25  0353   SODIUM 139 140   POTASSIUM 5.2 4.2   CHLORIDE 103 106   CO2 13* 20   GLUCOSE 135* 80   BUN 18 13   CREATININE 0.96 0.58   CALCIUM 9.3 8.2*                     Imaging  CT-CSPINE WITHOUT PLUS RECONS   Final Result      No acute fracture or dislocation cervical spine.      Postsurgical and degenerative changes.      Severe emphysematous changes of the lung apices.      CT-HEAD W/O   Final Result      No acute intracranial abnormality.                                 DX-CHEST-PORTABLE (1 VIEW)   Final Result      Chronic interstitial changes. No new consolidation or pleural effusion.      EC-ECHOCARDIOGRAM COMPLETE W/O CONT    (Results Pending)       Assessment/Plan  * Hypothermia due to cold environment- (present on admission)  Assessment & Plan  On admission patient " fell [place].  Initial temperature of 32.3  Hypothermia protocol.  Status post Tesha hugger, status post 3-1/2 L LR boluses given in the emergency room in ICU.    He was admitted to intensive care unit and now has normal temperature.        Encephalopathy acute- (present on admission)  Assessment & Plan  Severe encephalopathy this morning with agitation requiring soft wrist restraints and initiation of a Precedex drip  It is unclear if he has a history of alcohol abuse though will give benzodiazepines if so in order to mitigate seizures  Detox meds  Supportive care IV fluids      Atrial fibrillation with rapid ventricular response (HCC)- (present on admission)  Assessment & Plan  Rate of 150s in the emergency room.   Added amiodarone bolus and drip.  However this was discontinued as this problem seemed to resolve with temperature control.  Refrain from treatment for now he does not need an echocardiogram  Continuous telemetry monitoring    Emphysema of lung (HCC)- (present on admission)  Assessment & Plan  Noted apical emphysema of the lung on CT scans on admission.    Without exacerbation    Acidosis, unspecified- (present on admission)  Assessment & Plan  Bicarb 13 on admission.    Resolved with IV fluids    Leukocytosis  Assessment & Plan  Leukocytosis of 13 on admission.    This does not appear to be infectious as likely reactive    Schizophrenia (HCC)- (present on admission)  Assessment & Plan  Known history of schizophrenia unclear if he is on any medications

## 2025-01-25 NOTE — THERAPY
Speech Language Pathology   Clinical Swallow Evaluation     Patient Name: Brian Durham  AGE:  64 y.o., SEX:  male  Medical Record #: [id number]  Date of Service: 1/24/2025      History of Present Illness  Patient is a 64 y.o. male admitted 1/24 with hypothermia after fall [place].     PMHx: schizophrenia    CT-Head:  No acute intracranial abnormality.     CXR:  Chronic interstitial changes. No new consolidation or pleural effusion.      General Information:  Vitals  O2 Delivery Device: None - Room Air  Level of Consciousness: Awake, Alert  Orientation: Self, Situation, General place  Follows Directives: Yes      Prior Living Situation & Level of Function:  Housing / Facility: Homeless  Swallowing: WFL per pt       Oral Mechanism Evaluation:  Dentition: Edentulous   Facial Symmetry: Equal  Facial Sensation: Equal     Labial Observations: WFL   Lingual Observations: Midline  Motor Speech: Dysarthric       Laryngeal Function:  Secretion Management: Adequate  Voice Quality: WFL  Cough: Perceptually WNL       Subjective  Patient received awake, sitting upright in bed. Eager to have to PO.        Assessment  Current Method of Nutrition: NPO until cleared by speech pathology  Positioning: Ribeiro's (60-90 degrees)  Bolus Administration: Patient  O2 Delivery Device: None - Room Air         Swallowing Trials:  Swallowing Trials  Thin Liquid (TN0): WFL  Liquidised (LQ3): WFL  Regular (RG7): WFL      Comments: Patient able to self-feed; though impulsive requiring cues for single sips/bites and smaller volume sizes. Complete oral bolus containment. Slow mastication of regular solids. Pt consumed a full cup of water with rapid consecutive sips without difficulty. No cough/throat clear, vocal wetness, or increased WOB appreciated throughout oral intake. Single swallow completed per bolus. Education provided regarding general aspiration precautions and signs; along with importance for small, single bite/sips.  "      Clinical Impressions  Patient presents with oral phase deficits characterized by prolonged mastication, suspect chronic 2/2 edentulism. No overt s/sx of aspiration. Recommend modified diet of soft&bite sized solids/thin liquids with 1:1 supervision given impulsivity. SLP to follow to ensure diet tolerance and monitor for changes.       Recommendations  Diet Consistency: Soft&bite sized solids/thin liquids  Instrumentation: None indicated at this time  Medication: As tolerated  Supervision: 1:1 feeding with constant supervision, Monitor due to impulsive behavior  Positioning: Fully upright and midline during oral intake, Meals sitting upright in a chair, as tolerated  Risk Management : Small bites/sips, Slow rate of intake  Oral Care: BID         SLP Treatment Plan  Treatment Plan: Dysphagia Treatment  SLP Frequency: 3x Per Week  Estimated Duration: Until Therapy Goals Met      Anticipated Discharge Needs  Discharge Recommendations: Anticipate that the patient will have no further speech therapy needs after discharge from the hospital   Therapy Recommendations Upon DC: Not Indicated        Patient / Family Goals  Patient / Family Goal #1: \"Something to eat\"  Short Term Goals  Short Term Goal # 1: Pt will consume an oral diet of soft&bite sized solids/thin liquids without overt s/sx of aspiration or decline in respiratory status      Sunil Barreto, JOHNNA   "

## 2025-01-26 PROBLEM — T68.XXXA HYPOTHERMIA DUE TO EXPOSURE: Status: ACTIVE | Noted: 2025-01-26

## 2025-01-26 LAB
ALBUMIN SERPL BCP-MCNC: 3.3 G/DL (ref 3.2–4.9)
ALBUMIN/GLOB SERPL: 1.4 G/DL
ALP SERPL-CCNC: 51 U/L (ref 30–99)
ALT SERPL-CCNC: 40 U/L (ref 2–50)
ANION GAP SERPL CALC-SCNC: 12 MMOL/L (ref 7–16)
AST SERPL-CCNC: 73 U/L (ref 12–45)
BASOPHILS # BLD AUTO: 0.3 % (ref 0–1.8)
BASOPHILS # BLD: 0.02 K/UL (ref 0–0.12)
BILIRUB SERPL-MCNC: 0.5 MG/DL (ref 0.1–1.5)
BUN SERPL-MCNC: 9 MG/DL (ref 8–22)
CALCIUM ALBUM COR SERPL-MCNC: 8.7 MG/DL (ref 8.5–10.5)
CALCIUM SERPL-MCNC: 8.1 MG/DL (ref 8.5–10.5)
CHLORIDE SERPL-SCNC: 105 MMOL/L (ref 96–112)
CO2 SERPL-SCNC: 21 MMOL/L (ref 20–33)
CREAT SERPL-MCNC: 0.53 MG/DL (ref 0.5–1.4)
EOSINOPHIL # BLD AUTO: 0.05 K/UL (ref 0–0.51)
EOSINOPHIL NFR BLD: 0.8 % (ref 0–6.9)
ERYTHROCYTE [DISTWIDTH] IN BLOOD BY AUTOMATED COUNT: 45.2 FL (ref 35.9–50)
GFR SERPLBLD CREATININE-BSD FMLA CKD-EPI: 113 ML/MIN/1.73 M 2
GLOBULIN SER CALC-MCNC: 2.3 G/DL (ref 1.9–3.5)
GLUCOSE SERPL-MCNC: 89 MG/DL (ref 65–99)
HCT VFR BLD AUTO: 34.7 % (ref 42–52)
HGB BLD-MCNC: 11.7 G/DL (ref 14–18)
IMM GRANULOCYTES # BLD AUTO: 0.02 K/UL (ref 0–0.11)
IMM GRANULOCYTES NFR BLD AUTO: 0.3 % (ref 0–0.9)
LYMPHOCYTES # BLD AUTO: 1.79 K/UL (ref 1–4.8)
LYMPHOCYTES NFR BLD: 27.8 % (ref 22–41)
MCH RBC QN AUTO: 32.3 PG (ref 27–33)
MCHC RBC AUTO-ENTMCNC: 33.7 G/DL (ref 32.3–36.5)
MCV RBC AUTO: 95.9 FL (ref 81.4–97.8)
MONOCYTES # BLD AUTO: 0.92 K/UL (ref 0–0.85)
MONOCYTES NFR BLD AUTO: 14.3 % (ref 0–13.4)
NEUTROPHILS # BLD AUTO: 3.63 K/UL (ref 1.82–7.42)
NEUTROPHILS NFR BLD: 56.5 % (ref 44–72)
NRBC # BLD AUTO: 0 K/UL
NRBC BLD-RTO: 0 /100 WBC (ref 0–0.2)
PLATELET # BLD AUTO: 208 K/UL (ref 164–446)
PMV BLD AUTO: 8.6 FL (ref 9–12.9)
POTASSIUM SERPL-SCNC: 3.6 MMOL/L (ref 3.6–5.5)
PROT SERPL-MCNC: 5.6 G/DL (ref 6–8.2)
RBC # BLD AUTO: 3.62 M/UL (ref 4.7–6.1)
SODIUM SERPL-SCNC: 138 MMOL/L (ref 135–145)
TSH SERPL DL<=0.005 MIU/L-ACNC: 3.82 UIU/ML (ref 0.38–5.33)
WBC # BLD AUTO: 6.4 K/UL (ref 4.8–10.8)

## 2025-01-26 PROCEDURE — 85025 COMPLETE CBC W/AUTO DIFF WBC: CPT

## 2025-01-26 PROCEDURE — A9270 NON-COVERED ITEM OR SERVICE: HCPCS | Performed by: HOSPITALIST

## 2025-01-26 PROCEDURE — 770006 HCHG ROOM/CARE - MED/SURG/GYN SEMI*

## 2025-01-26 PROCEDURE — 80053 COMPREHEN METABOLIC PANEL: CPT

## 2025-01-26 PROCEDURE — 700101 HCHG RX REV CODE 250: Performed by: HOSPITALIST

## 2025-01-26 PROCEDURE — 84443 ASSAY THYROID STIM HORMONE: CPT

## 2025-01-26 PROCEDURE — 700105 HCHG RX REV CODE 258: Performed by: INTERNAL MEDICINE

## 2025-01-26 PROCEDURE — 700102 HCHG RX REV CODE 250 W/ 637 OVERRIDE(OP): Performed by: HOSPITALIST

## 2025-01-26 PROCEDURE — 99232 SBSQ HOSP IP/OBS MODERATE 35: CPT | Performed by: HOSPITALIST

## 2025-01-26 PROCEDURE — 700111 HCHG RX REV CODE 636 W/ 250 OVERRIDE (IP): Mod: JZ

## 2025-01-26 PROCEDURE — A9270 NON-COVERED ITEM OR SERVICE: HCPCS

## 2025-01-26 PROCEDURE — 700102 HCHG RX REV CODE 250 W/ 637 OVERRIDE(OP)

## 2025-01-26 RX ORDER — BACITRACIN ZINC AND POLYMYXIN B SULFATE 500; 1000 [USP'U]/G; [USP'U]/G
OINTMENT TOPICAL 2 TIMES DAILY
Status: DISCONTINUED | OUTPATIENT
Start: 2025-01-26 | End: 2025-01-27 | Stop reason: HOSPADM

## 2025-01-26 RX ORDER — ARIPIPRAZOLE 10 MG/1
5 TABLET ORAL DAILY
Status: DISCONTINUED | OUTPATIENT
Start: 2025-01-26 | End: 2025-01-27

## 2025-01-26 RX ADMIN — Medication 100 MG: at 05:32

## 2025-01-26 RX ADMIN — ENOXAPARIN SODIUM 40 MG: 100 INJECTION SUBCUTANEOUS at 17:35

## 2025-01-26 RX ADMIN — ARIPIPRAZOLE 5 MG: 10 TABLET ORAL at 13:44

## 2025-01-26 RX ADMIN — Medication 1 EACH: at 17:35

## 2025-01-26 RX ADMIN — SENNOSIDES AND DOCUSATE SODIUM 2 TABLET: 50; 8.6 TABLET ORAL at 17:35

## 2025-01-26 RX ADMIN — SODIUM CHLORIDE, POTASSIUM CHLORIDE, SODIUM LACTATE AND CALCIUM CHLORIDE: 600; 310; 30; 20 INJECTION, SOLUTION INTRAVENOUS at 01:38

## 2025-01-26 RX ADMIN — LORAZEPAM 1 MG: 1 TABLET ORAL at 02:26

## 2025-01-26 RX ADMIN — LORAZEPAM 1 MG: 1 TABLET ORAL at 13:37

## 2025-01-26 RX ADMIN — LORAZEPAM 1 MG: 1 TABLET ORAL at 07:25

## 2025-01-26 RX ADMIN — ACETAMINOPHEN 650 MG: 325 TABLET ORAL at 09:51

## 2025-01-26 RX ADMIN — LORAZEPAM 1 MG: 1 TABLET ORAL at 21:15

## 2025-01-26 ASSESSMENT — COGNITIVE AND FUNCTIONAL STATUS - GENERAL
SUGGESTED CMS G CODE MODIFIER DAILY ACTIVITY: CH
DAILY ACTIVITIY SCORE: 24
MOBILITY SCORE: 22
SUGGESTED CMS G CODE MODIFIER MOBILITY: CJ
CLIMB 3 TO 5 STEPS WITH RAILING: A LITTLE
WALKING IN HOSPITAL ROOM: A LITTLE

## 2025-01-26 ASSESSMENT — PAIN DESCRIPTION - PAIN TYPE
TYPE: ACUTE PAIN

## 2025-01-26 NOTE — CARE PLAN
The patient is Watcher - Medium risk of patient condition declining or worsening    Shift Goals  Clinical Goals: hemodynamic stability, safety  Patient Goals: rest, sleep, eat  Family Goals: sherif    Progress made toward(s) clinical / shift goals:    Problem: Knowledge Deficit - Standard  Goal: Patient and family/care givers will demonstrate understanding of plan of care, disease process/condition, diagnostic tests and medications  Outcome: Progressing     Problem: Skin Integrity  Goal: Skin integrity is maintained or improved  Outcome: Progressing     Problem: Fall Risk  Goal: Patient will remain free from falls  Outcome: Progressing     Problem: Pain - Standard  Goal: Alleviation of pain or a reduction in pain to the patient’s comfort goal  Outcome: Progressing     Problem: Safety - Medical Restraint  Goal: Remains free of injury from restraints (Restraint for Interference with Medical Device)  Outcome: Progressing  Goal: Free from restraint(s) (Restraint for Interference with Medical Device)  Outcome: Progressing       Patient is not progressing towards the following goals:

## 2025-01-26 NOTE — CARE PLAN
The patient is Watcher - Medium risk of patient condition declining or worsening    Shift Goals  Clinical Goals: hemodynamic stability, safety  Patient Goals: rest, sleep, eat  Family Goals: sherif    Progress made toward(s) clinical / shift goals:        Problem: Safety - Medical Restraint  Goal: Remains free of injury from restraints (Restraint for Interference with Medical Device)  Outcome: Progressing  Flowsheets (Taken 1/26/2025 1112)  Addressed this shift: Remains free of injury from restraints (restraint for interference with medical device):   Determine that other, less restrictive measures have been tried or would not be effective before applying the restraint   Evaluate the patient's condition at the time of restraint application   Inform patient/family regarding the reason for restraint   Every 2 hours: Monitor safety, psychosocial status, comfort, nutrition and hydration     Problem: Pain - Standard  Goal: Alleviation of pain or a reduction in pain to the patient’s comfort goal  Outcome: Progressing  Note: Pt assessed for pain regularly and medicated PRN per MAR.      Problem: Fall Risk  Goal: Patient will remain free from falls  Outcome: Progressing  Note: Fall precautions in place. Bed in lowest position. Non-skid socks in place. Personal possessions within reach. Mobility sign on door. Bed-alarm on. Call light within reach. Pt educated regarding fall prevention and states understanding.      Problem: Knowledge Deficit - Standard  Goal: Patient and family/care givers will demonstrate understanding of plan of care, disease process/condition, diagnostic tests and medications  Outcome: Progressing  Note: Pt educated regarding plan of care and medications. All questions answered.

## 2025-01-26 NOTE — PROGRESS NOTES
Hospital Medicine Daily Progress Note    Date of Service  1/26/2025    Chief Complaint  Hypothermia    Hospital Course  Brian Durham is a homeless 64 y.o. male w/ schizophrenia, HCV, and emphysema that fell [place]. He admitted 1/24/2025 with hypothermia with a initial temp of 32 degrees celsius.     In the ER he was in afib with RVR in 150's.  He was warmed and IV fluids given.  In the icu once more alert he was extremely agitated and initated on restraints and Precedex drip.    Interval Problem Update  1/26: Awake and calm, off precedex drip. Tangential in speech.  States he saw some jars of jelly in the [place] water.  When asked where he was raised he stated [place] but then went on a tangent on being in [place], [place], and [place].  He could not focus with clear answers when asked with odd answers.  Calm and following though.      I have discussed this patient's plan of care and discharge plan at IDT rounds today with Case Management, Nursing, Nursing leadership, and other members of the IDT team.    Consultants/Specialty  none    Code Status  Full Code    Disposition  The patient is not medically cleared for discharge to home or a post-acute facility.  Anticipate discharge to: home with close outpatient follow-up    I have placed the appropriate orders for post-discharge needs.    Review of Systems  Review of Systems   Unable to perform ROS: Psychiatric disorder        Physical Exam  Temp:  [36.1 °C (97 °F)-36.8 °C (98.3 °F)] 36.7 °C (98 °F)  Pulse:  [53-85] 85  Resp:  [14-38] 24  BP: ()/(48-83) 132/82  SpO2:  [94 %-100 %] 96 %    Physical Exam  Vitals reviewed.   Constitutional:       Appearance: Normal appearance. He is not ill-appearing.   HENT:      Head: Normocephalic.      Nose: Nose normal.   Eyes:      Extraocular Movements: Extraocular movements intact.      Pupils: Pupils are equal, round, and reactive to light.   Cardiovascular:      Rate and Rhythm: Normal rate  and regular rhythm.   Pulmonary:      Effort: Pulmonary effort is normal. No respiratory distress.      Breath sounds: Normal breath sounds.   Abdominal:      General: Bowel sounds are normal. There is no distension.      Palpations: Abdomen is soft.   Musculoskeletal:      Cervical back: Neck supple.      Right lower leg: No edema.      Left lower leg: No edema.   Skin:     General: Skin is warm.   Neurological:      General: No focal deficit present.      Mental Status: He is alert and oriented to person, place, and time.      Cranial Nerves: No cranial nerve deficit.   Psychiatric:         Attention and Perception: Attention normal.         Speech: Speech is rapid and pressured and tangential.         Behavior: Behavior is cooperative.         Thought Content: Thought content is delusional.         Cognition and Memory: Cognition is impaired.         Fluids    Intake/Output Summary (Last 24 hours) at 1/26/2025 0831  Last data filed at 1/26/2025 0500  Gross per 24 hour   Intake 522.72 ml   Output 3700 ml   Net -3177.28 ml        Laboratory  Recent Labs     01/24/25  0846 01/25/25  0353 01/26/25  0538   WBC 13.8* 8.9 6.4   RBC 4.59* 3.81* 3.62*   HEMOGLOBIN 14.8 12.5* 11.7*   HEMATOCRIT 43.4 35.8* 34.7*   MCV 94.6 94.0 95.9   MCH 32.2 32.8 32.3   MCHC 34.1 34.9 33.7   RDW 43.5 44.5 45.2   PLATELETCT 325 230 208   MPV 8.7* 8.7* 8.6*     Recent Labs     01/24/25  0846 01/25/25  0353 01/26/25  0538   SODIUM 139 140 138   POTASSIUM 5.2 4.2 3.6   CHLORIDE 103 106 105   CO2 13* 20 21   GLUCOSE 135* 80 89   BUN 18 13 9   CREATININE 0.96 0.58 0.53   CALCIUM 9.3 8.2* 8.1*                   Imaging  CT-CSPINE WITHOUT PLUS RECONS   Final Result      No acute fracture or dislocation cervical spine.      Postsurgical and degenerative changes.      Severe emphysematous changes of the lung apices.      CT-HEAD W/O   Final Result      No acute intracranial abnormality.                                 DX-CHEST-PORTABLE (1 VIEW)    Final Result      Chronic interstitial changes. No new consolidation or pleural effusion.           Assessment/Plan  * Hypothermia due to cold environment- (present on admission)  Assessment & Plan  On admission patient fell [place].  Initial temperature of 32.3  Hypothermia protocol.  Status post Tesha hugger, status post 3-1/2 L LR boluses given in the emergency room in ICU.    He was admitted to intensive care unit and now has normal temperature.        Encephalopathy acute- (present on admission)  Assessment & Plan  1/26:  After admit he had severe encephalopathy with agitation requiring soft wrist restraints and initiation of a Precedex drip  It is unclear if he has a history of alcohol abuse though will give benzodiazepines if so in order to mitigate seizures  Detox meds  Supportive care IV fluids  Hx of schizophrnia  Check RPR, UA, TSH      Emphysema of lung (HCC)- (present on admission)  Assessment & Plan  Noted apical emphysema of the lung on CT scans on admission.    Without exacerbation    Acidosis, unspecified- (present on admission)  Assessment & Plan  Bicarb 13 on admission.    1/26 Co2:21 <20<13  Resolved with IV fluids    Leukocytosis  Assessment & Plan  Leukemoid reaction  Wbc:6.4 <8.9<13.8   This does not appear to be infectious as likely reactive    Atrial fibrillation with rapid ventricular response (HCC)- (present on admission)  Assessment & Plan  Rate of 150s in the emergency room.   Added amiodarone bolus and drip.  However this was discontinued as this problem seemed to resolve with temperature control.  Refrain from treatment for now he does not need an echocardiogram  Resolved.    Schizophrenia (HCC)- (present on admission)  Assessment & Plan  Known history of schizophrenia unclear if he is on any medications  1/26:  Starting low dose Abilify as it is once a day and hope it will help.         VTE prophylaxis:   SCDs/TEDs   enoxaparin ppx      I have performed a physical exam  and reviewed and updated ROS and Plan today (1/26/2025). In review of yesterday's note (1/25/2025), there are no changes except as documented above.
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
## 2025-01-26 NOTE — PROGRESS NOTES
4 Eyes Skin Assessment Completed by SHANTI Tomlinson and SHANTI Elena.    Head Blanching and Redness  Ears Redness and Blanching  Nose WDL  Mouth WDL  Neck WDL  Breast/Chest Redness and Abrasion  Shoulder Blades Redness and Blanching  Spine WDL  (R) Arm/Elbow/Hand Redness and Blanching  (L) Arm/Elbow/Hand Redness and Blanching  Abdomen Redness and Abrasion  Groin WDL  Scrotum/Coccyx/Buttocks Redness and Excoriation  (R) Leg Redness, Bruising, and Abrasion, blanching  (L) Leg Redness, Bruising, and Abrasion, blanching  (R) Heel/Foot/Toe Redness and Blanching, abrasion, ulcer  (L) Heel/Foot/Toe Redness and Blanching, abrasion, ulcer          Devices In Places Blood Pressure Cuff and Condom Cath      Interventions In Place Heel Mepilex, Pillows, and Barrier Cream    Possible Skin Injury Yes    Pictures Uploaded Into Epic Yes  Wound Consult Placed Yes  RN Wound Prevention Protocol Ordered Yes

## 2025-01-27 ENCOUNTER — PATIENT OUTREACH (OUTPATIENT)
Dept: SCHEDULING | Facility: IMAGING CENTER | Age: 65
End: 2025-01-27
Payer: MEDICARE

## 2025-01-27 VITALS
OXYGEN SATURATION: 98 % | BODY MASS INDEX: 26.76 KG/M2 | HEART RATE: 80 BPM | WEIGHT: 186.95 LBS | RESPIRATION RATE: 18 BRPM | DIASTOLIC BLOOD PRESSURE: 69 MMHG | HEIGHT: 70 IN | TEMPERATURE: 97.8 F | SYSTOLIC BLOOD PRESSURE: 105 MMHG

## 2025-01-27 LAB
ALBUMIN SERPL BCP-MCNC: 3.6 G/DL (ref 3.2–4.9)
ALBUMIN/GLOB SERPL: 1.4 G/DL
ALP SERPL-CCNC: 56 U/L (ref 30–99)
ALT SERPL-CCNC: 48 U/L (ref 2–50)
ANION GAP SERPL CALC-SCNC: 13 MMOL/L (ref 7–16)
AST SERPL-CCNC: 73 U/L (ref 12–45)
BASOPHILS # BLD AUTO: 0.4 % (ref 0–1.8)
BASOPHILS # BLD: 0.04 K/UL (ref 0–0.12)
BILIRUB SERPL-MCNC: 0.6 MG/DL (ref 0.1–1.5)
BUN SERPL-MCNC: 10 MG/DL (ref 8–22)
CALCIUM ALBUM COR SERPL-MCNC: 8.8 MG/DL (ref 8.5–10.5)
CALCIUM SERPL-MCNC: 8.5 MG/DL (ref 8.5–10.5)
CHLORIDE SERPL-SCNC: 104 MMOL/L (ref 96–112)
CO2 SERPL-SCNC: 20 MMOL/L (ref 20–33)
CREAT SERPL-MCNC: 0.82 MG/DL (ref 0.5–1.4)
EOSINOPHIL # BLD AUTO: 0.04 K/UL (ref 0–0.51)
EOSINOPHIL NFR BLD: 0.4 % (ref 0–6.9)
ERYTHROCYTE [DISTWIDTH] IN BLOOD BY AUTOMATED COUNT: 43.7 FL (ref 35.9–50)
GFR SERPLBLD CREATININE-BSD FMLA CKD-EPI: 99 ML/MIN/1.73 M 2
GLOBULIN SER CALC-MCNC: 2.6 G/DL (ref 1.9–3.5)
GLUCOSE SERPL-MCNC: 134 MG/DL (ref 65–99)
HCT VFR BLD AUTO: 36.6 % (ref 42–52)
HGB BLD-MCNC: 12.3 G/DL (ref 14–18)
IMM GRANULOCYTES # BLD AUTO: 0.04 K/UL (ref 0–0.11)
IMM GRANULOCYTES NFR BLD AUTO: 0.4 % (ref 0–0.9)
LYMPHOCYTES # BLD AUTO: 1.69 K/UL (ref 1–4.8)
LYMPHOCYTES NFR BLD: 18.3 % (ref 22–41)
MCH RBC QN AUTO: 31.7 PG (ref 27–33)
MCHC RBC AUTO-ENTMCNC: 33.6 G/DL (ref 32.3–36.5)
MCV RBC AUTO: 94.3 FL (ref 81.4–97.8)
MONOCYTES # BLD AUTO: 1.14 K/UL (ref 0–0.85)
MONOCYTES NFR BLD AUTO: 12.4 % (ref 0–13.4)
NEUTROPHILS # BLD AUTO: 6.26 K/UL (ref 1.82–7.42)
NEUTROPHILS NFR BLD: 68.1 % (ref 44–72)
NRBC # BLD AUTO: 0 K/UL
NRBC BLD-RTO: 0 /100 WBC (ref 0–0.2)
PLATELET # BLD AUTO: 216 K/UL (ref 164–446)
PMV BLD AUTO: 8.5 FL (ref 9–12.9)
POTASSIUM SERPL-SCNC: 3.6 MMOL/L (ref 3.6–5.5)
PROT SERPL-MCNC: 6.2 G/DL (ref 6–8.2)
RBC # BLD AUTO: 3.88 M/UL (ref 4.7–6.1)
SODIUM SERPL-SCNC: 137 MMOL/L (ref 135–145)
WBC # BLD AUTO: 9.2 K/UL (ref 4.8–10.8)

## 2025-01-27 PROCEDURE — A9270 NON-COVERED ITEM OR SERVICE: HCPCS

## 2025-01-27 PROCEDURE — 85025 COMPLETE CBC W/AUTO DIFF WBC: CPT

## 2025-01-27 PROCEDURE — A9270 NON-COVERED ITEM OR SERVICE: HCPCS | Performed by: HOSPITALIST

## 2025-01-27 PROCEDURE — 99239 HOSP IP/OBS DSCHRG MGMT >30: CPT | Performed by: INTERNAL MEDICINE

## 2025-01-27 PROCEDURE — A9270 NON-COVERED ITEM OR SERVICE: HCPCS | Performed by: INTERNAL MEDICINE

## 2025-01-27 PROCEDURE — 700102 HCHG RX REV CODE 250 W/ 637 OVERRIDE(OP): Performed by: HOSPITALIST

## 2025-01-27 PROCEDURE — 36415 COLL VENOUS BLD VENIPUNCTURE: CPT

## 2025-01-27 PROCEDURE — 700101 HCHG RX REV CODE 250: Performed by: HOSPITALIST

## 2025-01-27 PROCEDURE — 92526 ORAL FUNCTION THERAPY: CPT

## 2025-01-27 PROCEDURE — 80053 COMPREHEN METABOLIC PANEL: CPT

## 2025-01-27 PROCEDURE — 700102 HCHG RX REV CODE 250 W/ 637 OVERRIDE(OP)

## 2025-01-27 PROCEDURE — 700102 HCHG RX REV CODE 250 W/ 637 OVERRIDE(OP): Performed by: INTERNAL MEDICINE

## 2025-01-27 RX ORDER — LORAZEPAM 1 MG/1
1 TABLET ORAL 4 TIMES DAILY
Status: DISCONTINUED | OUTPATIENT
Start: 2025-01-27 | End: 2025-01-27 | Stop reason: HOSPADM

## 2025-01-27 RX ORDER — BUPROPION HYDROCHLORIDE 75 MG/1
150 TABLET ORAL DAILY
Status: DISCONTINUED | OUTPATIENT
Start: 2025-01-27 | End: 2025-01-27

## 2025-01-27 RX ADMIN — ACETAMINOPHEN 650 MG: 325 TABLET ORAL at 03:42

## 2025-01-27 RX ADMIN — LORAZEPAM 1 MG: 1 TABLET ORAL at 09:57

## 2025-01-27 RX ADMIN — LORAZEPAM 1 MG: 1 TABLET ORAL at 13:21

## 2025-01-27 RX ADMIN — Medication 1 EACH: at 04:15

## 2025-01-27 RX ADMIN — ARIPIPRAZOLE 5 MG: 10 TABLET ORAL at 04:15

## 2025-01-27 RX ADMIN — Medication 100 MG: at 04:15

## 2025-01-27 RX ADMIN — LORAZEPAM 1 MG: 1 TABLET ORAL at 03:35

## 2025-01-27 ASSESSMENT — PAIN DESCRIPTION - PAIN TYPE
TYPE: ACUTE PAIN
TYPE: ACUTE PAIN

## 2025-01-27 NOTE — CARE PLAN
The patient is Stable - Low risk of patient condition declining or worsening    Shift Goals  Clinical Goals: pt will remain free from falls and injuries throughout shift  Patient Goals: rest; clothes for dc  Family Goals: sherif    Progress made toward(s) clinical / shift goals:  Pt verbalized discharge instructions. Questions answered. Pt stated their discharge plan back to RN. All needs met.     Problem: Knowledge Deficit - Standard  Goal: Patient and family/care givers will demonstrate understanding of plan of care, disease process/condition, diagnostic tests and medications  Outcome: Met     Problem: Skin Integrity  Goal: Skin integrity is maintained or improved  Outcome: Met     Problem: Fall Risk  Goal: Patient will remain free from falls  Outcome: Met     Problem: Pain - Standard  Goal: Alleviation of pain or a reduction in pain to the patient’s comfort goal  Outcome: Met     Problem: Safety - Medical Restraint  Goal: Remains free of injury from restraints (Restraint for Interference with Medical Device)  Outcome: Met  Goal: Free from restraint(s) (Restraint for Interference with Medical Device)  Outcome: Met       Patient is not progressing towards the following goals:

## 2025-01-27 NOTE — RESPIRATORY CARE
COPD EDUCATION by COPD CLINICAL EDUCATOR  1/27/2025 at 3:26 PM by Pattie Ellison, RRT     Patient interviewed by COPD education team. Patient refused COPD education at this time.

## 2025-01-27 NOTE — DISCHARGE PLANNING
CHW Arcelia attempted to meet with pt bedside per referral request from Marlena TRINIDAD, this CHW was unsuccessful with assessment as pt 's speech is very difficult for this CHW to understand.   Discussed case with Marlena TRINIDAD, will discharge from [facility] due to lack of communication.
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
## 2025-01-27 NOTE — CARE PLAN
The patient is Stable - Low risk of patient condition declining or worsening    Shift Goals  Clinical Goals: Patient will remain fres from injury throughout shift  Patient Goals: rest, snacks  Family Goals: sherif    Patient disoriented to situation, does not utilize call light appropriately. Patient is able to ambulate to bathroom steadily but does not call for assistance and appears confused by bed alarms despite repeated education attempts.  Bed in low and locked position, call light in reach, rounded on hourly.     Progress made toward(s) clinical / shift goals:    Problem: Fall Risk  Goal: Patient will remain free from falls  Outcome: Progressing     Problem: Skin Integrity  Goal: Skin integrity is maintained or improved  Outcome: Progressing     Problem: Safety - Medical Restraint  Goal: Remains free of injury from restraints (Restraint for Interference with Medical Device)  Outcome: Progressing       Patient is not progressing towards the following goals:

## 2025-01-27 NOTE — PROGRESS NOTES
Discussed discharge instructions with pt, pt verbalizzed understanding, questions answerred. Pt states he will f/u with his MD. When asked about

## 2025-01-27 NOTE — DISCHARGE SUMMARY
Discharge Summary    CHIEF COMPLAINT ON ADMISSION  Chief Complaint   Patient presents with    Cold Exposure     [place], denies LOC, +HS, -thinners        Reason for Admission  ems     Admission Date  1/24/2025    CODE STATUS  Full Code    HPI & HOSPITAL COURSE  65 yo man with homelessness, COPD, schizophrenia who had [place] and was brought in for hypothermia.  He was found in A-fib RVR with hypotension and admitted to ICU.  He had CT head and CT C-spine with negative acute findings.  Chest x-ray showed chronic interstitial changes.  He was given amiodarone for A-fib, A-fib did resolve once his temperature was normalized which was likely the cause.  Patient became agitated and was placed on a Precedex drip and restarted on Ativan which he takes chronically. His mentation returned to baseline.  Patient says she has prescription with refills available at [facility] and he has a follow-up doctor appointment coming up.  Case management assisted him with discharge.    Therefore, he is discharged in good and stable condition to home with close outpatient follow-up.    The patient met 2-midnight criteria for an inpatient stay at the time of discharge.    Discharge Date  1/27/25    FOLLOW UP ITEMS POST DISCHARGE  Follow-up with PCP for management of schizophrenia    DISCHARGE DIAGNOSES  Principal Problem:    Hypothermia due to cold environment (POA: Yes)  Active Problems:    Schizophrenia (HCC) (POA: Yes)    Atrial fibrillation with rapid ventricular response (HCC) (POA: Yes)    Leukocytosis (POA: Unknown)    Acidosis, unspecified (POA: Yes)    Emphysema of lung (HCC) (POA: Yes)    Encephalopathy acute (POA: Yes)    Hypothermia due to exposure (POA: Yes)  Resolved Problems:    * No resolved hospital problems. *      FOLLOW UP  [facility]  [address] [place] [id number]  [phone]  Call  Please call [facility]  "to establish with a Primary Care Provider. Thank you.    [facility]  [address] [place] [address]  [phone]  Call  Please call [facility] the [facility] to establish with a Primary Care Provider. This office offers sliding fee scales based on income. Thank you.      MEDICATIONS ON DISCHARGE     Medication List        CONTINUE taking these medications        Instructions   trihexyphenidyl 5 MG Tabs  Commonly known as: Artane   Take 5 mg by mouth 2 times a day.  Dose: 5 mg              Allergies  Allergies   Allergen Reactions    Paroxetine Swelling     Throat     Shellfish Allergy Rash     \"My heart - very painful.\"       DIET  Orders Placed This Encounter   Procedures    Diet Order Diet: Level 6 - Soft and Bite Sized; Liquid level: Level 0 - Thin; Tray Modifications (optional): SLP - 1:1 Supervision by Nursing, SLP - Deliver to Nursing Station     Standing Status:   Standing     Number of Occurrences:   1     Order Specific Question:   Diet:     Answer:   Level 6 - Soft and Bite Sized [23]     Order Specific Question:   Liquid level     Answer:   Level 0 - Thin     Order Specific Question:   Tray Modifications (optional)     Answer:   SLP - 1:1 Supervision by Nursing     Order Specific Question:   Tray Modifications (optional)     Answer:   SLP - Deliver to Nursing Station       ACTIVITY  As tolerated.    CONSULTATIONS  Critical care    PROCEDURES  CT-CSPINE WITHOUT PLUS RECONS   Final Result      No acute fracture or dislocation cervical spine.      Postsurgical and degenerative changes.      Severe emphysematous changes of the lung apices.      CT-HEAD W/O   Final Result      No acute intracranial abnormality.                                 DX-CHEST-PORTABLE (1 VIEW)   Final Result      Chronic interstitial changes. No new consolidation or pleural effusion.            LABORATORY  Lab Results   Component Value Date    SODIUM 137 01/27/2025    POTASSIUM 3.6 01/27/2025    CHLORIDE 104 " 01/27/2025    CO2 20 01/27/2025    GLUCOSE 134 (H) 01/27/2025    BUN 10 01/27/2025    CREATININE 0.82 01/27/2025    CREATININE 0.7 05/08/2007        Lab Results   Component Value Date    WBC 9.2 01/27/2025    HEMOGLOBIN 12.3 (L) 01/27/2025    HEMATOCRIT 36.6 (L) 01/27/2025    PLATELETCT 216 01/27/2025        Total time of the discharge process exceeds 32 minutes.

## 2025-01-27 NOTE — THERAPY
"Speech Language Pathology   Daily Treatment     Patient Name: Brian Durham  AGE:  64 y.o., SEX:  male  Medical Record #: [id number]  Date of Service: 1/27/2025      Precautions:  Precautions: Fall Risk, Swallow Precautions       Subjective:  Patient seen this date for dysphagia management. Patient alert and positioned to upright in bed. Pt presented with incoherent fast paced speech during PO trials but was able to be redirected to PO trials. Pt was awaiting discharge and stated \"I need pants\", which his RN stated she was retrieving for him.     Assessment:  PO presentations of thin liquids (TN0) cup, soft and bite size solids (SB6), and regular solids (RG7). Adequate oral bolus acceptance/containment. Complete AP transfer without notable oral bolus residue upon oral inspection. Adequate bite with functional mastication of solids despite being edentulous. No cough/throat clear appreciated with PO. Vocal quality remained stable throughout PO intake. Single swallow completed per bolus. No signs of esophageal dysfunction. Patient adequately self-feeding. Pt demonstrated impulsivity with eating: indicated by a fast rate and increased volume of intake, overall functional.Discussed diet advancement. RN updated.     Clinical Impressions  Patient presents with clinically functional oropharyngeal swallow. Diet advancement is appropriate. Acute dysphagia intervention is not warranted at this time. Please re-consult with change to swallow function.      Recommendations  Dysphagia Treatment  Diet Consistency: RG7/TN0  Instrumentation: None indicated at this time  Medication: As tolerated  Supervision: Monitor due to impulsive behavior, Distant supervision - check on patient 2-3 times per meal  Positioning: Fully upright and midline during oral intake  Risk Management : Small bites/sips, Slow rate of intake  Oral Care: BID      SLP Treatment Plan  Treatment Plan: None Indicated (D/C secondary to goals met)      " "  Anticipated Discharge Needs  Discharge Recommendations: Anticipate that the patient will have no further speech therapy needs after discharge from the hospital  Therapy Recommendations Upon DC: Not Indicated      Patient / Family Goals  Patient / Family Goal #1: \"Something to eat\"  Goal #1 Outcome: Goal met  Short Term Goals  Short Term Goal # 1: Pt will consume an oral diet of soft&bite sized solids/thin liquids without overt s/sx of aspiration or decline in respiratory status  Goal Outcome # 1: Goal met      Chavo Joyner, Student  Supervised by Sarai Bernal MS CCC-SLP  "

## 2025-01-27 NOTE — DISCHARGE PLANNING
@5603  Agency/Facility Name: [facility]  Spoke To: Cuca  Outcome: Transport is scheduled for today going to:  [address] [place]  [address]  Karena will  at 1530.    Trip #[id number]

## 2025-01-29 ENCOUNTER — HOSPITAL ENCOUNTER (INPATIENT)
Facility: MEDICAL CENTER | Age: 65
End: 2025-01-29
Attending: EMERGENCY MEDICINE | Admitting: INTERNAL MEDICINE
Payer: MEDICARE

## 2025-01-29 DIAGNOSIS — F41.9 ANXIETY: ICD-10-CM

## 2025-01-29 DIAGNOSIS — I95.9 HYPOTENSION, UNSPECIFIED HYPOTENSION TYPE: ICD-10-CM

## 2025-01-29 DIAGNOSIS — R42 DIZZINESS: ICD-10-CM

## 2025-01-29 PROBLEM — Z59.00 HOMELESSNESS: Status: ACTIVE | Noted: 2025-01-29

## 2025-01-29 LAB
ALBUMIN SERPL BCP-MCNC: 3.4 G/DL (ref 3.2–4.9)
ALBUMIN/GLOB SERPL: 1.3 G/DL
ALP SERPL-CCNC: 69 U/L (ref 30–99)
ALT SERPL-CCNC: 33 U/L (ref 2–50)
AMPHET UR QL SCN: NEGATIVE
ANION GAP SERPL CALC-SCNC: 10 MMOL/L (ref 7–16)
APPEARANCE UR: ABNORMAL
AST SERPL-CCNC: 26 U/L (ref 12–45)
BACTERIA #/AREA URNS HPF: ABNORMAL /HPF
BARBITURATES UR QL SCN: NEGATIVE
BENZODIAZ UR QL SCN: POSITIVE
BILIRUB SERPL-MCNC: 0.4 MG/DL (ref 0.1–1.5)
BILIRUB UR QL STRIP.AUTO: NEGATIVE
BUN SERPL-MCNC: 16 MG/DL (ref 8–22)
BZE UR QL SCN: NEGATIVE
CALCIUM ALBUM COR SERPL-MCNC: 8.6 MG/DL (ref 8.5–10.5)
CALCIUM SERPL-MCNC: 8.1 MG/DL (ref 8.5–10.5)
CANNABINOIDS UR QL SCN: NEGATIVE
CASTS URNS QL MICRO: ABNORMAL /LPF (ref 0–2)
CHLORIDE SERPL-SCNC: 109 MMOL/L (ref 96–112)
CO2 SERPL-SCNC: 20 MMOL/L (ref 20–33)
COLOR UR: YELLOW
CORTIS SERPL-MCNC: 9.1 UG/DL (ref 0–23)
CREAT SERPL-MCNC: 0.59 MG/DL (ref 0.5–1.4)
EKG IMPRESSION: NORMAL
EPITHELIAL CELLS 1715: ABNORMAL /HPF (ref 0–5)
ERYTHROCYTE [DISTWIDTH] IN BLOOD BY AUTOMATED COUNT: 45.7 FL (ref 35.9–50)
ETHANOL BLD-MCNC: <10.1 MG/DL
FENTANYL UR QL: NEGATIVE
GFR SERPLBLD CREATININE-BSD FMLA CKD-EPI: 109 ML/MIN/1.73 M 2
GLOBULIN SER CALC-MCNC: 2.7 G/DL (ref 1.9–3.5)
GLUCOSE BLD STRIP.AUTO-MCNC: 117 MG/DL (ref 65–99)
GLUCOSE SERPL-MCNC: 120 MG/DL (ref 65–99)
GLUCOSE UR STRIP.AUTO-MCNC: NEGATIVE MG/DL
HCT VFR BLD AUTO: 33.9 % (ref 42–52)
HGB BLD-MCNC: 11.5 G/DL (ref 14–18)
KETONES UR STRIP.AUTO-MCNC: ABNORMAL MG/DL
LACTATE SERPL-SCNC: 1.8 MMOL/L (ref 0.5–2)
LEUKOCYTE ESTERASE UR QL STRIP.AUTO: ABNORMAL
MAGNESIUM SERPL-MCNC: 2.3 MG/DL (ref 1.5–2.5)
MCH RBC QN AUTO: 32.2 PG (ref 27–33)
MCHC RBC AUTO-ENTMCNC: 33.9 G/DL (ref 32.3–36.5)
MCV RBC AUTO: 95 FL (ref 81.4–97.8)
METHADONE UR QL SCN: NEGATIVE
MICRO URNS: ABNORMAL
NITRITE UR QL STRIP.AUTO: POSITIVE
OPIATES UR QL SCN: NEGATIVE
OXYCODONE UR QL SCN: NEGATIVE
PCP UR QL SCN: NEGATIVE
PH UR STRIP.AUTO: 5.5 [PH] (ref 5–8)
PHOSPHATE SERPL-MCNC: 2.3 MG/DL (ref 2.5–4.5)
PLATELET # BLD AUTO: 200 K/UL (ref 164–446)
PMV BLD AUTO: 8.5 FL (ref 9–12.9)
POTASSIUM SERPL-SCNC: 3.6 MMOL/L (ref 3.6–5.5)
PROPOXYPH UR QL SCN: NEGATIVE
PROT SERPL-MCNC: 6.1 G/DL (ref 6–8.2)
PROT UR QL STRIP: NEGATIVE MG/DL
RBC # BLD AUTO: 3.57 M/UL (ref 4.7–6.1)
RBC # URNS HPF: ABNORMAL /HPF (ref 0–2)
RBC UR QL AUTO: ABNORMAL
SODIUM SERPL-SCNC: 139 MMOL/L (ref 135–145)
SP GR UR STRIP.AUTO: 1.01
TROPONIN T SERPL-MCNC: 16 NG/L (ref 6–19)
UROBILINOGEN UR STRIP.AUTO-MCNC: 1 EU/DL
VALPROATE SERPL-MCNC: 95.1 UG/ML (ref 50–100)
WBC # BLD AUTO: 9.5 K/UL (ref 4.8–10.8)
WBC #/AREA URNS HPF: ABNORMAL /HPF

## 2025-01-29 PROCEDURE — 96375 TX/PRO/DX INJ NEW DRUG ADDON: CPT

## 2025-01-29 PROCEDURE — 84484 ASSAY OF TROPONIN QUANT: CPT

## 2025-01-29 PROCEDURE — 96374 THER/PROPH/DIAG INJ IV PUSH: CPT

## 2025-01-29 PROCEDURE — 96372 THER/PROPH/DIAG INJ SC/IM: CPT

## 2025-01-29 PROCEDURE — 80307 DRUG TEST PRSMV CHEM ANLYZR: CPT

## 2025-01-29 PROCEDURE — 81001 URINALYSIS AUTO W/SCOPE: CPT

## 2025-01-29 PROCEDURE — 87086 URINE CULTURE/COLONY COUNT: CPT

## 2025-01-29 PROCEDURE — 82533 TOTAL CORTISOL: CPT

## 2025-01-29 PROCEDURE — 99223 1ST HOSP IP/OBS HIGH 75: CPT | Performed by: INTERNAL MEDICINE

## 2025-01-29 PROCEDURE — 83605 ASSAY OF LACTIC ACID: CPT

## 2025-01-29 PROCEDURE — 99285 EMERGENCY DEPT VISIT HI MDM: CPT

## 2025-01-29 PROCEDURE — 83735 ASSAY OF MAGNESIUM: CPT

## 2025-01-29 PROCEDURE — 85027 COMPLETE CBC AUTOMATED: CPT

## 2025-01-29 PROCEDURE — 700111 HCHG RX REV CODE 636 W/ 250 OVERRIDE (IP): Mod: UD | Performed by: EMERGENCY MEDICINE

## 2025-01-29 PROCEDURE — 82077 ASSAY SPEC XCP UR&BREATH IA: CPT

## 2025-01-29 PROCEDURE — 80053 COMPREHEN METABOLIC PANEL: CPT

## 2025-01-29 PROCEDURE — 700111 HCHG RX REV CODE 636 W/ 250 OVERRIDE (IP): Mod: JZ | Performed by: INTERNAL MEDICINE

## 2025-01-29 PROCEDURE — 700102 HCHG RX REV CODE 250 W/ 637 OVERRIDE(OP): Performed by: INTERNAL MEDICINE

## 2025-01-29 PROCEDURE — 80164 ASSAY DIPROPYLACETIC ACD TOT: CPT

## 2025-01-29 PROCEDURE — 84100 ASSAY OF PHOSPHORUS: CPT

## 2025-01-29 PROCEDURE — 36415 COLL VENOUS BLD VENIPUNCTURE: CPT

## 2025-01-29 PROCEDURE — A9270 NON-COVERED ITEM OR SERVICE: HCPCS | Performed by: INTERNAL MEDICINE

## 2025-01-29 PROCEDURE — 87147 CULTURE TYPE IMMUNOLOGIC: CPT

## 2025-01-29 PROCEDURE — 87077 CULTURE AEROBIC IDENTIFY: CPT

## 2025-01-29 PROCEDURE — 93005 ELECTROCARDIOGRAM TRACING: CPT | Mod: TC | Performed by: EMERGENCY MEDICINE

## 2025-01-29 PROCEDURE — 700105 HCHG RX REV CODE 258: Mod: UD | Performed by: EMERGENCY MEDICINE

## 2025-01-29 PROCEDURE — 700105 HCHG RX REV CODE 258: Performed by: INTERNAL MEDICINE

## 2025-01-29 PROCEDURE — 82962 GLUCOSE BLOOD TEST: CPT

## 2025-01-29 PROCEDURE — 87186 SC STD MICRODIL/AGAR DIL: CPT

## 2025-01-29 PROCEDURE — 770020 HCHG ROOM/CARE - TELE (206)

## 2025-01-29 RX ORDER — CLOTRIMAZOLE 1 %
1 CREAM (GRAM) TOPICAL 2 TIMES DAILY
COMMUNITY

## 2025-01-29 RX ORDER — DEXAMETHASONE SODIUM PHOSPHATE 4 MG/ML
12 INJECTION, SOLUTION INTRA-ARTICULAR; INTRALESIONAL; INTRAMUSCULAR; INTRAVENOUS; SOFT TISSUE ONCE
Status: COMPLETED | OUTPATIENT
Start: 2025-01-29 | End: 2025-01-29

## 2025-01-29 RX ORDER — ACETAMINOPHEN 325 MG/1
650 TABLET ORAL EVERY 6 HOURS PRN
Status: DISCONTINUED | OUTPATIENT
Start: 2025-01-29 | End: 2025-01-30

## 2025-01-29 RX ORDER — DIVALPROEX SODIUM 500 MG/1
500 TABLET, DELAYED RELEASE ORAL 2 TIMES DAILY
COMMUNITY
End: 2025-02-26

## 2025-01-29 RX ORDER — SODIUM CHLORIDE 9 MG/ML
2000 INJECTION, SOLUTION INTRAVENOUS ONCE
Status: COMPLETED | OUTPATIENT
Start: 2025-01-29 | End: 2025-01-29

## 2025-01-29 RX ORDER — ENOXAPARIN SODIUM 100 MG/ML
40 INJECTION SUBCUTANEOUS DAILY
Status: DISCONTINUED | OUTPATIENT
Start: 2025-01-29 | End: 2025-02-01 | Stop reason: HOSPADM

## 2025-01-29 RX ORDER — SODIUM CHLORIDE, SODIUM LACTATE, POTASSIUM CHLORIDE, CALCIUM CHLORIDE 600; 310; 30; 20 MG/100ML; MG/100ML; MG/100ML; MG/100ML
INJECTION, SOLUTION INTRAVENOUS CONTINUOUS
Status: DISCONTINUED | OUTPATIENT
Start: 2025-01-29 | End: 2025-01-31

## 2025-01-29 RX ORDER — LORAZEPAM 1 MG/1
1 TABLET ORAL EVERY 8 HOURS PRN
Status: ON HOLD | COMMUNITY
End: 2025-02-01

## 2025-01-29 RX ORDER — DEXTROAMPHETAMINE SACCHARATE, AMPHETAMINE ASPARTATE, DEXTROAMPHETAMINE SULFATE AND AMPHETAMINE SULFATE 5; 5; 5; 5 MG/1; MG/1; MG/1; MG/1
20 TABLET ORAL 2 TIMES DAILY
COMMUNITY

## 2025-01-29 RX ORDER — HYDROCORTISONE SODIUM SUCCINATE 100 MG/2ML
100 INJECTION INTRAMUSCULAR; INTRAVENOUS EVERY 8 HOURS
Status: DISCONTINUED | OUTPATIENT
Start: 2025-01-29 | End: 2025-01-30

## 2025-01-29 RX ORDER — MIDODRINE HYDROCHLORIDE 5 MG/1
5 TABLET ORAL
Status: DISCONTINUED | OUTPATIENT
Start: 2025-01-29 | End: 2025-01-30

## 2025-01-29 RX ORDER — LUMATEPERONE 42 MG/1
42 CAPSULE ORAL DAILY
COMMUNITY

## 2025-01-29 RX ADMIN — SODIUM CHLORIDE, POTASSIUM CHLORIDE, SODIUM LACTATE AND CALCIUM CHLORIDE: 600; 310; 30; 20 INJECTION, SOLUTION INTRAVENOUS at 16:12

## 2025-01-29 RX ADMIN — HYDROCORTISONE SODIUM SUCCINATE 100 MG: 100 INJECTION, POWDER, FOR SOLUTION INTRAMUSCULAR; INTRAVENOUS at 21:13

## 2025-01-29 RX ADMIN — SODIUM CHLORIDE 2000 ML: 9 INJECTION, SOLUTION INTRAVENOUS at 12:07

## 2025-01-29 RX ADMIN — HYDROCORTISONE SODIUM SUCCINATE 100 MG: 100 INJECTION, POWDER, FOR SOLUTION INTRAMUSCULAR; INTRAVENOUS at 16:10

## 2025-01-29 RX ADMIN — ENOXAPARIN SODIUM 40 MG: 100 INJECTION SUBCUTANEOUS at 17:51

## 2025-01-29 RX ADMIN — DEXAMETHASONE SODIUM PHOSPHATE 12 MG: 4 INJECTION INTRA-ARTICULAR; INTRALESIONAL; INTRAMUSCULAR; INTRAVENOUS; SOFT TISSUE at 13:37

## 2025-01-29 RX ADMIN — MIDODRINE HYDROCHLORIDE 5 MG: 5 TABLET ORAL at 18:50

## 2025-01-29 ASSESSMENT — FIBROSIS 4 INDEX: FIB4 SCORE: 3.02

## 2025-01-29 ASSESSMENT — PAIN DESCRIPTION - PAIN TYPE: TYPE: ACUTE PAIN

## 2025-01-29 ASSESSMENT — PATIENT HEALTH QUESTIONNAIRE - PHQ9
SUM OF ALL RESPONSES TO PHQ9 QUESTIONS 1 AND 2: 0
1. LITTLE INTEREST OR PLEASURE IN DOING THINGS: NOT AT ALL

## 2025-01-29 NOTE — ASSESSMENT & PLAN NOTE
Patient presented with symptomatic hypotension and he was started on IV fluid patient  TSH normal  UDS pos for benzo  Cortisol 9.1, started on hydrocortisone.  Blood pressure improving after IV fluid.  Change midodrine to as needed  Echo normal LVEF. RVSP 44mmHg  Blood culture  Telemetry  PT/OT

## 2025-01-29 NOTE — ED NOTES
Pt trf from blue 17to yellow 56, received report from Nanda jansen  Assisted pt to restrhollandm , 1person asst,unsteady gait.   Had normal bm

## 2025-01-29 NOTE — ED NOTES
Med rec partially complete per Depakote prescription bottle at bedside.   [facility] closed for lunch [phone].  Pt unable to participate in interview.  Pt recently discharged from [facility] 1/27/25.

## 2025-01-29 NOTE — H&P
Hospital Medicine History & Physical Note    Date of Service  1/29/2025    Primary Care Physician  Pcp Pt States None    Consultants  None    Specialist Names: N/A    Code Status  Full Code    Chief Complaint  Chief Complaint   Patient presents with    Back Pain    Dizziness     Hypotensive upon arrival 86/56, AOx4       History of Presenting Illness    Brian Durham is a 64 y.o. male with past history of schizophrenia, homelessness and recently discharged from the hospital on January 27, 2025 presented to the hospital on 1/29/2025 with dizziness and he found to have significant hypotension.  On my evaluation patient was very somnolent and he is following commands but I was unable to obtain information from him.    HPI is limited as patient is a poor historian    ER course: Patient found to have significant hypotension with systolic blood pressure of low as 87 mmHg.    I ordered CBC, CMP magnesium and phosphorus this patient.    I discussed plan of care with ER physician Dr. Monae.      I discussed the plan of care with patient.    Review of Systems  Review of Systems   Unable to perform ROS: Medical condition       Past Medical History   has a past medical history of ADHD, Arthritis, Cold (01/01/2012), Degenerative disc disease, Emphysema of lung (HCC) (1/24/2025), Encephalopathy acute (1/25/2025), Hepatitis B, Pain (12/30/2011), Psychiatric disorder, and Schizophrenia ([facility]).    Surgical History   has a past surgical history that includes other and cervical disk and fusion anterior (1/16/2012).     Family History  family history is not on file.   Family history reviewed with patient. There is no family history that is pertinent to the chief complaint.     Social History   reports that he has been smoking cigarettes. He has never used smokeless tobacco. He reports that he does not currently use alcohol. He reports that he does not use drugs.    Allergies  Allergies   Allergen Reactions    Paroxetine  "Swelling     Throat     Shellfish Allergy Rash     \"My heart - very painful.\"       Medications  Prior to Admission Medications   Prescriptions Last Dose Informant Patient Reported? Taking?   LORazepam (ATIVAN) 1 MG Tab Unknown Patient's Home Pharmacy Yes No   Sig: Take 1 mg by mouth every 8 hours as needed for Anxiety.   Lumateperone Tosylate (CAPLYTA) 42 MG Cap Unknown Patient's Home Pharmacy Yes No   Sig: Take 42 mg by mouth every day.   amphetamine-dextroamphetamine (ADDERALL) 20 MG Tab Unknown Patient's Home Pharmacy Yes No   Sig: Take 20 mg by mouth 2 times a day.   clotrimazole (LOTRIMIN) 1 % Cream Unknown Patient's Home Pharmacy Yes No   Sig: Apply 1 Application topically 2 times a day.   divalproex (DEPAKOTE) 500 MG Tablet Delayed Response Unknown Patient's Home Pharmacy Yes No   Sig: Take 500 mg by mouth 2 times a day.      Facility-Administered Medications: None       Physical Exam  Temp:  [36.1 °C (97 °F)] 36.1 °C (97 °F)  Pulse:  [76-89] 78  Resp:  [15] 15  BP: (86-97)/(51-65) 95/55  SpO2:  [91 %-96 %] 96 %  Blood Pressure: 95/55   Temperature: 36.1 °C (97 °F)   Pulse: 78   Respiration: 15   Pulse Oximetry: 96 %       Physical Exam  Vitals reviewed.   Constitutional:       General: He is not in acute distress.     Appearance: He is ill-appearing.   HENT:      Head: Normocephalic and atraumatic. No contusion.      Right Ear: External ear normal.      Left Ear: External ear normal.      Nose: Nose normal.      Mouth/Throat:      Mouth: Mucous membranes are dry.      Pharynx: No oropharyngeal exudate.   Eyes:      General:         Right eye: No discharge.         Left eye: No discharge.      Pupils: Pupils are equal, round, and reactive to light.   Cardiovascular:      Rate and Rhythm: Normal rate and regular rhythm.      Heart sounds: No murmur heard.     No friction rub. No gallop.   Pulmonary:      Effort: Pulmonary effort is normal.      Breath sounds: No wheezing or rhonchi.   Abdominal:      General: " "Bowel sounds are normal. There is no distension.      Palpations: Abdomen is soft.      Tenderness: There is no abdominal tenderness. There is no rebound.   Musculoskeletal:         General: No swelling or tenderness. Normal range of motion.      Cervical back: No rigidity. No muscular tenderness.   Skin:     General: Skin is warm and dry.      Coloration: Skin is not jaundiced.   Neurological:      General: No focal deficit present.      Mental Status: He is alert.      Cranial Nerves: No cranial nerve deficit.      Sensory: No sensory deficit.      Comments: Following commands and moving all his extremities   Psychiatric:         Mood and Affect: Mood normal.         Laboratory:  Recent Labs     01/27/25  0415   WBC 9.2   RBC 3.88*   HEMOGLOBIN 12.3*   HEMATOCRIT 36.6*   MCV 94.3   MCH 31.7   MCHC 33.6   RDW 43.7   PLATELETCT 216   MPV 8.5*     Recent Labs     01/27/25  0415   SODIUM 137   POTASSIUM 3.6   CHLORIDE 104   CO2 20   GLUCOSE 134*   BUN 10   CREATININE 0.82   CALCIUM 8.5     Recent Labs     01/27/25  0415   ALTSGPT 48   ASTSGOT 73*   ALKPHOSPHAT 56   TBILIRUBIN 0.6   GLUCOSE 134*         No results for input(s): \"NTPROBNP\" in the last 72 hours.      Recent Labs     01/29/25  1147   TROPONINT 16       Imaging:  EC-ECHOCARDIOGRAM COMPLETE W/O CONT    (Results Pending)           Assessment/Plan:  Justification for Admission Status  I anticipate this patient will require at least two midnights for appropriate medical management, necessitating inpatient admission because due to significant hypotension and he is symptomatic.  He also recently discharged from the hospital 2 days ago that will require further evaluation and management.    Patient will need a Telemetry bed on MEDICAL service .  The need is secondary to symptomatic hypotension.    * Hypotension- (present on admission)  Assessment & Plan  Patient presented with symptomatic hypotension and he was started on IV fluid patient  I ordered " echocardiogram  Admit to telemetry  He denies any drug use.  I ordered urine drug screen.  Found to have low normal cortisol level.  Started him on hydrocortisone and midodrine.      Homelessness  Assessment & Plan  As per daughter patient has homelessness.  Social service consultation.      I reviewed last discharge summary from January 27, 2025 patient was admitted for hypothermia.  Dr. Phelps.      I discussed plan of care with ER physician Dr. Monae.     VTE prophylaxis: enoxaparin ppx

## 2025-01-29 NOTE — ED TRIAGE NOTES
BIB [facility] to rm 17  Chief Complaint   Patient presents with    Back Pain    Dizziness     Hypotensive upon arrival 86/56, AOx4     Pt was at a gas station and had staff call 911 because he said she could not stand. Per EMS pt was able to stand, but had some back pain, also found to be hypotensive, 80s/50s.

## 2025-01-29 NOTE — ED NOTES
Med rec complete per Dominga.   Per Dominga, Pt last picked up [facility] September 2024 x 30 days.   [facility] has not filled anything for PT since 2023.

## 2025-01-29 NOTE — ED PROVIDER NOTES
ER Provider Note    Scribed for Jack Monae M.D. by Chantal Dorsey. 1/29/2025   11:53 AM    Primary Care Provider: Pcp Pt States None    CHIEF COMPLAINT  Chief Complaint   Patient presents with    Back Pain    Dizziness     Hypotensive upon arrival 86/56, AOx4     EXTERNAL RECORDS REVIEWED  Inpatient Notes Patient was admitted 1/24/25 for hypothermia due to cold environment.    HPI/ROS  LIMITATION TO HISTORY   Select: : None  OUTSIDE HISTORIAN(S):  RN provides the patient's history as seen below.    Brian Durham is a 64 y.o. male who presents to the ED via EMS for evaluation of back pain and dizziness onset prior to arrival. Per RN, patient was noted to be at a gas station, unable to stand. Once EMS got there, the patient was able to stand. He was noted to be hypotensive at 86/56. Patient was noted to deny any drug or alcohol use. No alleviating or exacerbating factors were noted. The patient has an allergy to Paroxetine.     PAST MEDICAL HISTORY  Past Medical History:   Diagnosis Date    ADHD     Arthritis     Cold 01/01/2012    2 weeks ago    Degenerative disc disease     Emphysema of lung (HCC) 1/24/2025    Encephalopathy acute 1/25/2025    Hepatitis B     Pain 12/30/2011    neck, 6/10    Psychiatric disorder     bipolar, ADHD    Schizophrenia (HCC)      SURGICAL HISTORY  Past Surgical History:   Procedure Laterality Date    CERVICAL DISK AND FUSION ANTERIOR  1/16/2012    Performed by REMY FRANKS at SURGERY [facility] ORS    OTHER      benign tumor removed from chest     FAMILY HISTORY  No family history noted.     SOCIAL HISTORY   reports that he has been smoking cigarettes. He has never used smokeless tobacco. He reports that he does not currently use alcohol. He reports that he does not use drugs.    CURRENT MEDICATIONS  Previous Medications    TRIHEXYPHENIDYL (ARTANE) 5 MG TAB    Take 5 mg by mouth 2 times a day.     ALLERGIES  Allergies   Allergen Reactions    Paroxetine Swelling      "Throat     Shellfish Allergy Rash     \"My heart - very painful.\"      PHYSICAL EXAM  BP (!) 86/56   Pulse 78   Temp 36.1 °C (97 °F) (Temporal)   Resp 15   Wt 84.4 kg (186 lb 1.1 oz)   SpO2 96%   BMI 26.70 kg/m²      Nursing note and vitals reviewed.  Constitutional: Somnolent, Arouses to voice, Pleasant, Interactive, Well-developed and well-nourished. No distress.   HENT: Head is normocephalic and atraumatic. Somewhat dry mucous membranes, Oropharynx is clear without exudate or erythema.   Eyes: Pupils are equal, round, and reactive to light. Conjunctiva are normal.   Cardiovascular: Normal rate and regular rhythm. No murmur heard. Normal radial pulses.  Pulmonary/Chest: Breath sounds normal. No wheezes or rales.   Abdominal: Soft and non-tender. No distention    Musculoskeletal: Extremities exhibit normal range of motion without edema or tenderness.   Neurological: Somnolent, Arouses to voice, Pleasant, Interactive, alert and oriented to person, place, and time. No focal deficits noted.  Skin: Skin is warm and dry. No rash.   Psychiatric: Normal mood and affect. Appropriate for clinical situation    DIAGNOSTIC STUDIES    Labs:   Results for orders placed or performed during the hospital encounter of 01/29/25   VALPROIC ACID    Collection Time: 01/29/25 11:47 AM   Result Value Ref Range    Valproic Acid 95.1 50.0 - 100.0 ug/mL   TROPONIN    Collection Time: 01/29/25 11:47 AM   Result Value Ref Range    Troponin T 16 6 - 19 ng/L   DIAGNOSTIC ALCOHOL    Collection Time: 01/29/25 11:47 AM   Result Value Ref Range    Diagnostic Alcohol <10.1 <10.1 mg/dL   POCT glucose device results    Collection Time: 01/29/25 11:51 AM   Result Value Ref Range    POC Glucose, Blood 117 (H) 65 - 99 mg/dL   LACTIC ACID    Collection Time: 01/29/25 12:04 PM   Result Value Ref Range    Lactic Acid 1.8 0.5 - 2.0 mmol/L   EKG (NOW)    Collection Time: 01/29/25  1:26 PM   Result Value Ref Range    Report       [facility] " [place] [facility].    Test Date:  2025  Pt Name:    SADI GUERRERO                Department: ER  MRN:        [id number]                      Room:       BL 17  Gender:     Male                         Technician: [id number]  :        1960                   Requested By:MILVIA GUERRERO  Order #:    [id number]                    Reading MD: MILVIA GUERRERO MD    Measurements  Intervals                                Axis  Rate:       77                           P:          76  VA:         148                          QRS:        75  QRSD:       92                           T:          71  QT:         410  QTc:        465    Interpretive Statements  Sinus rhythm  Nonspecific T abnormalities, lateral leads  Compared to ECG 2025 09:57:01  T-wave abnormality now present  Atrial fibrillation no longer present  Ventricular premature complex(es) no longer present  Early repolarization no longer present  Possible ischemia no longer present  Electronicall y Signed On 2025 13:26:05 PST by MILVIA GUERRERO MD       EKG:   I have independently interpreted this EKG as detailed above.       INITIAL ASSESSMENT AND PLAN    11:53 AM - Patient was evaluated at bedside. Patient has no complaints. He was hypotensive upon arrival. Ordered for UA, Valproic acid, Troponin, Diagnostic alcohol, Lactic acid, EKG to evaluate. Patient verbalizes understanding and support with my plan of care.  Differential diagnoses include but not limited to: Arrhythmia, Dehydration, Electrolyte abnormality, Anemia.     HYDRATION: Based on the patient's presentation of Dehydration the patient was given IV fluids. IV Hydration was used because oral hydration was not adequate alone. Upon recheck following hydration, the patient was improved.        ED Observation Status? No; Patient does not meet criteria for ED Observation.     Laboratory studies are remarkable for normal hemoglobin.  No significant electrolyte abnormality.  Lactic acid  is normal.  Patient has been treated with 2 L of IV fluids in the emergency department.  Remains hypotensive.  I see no evidence of infection.  No evidence of trauma.  No chest pain.  No abdominal pain.  Etiology of hypotension is unclear.  Possible adrenal insufficiency.  Will be treated with Decadron.  Will send cortisol.  Patient will be admitted to the hospital for further evaluation.  I spoke with Dr. Clark for admission.  May benefit from echocardiogram.    DISPOSITION:  Patient will be hospitalized by Dr. Triplett in guarded condition.    FINAL DIAGNOSIS  1. Dizziness    2. Hypotension, unspecified hypotension type         I, Chantal Dorsey (Tayla), am scribing for, and in the presence of, Jack Monae M.D..    Electronically signed by: Chantal Dorsey (Tayla), 1/29/2025    IJack M.D. personally performed the services described in this documentation, as scribed by Chantal Dorsey in my presence, and it is both accurate and complete.      The note accurately reflects work and decisions made by me.  Jack Monae M.D.  1/29/2025  1:45 PM

## 2025-01-30 ENCOUNTER — APPOINTMENT (OUTPATIENT)
Dept: CARDIOLOGY | Facility: MEDICAL CENTER | Age: 65
End: 2025-01-30
Attending: STUDENT IN AN ORGANIZED HEALTH CARE EDUCATION/TRAINING PROGRAM
Payer: MEDICARE

## 2025-01-30 PROBLEM — E83.39 HYPOPHOSPHATEMIA: Status: ACTIVE | Noted: 2025-01-30

## 2025-01-30 LAB
ALBUMIN SERPL BCP-MCNC: 3.4 G/DL (ref 3.2–4.9)
ALBUMIN/GLOB SERPL: 1.1 G/DL
ALP SERPL-CCNC: 59 U/L (ref 30–99)
ALT SERPL-CCNC: 29 U/L (ref 2–50)
ANION GAP SERPL CALC-SCNC: 12 MMOL/L (ref 7–16)
AST SERPL-CCNC: 28 U/L (ref 12–45)
BACTERIA BLD CULT: NORMAL
BACTERIA BLD CULT: NORMAL
BASOPHILS # BLD AUTO: 0.1 % (ref 0–1.8)
BASOPHILS # BLD: 0.01 K/UL (ref 0–0.12)
BILIRUB SERPL-MCNC: 0.3 MG/DL (ref 0.1–1.5)
BUN SERPL-MCNC: 17 MG/DL (ref 8–22)
CALCIUM ALBUM COR SERPL-MCNC: 9.3 MG/DL (ref 8.5–10.5)
CALCIUM SERPL-MCNC: 8.8 MG/DL (ref 8.5–10.5)
CHLORIDE SERPL-SCNC: 106 MMOL/L (ref 96–112)
CO2 SERPL-SCNC: 21 MMOL/L (ref 20–33)
CREAT SERPL-MCNC: 0.66 MG/DL (ref 0.5–1.4)
EOSINOPHIL # BLD AUTO: 0 K/UL (ref 0–0.51)
EOSINOPHIL NFR BLD: 0 % (ref 0–6.9)
ERYTHROCYTE [DISTWIDTH] IN BLOOD BY AUTOMATED COUNT: 44.7 FL (ref 35.9–50)
ERYTHROCYTE [DISTWIDTH] IN BLOOD BY AUTOMATED COUNT: 46.5 FL (ref 35.9–50)
GFR SERPLBLD CREATININE-BSD FMLA CKD-EPI: 106 ML/MIN/1.73 M 2
GLOBULIN SER CALC-MCNC: 3 G/DL (ref 1.9–3.5)
GLUCOSE SERPL-MCNC: 123 MG/DL (ref 65–99)
HCT VFR BLD AUTO: 30.7 % (ref 42–52)
HCT VFR BLD AUTO: 34.7 % (ref 42–52)
HGB BLD-MCNC: 10.5 G/DL (ref 14–18)
HGB BLD-MCNC: 11.6 G/DL (ref 14–18)
IMM GRANULOCYTES # BLD AUTO: 0.05 K/UL (ref 0–0.11)
IMM GRANULOCYTES NFR BLD AUTO: 0.5 % (ref 0–0.9)
LV EJECT FRACT MOD 2C 99903: 66.86
LV EJECT FRACT MOD 4C 99902: 72.17
LV EJECT FRACT MOD BP 99901: 69.85
LYMPHOCYTES # BLD AUTO: 0.44 K/UL (ref 1–4.8)
LYMPHOCYTES NFR BLD: 4.6 % (ref 22–41)
MAGNESIUM SERPL-MCNC: 2.1 MG/DL (ref 1.5–2.5)
MCH RBC QN AUTO: 32 PG (ref 27–33)
MCH RBC QN AUTO: 32.3 PG (ref 27–33)
MCHC RBC AUTO-ENTMCNC: 33.4 G/DL (ref 32.3–36.5)
MCHC RBC AUTO-ENTMCNC: 34.2 G/DL (ref 32.3–36.5)
MCV RBC AUTO: 94.5 FL (ref 81.4–97.8)
MCV RBC AUTO: 95.9 FL (ref 81.4–97.8)
MONOCYTES # BLD AUTO: 0.63 K/UL (ref 0–0.85)
MONOCYTES NFR BLD AUTO: 6.6 % (ref 0–13.4)
NEUTROPHILS # BLD AUTO: 8.48 K/UL (ref 1.82–7.42)
NEUTROPHILS NFR BLD: 88.2 % (ref 44–72)
NRBC # BLD AUTO: 0 K/UL
NRBC BLD-RTO: 0 /100 WBC (ref 0–0.2)
PLATELET # BLD AUTO: 218 K/UL (ref 164–446)
PLATELET # BLD AUTO: 225 K/UL (ref 164–446)
PMV BLD AUTO: 8.6 FL (ref 9–12.9)
PMV BLD AUTO: 8.8 FL (ref 9–12.9)
POTASSIUM SERPL-SCNC: 4.3 MMOL/L (ref 3.6–5.5)
PROT SERPL-MCNC: 6.4 G/DL (ref 6–8.2)
RBC # BLD AUTO: 3.25 M/UL (ref 4.7–6.1)
RBC # BLD AUTO: 3.62 M/UL (ref 4.7–6.1)
SIGNIFICANT IND 70042: NORMAL
SIGNIFICANT IND 70042: NORMAL
SITE SITE: NORMAL
SITE SITE: NORMAL
SODIUM SERPL-SCNC: 139 MMOL/L (ref 135–145)
SOURCE SOURCE: NORMAL
SOURCE SOURCE: NORMAL
WBC # BLD AUTO: 10 K/UL (ref 4.8–10.8)
WBC # BLD AUTO: 9.6 K/UL (ref 4.8–10.8)

## 2025-01-30 PROCEDURE — 80053 COMPREHEN METABOLIC PANEL: CPT

## 2025-01-30 PROCEDURE — 36415 COLL VENOUS BLD VENIPUNCTURE: CPT

## 2025-01-30 PROCEDURE — 85025 COMPLETE CBC W/AUTO DIFF WBC: CPT

## 2025-01-30 PROCEDURE — 99233 SBSQ HOSP IP/OBS HIGH 50: CPT | Performed by: STUDENT IN AN ORGANIZED HEALTH CARE EDUCATION/TRAINING PROGRAM

## 2025-01-30 PROCEDURE — 93306 TTE W/DOPPLER COMPLETE: CPT

## 2025-01-30 PROCEDURE — 700102 HCHG RX REV CODE 250 W/ 637 OVERRIDE(OP): Performed by: STUDENT IN AN ORGANIZED HEALTH CARE EDUCATION/TRAINING PROGRAM

## 2025-01-30 PROCEDURE — 87040 BLOOD CULTURE FOR BACTERIA: CPT | Mod: 91

## 2025-01-30 PROCEDURE — 85027 COMPLETE CBC AUTOMATED: CPT

## 2025-01-30 PROCEDURE — 770020 HCHG ROOM/CARE - TELE (206)

## 2025-01-30 PROCEDURE — 51798 US URINE CAPACITY MEASURE: CPT

## 2025-01-30 PROCEDURE — 700111 HCHG RX REV CODE 636 W/ 250 OVERRIDE (IP): Mod: JZ | Performed by: INTERNAL MEDICINE

## 2025-01-30 PROCEDURE — 700111 HCHG RX REV CODE 636 W/ 250 OVERRIDE (IP): Mod: JZ | Performed by: STUDENT IN AN ORGANIZED HEALTH CARE EDUCATION/TRAINING PROGRAM

## 2025-01-30 PROCEDURE — 700105 HCHG RX REV CODE 258: Performed by: INTERNAL MEDICINE

## 2025-01-30 PROCEDURE — A9270 NON-COVERED ITEM OR SERVICE: HCPCS | Performed by: STUDENT IN AN ORGANIZED HEALTH CARE EDUCATION/TRAINING PROGRAM

## 2025-01-30 PROCEDURE — 83735 ASSAY OF MAGNESIUM: CPT

## 2025-01-30 PROCEDURE — 93306 TTE W/DOPPLER COMPLETE: CPT | Mod: 26 | Performed by: INTERNAL MEDICINE

## 2025-01-30 RX ORDER — BUTALBITAL, ACETAMINOPHEN AND CAFFEINE 50; 325; 40 MG/1; MG/1; MG/1
1 TABLET ORAL EVERY 6 HOURS PRN
Status: DISCONTINUED | OUTPATIENT
Start: 2025-01-30 | End: 2025-02-01 | Stop reason: HOSPADM

## 2025-01-30 RX ORDER — MIDODRINE HYDROCHLORIDE 5 MG/1
5 TABLET ORAL 3 TIMES DAILY PRN
Status: DISCONTINUED | OUTPATIENT
Start: 2025-01-30 | End: 2025-02-01 | Stop reason: HOSPADM

## 2025-01-30 RX ORDER — HYDROCORTISONE SODIUM SUCCINATE 100 MG/2ML
100 INJECTION INTRAMUSCULAR; INTRAVENOUS EVERY 12 HOURS
Status: DISCONTINUED | OUTPATIENT
Start: 2025-01-30 | End: 2025-01-31

## 2025-01-30 RX ORDER — DIVALPROEX SODIUM 500 MG/1
500 TABLET, DELAYED RELEASE ORAL 2 TIMES DAILY
Status: DISCONTINUED | OUTPATIENT
Start: 2025-01-30 | End: 2025-02-01 | Stop reason: HOSPADM

## 2025-01-30 RX ORDER — HYDROXYZINE HYDROCHLORIDE 25 MG/1
25 TABLET, FILM COATED ORAL 3 TIMES DAILY PRN
Status: DISCONTINUED | OUTPATIENT
Start: 2025-01-30 | End: 2025-02-01 | Stop reason: HOSPADM

## 2025-01-30 RX ORDER — ACETAMINOPHEN 325 MG/1
650 TABLET ORAL EVERY 4 HOURS PRN
Status: DISCONTINUED | OUTPATIENT
Start: 2025-01-30 | End: 2025-02-01 | Stop reason: HOSPADM

## 2025-01-30 RX ADMIN — DIVALPROEX SODIUM 500 MG: 500 TABLET, DELAYED RELEASE ORAL at 17:08

## 2025-01-30 RX ADMIN — HYDROCORTISONE SODIUM SUCCINATE 100 MG: 100 INJECTION, POWDER, FOR SOLUTION INTRAMUSCULAR; INTRAVENOUS at 17:06

## 2025-01-30 RX ADMIN — HYDROXYZINE HYDROCHLORIDE 25 MG: 25 TABLET, FILM COATED ORAL at 20:14

## 2025-01-30 RX ADMIN — HYDROCORTISONE SODIUM SUCCINATE 100 MG: 100 INJECTION, POWDER, FOR SOLUTION INTRAMUSCULAR; INTRAVENOUS at 05:13

## 2025-01-30 RX ADMIN — ENOXAPARIN SODIUM 40 MG: 100 INJECTION SUBCUTANEOUS at 17:06

## 2025-01-30 RX ADMIN — HYDROXYZINE HYDROCHLORIDE 25 MG: 25 TABLET, FILM COATED ORAL at 11:10

## 2025-01-30 RX ADMIN — Medication 5 MG: at 20:14

## 2025-01-30 RX ADMIN — DIBASIC SODIUM PHOSPHATE, MONOBASIC POTASSIUM PHOSPHATE AND MONOBASIC SODIUM PHOSPHATE 500 MG: 852; 155; 130 TABLET ORAL at 17:06

## 2025-01-30 RX ADMIN — SODIUM CHLORIDE, POTASSIUM CHLORIDE, SODIUM LACTATE AND CALCIUM CHLORIDE: 600; 310; 30; 20 INJECTION, SOLUTION INTRAVENOUS at 05:17

## 2025-01-30 RX ADMIN — ACETAMINOPHEN 650 MG: 325 TABLET ORAL at 17:06

## 2025-01-30 SDOH — ECONOMIC STABILITY: TRANSPORTATION INSECURITY
IN THE PAST 12 MONTHS, HAS LACK OF RELIABLE TRANSPORTATION KEPT YOU FROM MEDICAL APPOINTMENTS, MEETINGS, WORK OR FROM GETTING THINGS NEEDED FOR DAILY LIVING?: YES

## 2025-01-30 SDOH — ECONOMIC STABILITY: TRANSPORTATION INSECURITY
IN THE PAST 12 MONTHS, HAS THE LACK OF TRANSPORTATION KEPT YOU FROM MEDICAL APPOINTMENTS OR FROM GETTING MEDICATIONS?: YES

## 2025-01-30 ASSESSMENT — PATIENT HEALTH QUESTIONNAIRE - PHQ9
SUM OF ALL RESPONSES TO PHQ9 QUESTIONS 1 AND 2: 0
2. FEELING DOWN, DEPRESSED, IRRITABLE, OR HOPELESS: NOT AT ALL
1. LITTLE INTEREST OR PLEASURE IN DOING THINGS: NOT AT ALL

## 2025-01-30 ASSESSMENT — COGNITIVE AND FUNCTIONAL STATUS - GENERAL
MOVING FROM LYING ON BACK TO SITTING ON SIDE OF FLAT BED: A LOT
MOBILITY SCORE: 14
TURNING FROM BACK TO SIDE WHILE IN FLAT BAD: A LITTLE
SUGGESTED CMS G CODE MODIFIER DAILY ACTIVITY: CK
DAILY ACTIVITIY SCORE: 18
DRESSING REGULAR UPPER BODY CLOTHING: A LITTLE
HELP NEEDED FOR BATHING: A LITTLE
PERSONAL GROOMING: A LITTLE
TOILETING: A LITTLE
EATING MEALS: A LITTLE
STANDING UP FROM CHAIR USING ARMS: A LITTLE
SUGGESTED CMS G CODE MODIFIER MOBILITY: CL
CLIMB 3 TO 5 STEPS WITH RAILING: A LOT
WALKING IN HOSPITAL ROOM: A LOT
MOVING TO AND FROM BED TO CHAIR: A LOT
DRESSING REGULAR LOWER BODY CLOTHING: A LITTLE

## 2025-01-30 ASSESSMENT — SOCIAL DETERMINANTS OF HEALTH (SDOH)
WITHIN THE PAST 12 MONTHS, THE FOOD YOU BOUGHT JUST DIDN'T LAST AND YOU DIDN'T HAVE MONEY TO GET MORE: PATIENT DECLINED
WITHIN THE LAST YEAR, HAVE YOU BEEN HUMILIATED OR EMOTIONALLY ABUSED IN OTHER WAYS BY YOUR PARTNER OR EX-PARTNER?: NO
IN THE PAST 12 MONTHS, HAS THE ELECTRIC, GAS, OIL, OR WATER COMPANY THREATENED TO SHUT OFF SERVICE IN YOUR HOME?: PATIENT DECLINED
WITHIN THE LAST YEAR, HAVE TO BEEN RAPED OR FORCED TO HAVE ANY KIND OF SEXUAL ACTIVITY BY YOUR PARTNER OR EX-PARTNER?: NO
WITHIN THE LAST YEAR, HAVE YOU BEEN AFRAID OF YOUR PARTNER OR EX-PARTNER?: NO
WITHIN THE PAST 12 MONTHS, YOU WORRIED THAT YOUR FOOD WOULD RUN OUT BEFORE YOU GOT THE MONEY TO BUY MORE: PATIENT DECLINED
WITHIN THE LAST YEAR, HAVE YOU BEEN KICKED, HIT, SLAPPED, OR OTHERWISE PHYSICALLY HURT BY YOUR PARTNER OR EX-PARTNER?: NO

## 2025-01-30 ASSESSMENT — PAIN DESCRIPTION - PAIN TYPE
TYPE: ACUTE PAIN

## 2025-01-30 ASSESSMENT — FIBROSIS 4 INDEX: FIB4 SCORE: 1.48

## 2025-01-30 NOTE — WOUND TEAM
Wound consult placed on on 1/29/25 regarding pts kahlil feet. Chart and images reviewed. Pt was evaluated on 1/26/25 by Jase and orders were placed. Pt since transferred units and therefore new consult was placed. Images reviewed and wounds have not worsened. Wound consult completed.

## 2025-01-30 NOTE — CARE PLAN
The patient is Stable - Low risk of patient condition declining or worsening    Shift Goals  Clinical Goals: Monitor BP, hemodynamic stability  Patient Goals: Rest, eat    Progress made toward(s) clinical / shift goals:    Problem: Knowledge Deficit - Standard  Goal: Patient and family/care givers will demonstrate understanding of plan of care, disease process/condition, diagnostic tests and medications  Outcome: Progressing  Note: Pt updated on plan of care.      Problem: Fall Risk  Goal: Patient will remain free from falls  Outcome: Progressing  Note: Pt bed alarm connected and working appropriately.        Patient is not progressing towards the following goals:

## 2025-01-30 NOTE — CARE PLAN
The patient is Stable - Low risk of patient condition declining or worsening         Progress made toward(s) clinical / shift goals:    Problem: Knowledge Deficit - Standard  Goal: Patient and family/care givers will demonstrate understanding of plan of care, disease process/condition, diagnostic tests and medications  Description: Target End Date:  1-3 days or as soon as patient condition allows    Document in Patient Education    1.  Patient and family/caregiver oriented to unit, equipment, visitation policy and means for communicating concern  2.  Complete/review Learning Assessment  3.  Assess knowledge level of disease process/condition, treatment plan, diagnostic tests and medications  4.  Explain disease process/condition, treatment plan, diagnostic tests and medications  Outcome: Progressing     Problem: Fall Risk  Goal: Patient will remain free from falls  Description: Target End Date:  Prior to discharge or change in level of care    Document interventions on the [facility] [place] Fall Risk Assessment    1.  Assess for fall risk factors  2.  Implement fall precautions  Outcome: Progressing       Patient is not progressing towards the following goals:

## 2025-01-30 NOTE — PROGRESS NOTES
Bedside report received from off going RN/tech: Fina, assumed care of patient.     Fall Risk Score:    Fall risk interventions in place: Place yellow fall risk ID band on patient, Provide patient/family education based on risk assessment, Educate patient/family to call staff for assistance when getting out of bed, Place fall precaution signage outside patient door, Place patient in room close to nursing station, Notify charge of high risk for huddle, and Bed alarm connected correctly  Bed type: Regular (Joselito Score less than 17 interventions in place)  Patient on cardiac monitor: Yes  IVF/IV medications: Infusion per MAR (List Med(s)) LR @83  Oxygen: Room Air  Bedside sitter: Not Applicable   Isolation: Not applicable

## 2025-01-30 NOTE — PROGRESS NOTES
Received report from previous shift RN, assumed care of patient. Patient is A&Ox4, vital signs are stable, on room air. Patient denies pain at this time, no signs of distress or discomfort. Sitting up in bed for breakfast. Call light and belongings within reach. Bed in lowest/locked position.     Fall Risk Score: HIGH RISK  Fall risk interventions in place: Place yellow fall risk ID band on patient, Provide patient/family education based on risk assessment, Educate patient/family to call staff for assistance when getting out of bed, and Place fall precaution signage outside patient door  Bed type: Regular (Joselito Score less than 17 interventions in place)  Patient on cardiac monitor: Yes  IVF/IV medications: Infusion per MAR (List Med(s)) LR  Oxygen: Room Air  Bedside sitter: Not Applicable   Isolation: Not applicable

## 2025-01-30 NOTE — DISCHARGE PLANNING
"Community Health Worker Intake    Identified barriers to homelessness.  Resources provided to ELI Disla.  Contact information provided to Brian Durham   Inpatient assessment completed.    CHW Wendy introduced community care management to the patient.   Pt's sentences are hard to comprehend so SDoH assessment is incomplete.      Pt states that he see's a psychiatrist on [address].   CHW provided information to Arsh Disla in his AVS because pt states that it is \"too early for paperwork\".     Pt states that he takes the [employer] bus and is receiving SNAP benefits.     Pt does not have a phone. States that he stays at the [facility].     Plan: CHW will no longer be able to follow after DC as pt does not have any contact info.       "

## 2025-01-30 NOTE — PROGRESS NOTES
Received report from ED RN, pt transported to floor with ACT. Assumed care. Pt vitals taken, vitals stable. Connected to tele monitor, monitors notified. Pt currently A&Ox4 with mumbled speech. Bed alarm connected and working, bed in lowest/locked position. Call light within reach. Pt educated to call for assistance.

## 2025-01-30 NOTE — PROGRESS NOTES
4 Eyes Skin Assessment Completed by SHANTI Harden and NAHED Rodrigez.    Head WDL  Ears WDL  Nose WDL  Mouth WDL  Neck WDL  Breast/Chest WDL  Shoulder Blades WDL  Spine WDL  (R) Arm/Elbow/Hand Bruising  (L) Arm/Elbow/Hand Bruising  Abdomen WDL  Groin Redness  Scrotum/Coccyx/Buttocks Redness and Blanching  (R) Leg Bruising  (L) Leg Bruising  (R) Heel/Foot/Toe Redness, Blanching, Discoloration, Boggy, Ulcer(s), and Scab  (L) Heel/Foot/Toe Redness, Blanching, Discoloration, Boggy, Ulcer(s), Bruising, and Scab          Devices In Places Tele Box      Interventions In Place N/A    Possible Skin Injury Yes    Pictures Uploaded Into Epic Yes  Wound Consult Placed Yes  RN Wound Prevention Protocol Ordered Yes

## 2025-01-30 NOTE — DISCHARGE PLANNING
"Case Management Discharge Planning    Admission Date: 1/29/2025  GMLOS: 2.7  ALOS: 1    6-Clicks ADL Score: 18  6-Clicks Mobility Score: 14  PT and/or OT Eval ordered: Yes  Post-acute Referrals Ordered: No  Post-acute Choice Obtained: No  Has referral(s) been sent to post-acute provider:  No    Anticipated Discharge Dispo: Discharge Disposition: D/T to SNF with Medicare cert in anticipation of skilled care (03)    DME Needed: No    Action(s) Taken: DC Assessment Complete (See below) and Referral(s) sent    RNCM met with patient at bedside to complete assessment and discuss discuss discharge plan.     Pt reports he lives at the [facility]; he is ambulatory and does not use any DME at baseline.     Pt reports he has no outside support; he has a grand parent that lives in [place] but he is not allowed to contact her.     Pt reports he has a PCP \"Dr. Flor\"; unable to report what office provider works at. Preferred pharmacy is [facility]. Insurance coverage via [employer] and Medicaid FFS. Pt has income of $1000 in [employer] deposits.    Patient admits to smoking \"mentol cigarettes\"; denies using drugs or alcohol. Pt has a history of Schizoprenia.     Per policy; SNF referral sent out due to low mobility score. Patient is pending clinical stability, PT/OT eval. CM will follow and assist with DCP.     Pt uses the bus to get around town; if patient discharges home he will use the bus for transport to his location of choice.     Escalations Completed: None    Medically Clear: No    Next Steps: F/U with medical team regarding D/C needs/ barriers.     Barriers to Discharge: Medical clearance and Pending PT Evaluation    Is the patient up for discharge tomorrow: No    Care Transition Team Assessment    Information Source  Orientation Level: Oriented X4  Information Given By: Patient  Informant's Name: Brian Durham  Who is responsible for making decisions for patient? : Patient    Readmission Evaluation  Is this a " readmission?: No    Elopement Risk  Legal Hold: No  Ambulatory or Self Mobile in Wheelchair: No-Not an Elopement Risk  Elopement Risk: Not at Risk for Elopement    Interdisciplinary Discharge Planning  Patient or legal guardian wants to designate a caregiver: No    Discharge Preparedness  What is your plan after discharge?: Uncertain - pending medical team collaboration  What are your discharge supports?: Other (comment) (None)  Prior Functional Level: Ambulatory, Independent with Activities of Daily Living, Independent with Medication Management    Functional Assesment  Prior Functional Level: Ambulatory, Independent with Activities of Daily Living, Independent with Medication Management    Finances  Financial Barriers to Discharge: No  Prescription Coverage: Yes    Vision / Hearing Impairment  Vision Impairment : Yes  Right Eye Vision: Impaired  Left Eye Vision: Impaired  Hearing Impairment : No    Advance Directive  Advance Directive?: None  Advance Directive offered?: AD Booklet refused    Domestic Abuse  Possible Abuse/Neglect Reported to:: Not Applicable    Psychological Assessment  History of Substance Abuse: None  History of Psychiatric Problems: No    Discharge Risks or Barriers  Discharge risks or barriers?: Complex medical needs  Patient risk factors: Complex medical needs    Anticipated Discharge Information  Discharge Disposition: D/T to SNF with Medicare cert in anticipation of skilled care (03)

## 2025-01-31 VITALS
SYSTOLIC BLOOD PRESSURE: 148 MMHG | DIASTOLIC BLOOD PRESSURE: 83 MMHG | HEART RATE: 85 BPM | WEIGHT: 182.76 LBS | OXYGEN SATURATION: 95 % | HEIGHT: 70 IN | BODY MASS INDEX: 26.16 KG/M2 | TEMPERATURE: 96.8 F | RESPIRATION RATE: 20 BRPM

## 2025-01-31 LAB
ANION GAP SERPL CALC-SCNC: 8 MMOL/L (ref 7–16)
BACTERIA UR CULT: ABNORMAL
BACTERIA UR CULT: ABNORMAL
BUN SERPL-MCNC: 13 MG/DL (ref 8–22)
CALCIUM SERPL-MCNC: 8.6 MG/DL (ref 8.5–10.5)
CHLORIDE SERPL-SCNC: 108 MMOL/L (ref 96–112)
CO2 SERPL-SCNC: 24 MMOL/L (ref 20–33)
CREAT SERPL-MCNC: 0.62 MG/DL (ref 0.5–1.4)
GFR SERPLBLD CREATININE-BSD FMLA CKD-EPI: 108 ML/MIN/1.73 M 2
GLUCOSE SERPL-MCNC: 124 MG/DL (ref 65–99)
MAGNESIUM SERPL-MCNC: 2 MG/DL (ref 1.5–2.5)
PHOSPHATE SERPL-MCNC: 3.1 MG/DL (ref 2.5–4.5)
POTASSIUM SERPL-SCNC: 3.9 MMOL/L (ref 3.6–5.5)
SIGNIFICANT IND 70042: ABNORMAL
SITE SITE: ABNORMAL
SODIUM SERPL-SCNC: 140 MMOL/L (ref 135–145)
SOURCE SOURCE: ABNORMAL

## 2025-01-31 PROCEDURE — 700105 HCHG RX REV CODE 258: Performed by: INTERNAL MEDICINE

## 2025-01-31 PROCEDURE — 97162 PT EVAL MOD COMPLEX 30 MIN: CPT

## 2025-01-31 PROCEDURE — 700111 HCHG RX REV CODE 636 W/ 250 OVERRIDE (IP): Mod: JZ | Performed by: STUDENT IN AN ORGANIZED HEALTH CARE EDUCATION/TRAINING PROGRAM

## 2025-01-31 PROCEDURE — 700111 HCHG RX REV CODE 636 W/ 250 OVERRIDE (IP): Mod: JZ | Performed by: INTERNAL MEDICINE

## 2025-01-31 PROCEDURE — 99233 SBSQ HOSP IP/OBS HIGH 50: CPT | Performed by: STUDENT IN AN ORGANIZED HEALTH CARE EDUCATION/TRAINING PROGRAM

## 2025-01-31 PROCEDURE — 83735 ASSAY OF MAGNESIUM: CPT

## 2025-01-31 PROCEDURE — A9270 NON-COVERED ITEM OR SERVICE: HCPCS | Performed by: STUDENT IN AN ORGANIZED HEALTH CARE EDUCATION/TRAINING PROGRAM

## 2025-01-31 PROCEDURE — 97166 OT EVAL MOD COMPLEX 45 MIN: CPT

## 2025-01-31 PROCEDURE — 770020 HCHG ROOM/CARE - TELE (206)

## 2025-01-31 PROCEDURE — 84100 ASSAY OF PHOSPHORUS: CPT

## 2025-01-31 PROCEDURE — 80048 BASIC METABOLIC PNL TOTAL CA: CPT

## 2025-01-31 PROCEDURE — 700102 HCHG RX REV CODE 250 W/ 637 OVERRIDE(OP): Performed by: STUDENT IN AN ORGANIZED HEALTH CARE EDUCATION/TRAINING PROGRAM

## 2025-01-31 RX ORDER — HYDROCORTISONE SODIUM SUCCINATE 100 MG/2ML
50 INJECTION INTRAMUSCULAR; INTRAVENOUS EVERY 12 HOURS
Status: DISCONTINUED | OUTPATIENT
Start: 2025-01-31 | End: 2025-02-01

## 2025-01-31 RX ADMIN — DIBASIC SODIUM PHOSPHATE, MONOBASIC POTASSIUM PHOSPHATE AND MONOBASIC SODIUM PHOSPHATE 500 MG: 852; 155; 130 TABLET ORAL at 05:42

## 2025-01-31 RX ADMIN — HYDROXYZINE HYDROCHLORIDE 25 MG: 25 TABLET, FILM COATED ORAL at 16:37

## 2025-01-31 RX ADMIN — DIVALPROEX SODIUM 500 MG: 500 TABLET, DELAYED RELEASE ORAL at 05:42

## 2025-01-31 RX ADMIN — HYDROCORTISONE SODIUM SUCCINATE 100 MG: 100 INJECTION, POWDER, FOR SOLUTION INTRAMUSCULAR; INTRAVENOUS at 05:44

## 2025-01-31 RX ADMIN — DIBASIC SODIUM PHOSPHATE, MONOBASIC POTASSIUM PHOSPHATE AND MONOBASIC SODIUM PHOSPHATE 500 MG: 852; 155; 130 TABLET ORAL at 16:37

## 2025-01-31 RX ADMIN — Medication 5 MG: at 22:25

## 2025-01-31 RX ADMIN — HYDROCORTISONE SODIUM SUCCINATE 50 MG: 100 INJECTION, POWDER, FOR SOLUTION INTRAMUSCULAR; INTRAVENOUS at 17:06

## 2025-01-31 RX ADMIN — HYDROXYZINE HYDROCHLORIDE 25 MG: 25 TABLET, FILM COATED ORAL at 09:20

## 2025-01-31 RX ADMIN — HYDROXYZINE HYDROCHLORIDE 25 MG: 25 TABLET, FILM COATED ORAL at 22:26

## 2025-01-31 RX ADMIN — DIVALPROEX SODIUM 500 MG: 500 TABLET, DELAYED RELEASE ORAL at 16:37

## 2025-01-31 RX ADMIN — ENOXAPARIN SODIUM 40 MG: 100 INJECTION SUBCUTANEOUS at 16:37

## 2025-01-31 RX ADMIN — SODIUM CHLORIDE, POTASSIUM CHLORIDE, SODIUM LACTATE AND CALCIUM CHLORIDE: 600; 310; 30; 20 INJECTION, SOLUTION INTRAVENOUS at 05:00

## 2025-01-31 RX ADMIN — ACETAMINOPHEN 650 MG: 325 TABLET ORAL at 12:41

## 2025-01-31 ASSESSMENT — COGNITIVE AND FUNCTIONAL STATUS - GENERAL
SUGGESTED CMS G CODE MODIFIER MOBILITY: CJ
TOILETING: A LITTLE
TOILETING: A LITTLE
DRESSING REGULAR UPPER BODY CLOTHING: A LITTLE
SUGGESTED CMS G CODE MODIFIER DAILY ACTIVITY: CK
WALKING IN HOSPITAL ROOM: A LITTLE
SUGGESTED CMS G CODE MODIFIER MOBILITY: CJ
DAILY ACTIVITIY SCORE: 22
DRESSING REGULAR LOWER BODY CLOTHING: A LITTLE
WALKING IN HOSPITAL ROOM: A LITTLE
MOBILITY SCORE: 22
PERSONAL GROOMING: A LITTLE
CLIMB 3 TO 5 STEPS WITH RAILING: A LITTLE
MOVING TO AND FROM BED TO CHAIR: A LITTLE
MOBILITY SCORE: 21
SUGGESTED CMS G CODE MODIFIER DAILY ACTIVITY: CJ
HELP NEEDED FOR BATHING: A LITTLE
DAILY ACTIVITIY SCORE: 19
HELP NEEDED FOR BATHING: A LITTLE
CLIMB 3 TO 5 STEPS WITH RAILING: A LITTLE

## 2025-01-31 ASSESSMENT — LIFESTYLE VARIABLES
ON A TYPICAL DAY WHEN YOU DRINK ALCOHOL HOW MANY DRINKS DO YOU HAVE: 0
AVERAGE NUMBER OF DAYS PER WEEK YOU HAVE A DRINK CONTAINING ALCOHOL: 0
CONSUMPTION TOTAL: NEGATIVE
EVER HAD A DRINK FIRST THING IN THE MORNING TO STEADY YOUR NERVES TO GET RID OF A HANGOVER: NO
TOTAL SCORE: 0
ALCOHOL_USE: NO
HAVE YOU EVER FELT YOU SHOULD CUT DOWN ON YOUR DRINKING: NO
TOTAL SCORE: 0
EVER FELT BAD OR GUILTY ABOUT YOUR DRINKING: NO
TOTAL SCORE: 0
HOW MANY TIMES IN THE PAST YEAR HAVE YOU HAD 5 OR MORE DRINKS IN A DAY: 0
HAVE PEOPLE ANNOYED YOU BY CRITICIZING YOUR DRINKING: NO
DOES PATIENT WANT TO STOP DRINKING: NO

## 2025-01-31 ASSESSMENT — ACTIVITIES OF DAILY LIVING (ADL): TOILETING: INDEPENDENT

## 2025-01-31 ASSESSMENT — GAIT ASSESSMENTS
DEVIATION: NO DEVIATION
GAIT LEVEL OF ASSIST: STANDBY ASSIST
DISTANCE (FEET): 100

## 2025-01-31 ASSESSMENT — FIBROSIS 4 INDEX: FIB4 SCORE: 1.43

## 2025-01-31 ASSESSMENT — PAIN DESCRIPTION - PAIN TYPE: TYPE: ACUTE PAIN

## 2025-01-31 NOTE — PROGRESS NOTES
Monitor Summary  Rhythm: SR  Rate: 69-95  Ectopy: R PVC, R PAC  Measurement: .15/.05/.38

## 2025-01-31 NOTE — THERAPY
"Occupational Therapy   Initial Evaluation     Patient Name: Brian Durham  Age:  64 y.o., Sex:  male  Medical Record #: [id number]  Today's Date: 1/31/2025     Precautions  Precautions: Fall Risk    Assessment    Patient is 64 y.o. male admitted with hypotension. Other pertinent medical history includes a-fib with RVR, schizophrenia, and homelessness. Pt seen for OT evaluation. Pt required supv-SBA to adjust socks, perform toilet transfer, toilet hygiene including clothing management, and stand to wash hands. Pt was staying at [facility] where he reported he has a friend who occasionally assists him. Pt educated regarding the role of OT and the pathology of bedrest. Patient will not be actively followed for occupational therapy services at this time, however may be seen if requested by physician for 1 more visit within 30 days to address any discharge or equipment needs.      Plan    Occupational Therapy Initial Treatment Plan   Duration: Discharge Needs Only    DC Equipment Recommendations: None  Discharge Recommendations: Anticipate that the patient will have no further occupational therapy needs after discharge from the hospital      Objective     01/31/25 0840   Prior Living Situation   Prior Services   (some support from his friend \"Sandra\")   Housing / Facility Homeless   Equipment Owned None   Lives with - Patient's Self Care Capacity Alone and Able to Care For Self   Prior Level of ADL Function   Self Feeding Independent   Grooming / Hygiene Independent   Bathing Independent   Dressing Independent   Toileting Independent   Prior Level of IADL Function   Medication Management Independent   Finances Independent   Shopping Independent   Prior Level Of Mobility Independent Without Device in Community;Independent Without Device in Home   Driving / Transportation Utilizes Public Transportation  (takes the bus)   History of Falls   History of Falls Yes   Date of Last Fall   (Pt reported 1-2 falls in the past " year.)   Precautions   Precautions Fall Risk   Vitals   Pulse 96   Blood Pressure   (supine 116/64, /76)   O2 Delivery Device None - Room Air   Cognition    Cognition / Consciousness X   Speech/ Communication Slurred  (difficult to understand at times)   Level of Consciousness Alert   Ability To Follow Commands 2 Step   New Learning Impaired   Attention Impaired   Comments Pleasant and cooperative   Passive ROM Upper Body   Comments WFL from observation   Active ROM Upper Body   Comments WFL from observatin   Strength Upper Body   Comments WFL from observation   Sensation Upper Body   Upper Extremity Sensation  WDL   Upper Body Muscle Tone   Upper Body Muscle Tone  WDL   Coordination Upper Body   Comments slight tremor noted in B UE   Balance Assessment   Sitting Balance (Static) Fair +   Sitting Balance (Dynamic) Fair +   Standing Balance (Static) Fair   Standing Balance (Dynamic) Fair   Weight Shift Sitting Fair   Weight Shift Standing Fair   Comments trialed with and without FWW   Bed Mobility    Supine to Sit Standby Assist   Sit to Supine   (up to chair post)   Scooting Standby Assist   Rolling Standby Assist   Comments HOB slightly elevated   ADL Assessment   Grooming Standby Assist;Standing  (washed hands at sink)   Lower Body Dressing Standby Assist  (adjusted socks)   Toileting Standby Assist  (BM on toilet including clothing management)   Functional Mobility   Sit to Stand Standby Assist   Bed, Chair, Wheelchair Transfer Standby Assist   Toilet Transfers Standby Assist   Transfer Method Stand Step   Mobility EOB>bathroom>bed   Comments w/ FWW   Visual Perception   Visual Perception  Not Tested   Activity Tolerance   Sitting in Chair up to chair post   Sitting Edge of Bed ~5 min   Standing <10 min   Comments funcitonal for eval   Education Group   Education Provided Role of Occupational Therapist;Activities of Daily Living   Role of Occupational Therapist Patient Response  Patient;Acceptance;Explanation;Verbal Demonstration   ADL Patient Response Patient;Acceptance;Explanation;Verbal Demonstration;Action Demonstration;Demonstration

## 2025-01-31 NOTE — CARE PLAN
The patient is Stable - Low risk of patient condition declining or worsening    Shift Goals  Clinical Goals: cardiac monitor, VSS, comfort  Patient Goals: rest, eat  Family Goals: sherif    Progress made toward(s) clinical / shift goals:    Problem: Knowledge Deficit - Standard  Goal: Patient and family/care givers will demonstrate understanding of plan of care, disease process/condition, diagnostic tests and medications  Outcome: Progressing  Note: Discussed plan of care, pt able to actively participate in the learning process and demonstrates understanding by return demonstration or return explanation. Improved ability to communicate regarding their healthcare and current admission. Improved ability to identify barriers to learning and care.       Problem: Fall Risk  Goal: Patient will remain free from falls  Outcome: Progressing  Note: Fall prevention measures in place, bed alarm on and connected correctly, call light within reach, hourly rounding, Education provided on fall prevention. Pt instructed to use call light prior to exiting the bed, educated on use of bed alarms, and risks and complications related to falls. Medication orders evaluated for fall risk, gait/mobility assessed, sensory deficits assessed, mental status assessed, assessed for hypotension, hypovolemia, weakness, fatigue, elimination, and confusion.         Patient is not progressing towards the following goals:

## 2025-01-31 NOTE — CARE PLAN
Problem: Knowledge Deficit - Standard  Goal: Patient and family/care givers will demonstrate understanding of plan of care, disease process/condition, diagnostic tests and medications  Outcome: Progressing     Problem: Fall Risk  Goal: Patient will remain free from falls  Outcome: Progressing   The patient is Stable - Low risk of patient condition declining or worsening    Shift Goals  Clinical Goals: cardiac monitor, VSS, comfort  Patient Goals: rest, eat  Family Goals: sherif    Progress made toward(s) clinical / shift goals:  patient involved in plan of care, fall prevention measures in place    Patient is not progressing towards the following goals: n/a

## 2025-01-31 NOTE — PROGRESS NOTES
Bedside report received from day shift RN  SHANTI Harden. Pt is currently A&Ox4, SR, breathing at a normal rate and rhythm, warm, dry, and skin color appropriate for ethnicity, and in no visible emotional or physical distress. Bed locked in lowest position, call light is within reach, fall prevention measures in place. Pt educated to use call light before getting out of bed, pt verbalized understanding. Pt is on a cardiac monitor, order verified, transmitter connected appropriately, and leads placed correctly, rhythm and rate assessed by RN, monitor staff updated of changes and parameters as necessary.  No further needs at this time. Assumed care of pt. Report given to assisting staff - CNA/CCT/ACT    Fall Risk Score: HIGH RISK  Fall risk interventions in place: Place yellow fall risk ID band on patient, Provide patient/family education based on risk assessment, Educate patient/family to call staff for assistance when getting out of bed, Place fall precaution signage outside patient door, Place patient in room close to nursing station, Utilize bed/chair fall alarm, Notify charge of high risk for huddle, and Bed alarm connected correctly  Bed type: Regular (Joselito Score less than 17 interventions in place)  Patient on cardiac monitor: Yes  IVF/IV medications: Infusion per MAR (List Med(s)) LR @ 83mL/hr  Oxygen: Room Air  Bedside sitter: Not Applicable   Isolation: Not applicable

## 2025-01-31 NOTE — PROGRESS NOTES
Bedside report received from off going RN/tech: Dipti, assumed care of patient.     Fall Risk Score: HIGH RISK  Fall risk interventions in place: Place yellow fall risk ID band on patient, Provide patient/family education based on risk assessment, Educate patient/family to call staff for assistance when getting out of bed, Place fall precaution signage outside patient door, Place patient in room close to nursing station, Utilize bed/chair fall alarm, and Bed alarm connected correctly  Bed type: Regular (Joselito Score less than 17 interventions in place)  Patient on cardiac monitor: Yes  IVF/IV medications: Infusion per MAR (List Med(s)) LR at 83 mL/hr  Oxygen: Room Air  Bedside sitter: Not Applicable   Isolation: Not applicable

## 2025-01-31 NOTE — PROGRESS NOTES
Hospital Medicine Daily Progress Note    Date of Service  1/30/2025    Chief Complaint  Brian Durham is a 64 y.o. male admitted 1/29/2025 with hypotension    Hospital Course   64 y.o. male with past history of schizophrenia, homelessness and recently discharged from the hospital on January 27, 2025 for Afib RVR, hypothermia and hypotension after he fell [place] the [place], who presented to the hospital on 1/29/2025 with dizziness and he found to have significant hypotension.    HPI is limited as patient is a poor historian     ER course: Patient found to have significant hypotension with systolic blood pressure of low as 87 mmHg. He was given IVF and started midodrine. His cortisol level was 9.1. He was started on hydrocortisone.    UDS positive benzo    Interval Problem Update  -Notes were extensively reviewed from primary team, radiology, ED, surgery, specialists, etc.   -Reviewed labs to date, microbiology for current admit and prior  -Imaging was independently reviewed and interpreted    Seen patient at bedside  He is AO self. He states he is in ICU and it is Feb 2025.   Patient is unclear why he is in the hospital  Poor historian  Continue IVF. I have change midodrine to prn  Continue hydrocortisone. Weaning to 100mg bid per discussion with pharmacy  Blood culture  PT/OT  echo    I have discussed this patient's plan of care and discharge plan at IDT rounds today with Case Management, Nursing, Nursing leadership, and other members of the IDT team.    Consultants/Specialty  na    Code Status  Full Code    Disposition  The patient is not medically cleared for discharge to home or a post-acute facility.      I have placed the appropriate orders for post-discharge needs.    Review of Systems  Review of Systems   Unable to perform ROS: Medical condition        Physical Exam  Temp:  [36.5 °C (97.7 °F)-36.8 °C (98.2 °F)] 36.8 °C (98.2 °F)  Pulse:  [80-90] 84  Resp:  [15-20] 19  BP: ()/(56-80)  119/68  SpO2:  [94 %-97 %] 94 %    Physical Exam  Vitals and nursing note reviewed.   Constitutional:       Appearance: He is ill-appearing.   HENT:      Head: Normocephalic and atraumatic.      Mouth/Throat:      Pharynx: Oropharynx is clear.   Eyes:      Pupils: Pupils are equal, round, and reactive to light.   Neck:      Vascular: No carotid bruit.   Cardiovascular:      Rate and Rhythm: Normal rate and regular rhythm.   Pulmonary:      Effort: Pulmonary effort is normal.      Breath sounds: Normal breath sounds.   Abdominal:      General: Abdomen is flat. Bowel sounds are normal.      Palpations: Abdomen is soft. There is no mass.   Musculoskeletal:         General: Normal range of motion.      Cervical back: Neck supple.   Skin:     General: Skin is warm and dry.   Neurological:      General: No focal deficit present.      Mental Status: He is alert.      Comments: AO self only  Following commands. Move extremities spontaneously       Psychiatric:      Comments: Unable to assess         Fluids    Intake/Output Summary (Last 24 hours) at 1/30/2025 1613  Last data filed at 1/30/2025 0404  Gross per 24 hour   Intake 1280 ml   Output 675 ml   Net 605 ml        Laboratory  Recent Labs     01/29/25  1659 01/30/25  0038   WBC 9.5 9.6   RBC 3.57* 3.62*   HEMOGLOBIN 11.5* 11.6*   HEMATOCRIT 33.9* 34.7*   MCV 95.0 95.9   MCH 32.2 32.0   MCHC 33.9 33.4   RDW 45.7 46.5   PLATELETCT 200 218   MPV 8.5* 8.6*     Recent Labs     01/29/25  1659 01/30/25  0038   SODIUM 139 139   POTASSIUM 3.6 4.3   CHLORIDE 109 106   CO2 20 21   GLUCOSE 120* 123*   BUN 16 17   CREATININE 0.59 0.66   CALCIUM 8.1* 8.8                   Imaging  EC-ECHOCARDIOGRAM COMPLETE W/O CONT   Final Result           Assessment/Plan  * Hypotension- (present on admission)  Assessment & Plan  Patient presented with symptomatic hypotension and he was started on IV fluid patient  TSH normal  UDS pos for benzo  Cortisol 9.1, started on hydrocortisone.  Blood  pressure improving after IV fluid.  Change medicine to as needed  Echo  Blood culture  Telemetry  PT/OT        Hypophosphatemia  Assessment & Plan  Replace as indicated    Homelessness  Assessment & Plan  As per daughter patient has homelessness.  Social service consultation.    Schizophrenia (HCC)- (present on admission)  Assessment & Plan  Hx of  Currently not on meds at home         VTE prophylaxis: lovenox    I have performed a physical exam and reviewed and updated ROS and Plan today (1/30/2025). In review of yesterday's note (1/29/2025), there are no changes except as documented above.    Patient is has a high medical complexity, complex decision making and is at high risk for complication, morbidity, and mortality.  I spent 51 minutes, reviewing the chart, obtaining and/or reviewing separately obtained history. Performing a medically appropriate examination and evaluation.  Counseling and educating the patient. Ordering and reviewing medications, tests, or procedures. Documenting clinical information in EPIC. Independently interpreting results and communicating results to patient. Discussing future disposition of care with patient, RN and case management.  This does not include time spent on separately billable procedures/tests.

## 2025-01-31 NOTE — THERAPY
Physical Therapy   Initial Evaluation     Patient Name: Brian Durham  Age:  64 y.o., Sex:  male  Medical Record #: [id number]  Today's Date: 1/31/2025     Precautions  Precautions: Fall Risk    Assessment  Patient is a 64 y.o. male with hx of  schizophrenia, homelessness and recent discharge from the hospital on January 27, 2025 for Afib RVR, hypothermia and hypotension after he fell [place] the [place]. Pt admitted with symptomatic hypotension. PT eval complete, pt currently presents at/near his functional baseline and completed all mobility at SBA-SPV level. Pt does better with using the FWW, however he intermittently wants to use it and then doesn't. Anticipate no PT needs when medically cleared. Patient will not be actively followed for physical therapy services at this time, however may be seen if requested by physician for 1 more visit within 30 days to address any discharge or equipment needs.     Plan    Physical Therapy Initial Treatment Plan   Duration: Discharge Needs Only    DC Equipment Recommendations: Front-Wheel Walker (ordered)  Discharge Recommendations: Anticipate that the patient will have no further physical therapy needs after discharge from the hospital         Vitals   O2 (LPM) 0   O2 Delivery Device None - Room Air   Pain 0 - 10 Group   Therapist Pain Assessment   (no c/o pain)   Prior Living Situation   Housing / Facility Homeless   Equipment Owned None   Lives with - Patient's Self Care Capacity Alone and Able to Care For Self   Comments reports he has been staying at [facility]   Prior Level of Functional Mobility   Bed Mobility Independent   Transfer Status Independent   Ambulation Independent   Ambulation Distance community distances   Assistive Devices Used None   Cognition    Cognition / Consciousness X   Speech/ Communication Slurred   Level of Consciousness Alert   New Learning Impaired   Attention Impaired   Comments participatory, difficult to understand at times    Active ROM Lower Body    Active ROM Lower Body  WDL   Strength Lower Body   Lower Body Strength  WDL   Coordination Lower Body    Coordination Lower Body  WDL   Balance Assessment   Sitting Balance (Static) Good   Sitting Balance (Dynamic) Fair +   Standing Balance (Static) Fair +   Standing Balance (Dynamic) Fair   Weight Shift Sitting Good   Weight Shift Standing Fair   Comments no AD   Bed Mobility    Comments NT, in chair pre/post   Gait Analysis   Gait Level Of Assist Standby Assist   Assistive Device None   Distance (Feet) 100   # of Times Distance was Traveled 1   Deviation No deviation   # of Stairs Climbed 0   Weight Bearing Status no restrictions   Comments pt walked with the fww in the room and was stable. distractable in the [place] but had steady balance when cued to look forward and concentrate   Functional Mobility   Sit to Stand Supervised   Bed, Chair, Wheelchair Transfer Supervised   Mobility FWW   6 Clicks Assessment - How much HELP from from another person do you currently need... (If the patient hasn't done an activity recently, how much help from another person do you think he/she would need if he/she tried?)   Turning from your back to your side while in a flat bed without using bedrails? 4   Moving from lying on your back to sitting on the side of a flat bed without using bedrails? 4   Moving to and from a bed to a chair (including a wheelchair)? 4   Standing up from a chair using your arms (e.g., wheelchair, or bedside chair)? 4   Walking in hospital room? 3   Climbing 3-5 steps with a railing? 3   6 clicks Mobility Score 22   Activity Tolerance   Comments no overt pain, SOB, or fatigue   Education Group   Education Provided Role of Physical Therapist   Role of Physical Therapist Patient Response Patient;Acceptance;Explanation;Verbal Demonstration   Physical Therapy Initial Treatment Plan    Duration Discharge Needs Only   Problem List    Problems None   Anticipated Discharge Equipment and  Recommendations   DC Equipment Recommendations Front-Wheel Walker  (ordered)   Discharge Recommendations Anticipate that the patient will have no further physical therapy needs after discharge from the hospital   Interdisciplinary Plan of Care Collaboration   IDT Collaboration with  Nursing   Patient Position at End of Therapy Seated;Chair Alarm On;Call Light within Reach;Tray Table within Reach;Phone within Reach   Collaboration Comments RN updated   Session Information   Date / Session Number  1/31- 1x only (DC needs)

## 2025-02-01 ENCOUNTER — PHARMACY VISIT (OUTPATIENT)
Dept: PHARMACY | Facility: MEDICAL CENTER | Age: 65
End: 2025-02-01
Payer: COMMERCIAL

## 2025-02-01 VITALS
OXYGEN SATURATION: 95 % | WEIGHT: 190.7 LBS | DIASTOLIC BLOOD PRESSURE: 91 MMHG | HEART RATE: 73 BPM | TEMPERATURE: 97.9 F | BODY MASS INDEX: 27.3 KG/M2 | RESPIRATION RATE: 18 BRPM | SYSTOLIC BLOOD PRESSURE: 147 MMHG | HEIGHT: 70 IN

## 2025-02-01 PROCEDURE — 700111 HCHG RX REV CODE 636 W/ 250 OVERRIDE (IP): Mod: JZ | Performed by: STUDENT IN AN ORGANIZED HEALTH CARE EDUCATION/TRAINING PROGRAM

## 2025-02-01 PROCEDURE — 99239 HOSP IP/OBS DSCHRG MGMT >30: CPT | Performed by: STUDENT IN AN ORGANIZED HEALTH CARE EDUCATION/TRAINING PROGRAM

## 2025-02-01 PROCEDURE — 700102 HCHG RX REV CODE 250 W/ 637 OVERRIDE(OP): Performed by: STUDENT IN AN ORGANIZED HEALTH CARE EDUCATION/TRAINING PROGRAM

## 2025-02-01 PROCEDURE — A9270 NON-COVERED ITEM OR SERVICE: HCPCS | Performed by: STUDENT IN AN ORGANIZED HEALTH CARE EDUCATION/TRAINING PROGRAM

## 2025-02-01 PROCEDURE — RXMED WILLOW AMBULATORY MEDICATION CHARGE: Performed by: STUDENT IN AN ORGANIZED HEALTH CARE EDUCATION/TRAINING PROGRAM

## 2025-02-01 RX ORDER — HYDROCORTISONE 10 MG/1
25 TABLET ORAL DAILY
Qty: 5 TABLET | Refills: 0 | Status: SHIPPED | OUTPATIENT
Start: 2025-02-02 | End: 2025-02-04

## 2025-02-01 RX ORDER — HYDROXYZINE HYDROCHLORIDE 25 MG/1
25 TABLET, FILM COATED ORAL 3 TIMES DAILY PRN
Qty: 30 TABLET | Refills: 0 | Status: SHIPPED | OUTPATIENT
Start: 2025-02-01 | End: 2025-02-26

## 2025-02-01 RX ADMIN — DIVALPROEX SODIUM 500 MG: 500 TABLET, DELAYED RELEASE ORAL at 06:07

## 2025-02-01 RX ADMIN — DIBASIC SODIUM PHOSPHATE, MONOBASIC POTASSIUM PHOSPHATE AND MONOBASIC SODIUM PHOSPHATE 500 MG: 852; 155; 130 TABLET ORAL at 06:06

## 2025-02-01 RX ADMIN — HYDROCORTISONE SODIUM SUCCINATE 50 MG: 100 INJECTION, POWDER, FOR SOLUTION INTRAMUSCULAR; INTRAVENOUS at 06:07

## 2025-02-01 ASSESSMENT — FIBROSIS 4 INDEX
FIB4 SCORE: 1.43
FIB4 SCORE: 1.43

## 2025-02-01 NOTE — PROGRESS NOTES
Monitor Summary  Rhythm: sinus jeison, sinus rhythm  Rate: 56-83  Ectopy: PVC, PAC (rare), 7 beats VTACH  .13 / .11 / .46

## 2025-02-01 NOTE — DISCHARGE PLANNING
Case Management Discharge Planning    Admission Date: 1/29/2025  GMLOS: 2.7  ALOS: 3    6-Clicks ADL Score: 22  6-Clicks Mobility Score: 21    Anticipated Discharge Dispo: Discharge Disposition: Discharged to home/self care (01)    DME Needed: No    Action(s) Taken: Patient discussed in rounds; medically cleared for discharge today per MD. PT/OT cleared patient for discharge, six clicks of 21 and 22.     RNCM met with patient at bedside to obtain signature for IMM; patient gave verbal consent for IMM.     Escalations Completed: None    Medically Clear: Yes    Next Steps: F/U with medical team regarding D/C needs/ barriers.     Barriers to Discharge: None    Is the patient up for discharge tomorrow: No

## 2025-02-01 NOTE — DISCHARGE INSTRUCTIONS
Discharge Instructions per Monica Askew M.D.    Please follow-up with PCP as outpatient.  Take atrax as needed for anxiety  Take hydrocortisone tomorrow 2/2 25mg daily for 2 more days    Return to ER in the event of new or worsening symptoms. Please note importance of compliance and the patient has agreed to proceed with all medical recommendations and follow up plan indicated above. All medications come with benefits and risks. Risks may include permanent injury or death and these risks can be minimized with close reassessment and monitoring. Please make it to your scheduled follow ups with PCP

## 2025-02-01 NOTE — PROGRESS NOTES
Home medications and bus pass given to patient. Patient taken to the discharge lounge with care aide.

## 2025-02-01 NOTE — PROGRESS NOTES
Hospital Medicine Daily Progress Note    Date of Service  1/31/2025    Chief Complaint  Brian Durham is a 64 y.o. male admitted 1/29/2025 with hypotension    Hospital Course   64 y.o. male with past history of schizophrenia, homelessness and recently discharged from the hospital on January 27, 2025 for Afib RVR, hypothermia and hypotension after he fell into the [place], who presented to the hospital on 1/29/2025 with dizziness and he found to have significant hypotension.    HPI is limited as patient is a poor historian     ER course: Patient found to have significant hypotension with systolic blood pressure of low as 87 mmHg. He was given IVF and started midodrine. His cortisol level was 9.1. He was started on hydrocortisone.    UDS positive benzo    Interval Problem Update  Seen patient at bedside  No acute overnight events  Mentation wax and wane  Poor historian  Bp improving, dc IVF  Midodrine prn  Weaning iv hydrocortisone   Blood culture pending  PT/OT    I have discussed this patient's plan of care and discharge plan at IDT rounds today with Case Management, Nursing, Nursing leadership, and other members of the IDT team.    Consultants/Specialty  na    Code Status  Full Code    Disposition  The patient is not medically cleared for discharge to home or a post-acute facility.      I have placed the appropriate orders for post-discharge needs.    Review of Systems  Review of Systems   Unable to perform ROS: Medical condition        Physical Exam  Temp:  [36.5 °C (97.7 °F)-36.8 °C (98.2 °F)] 36.7 °C (98.1 °F)  Pulse:  [66-96] 77  Resp:  [18-20] 20  BP: (107-129)/(62-79) 125/63  SpO2:  [93 %-97 %] 95 %    Physical Exam  Vitals and nursing note reviewed.   Constitutional:       Appearance: He is ill-appearing.   HENT:      Head: Normocephalic and atraumatic.      Mouth/Throat:      Pharynx: Oropharynx is clear.   Eyes:      Pupils: Pupils are equal, round, and reactive to light.   Neck:       Vascular: No carotid bruit.   Cardiovascular:      Rate and Rhythm: Normal rate and regular rhythm.   Pulmonary:      Effort: Pulmonary effort is normal.      Breath sounds: Normal breath sounds.   Abdominal:      General: Abdomen is flat. Bowel sounds are normal.      Palpations: Abdomen is soft. There is no mass.   Musculoskeletal:         General: Normal range of motion.      Cervical back: Neck supple.   Skin:     General: Skin is warm and dry.   Neurological:      General: No focal deficit present.      Mental Status: He is alert.      Comments: AO self only  Following commands. Move extremities spontaneously       Psychiatric:      Comments: Unable to assess         Fluids    Intake/Output Summary (Last 24 hours) at 1/31/2025 1618  Last data filed at 1/31/2025 1511  Gross per 24 hour   Intake 2802.07 ml   Output 1200 ml   Net 1602.07 ml        Laboratory  Recent Labs     01/29/25  1659 01/30/25  0038 01/30/25  1820   WBC 9.5 9.6 10.0   RBC 3.57* 3.62* 3.25*   HEMOGLOBIN 11.5* 11.6* 10.5*   HEMATOCRIT 33.9* 34.7* 30.7*   MCV 95.0 95.9 94.5   MCH 32.2 32.0 32.3   MCHC 33.9 33.4 34.2   RDW 45.7 46.5 44.7   PLATELETCT 200 218 225   MPV 8.5* 8.6* 8.8*     Recent Labs     01/29/25  1659 01/30/25  0038 01/31/25  0120   SODIUM 139 139 140   POTASSIUM 3.6 4.3 3.9   CHLORIDE 109 106 108   CO2 20 21 24   GLUCOSE 120* 123* 124*   BUN 16 17 13   CREATININE 0.59 0.66 0.62   CALCIUM 8.1* 8.8 8.6                   Imaging  EC-ECHOCARDIOGRAM COMPLETE W/O CONT   Final Result           Assessment/Plan  * Hypotension- (present on admission)  Assessment & Plan  Patient presented with symptomatic hypotension and he was started on IV fluid patient  TSH normal  UDS pos for benzo  Cortisol 9.1, started on hydrocortisone.  Blood pressure improving after IV fluid.  Change midodrine to as needed  Echo normal LVEF. RVSP 44mmHg  Blood culture  Telemetry  PT/OT        Hypophosphatemia  Assessment & Plan  Replace as  indicated    Homelessness  Assessment & Plan  As per daughter patient has homelessness.  Social service consultation.    Schizophrenia (HCC)- (present on admission)  Assessment & Plan  Hx of  Currently not on meds at home         VTE prophylaxis: lovenox    I have performed a physical exam and reviewed and updated ROS and Plan today (1/31/2025). In review of yesterday's note (1/30/2025), there are no changes except as documented above.    Patient is has a high medical complexity, complex decision making and is at high risk for complication, morbidity, and mortality.  I spent 53 minutes, reviewing the chart, obtaining and/or reviewing separately obtained history. Performing a medically appropriate examination and evaluation.  Counseling and educating the patient. Ordering and reviewing medications, tests, or procedures. Documenting clinical information in EPIC. Independently interpreting results and communicating results to patient. Discussing future disposition of care with patient, RN and case management.  This does not include time spent on separately billable procedures/tests.

## 2025-02-01 NOTE — PROGRESS NOTES
Bedside report received from day shift RN  SHANTI Ramos. Pt is currently A&Ox4, SR, breathing at a normal rate and rhythm, warm, dry, and skin color appropriate for ethnicity, and in no visible emotional or physical distress. Bed locked in lowest position, call light is within reach, fall prevention measures in place. Pt educated to use call light before getting out of bed, pt verbalized understanding. Pt is on a cardiac monitor, order verified, transmitter connected appropriately, and leads placed correctly, rhythm and rate assessed by RN, monitor staff updated of changes and parameters as necessary.  No further needs at this time. Assumed care of pt. Report given to assisting staff - CNA/CCT/ACT    Fall Risk Score: HIGH RISK  Fall risk interventions in place: Place yellow fall risk ID band on patient, Provide patient/family education based on risk assessment, Educate patient/family to call staff for assistance when getting out of bed, Place fall precaution signage outside patient door, Place patient in room close to nursing station, Utilize bed/chair fall alarm, Notify charge of high risk for huddle, and Bed alarm connected correctly  Bed type: Regular (Joselito Score less than 17 interventions in place)  Patient on cardiac monitor: Yes  IVF/IV medications: Not Applicable   Oxygen: Room Air  Bedside sitter: Not Applicable   Isolation: Not applicable

## 2025-02-01 NOTE — PROGRESS NOTES
Discharge orders received.  Patient arrived to the discharge lounge.  PIV removed.  Instructions given, medications reviewed and general discharge education provided to patient.  Follow up appointments discussed.  Patient verbalized understanding of dc instructions and prescriptions.  Patient signed discharge instructions.  Patient verbalized understanding had all belongings with him including home meds returned. Pt pending new meds to beds at this time and then will leave with bus pass. Wished patient a speedy recovery.

## 2025-02-01 NOTE — DISCHARGE SUMMARY
Discharge Summary    CHIEF COMPLAINT ON ADMISSION  Chief Complaint   Patient presents with    Back Pain    Dizziness     Hypotensive upon arrival 86/56, AOx4       Reason for Admission  EMS     Admission Date  1/29/2025    CODE STATUS  Full Code    HPI & HOSPITAL COURSE  This is a  64 y.o. male with past history of schizophrenia, homelessness and recently discharged from the hospital on January 27, 2025 for Afib RVR, hypothermia and hypotension after he fell [place] the [place], who presented to the hospital on 1/29/2025 with dizziness and he found to have significant hypotension.    In the ER, Patient was found to have significant hypotension with systolic blood pressure of low as 87 mmHg. He was given IVF and started midodrine. His cortisol level was 9.1. He was started on hydrocortisone.  His blood culture negative. Urine culture positive for MSSA. Patient denies any UTI symptoms. Echo noted LVEF 70% with RVSP 44mmHg. Patient's Bp has been improving significantly after IVF. Midodrine weaned off. He is on hydrocortisone taper and will be discharged with hydrocortisone 25mg daily for 2 doses. He is advised to follow up with PCP as outpatient in one week.     Therefore, he is discharged in good and stable condition to home with close outpatient follow-up.    The patient met 2-midnight criteria for an inpatient stay at the time of discharge.    Discharge Date  2/1/25    FOLLOW UP ITEMS POST DISCHARGE  PCP    DISCHARGE DIAGNOSES  Principal Problem:    Hypotension (POA: Yes)  Active Problems:    Schizophrenia (HCC) (POA: Yes)    Homelessness (POA: Unknown)    Hypophosphatemia (POA: Unknown)  Resolved Problems:    * No resolved hospital problems. *      FOLLOW UP  No future appointments.  [facility]  [address] [facility] [address]  [phone]  Go to  Walk in at 8am to establish with a primary care doctor. Their new patient appointments are first come first serve.      MEDICATIONS ON DISCHARGE    "  Medication List        START taking these medications        Instructions   hydrocortisone 10 MG Tabs  Start taking on: February 2, 2025  Commonly known as: Cortef   Take 2.5 Tablets by mouth every day for 2 days.  Dose: 25 mg     hydrOXYzine HCl 25 MG Tabs  Commonly known as: Atarax   Take 1 Tablet by mouth 3 times a day as needed for Itching or Anxiety.  Dose: 25 mg            CONTINUE taking these medications        Instructions   amphetamine-dextroamphetamine 20 MG Tabs  Commonly known as: Adderall   Take 20 mg by mouth 2 times a day.  Dose: 20 mg     Caplyta 42 MG Caps  Generic drug: Lumateperone Tosylate   Take 42 mg by mouth every day.  Dose: 42 mg     clotrimazole 1 % Crea  Commonly known as: Lotrimin   Apply 1 Application topically 2 times a day.  Dose: 1 Application     divalproex 500 MG Tbec  Commonly known as: Depakote   Take 500 mg by mouth 2 times a day.  Dose: 500 mg            STOP taking these medications      LORazepam 1 MG Tabs  Commonly known as: Ativan              Allergies  Allergies   Allergen Reactions    Paroxetine Swelling     Throat     Shellfish Allergy Rash     \"My heart - very painful.\"       DIET  Orders Placed This Encounter   Procedures    Diet Order Diet: Regular     Standing Status:   Standing     Number of Occurrences:   1     Order Specific Question:   Diet:     Answer:   Regular [1]       ACTIVITY  As tolerated.  Weight bearing as tolerated    CONSULTATIONS  na    PROCEDURES  na    LABORATORY  Lab Results   Component Value Date    SODIUM 140 01/31/2025    POTASSIUM 3.9 01/31/2025    CHLORIDE 108 01/31/2025    CO2 24 01/31/2025    GLUCOSE 124 (H) 01/31/2025    BUN 13 01/31/2025    CREATININE 0.62 01/31/2025    CREATININE 0.7 05/08/2007        Lab Results   Component Value Date    WBC 10.0 01/30/2025    HEMOGLOBIN 10.5 (L) 01/30/2025    HEMATOCRIT 30.7 (L) 01/30/2025    PLATELETCT 225 01/30/2025      EC-ECHOCARDIOGRAM COMPLETE W/O CONT   Final Result          Total time of the " discharge process 34 minutes.

## 2025-02-01 NOTE — PROGRESS NOTES
Bedside report received from off going RN/tech: Dipti, assumed care of patient.     Fall Risk Score: HIGH RISK  Fall risk interventions in place: Place yellow fall risk ID band on patient, Provide patient/family education based on risk assessment, Educate patient/family to call staff for assistance when getting out of bed, Place fall precaution signage outside patient door, Place patient in room close to nursing station, Utilize bed/chair fall alarm, and Bed alarm connected correctly  Bed type: Regular (Joselito Score less than 17 interventions in place)  Patient on cardiac monitor: Yes  IVF/IV medications: Not Applicable   Oxygen: Room Air  Bedside sitter: Not Applicable   Isolation: Not applicable

## 2025-02-01 NOTE — CARE PLAN
The patient is Watcher - Medium risk of patient condition declining or worsening    Shift Goals  Clinical Goals: cardiac monitor, VSS, saftey  Patient Goals: rest  Family Goals: sherif    Progress made toward(s) clinical / shift goals:    Problem: Knowledge Deficit - Standard  Goal: Patient and family/care givers will demonstrate understanding of plan of care, disease process/condition, diagnostic tests and medications  Outcome: Progressing  Note: Discussed plan of care, pt able to actively participate in the learning process and demonstrates understanding by return demonstration or return explanation. Improved ability to communicate regarding their healthcare and current admission. Improved ability to identify barriers to learning and care.       Problem: Fall Risk  Goal: Patient will remain free from falls  Outcome: Progressing  Note: Fall prevention measures in place, bed alarm on and connected correctly, call light within reach, hourly rounding, Education provided on fall prevention. Pt instructed to use call light prior to exiting the bed, educated on use of bed alarms, and risks and complications related to falls. Medication orders evaluated for fall risk, gait/mobility assessed, sensory deficits assessed, mental status assessed, assessed for hypotension, hypovolemia, weakness, fatigue, elimination, and confusion.       Problem: Hemodynamics  Goal: Patient's hemodynamics, fluid balance and neurologic status will be stable or improve  Outcome: Progressing  Note: Vital signs stable, CMS intact, signs of bleeding assessed. I/O monitored. IV fluids not in use. Oral intake adequate. Peripheral pulses assessed and monitored. Capillary refill assessed. PRN blood pressure control meds given as needed.         Patient is not progressing towards the following goals:

## 2025-02-04 LAB
BACTERIA BLD CULT: NORMAL
BACTERIA BLD CULT: NORMAL
SIGNIFICANT IND 70042: NORMAL
SIGNIFICANT IND 70042: NORMAL
SITE SITE: NORMAL
SITE SITE: NORMAL
SOURCE SOURCE: NORMAL
SOURCE SOURCE: NORMAL

## 2025-02-05 ENCOUNTER — APPOINTMENT (OUTPATIENT)
Dept: RADIOLOGY | Facility: MEDICAL CENTER | Age: 65
End: 2025-02-05
Attending: EMERGENCY MEDICINE
Payer: MEDICARE

## 2025-02-05 ENCOUNTER — HOSPITAL ENCOUNTER (EMERGENCY)
Facility: MEDICAL CENTER | Age: 65
End: 2025-02-06
Attending: EMERGENCY MEDICINE
Payer: MEDICARE

## 2025-02-05 DIAGNOSIS — N39.0 URINARY TRACT INFECTION WITHOUT HEMATURIA, SITE UNSPECIFIED: ICD-10-CM

## 2025-02-05 DIAGNOSIS — R60.0 PERIPHERAL EDEMA: ICD-10-CM

## 2025-02-05 LAB
ALBUMIN SERPL BCP-MCNC: 3.3 G/DL (ref 3.2–4.9)
ALBUMIN/GLOB SERPL: 1.2 G/DL
ALP SERPL-CCNC: 62 U/L (ref 30–99)
ALT SERPL-CCNC: 17 U/L (ref 2–50)
ANION GAP SERPL CALC-SCNC: 9 MMOL/L (ref 7–16)
ANISOCYTOSIS BLD QL SMEAR: ABNORMAL
APTT PPP: 30.1 SEC (ref 24.7–36)
AST SERPL-CCNC: 15 U/L (ref 12–45)
BASOPHILS # BLD AUTO: 0.9 % (ref 0–1.8)
BASOPHILS # BLD: 0.1 K/UL (ref 0–0.12)
BILIRUB SERPL-MCNC: 0.2 MG/DL (ref 0.1–1.5)
BUN SERPL-MCNC: 18 MG/DL (ref 8–22)
CALCIUM ALBUM COR SERPL-MCNC: 8.8 MG/DL (ref 8.5–10.5)
CALCIUM SERPL-MCNC: 8.2 MG/DL (ref 8.5–10.5)
CHLORIDE SERPL-SCNC: 108 MMOL/L (ref 96–112)
CO2 SERPL-SCNC: 23 MMOL/L (ref 20–33)
CREAT SERPL-MCNC: 0.83 MG/DL (ref 0.5–1.4)
EOSINOPHIL # BLD AUTO: 0.28 K/UL (ref 0–0.51)
EOSINOPHIL NFR BLD: 2.6 % (ref 0–6.9)
ERYTHROCYTE [DISTWIDTH] IN BLOOD BY AUTOMATED COUNT: 49.8 FL (ref 35.9–50)
GFR SERPLBLD CREATININE-BSD FMLA CKD-EPI: 99 ML/MIN/1.73 M 2
GLOBULIN SER CALC-MCNC: 2.8 G/DL (ref 1.9–3.5)
GLUCOSE SERPL-MCNC: 92 MG/DL (ref 65–99)
HCT VFR BLD AUTO: 34.6 % (ref 42–52)
HGB BLD-MCNC: 11.1 G/DL (ref 14–18)
INR PPP: 1.04 (ref 0.87–1.13)
LIPASE SERPL-CCNC: 32 U/L (ref 11–82)
LYMPHOCYTES # BLD AUTO: 3.63 K/UL (ref 1–4.8)
LYMPHOCYTES NFR BLD: 33.3 % (ref 22–41)
MANUAL DIFF BLD: NORMAL
MCH RBC QN AUTO: 32.3 PG (ref 27–33)
MCHC RBC AUTO-ENTMCNC: 32.1 G/DL (ref 32.3–36.5)
MCV RBC AUTO: 100.6 FL (ref 81.4–97.8)
MICROCYTES BLD QL SMEAR: ABNORMAL
MONOCYTES # BLD AUTO: 0.96 K/UL (ref 0–0.85)
MONOCYTES NFR BLD AUTO: 8.8 % (ref 0–13.4)
MORPHOLOGY BLD-IMP: NORMAL
MYELOCYTES NFR BLD MANUAL: 0.9 %
NEUTROPHILS # BLD AUTO: 5.83 K/UL (ref 1.82–7.42)
NEUTROPHILS NFR BLD: 53.5 % (ref 44–72)
NRBC # BLD AUTO: 0 K/UL
NRBC BLD-RTO: 0 /100 WBC (ref 0–0.2)
NT-PROBNP SERPL IA-MCNC: 151 PG/ML (ref 0–125)
PLATELET # BLD AUTO: 368 K/UL (ref 164–446)
PLATELET BLD QL SMEAR: NORMAL
PMV BLD AUTO: 8.3 FL (ref 9–12.9)
POTASSIUM SERPL-SCNC: 3.9 MMOL/L (ref 3.6–5.5)
PROT SERPL-MCNC: 6.1 G/DL (ref 6–8.2)
PROTHROMBIN TIME: 13.6 SEC (ref 12–14.6)
RBC # BLD AUTO: 3.44 M/UL (ref 4.7–6.1)
RBC BLD AUTO: PRESENT
SODIUM SERPL-SCNC: 140 MMOL/L (ref 135–145)
TROPONIN T SERPL-MCNC: 17 NG/L (ref 6–19)
WBC # BLD AUTO: 10.9 K/UL (ref 4.8–10.8)

## 2025-02-05 PROCEDURE — 36415 COLL VENOUS BLD VENIPUNCTURE: CPT

## 2025-02-05 PROCEDURE — 71045 X-RAY EXAM CHEST 1 VIEW: CPT

## 2025-02-05 PROCEDURE — 85027 COMPLETE CBC AUTOMATED: CPT

## 2025-02-05 PROCEDURE — 84484 ASSAY OF TROPONIN QUANT: CPT

## 2025-02-05 PROCEDURE — 80053 COMPREHEN METABOLIC PANEL: CPT

## 2025-02-05 PROCEDURE — 93005 ELECTROCARDIOGRAM TRACING: CPT | Mod: TC | Performed by: EMERGENCY MEDICINE

## 2025-02-05 PROCEDURE — 99285 EMERGENCY DEPT VISIT HI MDM: CPT

## 2025-02-05 PROCEDURE — 83690 ASSAY OF LIPASE: CPT

## 2025-02-05 PROCEDURE — 83880 ASSAY OF NATRIURETIC PEPTIDE: CPT

## 2025-02-05 PROCEDURE — 85610 PROTHROMBIN TIME: CPT

## 2025-02-05 PROCEDURE — 85007 BL SMEAR W/DIFF WBC COUNT: CPT

## 2025-02-05 PROCEDURE — 85730 THROMBOPLASTIN TIME PARTIAL: CPT

## 2025-02-05 ASSESSMENT — PAIN DESCRIPTION - PAIN TYPE: TYPE: ACUTE PAIN

## 2025-02-05 ASSESSMENT — FIBROSIS 4 INDEX: FIB4 SCORE: 1.43

## 2025-02-06 ENCOUNTER — PHARMACY VISIT (OUTPATIENT)
Dept: PHARMACY | Facility: MEDICAL CENTER | Age: 65
End: 2025-02-06
Payer: COMMERCIAL

## 2025-02-06 VITALS
RESPIRATION RATE: 16 BRPM | HEIGHT: 70 IN | BODY MASS INDEX: 27.74 KG/M2 | DIASTOLIC BLOOD PRESSURE: 69 MMHG | SYSTOLIC BLOOD PRESSURE: 117 MMHG | OXYGEN SATURATION: 93 % | TEMPERATURE: 97.3 F | WEIGHT: 193.78 LBS | HEART RATE: 85 BPM

## 2025-02-06 LAB
APPEARANCE UR: ABNORMAL
BACTERIA #/AREA URNS HPF: ABNORMAL /HPF
BILIRUB UR QL STRIP.AUTO: NEGATIVE
CASTS URNS QL MICRO: ABNORMAL /LPF (ref 0–2)
COLOR UR: YELLOW
EKG IMPRESSION: NORMAL
EPITHELIAL CELLS 1715: ABNORMAL /HPF (ref 0–5)
GLUCOSE UR STRIP.AUTO-MCNC: NEGATIVE MG/DL
KETONES UR STRIP.AUTO-MCNC: ABNORMAL MG/DL
LEUKOCYTE ESTERASE UR QL STRIP.AUTO: ABNORMAL
MICRO URNS: ABNORMAL
NITRITE UR QL STRIP.AUTO: NEGATIVE
PH UR STRIP.AUTO: 6.5 [PH] (ref 5–8)
PROT UR QL STRIP: NEGATIVE MG/DL
RBC # URNS HPF: ABNORMAL /HPF (ref 0–2)
RBC UR QL AUTO: NEGATIVE
SP GR UR STRIP.AUTO: 1.02
TROPONIN T SERPL-MCNC: 17 NG/L (ref 6–19)
UROBILINOGEN UR STRIP.AUTO-MCNC: 1 EU/DL
WBC #/AREA URNS HPF: >100 /HPF

## 2025-02-06 PROCEDURE — 81001 URINALYSIS AUTO W/SCOPE: CPT

## 2025-02-06 PROCEDURE — 87086 URINE CULTURE/COLONY COUNT: CPT

## 2025-02-06 PROCEDURE — 36415 COLL VENOUS BLD VENIPUNCTURE: CPT

## 2025-02-06 PROCEDURE — 87077 CULTURE AEROBIC IDENTIFY: CPT

## 2025-02-06 PROCEDURE — 96374 THER/PROPH/DIAG INJ IV PUSH: CPT

## 2025-02-06 PROCEDURE — 87186 SC STD MICRODIL/AGAR DIL: CPT

## 2025-02-06 PROCEDURE — 93970 EXTREMITY STUDY: CPT

## 2025-02-06 PROCEDURE — 84484 ASSAY OF TROPONIN QUANT: CPT

## 2025-02-06 PROCEDURE — RXMED WILLOW AMBULATORY MEDICATION CHARGE: Performed by: EMERGENCY MEDICINE

## 2025-02-06 PROCEDURE — 700111 HCHG RX REV CODE 636 W/ 250 OVERRIDE (IP): Mod: JZ,UD | Performed by: EMERGENCY MEDICINE

## 2025-02-06 RX ORDER — CEFTRIAXONE 2 G/1
2000 INJECTION, POWDER, FOR SOLUTION INTRAMUSCULAR; INTRAVENOUS ONCE
Status: COMPLETED | OUTPATIENT
Start: 2025-02-06 | End: 2025-02-06

## 2025-02-06 RX ORDER — SULFAMETHOXAZOLE AND TRIMETHOPRIM 800; 160 MG/1; MG/1
1 TABLET ORAL 2 TIMES DAILY
Qty: 14 TABLET | Refills: 0 | Status: ACTIVE | OUTPATIENT
Start: 2025-02-06 | End: 2025-02-13

## 2025-02-06 RX ADMIN — CEFTRIAXONE SODIUM 2000 MG: 2 INJECTION, POWDER, FOR SOLUTION INTRAMUSCULAR; INTRAVENOUS at 03:32

## 2025-02-06 NOTE — ED PROVIDER NOTES
ED Provider Note    CHIEF COMPLAINT  Chief Complaint   Patient presents with    Leg Swelling     BIBA from [facility]. Patient c/o bilateral leg swelling with pain that started about 3-4 days ago.        EXTERNAL RECORDS REVIEWED  Inpatient Notes Discharge note 2/1/25 after the patient had been hospitalized for hypothermia after falling in the [place].  He was noted to be hypotensive and was started on midodrine.  He was discharged with hydrocortisone.    HPI/ROS  LIMITATION TO HISTORY   Select: : None  OUTSIDE HISTORIAN(S):  None    Brian Durham is a 64 y.o. male who presents to the emergency department for evaluation of leg swelling.  The patient states that he is coming to the emergency department tonight because he is tired and has nowhere to sleep.  He states that he is also having some swelling in bilateral lower extremities.  He denies any pain.  He denies chest pain or shortness of breath.  He has not had a fever.  He states that he has been taking the hydrocortisone that he was discharged with yesterday.  He states that he is urinating normally.  He denies nausea or vomiting.  He denies abdominal pain.    PAST MEDICAL HISTORY   has a past medical history of ADHD, Arthritis, Cold (01/01/2012), Degenerative disc disease, Emphysema of lung (HCC) (1/24/2025), Encephalopathy acute (1/25/2025), Hepatitis B, Pain (12/30/2011), Psychiatric disorder, and Schizophrenia ([facility]).    SURGICAL HISTORY   has a past surgical history that includes other and cervical disk and fusion anterior (1/16/2012).    FAMILY HISTORY  History reviewed. No pertinent family history.    SOCIAL HISTORY  Social History     Tobacco Use    Smoking status: Some Days     Current packs/day: 0.25     Types: Cigarettes    Smokeless tobacco: Never    Tobacco comments:     1/2 pack a day.   Vaping Use    Vaping status: Never Used   Substance and Sexual Activity    Alcohol use: Not Currently    Drug use: No    Sexual activity: Not on  "file       CURRENT MEDICATIONS  Home Medications       Reviewed by Flor Rolle R.N. (Registered Nurse) on 02/05/25 at 1947  Med List Status: Not Addressed     Medication Last Dose Status   amphetamine-dextroamphetamine (ADDERALL) 20 MG Tab  Active   clotrimazole (LOTRIMIN) 1 % Cream  Active   divalproex (DEPAKOTE) 500 MG Tablet Delayed Response  Active   hydrOXYzine HCl (ATARAX) 25 MG Tab  Active   Lumateperone Tosylate (CAPLYTA) 42 MG Cap  Active                  Audit from Redirected Encounters    **Home medications have not yet been reviewed for this encounter**         ALLERGIES  Allergies   Allergen Reactions    Paroxetine Swelling     Throat     Shellfish Allergy Rash     \"My heart - very painful.\"       PHYSICAL EXAM  VITAL SIGNS: /69   Pulse 85   Temp 36.3 °C (97.3 °F) (Temporal)   Resp 16   Ht 1.778 m (5' 10\")   Wt 87.9 kg (193 lb 12.6 oz)   SpO2 93%   BMI 27.81 kg/m²   Constitutional: Alert and in no apparent distress.  HENT: Normocephalic atraumatic. Bilateral external ears normal. Nose normal. Mucous membranes are moist.  Eyes: Pupils are equal and reactive. Conjunctiva normal. Non-icteric sclera.   Neck: Normal range of motion without tenderness. Supple.   Cardiovascular: Regular rate and rhythm. No murmurs, gallops or rubs.  Thorax & Lungs: No increased work of breathing. Breath sounds are clear to auscultation bilaterally. No wheezing, rhonchi or rales.  Abdomen: Soft, nontender and nondistended.   Skin: Warm and dry. No rashes are noted.  Back: No bony tenderness, No CVA tenderness.   Extremities: 2+ peripheral pulses. Cap refill is less than 2 seconds. No cyanosis, or clubbing.  2+ pitting edema to bilateral lower extremities.  Musculoskeletal: Good range of motion in all major joints. No tenderness to palpation or major deformities noted.   Neurologic: Alert and appropriate for age. The patient moves all 4 extremities without obvious deficits.    EKG/LABS  Results for orders " placed or performed during the hospital encounter of 02/05/25   CBC WITH DIFFERENTIAL    Collection Time: 02/05/25 11:00 PM   Result Value Ref Range    WBC 10.9 (H) 4.8 - 10.8 K/uL    RBC 3.44 (L) 4.70 - 6.10 M/uL    Hemoglobin 11.1 (L) 14.0 - 18.0 g/dL    Hematocrit 34.6 (L) 42.0 - 52.0 %    .6 (H) 81.4 - 97.8 fL    MCH 32.3 27.0 - 33.0 pg    MCHC 32.1 (L) 32.3 - 36.5 g/dL    RDW 49.8 35.9 - 50.0 fL    Platelet Count 368 164 - 446 K/uL    MPV 8.3 (L) 9.0 - 12.9 fL    Neutrophils-Polys 53.50 44.00 - 72.00 %    Lymphocytes 33.30 22.00 - 41.00 %    Monocytes 8.80 0.00 - 13.40 %    Eosinophils 2.60 0.00 - 6.90 %    Basophils 0.90 0.00 - 1.80 %    Nucleated RBC 0.00 0.00 - 0.20 /100 WBC    Neutrophils (Absolute) 5.83 1.82 - 7.42 K/uL    Lymphs (Absolute) 3.63 1.00 - 4.80 K/uL    Monos (Absolute) 0.96 (H) 0.00 - 0.85 K/uL    Eos (Absolute) 0.28 0.00 - 0.51 K/uL    Baso (Absolute) 0.10 0.00 - 0.12 K/uL    NRBC (Absolute) 0.00 K/uL    Anisocytosis 1+     Microcytosis 1+    COMP METABOLIC PANEL    Collection Time: 02/05/25 11:00 PM   Result Value Ref Range    Sodium 140 135 - 145 mmol/L    Potassium 3.9 3.6 - 5.5 mmol/L    Chloride 108 96 - 112 mmol/L    Co2 23 20 - 33 mmol/L    Anion Gap 9.0 7.0 - 16.0    Glucose 92 65 - 99 mg/dL    Bun 18 8 - 22 mg/dL    Creatinine 0.83 0.50 - 1.40 mg/dL    Calcium 8.2 (L) 8.5 - 10.5 mg/dL    Correct Calcium 8.8 8.5 - 10.5 mg/dL    AST(SGOT) 15 12 - 45 U/L    ALT(SGPT) 17 2 - 50 U/L    Alkaline Phosphatase 62 30 - 99 U/L    Total Bilirubin 0.2 0.1 - 1.5 mg/dL    Albumin 3.3 3.2 - 4.9 g/dL    Total Protein 6.1 6.0 - 8.2 g/dL    Globulin 2.8 1.9 - 3.5 g/dL    A-G Ratio 1.2 g/dL   LIPASE    Collection Time: 02/05/25 11:00 PM   Result Value Ref Range    Lipase 32 11 - 82 U/L   TROPONIN    Collection Time: 02/05/25 11:00 PM   Result Value Ref Range    Troponin T 17 6 - 19 ng/L   proBrain Natriuretic Peptide, NT    Collection Time: 02/05/25 11:00 PM   Result Value Ref Range    NT-proBNP  151 (H) 0 - 125 pg/mL   PROTHROMBIN TIME (INR)    Collection Time: 02/05/25 11:00 PM   Result Value Ref Range    PT 13.6 12.0 - 14.6 sec    INR 1.04 0.87 - 1.13   APTT    Collection Time: 02/05/25 11:00 PM   Result Value Ref Range    APTT 30.1 24.7 - 36.0 sec   DIFFERENTIAL MANUAL    Collection Time: 02/05/25 11:00 PM   Result Value Ref Range    Myelocytes 0.90 %    Manual Diff Status PERFORMED    PERIPHERAL SMEAR REVIEW    Collection Time: 02/05/25 11:00 PM   Result Value Ref Range    Peripheral Smear Review see below    PLATELET ESTIMATE    Collection Time: 02/05/25 11:00 PM   Result Value Ref Range    Plt Estimation Normal    MORPHOLOGY    Collection Time: 02/05/25 11:00 PM   Result Value Ref Range    RBC Morphology Present    ESTIMATED GFR    Collection Time: 02/05/25 11:00 PM   Result Value Ref Range    GFR (CKD-EPI) 99 >60 mL/min/1.73 m 2   TROPONIN    Collection Time: 02/06/25  1:08 AM   Result Value Ref Range    Troponin T 17 6 - 19 ng/L   URINALYSIS CULTURE, IF INDICATED    Collection Time: 02/06/25  2:25 AM    Specimen: Urine, Clean Catch   Result Value Ref Range    Color Yellow     Character Cloudy (A)     Specific Gravity 1.022 <1.035    Ph 6.5 5.0 - 8.0    Glucose Negative Negative mg/dL    Ketones Trace (A) Negative mg/dL    Protein Negative Negative mg/dL    Bilirubin Negative Negative    Urobilinogen, Urine 1.0 <=1.0 EU/dL    Nitrite Negative Negative    Leukocyte Esterase Large (A) Negative    Occult Blood Negative Negative    Micro Urine Req Microscopic    URINE MICROSCOPIC (W/UA)    Collection Time: 02/06/25  2:25 AM   Result Value Ref Range    WBC >100 (A) /hpf    RBC 0-2 0 - 2 /hpf    Bacteria Many (A) None /hpf    Epithelial Cells 0-2 0 - 5 /hpf    Urine Casts 0-2 0 - 2 /lpf   URINE CULTURE(NEW)    Collection Time: 02/06/25  2:41 AM    Specimen: Urine   Result Value Ref Range    Significant Indicator NEG     Source UR     Site -     Culture Result -    EKG (NOW)    Collection Time: 02/06/25   2:52 AM   Result Value Ref Range    Report       [facility] Emergency Dept.    Test Date:  2025  Pt Name:    SADI GUERRERO                Department: ER  MRN:        [id number]                      Room:        15  Gender:     Male                         Technician: [id number]  :        1960                   Requested By:CHARU GALVEZ  Order #:    [id number]                    Reading MD: Charu Galvez    Measurements  Intervals                                Axis  Rate:       69                           P:          71  AZ:         137                          QRS:        71  QRSD:       91                           T:          59  QT:         423  QTc:        454    Interpretive Statements  Sinus rhythm  Compared to ECG 2025 12:22:14  T-wave abnormality no longer present  Electronically Signed On 2025 02:52:10 PST by Charu Galvez         I have independently interpreted this EKG    RADIOLOGY/PROCEDURES   I have independently interpreted the diagnostic imaging associated with this visit and am waiting the final reading from the radiologist.   My preliminary interpretation is as follows: No focal opacities noted.    Radiologist interpretation:  US-EXTREMITY VENOUS LOWER BILAT   Final Result      DX-CHEST-PORTABLE (1 VIEW)   Final Result         1.  Interstitial pulmonary parenchymal prominence suggest chronic underlying lung disease, component of interstitial edema and/or infiltrates not excluded particularly in the right lower lobe.          COURSE & MEDICAL DECISION MAKING    ASSESSMENT, COURSE AND PLAN  Care Narrative: This is a 64-year-old male presenting to the emergency department for evaluation of leg swelling.  On initial evaluation, the patient did not appear to be in any acute distress.  Vital signs were normal and reassuring.  Physical exam was notable for 2+ pitting edema to bilateral lower extremities.  His exam was otherwise unremarkable.    Patient's white blood cell  count was minimally elevated at 10.9.  He had no history of fevers.  No neutrophil predominance was noted.  I am less concerned for sepsis.    EKG did not show any evidence of acute ischemia.  Troponins x 2 were ordered and unchanged.  He also denied any chest pain, shortness of breath, or other anginal equivalents.  His heart score is 3.  I am less concerned for ACS at this point.    Chest x-ray did not show any evidence of focal opacity concerning for bacterial pneumonia.  His BNP was only minimally elevated at 151 and no obvious pulmonary edema was noted on chest x-ray.  I am less concerned for heart failure.    He had no evidence of DVT on Dopplers of bilateral lower extremities.    Electrolytes were completely without derangement and renal function was normal.    Urinalysis had greater than 100 WBCs and many bacteria.  He had no flank pain concern for pyelonephritis.  I did review his previous urine culture which revealed MSSA.  It did not appear that he went home on antibiotics when he was discharged from the hospital.  He was given a dose of ceftriaxone here in the ED and a prescription for Bactrim was sent to the pharmacy.    I do think he is stable for discharge at this time as he does not appear septic.  Repeat vital signs are normal.  He is comfortable and has tolerated an oral challenge.  I strongly encouraged him to follow-up and he understands to return immediately to the ED with any worsening signs or symptoms.    ADDITIONAL PROBLEMS MANAGED  UTI    DISPOSITION AND DISCUSSIONS  I have discussed management of the patient with the following physicians and LEA's:  None    Discussion of management with other [facility] or appropriate source(s): None     Escalation of care considered, and ultimately not performed:acute inpatient care management, however at this time, the patient is most appropriate for outpatient management    Barriers to care at this time, including but not limited to:  None .     Decision tools  and prescription drugs considered including, but not limited to: Antibiotics Bactrim .    FINAL IMPRESSION  1. Peripheral edema    2. Urinary tract infection without hematuria, site unspecified      PRESCRIPTIONS  New Prescriptions    SULFAMETHOXAZOLE-TRIMETHOPRIM (BACTRIM DS) 800-160 MG TABLET    Take 1 Tablet by mouth 2 times a day for 7 days.     FOLLOW UP  [facility], Emergency Dept  [address] [address]  [phone]  Go to   As needed    -DISCHARGE-    Electronically signed by: Charu Tarango D.O., 2/5/2025 10:24 PM

## 2025-02-06 NOTE — ED TRIAGE NOTES
Chief Complaint   Patient presents with    Leg Swelling     BIBA from [facility]. Patient c/o bilateral leg swelling with pain that started about 3-4 days ago.        Pt has hx of bipolar and ADHD    Pt to triage via wheelchair for above complaint.  Pt is alert and oriented, speaking in full sentences, follows commands and responds appropriately to questions. Not in any apparent distress. Respirations are even and unlabored.  Pt educated on triage process. Pt encouraged to alert staff for any changes.

## 2025-02-08 LAB
BACTERIA UR CULT: ABNORMAL
BACTERIA UR CULT: ABNORMAL
SIGNIFICANT IND 70042: ABNORMAL
SITE SITE: ABNORMAL
SOURCE SOURCE: ABNORMAL

## 2025-02-08 NOTE — ED NOTES
"ED Positive Culture Follow-up/Notification Note:    Date: 2/8/2025     Patient seen in the ED on 2/5/2025 for leg swelling.  Patient reports coming to the ED because he was tired with nowhere to sleep and has swelling in his bilateral lower extremities.   Patient denies any urinary signs or symptoms.      1. Peripheral edema    2. Urinary tract infection without hematuria, site unspecified       Discharge Medication List as of 2/6/2025  3:50 AM        START taking these medications    Details   sulfamethoxazole-trimethoprim (BACTRIM DS) 800-160 MG tablet Take 1 Tablet by mouth 2 times a day for 7 days., Disp-14 Tablet, R-0, Normal             Allergies: Paroxetine and Shellfish allergy     Vitals:    02/05/25 1943 02/06/25 0000 02/06/25 0104 02/06/25 0226   BP:  107/62 90/51 117/69   Pulse:  80 82 85   Resp:       Temp:       TempSrc:       SpO2:  96% 90% 93%   Weight: 87.9 kg (193 lb 12.6 oz)      Height: 1.778 m (5' 10\")          Final cultures:   Results       Procedure Component Value Units Date/Time    URINE CULTURE(NEW) [[id number]]  (Abnormal)  (Susceptibility) Collected: 02/06/25 0225    Order Status: Completed Specimen: Urine Updated: 02/08/25 0812     Significant Indicator POS     Source UR     Site -     Culture Result -      Enterococcus faecalis  >100,000 cfu/mL      Susceptibility       Enterococcus faecalis (1)       Antibiotic Interpretation Microscan   Method Status    Ampicillin Sensitive <=2 mcg/mL RUBI Final    Ciprofloxacin  [*]  Sensitive <=1 mcg/mL RUBI Final    Daptomycin Sensitive 2 mcg/mL RUBI Final    Nitrofurantoin Sensitive <=32 mcg/mL RUBI Final    Gent Synergy  [*]  Sensitive <=500 mcg/mL RUBI Final    Levofloxacin  [*]  Sensitive <=1 mcg/mL RUBI Final    Linezolid  [*]  Sensitive 2 mcg/mL RUBI Final    Penicillin  [*]  Sensitive 2 mcg/mL RUBI Final    Rifampin  [*]  Resistant >2 mcg/mL RUBI Final    Strep Synergy  [*]  Sensitive <=1000 mcg/mL RUBI Final    Tetracycline Sensitive <=4 mcg/mL RUBI " Final    Tigecycline  [*]  Sensitive <=0.25 mcg/mL RUBI Final    Vancomycin Sensitive 2 mcg/mL RUBI Final               [*]  Suppressed Antibiotic                   URINALYSIS CULTURE, IF INDICATED [[id number]]  (Abnormal) Collected: 02/06/25 0225    Order Status: Completed Specimen: Urine, Clean Catch Updated: 02/06/25 0241     Color Yellow     Character Cloudy     Specific Gravity 1.022     Ph 6.5     Glucose Negative mg/dL      Ketones Trace mg/dL      Protein Negative mg/dL      Bilirubin Negative     Urobilinogen, Urine 1.0 EU/dL      Nitrite Negative     Leukocyte Esterase Large     Occult Blood Negative     Micro Urine Req Microscopic            Plan:   Urine culture is positive for enterococcus faecalis. E faecalis in the urine is a common urogenital colonizer and not likely a true urinary pathogen.  No need to treat with antimicrobials at this time.        Miguel Pizano, PharmD

## 2025-02-11 ENCOUNTER — PATIENT OUTREACH (OUTPATIENT)
Dept: HEALTH INFORMATION MANAGEMENT | Facility: OTHER | Age: 65
End: 2025-02-11
Payer: MEDICARE

## 2025-02-26 ENCOUNTER — HOSPITAL ENCOUNTER (EMERGENCY)
Facility: MEDICAL CENTER | Age: 65
End: 2025-02-26
Attending: EMERGENCY MEDICINE
Payer: MEDICARE

## 2025-02-26 VITALS
HEART RATE: 92 BPM | TEMPERATURE: 97.1 F | WEIGHT: 190.26 LBS | HEIGHT: 70 IN | OXYGEN SATURATION: 98 % | BODY MASS INDEX: 27.24 KG/M2 | SYSTOLIC BLOOD PRESSURE: 124 MMHG | RESPIRATION RATE: 18 BRPM | DIASTOLIC BLOOD PRESSURE: 83 MMHG

## 2025-02-26 DIAGNOSIS — Z76.0 MEDICATION REFILL: ICD-10-CM

## 2025-02-26 DIAGNOSIS — F41.9 ANXIETY: ICD-10-CM

## 2025-02-26 PROCEDURE — 99282 EMERGENCY DEPT VISIT SF MDM: CPT

## 2025-02-26 RX ORDER — LORAZEPAM 1 MG/1
TABLET ORAL
Status: ON HOLD | COMMUNITY

## 2025-02-26 RX ORDER — DIVALPROEX SODIUM 500 MG/1
500 TABLET, DELAYED RELEASE ORAL 2 TIMES DAILY
Qty: 90 TABLET | Refills: 0 | Status: ON HOLD | OUTPATIENT
Start: 2025-02-26

## 2025-02-26 RX ORDER — HYDROXYZINE HYDROCHLORIDE 25 MG/1
25 TABLET, FILM COATED ORAL 3 TIMES DAILY PRN
Qty: 30 TABLET | Refills: 0 | Status: SHIPPED | OUTPATIENT
Start: 2025-02-26 | End: 2025-03-30

## 2025-02-26 RX ORDER — HYDROCORTISONE 10 MG/1
25 TABLET ORAL DAILY
Status: SHIPPED | COMMUNITY
Start: 2025-02-01 | End: 2025-03-30

## 2025-02-26 ASSESSMENT — FIBROSIS 4 INDEX: FIB4 SCORE: 1.09

## 2025-02-26 NOTE — ED NOTES
Pharmacy Medication Reconciliation      ~Medication reconciliation updated and complete per patient home pharmacy   ~No oral ABX within the last 30 days  ~Is dispense history available: yes    ~Patient home pharmacy :  [facility]       ~Anticoagulants (rivaroxaban, apixaban, edoxaban, dabigatran, warfarin, enoxaparin) taken in the last 14 days? No

## 2025-02-26 NOTE — ED TRIAGE NOTES
"Chief Complaint   Patient presents with    Other     \" I have smoke intolerance for the smoke outside and I think my knee is getting old\"      /87   Pulse 89   Temp 36 °C (96.8 °F) (Temporal)   Resp 20   Ht 1.778 m (5' 10\")   Wt 86.3 kg (190 lb 4.1 oz)   SpO2 98%   BMI 27.30 kg/m²     Pt is alert/oriented and follows commands. Pt speaking in full sentences and responds appropriately to questions. No acute distress noted in triage and respirations are even and unlabored.      Pt placed in lobby and educated on triage process. Pt encouraged to alert staff for any changes in condition.    "

## 2025-02-27 ENCOUNTER — HOSPITAL ENCOUNTER (EMERGENCY)
Facility: MEDICAL CENTER | Age: 65
End: 2025-02-27
Attending: EMERGENCY MEDICINE
Payer: MEDICARE

## 2025-02-27 ENCOUNTER — APPOINTMENT (OUTPATIENT)
Dept: URGENT CARE | Facility: CLINIC | Age: 65
End: 2025-02-27
Payer: MEDICARE

## 2025-02-27 VITALS
WEIGHT: 198.63 LBS | RESPIRATION RATE: 14 BRPM | OXYGEN SATURATION: 92 % | HEART RATE: 88 BPM | DIASTOLIC BLOOD PRESSURE: 76 MMHG | SYSTOLIC BLOOD PRESSURE: 124 MMHG | HEIGHT: 70 IN | BODY MASS INDEX: 28.44 KG/M2 | TEMPERATURE: 98.2 F

## 2025-02-27 DIAGNOSIS — T69.029A TRENCH FOOT, UNSPECIFIED LATERALITY, INITIAL ENCOUNTER: ICD-10-CM

## 2025-02-27 PROCEDURE — 99282 EMERGENCY DEPT VISIT SF MDM: CPT

## 2025-02-27 ASSESSMENT — FIBROSIS 4 INDEX: FIB4 SCORE: 1.09

## 2025-02-28 NOTE — ED PROVIDER NOTES
ER Provider Note    Scribed for Joel Mirza M.d. by Maryann Rosales. 2/27/2025  7:11 PM    Primary Care Provider: None pertinent     CHIEF COMPLAINT   Chief Complaint   Patient presents with    Foot Pain     Pt reports chronic bilateral foot pain.      EXTERNAL RECORDS REVIEWED  Care everywhere Patient seen yesterday here in the ED for medication refill.    HPI/ROS  LIMITATION TO HISTORY   Select: : None  OUTSIDE HISTORIAN(S):  None    Brian Durham is a 64 y.o. male who presents to the ED for evaluation of chronic bilateral foot pain. Patient needs new shoes, he is a size 10. Denies fever, alcohol use, drug use, or history of diabetes mellitus. No alleviating or exacerbating factors noted.     PAST MEDICAL HISTORY  Past Medical History:   Diagnosis Date    ADHD     Arthritis     Cold 01/01/2012    2 weeks ago    Degenerative disc disease     Emphysema of lung (HCC) 1/24/2025    Encephalopathy acute 1/25/2025    Hepatitis B     Pain 12/30/2011    neck, 6/10    Psychiatric disorder     bipolar, ADHD    Schizophrenia (HCC)        SURGICAL HISTORY  Past Surgical History:   Procedure Laterality Date    CERVICAL DISK AND FUSION ANTERIOR  1/16/2012    Performed by REMY FRANKS at SUR[facility] ORS    OTHER      benign tumor removed from chest       FAMILY HISTORY  None noted     SOCIAL HISTORY   reports that he has been smoking cigarettes. He has never used smokeless tobacco. He reports that he does not currently use alcohol. He reports that he does not use drugs.    CURRENT MEDICATIONS  Previous Medications    AMPHETAMINE-DEXTROAMPHETAMINE (ADDERALL) 20 MG TAB    Take 20 mg by mouth 2 times a day.    CLOTRIMAZOLE (LOTRIMIN) 1 % CREAM    Apply 1 Application topically 2 times a day.    DIVALPROEX (DEPAKOTE) 500 MG TABLET DELAYED RESPONSE    Take 1 Tablet by mouth 2 times a day.    HYDROCORTISONE (CORTEF) 10 MG TAB    Take 25 mg by mouth every day. 2 days    HYDROXYZINE HCL (ATARAX) 25 MG TAB    Take 1  "Tablet by mouth 3 times a day as needed for Anxiety.    LORAZEPAM (ATIVAN) 1 MG TAB    Take 3 mg by mouth every day.    LUMATEPERONE TOSYLATE (CAPLYTA) 42 MG CAP    Take 42 mg by mouth every day.       ALLERGIES  Paroxetine and Shellfish allergy    PHYSICAL EXAM  /71   Pulse 99   Temp 36.9 °C (98.5 °F) (Oral)   Resp 20   Ht 1.778 m (5' 10\")   Wt 90.1 kg (198 lb 10.2 oz)   SpO2 95%   BMI 28.50 kg/m²   Constitutional: Alert in no apparent distress.Resting comfortably.   HENT: Normocephalic, Atraumatic, Bilateral external ears normal. Nose normal.   Eyes: Pupils are equal and reactive. Conjunctiva normal, non-icteric.   Heart: Regular rate and rhythm, no murmurs.    Lungs: Clear to auscultation bilaterally.  Extremities: No crepitus.  Skin: Warm, Dry, No erythema, No rash.   Neurologic: Alert, Grossly non-focal.   Psychiatric: Affect normal, Judgment normal, Mood normal, Appears appropriate and not intoxicated.               COURSE & MEDICAL DECISION MAKING     ASSESSMENT, COURSE AND PLAN  Care Narrative: 64 y.o. M p/w CC of foot pain    7:13 PM - Patient seen and examined at bedside. Discussed plan of care, including allowing patient time to improve with rest and a shower. Patient agrees to the plan of care.     9:29 PM   - I reevaluated the patient at bedside. The patient informs me they feel improved. I discussed plan for discharge and follow up as outlined below. The patient is stable for discharge at this time and will return for any new or worsening symptoms. Patient verbalizes understanding and support with my plan for discharge.   Patient with trench foot from poor fitting shoes and cold and water exposure  No evidence of infection or crepitus at this time  Numerous blisters in various states of healing present  Poor hygiene present as well    I counseled patient that I was glad to see him in which she would come sooner for this  We will outpatient to air dry his feet for approximately greater than " 4 hours  Patient was offered shower and food  Patient offered new shoes if available and new socks  Patient will follow-up with outpatient provider for wound recheck within 3 days or return to the emergency department  DISPOSITION AND DISCUSSIONS  I have discussed management of the patient with the following physicians and LEA's:  None    Discussion of management with other [facility] or appropriate source(s): None     The patient will return for new or worsening symptoms and is stable at the time of discharge.    DISPOSITION:  Patient will be discharged home in stable condition.    FOLLOW UP:  [facility], Emergency Dept  [address] [id number] [phone]    If symptoms worsen    [facility]  [address] [facility] [address]  [phone]  In 3 days  For wound re-check      OUTPATIENT MEDICATIONS:  New Prescriptions    No medications on file         FINAL DIAGNOSIS  1. Trench foot, unspecified laterality, initial encounter       IMaryann (Tayla), am scribing for, and in the presence of, Joel Mirza M.D..    Electronically signed by: Maryann Rosales (Scribe), 2/27/2025    IJoel M.D. personally performed the services described in this documentation, as scribed by Maryann Rosales in my presence, and it is both accurate and complete.       The note accurately reflects work and decisions made by me.  Joel Mirza M.D.  2/27/2025  9:30 PM

## 2025-02-28 NOTE — ED TRIAGE NOTES
"Chief Complaint   Patient presents with    Foot Pain     Pt reports chronic bilateral foot pain.        Pt to triage with steady gait for above complaint. Presents with pain to bilateral feet for \"many months\"    Pt back to lobby, educated on triage process and encourage to alert staff of any changes.     Vitals:    02/27/25 1723   BP: 116/71   Pulse: 99   Resp: 20   Temp: 36.9 °C (98.5 °F)   SpO2: 95%           "

## 2025-02-28 NOTE — ED NOTES
Bedside report received from off going RN/tech: Becki, assumed care of patient.  POC discussed with patient. Call light within reach, all needs addressed at this time.       Fall risk interventions in place: Give patient urinal if applicable and Keep floor surfaces clean and dry (all applicable per [facility] Fall risk assessment)   Continuous monitoring: Not Applicable   IVF/IV medications: Not Applicable   Oxygen: Room Air  Bedside sitter: Not Applicable   Isolation: Not Applicable        
Discharge instructions reviewed with patient and signed. They verbalized understanding of follow-up instructions. All belongings with patient. They ambulate with a steady gait.     
Pt ambulated to [facility] with steady gait. Primary RN aware. Chart up for ERP.  
Pt ambulated with a steady gait to the bathroom for a shower. Assisted by tech.  
Pt back from bathroom. New clothes given to pt.   
No

## 2025-03-20 ENCOUNTER — PHARMACY VISIT (OUTPATIENT)
Dept: PHARMACY | Facility: MEDICAL CENTER | Age: 65
End: 2025-03-20
Payer: COMMERCIAL

## 2025-03-20 ENCOUNTER — HOSPITAL ENCOUNTER (EMERGENCY)
Facility: MEDICAL CENTER | Age: 65
End: 2025-03-20
Attending: EMERGENCY MEDICINE
Payer: MEDICARE

## 2025-03-20 VITALS
DIASTOLIC BLOOD PRESSURE: 80 MMHG | HEART RATE: 75 BPM | WEIGHT: 199.3 LBS | SYSTOLIC BLOOD PRESSURE: 130 MMHG | HEIGHT: 70 IN | BODY MASS INDEX: 28.53 KG/M2 | TEMPERATURE: 98.2 F | OXYGEN SATURATION: 95 % | RESPIRATION RATE: 16 BRPM

## 2025-03-20 DIAGNOSIS — F41.9 ANXIETY: ICD-10-CM

## 2025-03-20 LAB
APPEARANCE UR: CLEAR
BILIRUB UR QL STRIP.AUTO: NEGATIVE
COLOR UR: YELLOW
GLUCOSE UR STRIP.AUTO-MCNC: NEGATIVE MG/DL
KETONES UR STRIP.AUTO-MCNC: ABNORMAL MG/DL
LEUKOCYTE ESTERASE UR QL STRIP.AUTO: NEGATIVE
MICRO URNS: ABNORMAL
NITRITE UR QL STRIP.AUTO: NEGATIVE
PH UR STRIP.AUTO: 6 [PH] (ref 5–8)
PROT UR QL STRIP: NEGATIVE MG/DL
RBC UR QL AUTO: NEGATIVE
SP GR UR STRIP.AUTO: 1.02
UROBILINOGEN UR STRIP.AUTO-MCNC: 1 EU/DL

## 2025-03-20 PROCEDURE — 99283 EMERGENCY DEPT VISIT LOW MDM: CPT

## 2025-03-20 PROCEDURE — RXMED WILLOW AMBULATORY MEDICATION CHARGE: Performed by: EMERGENCY MEDICINE

## 2025-03-20 PROCEDURE — 81003 URINALYSIS AUTO W/O SCOPE: CPT

## 2025-03-20 RX ORDER — HYDROXYZINE HYDROCHLORIDE 25 MG/1
25 TABLET, FILM COATED ORAL ONCE
Status: DISCONTINUED | OUTPATIENT
Start: 2025-03-20 | End: 2025-03-21 | Stop reason: HOSPADM

## 2025-03-20 RX ORDER — HYDROXYZINE HYDROCHLORIDE 25 MG/1
25 TABLET, FILM COATED ORAL 3 TIMES DAILY PRN
Qty: 30 TABLET | Refills: 0 | Status: ON HOLD | OUTPATIENT
Start: 2025-03-20

## 2025-03-20 ASSESSMENT — FIBROSIS 4 INDEX: FIB4 SCORE: 1.09

## 2025-03-21 NOTE — ED NOTES
Discharge instructions reviewed with patient and signed. They verbalized understanding of follow up instructions. All belongings with patient. They were instructed on how to  prescriptions. They ambulated with a steady gait to [facility].

## 2025-03-21 NOTE — ED TRIAGE NOTES
"Pt ambulated into triage with c/o \"I keep on being shocked in my penis\". Pt also sts \"I am very dehydrated\".  Pt in NAD at this time. Pt is A&O and ambulatory. Placed in lobby pending ER room.   "

## 2025-03-21 NOTE — ED NOTES
Pt refused to wait for medication to come from central pharmacy. Pt took discharged papers and left.

## 2025-03-21 NOTE — ED PROVIDER NOTES
"ER Provider Note    Scribed for Vishal Hendrix D.O. by Tiffany Solis. 3/20/2025  9:30 PM    Primary Care Provider: Pcp Pt States None    CHIEF COMPLAINT  Chief Complaint   Patient presents with    Other     Being shocked in penis       HPI/ROS  LIMITATION TO HISTORY   None    OUTSIDE HISTORIAN(S):  None    Brian Durham is a 64 y.o. male who presents to the Emergency Department for anxiety. The patient explains they have panic attacks and have a history of PTSD. However, they note they haven't been taking their medication for PTSD, stating they left it somewhere. Patient reported to triage they have sharp penis pain, but at bedside they deny having penis pain, discomfort or swelling. They note they did have penis pain yesterday but states their symptoms have since alleviated. However, they state they \"can not urinate.\" No alleviating or exacerbating factors noted.     PAST MEDICAL HISTORY  Past Medical History:   Diagnosis Date    ADHD     Arthritis     Cold 01/01/2012    2 weeks ago    Degenerative disc disease     Emphysema of lung (HCC) 1/24/2025    Encephalopathy acute 1/25/2025    Hepatitis B     Pain 12/30/2011    neck, 6/10    Psychiatric disorder     bipolar, ADHD    Schizophrenia (HCC)        SURGICAL HISTORY  Past Surgical History:   Procedure Laterality Date    CERVICAL DISK AND FUSION ANTERIOR  1/16/2012    Performed by REMY FRANKS at SURGERY [facility] ORS    OTHER      benign tumor removed from chest       FAMILY HISTORY  History reviewed. No pertinent family history.    SOCIAL HISTORY   reports that he has been smoking cigarettes. He has never used smokeless tobacco. He reports that he does not currently use alcohol. He reports that he does not use drugs.    CURRENT MEDICATIONS  Discharge Medication List as of 3/20/2025 11:01 PM        CONTINUE these medications which have NOT CHANGED    Details   LORazepam (ATIVAN) 1 MG Tab Take 3 mg by mouth every day., Historical Med    " "  hydrocortisone (CORTEF) 10 MG Tab Take 25 mg by mouth every day. 2 days, Historical Med      divalproex (DEPAKOTE) 500 MG Tablet Delayed Response Take 1 Tablet by mouth 2 times a day., Disp-90 Tablet, R-0, Normal      !! hydrOXYzine HCl (ATARAX) 25 MG Tab Take 1 Tablet by mouth 3 times a day as needed for Anxiety., Disp-30 Tablet, R-0, Normal      Lumateperone Tosylate (CAPLYTA) 42 MG Cap Take 42 mg by mouth every day., Historical Med      amphetamine-dextroamphetamine (ADDERALL) 20 MG Tab Take 20 mg by mouth 2 times a day., Historical Med      clotrimazole (LOTRIMIN) 1 % Cream Apply 1 Application topically 2 times a day., Historical Med       !! - Potential duplicate medications found. Please discuss with provider.          ALLERGIES  Paroxetine and Shellfish allergy    PHYSICAL EXAM  /82   Pulse 81   Temp 36.7 °C (98 °F) (Temporal)   Resp 18   Ht 1.778 m (5' 10\")   Wt 90.4 kg (199 lb 4.7 oz)   SpO2 94%   BMI 28.60 kg/m²     General: No acute distress.   HENT: Normocephalic, Mucus membranes are moist.   Chest: Lungs have even and unlabored respirations, Clear to auscultation.   Cardiovascular: Regular rate and regular rhythm, No peripheral cyanosis.  Abdomen: Non distended.  Neuro: Awake, Conversive, Able to relay recent events.  Psychiatric: Calm and cooperative. Mildly anxious, no suicidal or homicidal thoughts.     INITIAL ASSESSMENT  Patient's initial complaints was sharp pain to the penis but says it hasn't happened since yesterday. Complaining of some urinary tract symptoms, will evaluate for UTI. Patient states to me that his primary complaints is anxiety. He has been off his PTSD medication, stating he has lost it. Will treat anxiety with non-controlled substances.     ED Observation Status? No; Patient does not meet criteria for ED Observation.     DIAGNOSTIC STUDIES    Labs:   Results for orders placed or performed during the hospital encounter of 03/20/25   URINALYSIS,CULTURE IF INDICATED "    Collection Time: 03/20/25 10:30 PM    Specimen: Urine, Clean Catch   Result Value Ref Range    Color Yellow     Character Clear     Specific Gravity 1.024 <1.035    Ph 6.0 5.0 - 8.0    Glucose Negative Negative mg/dL    Ketones Trace (A) Negative mg/dL    Protein Negative Negative mg/dL    Bilirubin Negative Negative    Urobilinogen, Urine 1.0 <=1.0 EU/dL    Nitrite Negative Negative    Leukocyte Esterase Negative Negative    Occult Blood Negative Negative    Micro Urine Req see below      COURSE & MEDICAL DECISION MAKING     COURSE AND PLAN  9:30 PM - Patient seen and examined at bedside. Discussed plan of care, including lab. Patient agrees to the plan of care. Ordered for UA to evaluate his symptoms.     11:26 PM - I reassessed the patient. Vital signs were reassuring. Patient had the opportunity to ask any questions. The plan for discharge was discussed with them and they were told to return for any new or worsening symptoms. He was also informed of the plans for follow up. Patient is understanding and agreeable to the plan for discharge.    ED Summary: The patient has complaints of some dysuria, he complains of anxiety history of PEs TSD and lost his medications.  His records show that is here frequently for a different pain complaints.  The patient was medicated with hydroxyzine for his anxiety and he is stable for discharge home with follow-up with the primary doctor and to see as primary prescriber for refill of his medications that he has lost    The patient will return for new or worsening symptoms and is stable at the time of discharge.    The patient is referred to a primary physician for blood pressure management, diabetic screening, and for all other preventative health concerns.    DISPOSITION:  Patient will be discharged home in stable condition.    FOLLOW UP:  [facility]  [address] [place] [address] [address] [place] [id number]  [phone]  In 1 week      OUTPATIENT MEDICATIONS:  Discharge  Medication List as of 3/20/2025 11:01 PM        START taking these medications    Details   !! hydrOXYzine HCl (ATARAX) 25 MG Tab Take 1 Tablet by mouth 3 times a day as needed for Itching., Disp-30 Tablet, R-0, Normal       !! - Potential duplicate medications found. Please discuss with provider.        FINAL DIAGNOSIS  1. Anxiety      ITiffany (Scribe), am scribing for, and in the presence of, Vishal Hendrix D.O..    Electronically signed by: Tiffany Solis (Scribe), 3/20/2025    Vishal ULLOA D.O. personally performed the services described in this documentation, as scribed by Tiffany Solis in my presence, and it is both accurate and complete.     The note accurately reflects work and decisions made by me.  Vishal Hendrix D.O.  3/21/2025  12:01 AM

## 2025-03-21 NOTE — DISCHARGE INSTRUCTIONS
Use hydroxyzine to assist with your anxiety.  See your primary provider as soon as possible to get the rest your medications evaluated and represcribed.  Please follow-up with a primary care physician

## 2025-03-30 ENCOUNTER — APPOINTMENT (OUTPATIENT)
Dept: RADIOLOGY | Facility: MEDICAL CENTER | Age: 65
DRG: 062 | End: 2025-03-30
Attending: STUDENT IN AN ORGANIZED HEALTH CARE EDUCATION/TRAINING PROGRAM
Payer: MEDICARE

## 2025-03-30 ENCOUNTER — HOSPITAL ENCOUNTER (INPATIENT)
Facility: MEDICAL CENTER | Age: 65
End: 2025-03-30
Attending: STUDENT IN AN ORGANIZED HEALTH CARE EDUCATION/TRAINING PROGRAM | Admitting: INTERNAL MEDICINE
Payer: MEDICARE

## 2025-03-30 DIAGNOSIS — Z91.89 AT RISK FOR CONSTIPATION: ICD-10-CM

## 2025-03-30 DIAGNOSIS — R47.01 EXPRESSIVE APHASIA: ICD-10-CM

## 2025-03-30 DIAGNOSIS — I72.0 CAROTID ANEURYSM, LEFT (HCC): ICD-10-CM

## 2025-03-30 DIAGNOSIS — Z59.00 HOMELESSNESS UNSPECIFIED: ICD-10-CM

## 2025-03-30 DIAGNOSIS — Z76.0 MEDICATION REFILL: ICD-10-CM

## 2025-03-30 DIAGNOSIS — R53.81 DEBILITY: ICD-10-CM

## 2025-03-30 DIAGNOSIS — I63.9 ACUTE CVA (CEREBROVASCULAR ACCIDENT) (HCC): ICD-10-CM

## 2025-03-30 DIAGNOSIS — R47.1 DYSARTHRIA: Primary | ICD-10-CM

## 2025-03-30 DIAGNOSIS — F20.9 SCHIZOPHRENIA, UNSPECIFIED TYPE (HCC): ICD-10-CM

## 2025-03-30 LAB
ABO GROUP BLD: NORMAL
ALBUMIN SERPL BCP-MCNC: 4.1 G/DL (ref 3.2–4.9)
ALBUMIN/GLOB SERPL: 1.5 G/DL
ALP SERPL-CCNC: 69 U/L (ref 30–99)
ALT SERPL-CCNC: 13 U/L (ref 2–50)
ANION GAP SERPL CALC-SCNC: 12 MMOL/L (ref 7–16)
APTT PPP: 26.5 SEC (ref 24.7–36)
AST SERPL-CCNC: 15 U/L (ref 12–45)
BASOPHILS # BLD AUTO: 0.6 % (ref 0–1.8)
BASOPHILS # BLD: 0.04 K/UL (ref 0–0.12)
BILIRUB SERPL-MCNC: 0.4 MG/DL (ref 0.1–1.5)
BLD GP AB SCN SERPL QL: NORMAL
BUN SERPL-MCNC: 15 MG/DL (ref 8–22)
CALCIUM ALBUM COR SERPL-MCNC: 8.7 MG/DL (ref 8.5–10.5)
CALCIUM SERPL-MCNC: 8.8 MG/DL (ref 8.5–10.5)
CHLORIDE SERPL-SCNC: 106 MMOL/L (ref 96–112)
CO2 SERPL-SCNC: 22 MMOL/L (ref 20–33)
CREAT SERPL-MCNC: 0.85 MG/DL (ref 0.5–1.4)
EKG IMPRESSION: NORMAL
EOSINOPHIL # BLD AUTO: 0.29 K/UL (ref 0–0.51)
EOSINOPHIL NFR BLD: 4.5 % (ref 0–6.9)
ERYTHROCYTE [DISTWIDTH] IN BLOOD BY AUTOMATED COUNT: 46 FL (ref 35.9–50)
ETHANOL BLD-MCNC: <10.1 MG/DL
EXPOSED MRN EXMRN: NORMAL
EXPOSED MRN EXMRN: NORMAL
GFR SERPLBLD CREATININE-BSD FMLA CKD-EPI: 98 ML/MIN/1.73 M 2
GLOBULIN SER CALC-MCNC: 2.7 G/DL (ref 1.9–3.5)
GLUCOSE SERPL-MCNC: 97 MG/DL (ref 65–99)
HBV SURFACE AG SER QL: NORMAL
HCT VFR BLD AUTO: 38.8 % (ref 42–52)
HCV AB SER QL: NORMAL
HGB BLD-MCNC: 12.9 G/DL (ref 14–18)
HIV 1+2 AB+HIV1 P24 AG SERPL QL IA: NORMAL
IMM GRANULOCYTES # BLD AUTO: 0.06 K/UL (ref 0–0.11)
IMM GRANULOCYTES NFR BLD AUTO: 0.9 % (ref 0–0.9)
INR PPP: 0.96 (ref 0.87–1.13)
LYMPHOCYTES # BLD AUTO: 2.61 K/UL (ref 1–4.8)
LYMPHOCYTES NFR BLD: 40.2 % (ref 22–41)
MAGNESIUM SERPL-MCNC: 2 MG/DL (ref 1.5–2.5)
MCH RBC QN AUTO: 32.5 PG (ref 27–33)
MCHC RBC AUTO-ENTMCNC: 33.2 G/DL (ref 32.3–36.5)
MCV RBC AUTO: 97.7 FL (ref 81.4–97.8)
MONOCYTES # BLD AUTO: 0.88 K/UL (ref 0–0.85)
MONOCYTES NFR BLD AUTO: 13.5 % (ref 0–13.4)
NEUTROPHILS # BLD AUTO: 2.62 K/UL (ref 1.82–7.42)
NEUTROPHILS NFR BLD: 40.3 % (ref 44–72)
NRBC # BLD AUTO: 0 K/UL
NRBC BLD-RTO: 0 /100 WBC (ref 0–0.2)
PLATELET # BLD AUTO: 237 K/UL (ref 164–446)
PMV BLD AUTO: 8.4 FL (ref 9–12.9)
POTASSIUM SERPL-SCNC: 4.3 MMOL/L (ref 3.6–5.5)
PROT SERPL-MCNC: 6.8 G/DL (ref 6–8.2)
PROTHROMBIN TIME: 12.8 SEC (ref 12–14.6)
RBC # BLD AUTO: 3.97 M/UL (ref 4.7–6.1)
RH BLD: NORMAL
SODIUM SERPL-SCNC: 140 MMOL/L (ref 135–145)
TROPONIN T SERPL-MCNC: 14 NG/L (ref 6–19)
WBC # BLD AUTO: 6.5 K/UL (ref 4.8–10.8)

## 2025-03-30 PROCEDURE — 84484 ASSAY OF TROPONIN QUANT: CPT

## 2025-03-30 PROCEDURE — 700111 HCHG RX REV CODE 636 W/ 250 OVERRIDE (IP): Mod: UD | Performed by: STUDENT IN AN ORGANIZED HEALTH CARE EDUCATION/TRAINING PROGRAM

## 2025-03-30 PROCEDURE — 70450 CT HEAD/BRAIN W/O DYE: CPT

## 2025-03-30 PROCEDURE — 82077 ASSAY SPEC XCP UR&BREATH IA: CPT

## 2025-03-30 PROCEDURE — 99223 1ST HOSP IP/OBS HIGH 75: CPT | Mod: AI | Performed by: PSYCHIATRY & NEUROLOGY

## 2025-03-30 PROCEDURE — 85025 COMPLETE CBC W/AUTO DIFF WBC: CPT

## 2025-03-30 PROCEDURE — 99291 CRITICAL CARE FIRST HOUR: CPT

## 2025-03-30 PROCEDURE — 0042T CT-CEREBRAL PERFUSION ANALYSIS: CPT

## 2025-03-30 PROCEDURE — 70496 CT ANGIOGRAPHY HEAD: CPT

## 2025-03-30 PROCEDURE — 71045 X-RAY EXAM CHEST 1 VIEW: CPT

## 2025-03-30 PROCEDURE — 307059 PAD,EAR PROTECTOR: Performed by: INTERNAL MEDICINE

## 2025-03-30 PROCEDURE — 93005 ELECTROCARDIOGRAM TRACING: CPT | Mod: TC | Performed by: STUDENT IN AN ORGANIZED HEALTH CARE EDUCATION/TRAINING PROGRAM

## 2025-03-30 PROCEDURE — 36415 COLL VENOUS BLD VENIPUNCTURE: CPT

## 2025-03-30 PROCEDURE — 99291 CRITICAL CARE FIRST HOUR: CPT | Performed by: INTERNAL MEDICINE

## 2025-03-30 PROCEDURE — 770022 HCHG ROOM/CARE - ICU (200)

## 2025-03-30 PROCEDURE — 94760 N-INVAS EAR/PLS OXIMETRY 1: CPT

## 2025-03-30 PROCEDURE — 86850 RBC ANTIBODY SCREEN: CPT

## 2025-03-30 PROCEDURE — 96374 THER/PROPH/DIAG INJ IV PUSH: CPT

## 2025-03-30 PROCEDURE — 37195 THROMBOLYTIC THERAPY STROKE: CPT

## 2025-03-30 PROCEDURE — 83735 ASSAY OF MAGNESIUM: CPT

## 2025-03-30 PROCEDURE — 700117 HCHG RX CONTRAST REV CODE 255: Performed by: STUDENT IN AN ORGANIZED HEALTH CARE EDUCATION/TRAINING PROGRAM

## 2025-03-30 PROCEDURE — 3E03317 INTRODUCTION OF OTHER THROMBOLYTIC INTO PERIPHERAL VEIN, PERCUTANEOUS APPROACH: ICD-10-PCS | Performed by: PSYCHIATRY & NEUROLOGY

## 2025-03-30 PROCEDURE — 70498 CT ANGIOGRAPHY NECK: CPT

## 2025-03-30 PROCEDURE — 85610 PROTHROMBIN TIME: CPT

## 2025-03-30 PROCEDURE — 86901 BLOOD TYPING SEROLOGIC RH(D): CPT

## 2025-03-30 PROCEDURE — 700111 HCHG RX REV CODE 636 W/ 250 OVERRIDE (IP): Mod: UD,TB | Performed by: PSYCHIATRY & NEUROLOGY

## 2025-03-30 PROCEDURE — 86900 BLOOD TYPING SEROLOGIC ABO: CPT

## 2025-03-30 PROCEDURE — 85730 THROMBOPLASTIN TIME PARTIAL: CPT

## 2025-03-30 PROCEDURE — 80053 COMPREHEN METABOLIC PANEL: CPT

## 2025-03-30 RX ORDER — ACETAMINOPHEN 325 MG/1
650 TABLET ORAL EVERY 6 HOURS PRN
Status: DISCONTINUED | OUTPATIENT
Start: 2025-03-30 | End: 2025-04-03 | Stop reason: HOSPADM

## 2025-03-30 RX ORDER — INSULIN LISPRO 100 [IU]/ML
1-6 INJECTION, SOLUTION INTRAVENOUS; SUBCUTANEOUS EVERY 6 HOURS
Status: DISCONTINUED | OUTPATIENT
Start: 2025-03-31 | End: 2025-03-31

## 2025-03-30 RX ORDER — LORAZEPAM 2 MG/ML
1 INJECTION INTRAMUSCULAR ONCE
Status: COMPLETED | OUTPATIENT
Start: 2025-03-30 | End: 2025-03-30

## 2025-03-30 RX ORDER — BUPROPION HYDROCHLORIDE 150 MG/1
150 TABLET, EXTENDED RELEASE ORAL EVERY MORNING
COMMUNITY

## 2025-03-30 RX ORDER — ATORVASTATIN CALCIUM 80 MG/1
80 TABLET, FILM COATED ORAL EVERY EVENING
Status: DISCONTINUED | OUTPATIENT
Start: 2025-03-31 | End: 2025-04-03 | Stop reason: HOSPADM

## 2025-03-30 RX ORDER — LABETALOL HYDROCHLORIDE 5 MG/ML
10 INJECTION, SOLUTION INTRAVENOUS
Status: DISCONTINUED | OUTPATIENT
Start: 2025-03-30 | End: 2025-04-03 | Stop reason: HOSPADM

## 2025-03-30 RX ORDER — AMOXICILLIN 250 MG
2 CAPSULE ORAL EVERY EVENING
Status: DISCONTINUED | OUTPATIENT
Start: 2025-03-31 | End: 2025-04-03 | Stop reason: HOSPADM

## 2025-03-30 RX ORDER — POLYETHYLENE GLYCOL 3350 17 G/17G
1 POWDER, FOR SOLUTION ORAL
Status: DISCONTINUED | OUTPATIENT
Start: 2025-03-30 | End: 2025-04-03 | Stop reason: HOSPADM

## 2025-03-30 RX ORDER — HYDRALAZINE HYDROCHLORIDE 20 MG/ML
10 INJECTION INTRAMUSCULAR; INTRAVENOUS
Status: DISCONTINUED | OUTPATIENT
Start: 2025-03-30 | End: 2025-04-03 | Stop reason: HOSPADM

## 2025-03-30 RX ORDER — ACETAMINOPHEN 650 MG/1
650 SUPPOSITORY RECTAL EVERY 6 HOURS PRN
Status: DISCONTINUED | OUTPATIENT
Start: 2025-03-30 | End: 2025-04-03 | Stop reason: HOSPADM

## 2025-03-30 RX ORDER — ONDANSETRON 2 MG/ML
4 INJECTION INTRAMUSCULAR; INTRAVENOUS EVERY 4 HOURS PRN
Status: DISCONTINUED | OUTPATIENT
Start: 2025-03-30 | End: 2025-04-03 | Stop reason: HOSPADM

## 2025-03-30 RX ORDER — ONDANSETRON 4 MG/1
4 TABLET, ORALLY DISINTEGRATING ORAL EVERY 4 HOURS PRN
Status: DISCONTINUED | OUTPATIENT
Start: 2025-03-30 | End: 2025-04-03 | Stop reason: HOSPADM

## 2025-03-30 RX ORDER — DEXTROSE MONOHYDRATE 25 G/50ML
25 INJECTION, SOLUTION INTRAVENOUS
Status: DISCONTINUED | OUTPATIENT
Start: 2025-03-30 | End: 2025-03-31

## 2025-03-30 RX ORDER — DEXMEDETOMIDINE HYDROCHLORIDE 4 UG/ML
.1-1.5 INJECTION, SOLUTION INTRAVENOUS CONTINUOUS
Status: DISCONTINUED | OUTPATIENT
Start: 2025-03-30 | End: 2025-04-01

## 2025-03-30 RX ADMIN — IOHEXOL 80 ML: 350 INJECTION, SOLUTION INTRAVENOUS at 21:00

## 2025-03-30 RX ADMIN — TENECTEPLASE 25 MG: KIT at 19:38

## 2025-03-30 RX ADMIN — IOHEXOL 40 ML: 350 INJECTION, SOLUTION INTRAVENOUS at 19:45

## 2025-03-30 RX ADMIN — LORAZEPAM 1 MG: 2 INJECTION INTRAMUSCULAR; INTRAVENOUS at 19:19

## 2025-03-30 SDOH — ECONOMIC STABILITY - HOUSING INSECURITY: HOMELESSNESS UNSPECIFIED: Z59.00

## 2025-03-30 ASSESSMENT — FIBROSIS 4 INDEX
FIB4 SCORE: 1.09
FIB4 SCORE: 1.09

## 2025-03-31 ENCOUNTER — APPOINTMENT (OUTPATIENT)
Dept: RADIOLOGY | Facility: MEDICAL CENTER | Age: 65
DRG: 062 | End: 2025-03-31
Attending: NURSE PRACTITIONER
Payer: MEDICARE

## 2025-03-31 ENCOUNTER — HOSPITAL ENCOUNTER (OUTPATIENT)
Dept: RADIOLOGY | Facility: MEDICAL CENTER | Age: 65
End: 2025-03-31
Attending: NURSE PRACTITIONER

## 2025-03-31 ENCOUNTER — APPOINTMENT (OUTPATIENT)
Dept: RADIOLOGY | Facility: MEDICAL CENTER | Age: 65
DRG: 062 | End: 2025-03-31
Attending: STUDENT IN AN ORGANIZED HEALTH CARE EDUCATION/TRAINING PROGRAM
Payer: MEDICARE

## 2025-03-31 ENCOUNTER — APPOINTMENT (OUTPATIENT)
Dept: CARDIOLOGY | Facility: MEDICAL CENTER | Age: 65
DRG: 062 | End: 2025-03-31
Attending: NURSE PRACTITIONER
Payer: MEDICARE

## 2025-03-31 VITALS
OXYGEN SATURATION: 95 % | HEART RATE: 80 BPM | TEMPERATURE: 97.7 F | HEIGHT: 67 IN | RESPIRATION RATE: 19 BRPM | SYSTOLIC BLOOD PRESSURE: 111 MMHG | BODY MASS INDEX: 29.79 KG/M2 | WEIGHT: 189.82 LBS | DIASTOLIC BLOOD PRESSURE: 70 MMHG

## 2025-03-31 PROBLEM — R89.2 ABNORMAL DRUG SCREEN: Status: ACTIVE | Noted: 2025-03-31

## 2025-03-31 PROBLEM — I72.0 CAROTID ANEURYSM, LEFT (HCC): Status: ACTIVE | Noted: 2025-03-31

## 2025-03-31 LAB
ABO + RH BLD: NORMAL
ALBUMIN SERPL BCP-MCNC: 3.7 G/DL (ref 3.2–4.9)
ALBUMIN/GLOB SERPL: 1.4 G/DL
ALP SERPL-CCNC: 68 U/L (ref 30–99)
ALT SERPL-CCNC: 13 U/L (ref 2–50)
AMPHET UR QL SCN: POSITIVE
ANION GAP SERPL CALC-SCNC: 11 MMOL/L (ref 7–16)
AST SERPL-CCNC: 22 U/L (ref 12–45)
BARBITURATES UR QL SCN: NEGATIVE
BASOPHILS # BLD AUTO: 0.6 % (ref 0–1.8)
BASOPHILS # BLD: 0.05 K/UL (ref 0–0.12)
BENZODIAZ UR QL SCN: POSITIVE
BILIRUB SERPL-MCNC: 0.5 MG/DL (ref 0.1–1.5)
BUN SERPL-MCNC: 13 MG/DL (ref 8–22)
BZE UR QL SCN: NEGATIVE
CALCIUM ALBUM COR SERPL-MCNC: 8.8 MG/DL (ref 8.5–10.5)
CALCIUM SERPL-MCNC: 8.6 MG/DL (ref 8.5–10.5)
CANNABINOIDS UR QL SCN: NEGATIVE
CHLORIDE SERPL-SCNC: 106 MMOL/L (ref 96–112)
CHOLEST SERPL-MCNC: 187 MG/DL (ref 100–199)
CO2 SERPL-SCNC: 21 MMOL/L (ref 20–33)
CREAT SERPL-MCNC: 0.66 MG/DL (ref 0.5–1.4)
EOSINOPHIL # BLD AUTO: 0.31 K/UL (ref 0–0.51)
EOSINOPHIL NFR BLD: 3.8 % (ref 0–6.9)
ERYTHROCYTE [DISTWIDTH] IN BLOOD BY AUTOMATED COUNT: 46 FL (ref 35.9–50)
EST. AVERAGE GLUCOSE BLD GHB EST-MCNC: 111 MG/DL
ETHANOL BLD-MCNC: <10.1 MG/DL
FENTANYL UR QL: NEGATIVE
GFR SERPLBLD CREATININE-BSD FMLA CKD-EPI: 106 ML/MIN/1.73 M 2
GLOBULIN SER CALC-MCNC: 2.7 G/DL (ref 1.9–3.5)
GLUCOSE BLD STRIP.AUTO-MCNC: 87 MG/DL (ref 65–99)
GLUCOSE BLD STRIP.AUTO-MCNC: 90 MG/DL (ref 65–99)
GLUCOSE BLD STRIP.AUTO-MCNC: 93 MG/DL (ref 65–99)
GLUCOSE BLD STRIP.AUTO-MCNC: 97 MG/DL (ref 65–99)
GLUCOSE BLD STRIP.AUTO-MCNC: 99 MG/DL (ref 65–99)
GLUCOSE SERPL-MCNC: 86 MG/DL (ref 65–99)
HBA1C MFR BLD: 5.5 % (ref 4–5.6)
HCT VFR BLD AUTO: 38 % (ref 42–52)
HDLC SERPL-MCNC: 57 MG/DL
HGB BLD-MCNC: 12.8 G/DL (ref 14–18)
IMM GRANULOCYTES # BLD AUTO: 0.03 K/UL (ref 0–0.11)
IMM GRANULOCYTES NFR BLD AUTO: 0.4 % (ref 0–0.9)
LDLC SERPL CALC-MCNC: 111 MG/DL
LYMPHOCYTES # BLD AUTO: 2.38 K/UL (ref 1–4.8)
LYMPHOCYTES NFR BLD: 29.1 % (ref 22–41)
MAGNESIUM SERPL-MCNC: 2 MG/DL (ref 1.5–2.5)
MCH RBC QN AUTO: 32.6 PG (ref 27–33)
MCHC RBC AUTO-ENTMCNC: 33.7 G/DL (ref 32.3–36.5)
MCV RBC AUTO: 96.7 FL (ref 81.4–97.8)
METHADONE UR QL SCN: NEGATIVE
MONOCYTES # BLD AUTO: 0.93 K/UL (ref 0–0.85)
MONOCYTES NFR BLD AUTO: 11.4 % (ref 0–13.4)
NEUTROPHILS # BLD AUTO: 4.49 K/UL (ref 1.82–7.42)
NEUTROPHILS NFR BLD: 54.7 % (ref 44–72)
NRBC # BLD AUTO: 0 K/UL
NRBC BLD-RTO: 0 /100 WBC (ref 0–0.2)
OPIATES UR QL SCN: NEGATIVE
OXYCODONE UR QL SCN: NEGATIVE
PCP UR QL SCN: NEGATIVE
PHOSPHATE SERPL-MCNC: 3.9 MG/DL (ref 2.5–4.5)
PLATELET # BLD AUTO: 235 K/UL (ref 164–446)
PMV BLD AUTO: 8.3 FL (ref 9–12.9)
POTASSIUM SERPL-SCNC: 4 MMOL/L (ref 3.6–5.5)
PROPOXYPH UR QL SCN: NEGATIVE
PROT SERPL-MCNC: 6.4 G/DL (ref 6–8.2)
RBC # BLD AUTO: 3.93 M/UL (ref 4.7–6.1)
SODIUM SERPL-SCNC: 138 MMOL/L (ref 135–145)
TRIGL SERPL-MCNC: 95 MG/DL (ref 0–149)
TSH SERPL DL<=0.005 MIU/L-ACNC: 2 UIU/ML (ref 0.38–5.33)
WBC # BLD AUTO: 8.2 K/UL (ref 4.8–10.8)

## 2025-03-31 PROCEDURE — 82077 ASSAY SPEC XCP UR&BREATH IA: CPT

## 2025-03-31 PROCEDURE — 85025 COMPLETE CBC W/AUTO DIFF WBC: CPT

## 2025-03-31 PROCEDURE — 80061 LIPID PANEL: CPT

## 2025-03-31 PROCEDURE — 84443 ASSAY THYROID STIM HORMONE: CPT

## 2025-03-31 PROCEDURE — 74018 RADEX ABDOMEN 1 VIEW: CPT

## 2025-03-31 PROCEDURE — 700102 HCHG RX REV CODE 250 W/ 637 OVERRIDE(OP): Performed by: INTERNAL MEDICINE

## 2025-03-31 PROCEDURE — 99232 SBSQ HOSP IP/OBS MODERATE 35: CPT | Performed by: PSYCHIATRY & NEUROLOGY

## 2025-03-31 PROCEDURE — 70551 MRI BRAIN STEM W/O DYE: CPT

## 2025-03-31 PROCEDURE — 700105 HCHG RX REV CODE 258: Performed by: NURSE PRACTITIONER

## 2025-03-31 PROCEDURE — 97163 PT EVAL HIGH COMPLEX 45 MIN: CPT

## 2025-03-31 PROCEDURE — 700102 HCHG RX REV CODE 250 W/ 637 OVERRIDE(OP): Performed by: NURSE PRACTITIONER

## 2025-03-31 PROCEDURE — 84100 ASSAY OF PHOSPHORUS: CPT

## 2025-03-31 PROCEDURE — 71045 X-RAY EXAM CHEST 1 VIEW: CPT

## 2025-03-31 PROCEDURE — 97166 OT EVAL MOD COMPLEX 45 MIN: CPT

## 2025-03-31 PROCEDURE — A9270 NON-COVERED ITEM OR SERVICE: HCPCS | Performed by: STUDENT IN AN ORGANIZED HEALTH CARE EDUCATION/TRAINING PROGRAM

## 2025-03-31 PROCEDURE — 86900 BLOOD TYPING SEROLOGIC ABO: CPT

## 2025-03-31 PROCEDURE — 82962 GLUCOSE BLOOD TEST: CPT

## 2025-03-31 PROCEDURE — 80053 COMPREHEN METABOLIC PANEL: CPT

## 2025-03-31 PROCEDURE — 700101 HCHG RX REV CODE 250: Performed by: NURSE PRACTITIONER

## 2025-03-31 PROCEDURE — A9270 NON-COVERED ITEM OR SERVICE: HCPCS | Performed by: NURSE PRACTITIONER

## 2025-03-31 PROCEDURE — 36415 COLL VENOUS BLD VENIPUNCTURE: CPT

## 2025-03-31 PROCEDURE — 83036 HEMOGLOBIN GLYCOSYLATED A1C: CPT

## 2025-03-31 PROCEDURE — 80307 DRUG TEST PRSMV CHEM ANLYZR: CPT

## 2025-03-31 PROCEDURE — 770022 HCHG ROOM/CARE - ICU (200)

## 2025-03-31 PROCEDURE — 700102 HCHG RX REV CODE 250 W/ 637 OVERRIDE(OP): Performed by: STUDENT IN AN ORGANIZED HEALTH CARE EDUCATION/TRAINING PROGRAM

## 2025-03-31 PROCEDURE — 83735 ASSAY OF MAGNESIUM: CPT

## 2025-03-31 PROCEDURE — 86901 BLOOD TYPING SEROLOGIC RH(D): CPT

## 2025-03-31 PROCEDURE — A9270 NON-COVERED ITEM OR SERVICE: HCPCS | Performed by: INTERNAL MEDICINE

## 2025-03-31 PROCEDURE — 99291 CRITICAL CARE FIRST HOUR: CPT | Performed by: STUDENT IN AN ORGANIZED HEALTH CARE EDUCATION/TRAINING PROGRAM

## 2025-03-31 PROCEDURE — 92610 EVALUATE SWALLOWING FUNCTION: CPT

## 2025-03-31 RX ORDER — DIVALPROEX SODIUM 500 MG/1
500 TABLET, DELAYED RELEASE ORAL 2 TIMES DAILY
Status: DISCONTINUED | OUTPATIENT
Start: 2025-03-31 | End: 2025-04-03 | Stop reason: HOSPADM

## 2025-03-31 RX ORDER — INSULIN LISPRO 100 [IU]/ML
1-6 INJECTION, SOLUTION INTRAVENOUS; SUBCUTANEOUS
Status: DISCONTINUED | OUTPATIENT
Start: 2025-03-31 | End: 2025-03-31

## 2025-03-31 RX ORDER — DEXTROSE MONOHYDRATE 25 G/50ML
25 INJECTION, SOLUTION INTRAVENOUS
Status: DISCONTINUED | OUTPATIENT
Start: 2025-03-31 | End: 2025-04-03 | Stop reason: HOSPADM

## 2025-03-31 RX ORDER — ASPIRIN 81 MG/1
81 TABLET, CHEWABLE ORAL DAILY
Status: DISCONTINUED | OUTPATIENT
Start: 2025-03-31 | End: 2025-04-03 | Stop reason: HOSPADM

## 2025-03-31 RX ORDER — BUPROPION HYDROCHLORIDE 150 MG/1
150 TABLET, EXTENDED RELEASE ORAL EVERY MORNING
Status: DISCONTINUED | OUTPATIENT
Start: 2025-03-31 | End: 2025-04-03 | Stop reason: HOSPADM

## 2025-03-31 RX ADMIN — BUPROPION HYDROCHLORIDE 150 MG: 150 TABLET, FILM COATED, EXTENDED RELEASE ORAL at 09:48

## 2025-03-31 RX ADMIN — ATORVASTATIN CALCIUM 80 MG: 80 TABLET, FILM COATED ORAL at 17:30

## 2025-03-31 RX ADMIN — DIVALPROEX SODIUM 500 MG: 500 TABLET, DELAYED RELEASE ORAL at 17:35

## 2025-03-31 RX ADMIN — ASPIRIN 81 MG: 81 TABLET, CHEWABLE ORAL at 21:02

## 2025-03-31 RX ADMIN — SENNOSIDES AND DOCUSATE SODIUM 2 TABLET: 50; 8.6 TABLET ORAL at 17:30

## 2025-03-31 RX ADMIN — DEXMEDETOMIDINE HYDROCHLORIDE 0.2 MCG/KG/HR: 100 INJECTION, SOLUTION INTRAVENOUS at 02:26

## 2025-03-31 RX ADMIN — DIVALPROEX SODIUM 500 MG: 500 TABLET, DELAYED RELEASE ORAL at 09:47

## 2025-03-31 ASSESSMENT — PAIN DESCRIPTION - PAIN TYPE
TYPE: ACUTE PAIN

## 2025-03-31 ASSESSMENT — LIFESTYLE VARIABLES
HAVE YOU EVER FELT YOU SHOULD CUT DOWN ON YOUR DRINKING: NO
EVER FELT BAD OR GUILTY ABOUT YOUR DRINKING: NO
EVER HAD A DRINK FIRST THING IN THE MORNING TO STEADY YOUR NERVES TO GET RID OF A HANGOVER: NO
AVERAGE NUMBER OF DAYS PER WEEK YOU HAVE A DRINK CONTAINING ALCOHOL: 0
ON A TYPICAL DAY WHEN YOU DRINK ALCOHOL HOW MANY DRINKS DO YOU HAVE: 0
HOW MANY TIMES IN THE PAST YEAR HAVE YOU HAD 5 OR MORE DRINKS IN A DAY: 0
ALCOHOL_USE: NO
CONSUMPTION TOTAL: NEGATIVE
TOTAL SCORE: 0
TOTAL SCORE: 0
HAVE PEOPLE ANNOYED YOU BY CRITICIZING YOUR DRINKING: NO
TOTAL SCORE: 0

## 2025-03-31 ASSESSMENT — COGNITIVE AND FUNCTIONAL STATUS - GENERAL
TURNING FROM BACK TO SIDE WHILE IN FLAT BAD: A LITTLE
WALKING IN HOSPITAL ROOM: A LITTLE
STANDING UP FROM CHAIR USING ARMS: A LITTLE
HELP NEEDED FOR BATHING: A LOT
PERSONAL GROOMING: A LITTLE
CLIMB 3 TO 5 STEPS WITH RAILING: A LITTLE
DRESSING REGULAR UPPER BODY CLOTHING: A LITTLE
MOBILITY SCORE: 18
SUGGESTED CMS G CODE MODIFIER MOBILITY: CK
DRESSING REGULAR LOWER BODY CLOTHING: A LOT
SUGGESTED CMS G CODE MODIFIER DAILY ACTIVITY: CK
MOVING TO AND FROM BED TO CHAIR: A LITTLE
TOILETING: A LOT
MOVING FROM LYING ON BACK TO SITTING ON SIDE OF FLAT BED: A LITTLE
DAILY ACTIVITIY SCORE: 16

## 2025-03-31 ASSESSMENT — ENCOUNTER SYMPTOMS
VOMITING: 0
SHORTNESS OF BREATH: 0
HEADACHES: 0
SPUTUM PRODUCTION: 0
EYES NEGATIVE: 1
FEVER: 0
CHILLS: 0
FOCAL WEAKNESS: 1
SORE THROAT: 0
MUSCULOSKELETAL NEGATIVE: 1
ABDOMINAL PAIN: 0
PALPITATIONS: 0
NAUSEA: 0

## 2025-03-31 ASSESSMENT — GAIT ASSESSMENTS
GAIT LEVEL OF ASSIST: MINIMAL ASSIST
DISTANCE (FEET): 75

## 2025-03-31 ASSESSMENT — FIBROSIS 4 INDEX: FIB4 SCORE: 1.61

## 2025-03-31 ASSESSMENT — SOCIAL DETERMINANTS OF HEALTH (SDOH)
WITHIN THE LAST YEAR, HAVE YOU BEEN HUMILIATED OR EMOTIONALLY ABUSED IN OTHER WAYS BY YOUR PARTNER OR EX-PARTNER?: NO
WITHIN THE LAST YEAR, HAVE YOU BEEN KICKED, HIT, SLAPPED, OR OTHERWISE PHYSICALLY HURT BY YOUR PARTNER OR EX-PARTNER?: NO
WITHIN THE LAST YEAR, HAVE YOU BEEN AFRAID OF YOUR PARTNER OR EX-PARTNER?: NO
IN THE PAST 12 MONTHS, HAS THE ELECTRIC, GAS, OIL, OR WATER COMPANY THREATENED TO SHUT OFF SERVICE IN YOUR HOME?: PATIENT DECLINED
WITHIN THE LAST YEAR, HAVE TO BEEN RAPED OR FORCED TO HAVE ANY KIND OF SEXUAL ACTIVITY BY YOUR PARTNER OR EX-PARTNER?: NO
WITHIN THE PAST 12 MONTHS, THE FOOD YOU BOUGHT JUST DIDN'T LAST AND YOU DIDN'T HAVE MONEY TO GET MORE: PATIENT DECLINED
WITHIN THE PAST 12 MONTHS, YOU WORRIED THAT YOUR FOOD WOULD RUN OUT BEFORE YOU GOT THE MONEY TO BUY MORE: PATIENT DECLINED

## 2025-03-31 ASSESSMENT — PATIENT HEALTH QUESTIONNAIRE - PHQ9
2. FEELING DOWN, DEPRESSED, IRRITABLE, OR HOPELESS: NOT AT ALL
SUM OF ALL RESPONSES TO PHQ9 QUESTIONS 1 AND 2: 0
1. LITTLE INTEREST OR PLEASURE IN DOING THINGS: NOT AT ALL

## 2025-03-31 ASSESSMENT — ACTIVITIES OF DAILY LIVING (ADL): TOILETING: INDEPENDENT

## 2025-03-31 NOTE — THERAPY
"Speech Language Pathology   Clinical Swallow Evaluation     Patient Name: Brian Durham  AGE:  64 y.o., SEX:  male  Medical Record #: [id number]  Date of Service: 3/31/2025      History of Present Illness  64 y.o. male acute onset of difficulty speaking and left-sided weakness. Pt undergoing stroke work up.    PMHx: SCZ, hepatitis B, emphysema, unhoused.    Imaging:   MRI pending    CT-Head: 3/30  No acute intracranial abnormality.     CXR:3/30  1. Stable prominent interstitial markings in the perihilar regions of the lungs, suggesting chronic interstitial lung changes, but a component of interstitial edema and/or infiltrates are not excluded.  2. No acute findings.    SLP Hx:   1/27:SWTX- Patient presents with clinically functional oropharyngeal swallow.   1/24: CSE: Patient presents with oral phase deficits characterized by prolonged mastication, suspect chronic 2/2 edentulism. No overt s/sx of aspiration.     General Information:  Vitals  O2 (LPM): 3  O2 Delivery Device: Silicone Nasal Cannula  Level of Consciousness: Awake, Drowsy  Patient Behaviors: Restless, Fatigue, Confused  Orientation: x3: self, location, year    Prior Living Situation & Level of Function:  Housing / Facility: Homeless  Swallowing: WFL   Communication: WFL     Oral Mechanism Evaluation:  Dentition: Edentulous   Facial Symmetry: Central left facial droop  Facial Sensation: Impaired - left     Labial Observations: WFL   Lingual Observations: Midline       Laryngeal Function:  Secretion Management: Adequate  Voice Quality: Hoarse (mild)  Motor speech: Dysarthria- slurred speech       Subjective  Pt seen this date for clinical swallow evaluation. Pt roused, seated upright, awake, lethargic and agreeable to session. Pt stated he eats a regular diet at baseline, has no Hx of PNA and had dentures, but stated \"I lost them\".     Assessment  Current Method of Nutrition: NPO until cleared by speech pathology  Positioning: Gema's (60-90 " degrees)  Bolus Administration: Patient, SLP  O2 (LPM): 3 O2 Delivery Device: Silicone Nasal Cannula  Factor(s) Affecting Performance: Impaired mental status, Impaired command following, Impaired endurance  Tracheostomy : No        Swallowing Trials:  Swallowing Trials  Thin Liquid (TN0): WFL  Liquidised (LQ3): WFL  Regular (RG7): Impaired (oral phase)    Comments: Anterior bolus loss noted on LQ3 and RG7 on left side. Oral bolus residue observed upon oral inspection of RG7, cleared with cued liquid wash. Gnawing bite with prolonged mastication of solids. No cough appreciated with PO. Vocal quality remained stable throughout PO intake. Multiple swallows completed per bolus, appeared functional. No signs of esophageal dysfunction. Patient requiring prompts to finish food in mouth and to liquid priyanka between bites. Provided education regarding general aspiration precautions as well as signs of aspiration, no learning evidence. RN updated.       Clinical Impressions  Patient presents with oral phase deficits characterized by prolonged mastication. No clinical indicators of pharyngeal dysphagia this date. Presentation is consistent with lack of dentition. Modified diet initiation is indicated d/t alerted mentation and observed oral phase deficits. Pt is requiring assistance with prompts to alternate bites and sips as well as meal tray set up, taper as mentation improves. SLP will continue to follow to ensure diet tolerance. Cognitive evaluation pending MRI results.    Recommendations  Diet Consistency: SB6/TN0  Instrumentation: None indicated at this time  Medication: Whole with liquid, Crush with applesauce, as appropriate, As tolerated  Supervision: Assist with meal tray set up, Direct supervision during meals (Taper as mentation improves)  Positioning: Fully upright and midline during oral intake  Risk Management : Small bites/sips, Alternate bites and sips, Slow rate of intake, Reduce environmental distractions,  "Monitor for left pocketing  Oral Care: Q6h       SLP Treatment Plan  Treatment Plan: Dysphagia Treatment  SLP Frequency: 3x Per Week  Estimated Duration: Until Therapy Goals Met      Anticipated Discharge Needs  Discharge Recommendations: Anticipate that the patient will have no further speech therapy needs after discharge from the hospital (Pending cognitive evaluation)   Therapy Recommendations Upon DC: Not Indicated        Patient / Family Goals  Patient / Family Goal #1: \"I lost my dentures\"  Short Term Goals  Short Term Goal # 1: Pt will consume least restrictive diet without s/sx of airway invasion or pulmonary decline.      Chavo Joyner, Student   Supervised by Sarai Bernal MS CCC-SLP  "

## 2025-03-31 NOTE — PROGRESS NOTES
Patient transported from ER to R114 with this RN on a monitor. Patient responds to voice, follow commands with 5/5 strength in all extremities, and is answering questions but difficult to understand due to slurred speech. 2 RN skin check completed. VSS. Patient is restless at times and attempts to get out of the bed. He is able to be redirected however. Post TNK neuro checks and vital signs in place. Unable to complete MRI screening form at this time due to patient's orientation status and slurred speech.

## 2025-03-31 NOTE — THERAPY
Occupational Therapy   Initial Evaluation     Patient Name: Brian Durham  Age:  64 y.o., Sex:  male  Medical Record #: [id number]  Today's Date: 3/31/2025     Precautions  Precautions: Fall Risk, Swallow Precautions    Assessment  Patient is 64 y.o. male with a diagnosis of L weakness, aphasia, s/p TNK.  Pt currently limited by decreased functional mobility, activity tolerance, cognition, sensation, strength, AROM, coordination, balance, which are affecting pt's ability to complete ADLs/IADLs at baseline. Pt would benefit from OT services in the acute care setting to maximize functional recovery.       Plan    Occupational Therapy Initial Treatment Plan   Treatment Interventions: (P) Self Care / Activities of Daily Living, Therapeutic Activity  Treatment Frequency: (P) 4 Times per Week  Duration: (P) Until Therapy Goals Met       Discharge Recommendations: (P) Recommend post-acute placement for additional occupational therapy services prior to discharge home        03/31/25 1110   Prior Living Situation   Housing / Facility Homeless  (stays at [facility])   Equipment Owned None   Lives with - Patient's Self Care Capacity   (homeless)   Prior Level of ADL Function   Self Feeding Independent   Grooming / Hygiene Independent   Bathing Independent   Dressing Independent   Toileting Independent   Cognition    Speech/ Communication Expressive Aphasia   Level of Consciousness Responds to voice   Comments lethargic   Strength Upper Body   Upper Body Strength  X   Comments 4/5 B UEs   Sensation Upper Body   Upper Extremity Sensation  Not Tested   Comments not following directions for testing   Balance Assessment   Sitting Balance (Static) Fair -   Sitting Balance (Dynamic) Fair -   Standing Balance (Static) Fair -   Standing Balance (Dynamic) Poor +   Weight Shift Sitting Fair   Weight Shift Standing Fair   ADL Assessment   Grooming Supervision   Upper Body Dressing Minimal Assist   Lower Body Dressing Moderate  Assist   Functional Mobility   Sit to Stand Minimal Assist   Bed, Chair, Wheelchair Transfer Minimal Assist   Short Term Goals   Short Term Goal # 1 supervised with UB dressing   Short Term Goal # 2 supervised with LB dressing   Short Term Goal # 3 supervised with ADL txfs   Occupational Therapy Initial Treatment Plan    Treatment Interventions Self Care / Activities of Daily Living;Therapeutic Activity   Treatment Frequency 4 Times per Week   Duration Until Therapy Goals Met   Anticipated Discharge Equipment and Recommendations   Discharge Recommendations Recommend post-acute placement for additional occupational therapy services prior to discharge home

## 2025-03-31 NOTE — ASSESSMENT & PLAN NOTE
Acute stroke presenting with sudden onset garbled speech and left arm weakness  Received TNKase at 1939  Admit to neuro ICU  Frequent neurochecks per protocol post thrombolytic therapy  Maintain strict BP control <180/105  Hold antiplatelet and blood thinners 24 hours post thrombolytic  Expedited MRI brain  Repeat CT head with any new/worsening neuro deficits  Maintain euvolemia, normothermia, eucapnia  Maintain normal sodium level  High intensity statin  Continuous telemetry monitoring  Echocardiogram pending  PT/OT/SLP

## 2025-03-31 NOTE — PROGRESS NOTES
4 Eyes Skin Assessment Completed by SHANTI BHAKTA and SHANTI MANDUJANO.    Head WDL  Ears WDL  Nose WDL  Mouth WDL  Neck WDL  Breast/Chest Redness and Blanching with scattered abrasions  Shoulder Blades WDL  Spine WDL  (R) Arm/Elbow/Hand Redness and Blanching, r arm bruise  (L) Arm/Elbow/Hand Redness and Blanching  Abdomen WDL  Groin Redness, Blanching, and Abrasion  Scrotum/Coccyx/Buttocks Redness, Bloody penis, appears to be bleeding from meatus,  (R) Leg Redness, Scab, and Abrasion, r foot scab. R leg abrasions high on thigh  (L) Leg Scab on knee  (R) Heel/Foot/Toe Redness and Blanching  (L) Heel/Foot/Toe Redness and Blanching          Devices In Places ECG, Blood Pressure Cuff, Pulse Ox, and SCD's      Interventions In Place NC W/Ear Foams, Sacral Mepilex, TAP System, Pillows, Q2 Turns, and Low Air Loss Mattress    Possible Skin Injury Yes    Pictures Uploaded Into Epic Yes  Wound Consult Placed Yes  RN Wound Prevention Protocol Ordered Yes

## 2025-03-31 NOTE — ASSESSMENT & PLAN NOTE
Chart history of atrial fibrillation, currently in sinus rhythm  Not on blood thinners  Trend continuous telemetry

## 2025-03-31 NOTE — CARE PLAN
The patient is Watcher - Medium risk of patient condition declining or worsening    Shift Goals  Clinical Goals: Post TNK neuro checks and vital signs, safety, SBP <180  Patient Goals: TIMOTEO  Family Goals: TIMOTEO    Progress made toward(s) clinical / shift goals:      Problem: Neuro Status  Goal: Neuro status will remain stable or improve  Outcome: Progressing  Note: Pst TNK neuro checks in place. Neuro status remained stable.     Problem: Hemodynamic Monitoring  Goal: Patient's hemodynamics, fluid balance and neurologic status will be stable or improve  Outcome: Progressing  Note: Maintained SBP <180 per orders.     Problem: Risk for Aspiration  Goal: Patient's risk for aspiration will be absent or decrease  Outcome: Progressing  Note: Maintained NPO until seen by speech per orders.     Problem: Fall Risk  Goal: Patient will remain free from falls  Outcome: Progressing  Note: Patient was assessed to be a high fall risk. Bed is in the low position and locked, bed alarm is on, and call light is within reach.         Patient is not progressing towards the following goals:    Problem: Mobility - Stroke  Goal: Patient's capacity to carry out activities will improve  Outcome: Not Progressing  Note: Unable to mobilize at this time due to strict bed rest orders. ,

## 2025-03-31 NOTE — CONSULTS
"Neurology STROKE CODE Consultation  Neurohospitalist Service, [facility]    Referring Physician: Joan Lozoya M.D.    STROKE CODE:   Difficulty talking and left-sided weakness      To obtain the most accurate data regarding the time called, and time patient seen, refer to the stroke run-sheet and chart.  For time of CT, refer to the radiology report. See A&P below for TNK Decision and door to needle time if and when applicable.    HPI: Brian Durham is a 64 y.o. left-handed male with history of schizophrenia, apparently living in a homeless shelter who was brought to emergency room for evaluation of acute onset of difficulty talking as well as mild left-sided weakness.  The onset of symptom was at 1730 p.m.  He came out of shower and his speech was not coherent.  Paramedics were called and patient was brought to emergency room for evaluation.  His speech is garbled and he is not able to provide meaningful history.  NIH scale score was 7.    Review of systems: In addition to what is detailed in the HPI above, all other systems reviewed and are negative.    Past Medical History:    has a past medical history of ADHD, Arthritis, Cold (01/01/2012), Degenerative disc disease, Emphysema of lung (HCC) (1/24/2025), Encephalopathy acute (1/25/2025), Hepatitis B, Pain (12/30/2011), Psychiatric disorder, and Schizophrenia ([facility]).    He has no past medical history of CAD (coronary artery disease), Infectious disease, or Liver disease.    FHx:  family history is not on file.    SHx:   reports that he has been smoking cigarettes. He has never used smokeless tobacco. He reports that he does not currently use alcohol. He reports that he does not use drugs.    Allergies:  Allergies   Allergen Reactions    Paroxetine Swelling     Throat     Shellfish Allergy Rash     \"My heart - very painful.\"       Medications:  No current facility-administered medications for this encounter.    Current " Outpatient Medications:     hydrOXYzine HCl (ATARAX) 25 MG Tab, Take 1 Tablet by mouth 3 times a day as needed for Itching., Disp: 30 Tablet, Rfl: 0    LORazepam (ATIVAN) 1 MG Tab, Take 3 mg by mouth every day., Disp: , Rfl:     hydrocortisone (CORTEF) 10 MG Tab, Take 25 mg by mouth every day. 2 days, Disp: , Rfl:     divalproex (DEPAKOTE) 500 MG Tablet Delayed Response, Take 1 Tablet by mouth 2 times a day., Disp: 90 Tablet, Rfl: 0    hydrOXYzine HCl (ATARAX) 25 MG Tab, Take 1 Tablet by mouth 3 times a day as needed for Anxiety., Disp: 30 Tablet, Rfl: 0    Lumateperone Tosylate (CAPLYTA) 42 MG Cap, Take 42 mg by mouth every day., Disp: , Rfl:     amphetamine-dextroamphetamine (ADDERALL) 20 MG Tab, Take 20 mg by mouth 2 times a day., Disp: , Rfl:     clotrimazole (LOTRIMIN) 1 % Cream, Apply 1 Application topically 2 times a day., Disp: , Rfl:     Physical Examination:    Vitals:    03/30/25 1939 03/30/25 1944 03/30/25 2000 03/30/25 2003   BP: 139/75 136/70 (!) 140/78 132/70   Pulse: 74 71 72 75   Resp: (!) 24 (!) 22     SpO2: 100%  99% 99%   Weight:           General:   Patient is awake and somewhat drowsy  Neck: Full range of motion  Eyes: Midline, Pupils reactive to light.  CV: RRR  Lungs: No respiratory distress  Extremities: No cyanosis, warm, no significant edema.    NEUROLOGICAL EXAM:   Mental status: Awake, alert and able to follow simple commands.  Speech and language: speech is profoundly dysarthric and none comprehendible.  He appears to have expressive aphasia.  Cranial nerve exam: Pupils are equal, round and reactive to light bilaterally. Visual fields are full. Extraocular muscles are intact.   Face is symmetric. Sensation in the face is intact to light touch.   Motor exam: Sustain antigravity in all 4 extremities with downward drift of left upper extremity. Tone is normal. No abnormal movements were seen on exam.  Sensory exam: No sensory deficits identified   Coordination: no gross ataxia noted on  exam  Plantar reflexes: Equivocal  Gait: deferred     NIH Stroke Scale performed at 1855:    1a. Level of Consciousness (Alert, drowsy, etc): 0= Alert    1b. LOC Questions (Month, age): 2= Incorrect    1c. LOC Commands (Open/close eyes make fist/let go): 0= Obeys both correctly    2.   Best Gaze (Eyes open - patient follows examiner's finger on face): 0= Normal    3.   Visual Fields (introduce visual stimulus/threat to patient's field quadrants): 0= No visual loss  4.   Facial Paresis (Show teeth, raise eyebrows and squeeze eyes shut): 0= Normal     5a. Motor Arm - Left (Elevate arm to 90 degrees if patient is sitting, 45 degrees if  supine): 1= Drift    5b. Motor Arm - Right (Elevate arm to 90 degrees if patient is sitting, 45 degrees if supine): 0= No drift    6a. Motor Leg - Left (Elevate leg 30 degrees with patient supine): 0= No drift    6b. Motor Leg - Right  (Elevate leg 30 degrees with patient supine): 0= No drift    7.   Limb Ataxia (Finger-nose, heel down shin): 0= No ataxia    8.   Sensory (Pin prick to face, arm, trunk and leg - compare side to side): 0= Normal    9.  Best Language (Name item, describe a picture and read sentences): 2= Severe aphasia    10. Dysarthria (Evaluate speech clarity by patient repeating listed words): 2= Near to unintelligible or worse    11. Extinction and Inattention (Use information from prior testing to identify neglect or  double simultaneous stimuli testing): 0= No neglect    Total NIH Score: 7    Baseline modified Charla Scale (MRS): 0 = No symptoms    Objective Data:    Labs:  Lab Results   Component Value Date/Time    PROTHROMBTM 12.8 03/30/2025 06:47 PM    INR 0.96 03/30/2025 06:47 PM      Lab Results   Component Value Date/Time    WBC 6.5 03/30/2025 06:47 PM    RBC 3.97 (L) 03/30/2025 06:47 PM    HEMOGLOBIN 12.9 (L) 03/30/2025 06:47 PM    HEMATOCRIT 38.8 (L) 03/30/2025 06:47 PM    MCV 97.7 03/30/2025 06:47 PM    MCH 32.5 03/30/2025 06:47 PM    MCHC 33.2 03/30/2025  "06:47 PM    MPV 8.4 (L) 03/30/2025 06:47 PM    NEUTSPOLYS 40.30 (L) 03/30/2025 06:47 PM    LYMPHOCYTES 40.20 03/30/2025 06:47 PM    MONOCYTES 13.50 (H) 03/30/2025 06:47 PM    EOSINOPHILS 4.50 03/30/2025 06:47 PM    BASOPHILS 0.60 03/30/2025 06:47 PM    ANISOCYTOSIS 1+ 02/05/2025 11:00 PM      Lab Results   Component Value Date/Time    SODIUM 140 03/30/2025 06:47 PM    POTASSIUM 4.3 03/30/2025 06:47 PM    CHLORIDE 106 03/30/2025 06:47 PM    CO2 22 03/30/2025 06:47 PM    GLUCOSE 97 03/30/2025 06:47 PM    BUN 15 03/30/2025 06:47 PM    CREATININE 0.85 03/30/2025 06:47 PM    CREATININE 0.7 05/08/2007 03:20 AM    BUNCREATRAT 21.3 (H) 02/25/2025 09:07 AM      No results found for: \"CHOLSTRLTOT\", \"LDL\", \"HDL\", \"TRIGLYCERIDE\"    Lab Results   Component Value Date/Time    ALKPHOSPHAT 69 03/30/2025 06:47 PM    ASTSGOT 15 03/30/2025 06:47 PM    ALTSGPT 13 03/30/2025 06:47 PM    TBILIRUBIN 0.4 03/30/2025 06:47 PM        Imaging/Testing:    I interpreted and/or reviewed the patient's neuroimaging    DX-CHEST-PORTABLE (1 VIEW)   Final Result      1. Stable prominent interstitial markings in the perihilar regions of the lungs, suggesting chronic interstitial lung changes, but a component of interstitial edema and/or infiltrates are not excluded.   2. No acute findings.      CT-CEREBRAL PERFUSION ANALYSIS   Final Result      1. Cerebral blood flow less than 30% possibly representing completed infarct = 24 mL. Based on distribution of this finding, this is likely to represent artifact.      2. T Max more than 6 seconds possibly representing combination of completed infarct and ischemia = 261 mL. Based on the distribution of this finding, this is likely to represent artifact.      3. Mismatched volume possibly representing ischemic brain/penumbra= 237 mL      4.  Please note that this cerebral perfusion study and report is Quantitative and targets supratentorial (cerebral) perfusion for evaluation of large vessel territory acute " "ischemia/infarction. For example, lacunar infarcts, and brainstem/posterior fossa    ischemia/infarction are not evaluated on this study.  Data acquisition is subject to artifacts which can yield non-anatomically plausible perfusion maps which may be due to motion, bolus timing, signal to noise ratio, or other technical factors.    Perfusion map abnormalities which show non-anatomic distributions are likely artifact.   This study is not \"stand-alone\" and should only be utilized for diagnosis, management/treatment in correlation with CT, CTA, and/or MRI and clinical factors.         CT-HEAD W/O   Final Result      No acute intracranial abnormality.               CT-CTA HEAD WITH & W/O-POST PROCESS    (Results Pending)   CT-CTA NECK WITH & W/O-POST PROCESSING    (Results Pending)   CT-CEREBRAL PERFUSION ANALYSIS    (Results Pending)       Assessment and Plan:  Brian Durham is a 64 y.o.   with history of psychiatric disorder and schizophrenia who was brought to emergency room for acute onset of difficulty talking as well as mild left-sided weakness.  The onset of symptom was at 1730 p.m.  NIH scale score is 7,  Brain CT is unremarkable.  He was somewhat restless in his scanner and we were not able to complete his CTA and CT perfusion.  Diagnosis likely stroke by clinical manifestation.  Given measurable and disabling neurological deficit with concern for acute stroke, I have decided to proceed with administration of TNK prior to completing rest of his brain imaging  Patient is not able to give verbal consent for TNK and this was done in an emergent situation.  I have also discussed this with Dr. Joan Lozoya M.D. who is in agreement.  Given he moves all 4 extremity, I have low suspicion for large vessel occlusion, however when he is more stable we will proceed with CT angiogram of the head and neck and CT perfusion.  He was given 1 mg of Ativan in CT scanner, however still was not able to tolerate and " hold steal.    TNK was given at 1939.  Blood pressure at the time of administration of /75.      Plan:  - ACUTE TREATMENT WITH TNK as managed with pharmacy and coordinated care  - Admit to the intensive care unit  -Complete CTA head and neck with perfusion.  - Neurology checks and vital signs per protocol; perform swallow screen  - Maintain blood pressure <180/105 per thrombolytic therapy guidelines, maintain -180.  - Maintain normoglycemia and avoid hypo- or hypernatremia; aim for normothermia  - Hold antiplatelets and any blood thinners for 24 hours status post tNK administration  - High intensity statin when safe to swallow, LDL goal less than 70  - Serum studies for stroke risk factors: lipid panel & hemoglobin A1C  - To identify stroke territory, obtain MRI brain without contrast  - Obtain a 2D echocardiogram  - Evaluate and treat with PT/OT/ST; physiatry consult  - Stroke education    The evaluation of the patient, and recommended management, was discussed with the Joan Lozoya M.D..    Upon my evaluation, this patient had a high probability of imminent or life-threatening deterioration due to cerebrovascular accident which required my direct attention, intervention, and personal management.  I personally provided 56 minutes of total critical care time. Time includes: review of laboratory data, review of radiology studies, discussion with consultants, discussion with family/patient, monitoring for potential decompensation.  Interventions were performed as documented in the chart.      Please note that this dictation was created using voice recognition software.  I have made every reasonable attempt to correct obvious errors, but I expect that there are errors of grammar and possibly content that I did not discover before finalizing the note.       Spring Zuñiga MD  [facility]

## 2025-03-31 NOTE — CONSULTS
Critical Care Consultation    Date of consult: 3/30/2025    Referring Physician  Joan Lozoya M.D.    Reason for Consultation  Acute stroke symptoms    History of Presenting Illness  64 y.o. male, PMH schizophrenia, hepatitis B, emphysema, unhoused who presented 3/30/2025 with acute onset of difficulty speaking and left-sided weakness occurring at approximately 1730.  He reportedly came out of his shower and had incoherent speech.  Initial NIHSS 7 with garbled speech and left arm drift.  CT brain was unremarkable.  CTA and CT perfusion were not completed due to patient restlessness.  He was seen by neurology with decision for TNK which was administered at 1939.  Blood pressure has been normal, 139/75.  He seems to have had some improvement in the 30 minutes post TNK with somewhat improved speech and left arm strength.    Code Status  Full Code    Review of Systems  Review of Systems   Unable to perform ROS: Acuity of condition       Past Medical History  Past Medical History:   Diagnosis Date    ADHD     Arthritis     Cold 01/01/2012    2 weeks ago    Degenerative disc disease     Emphysema of lung (HCC) 1/24/2025    Encephalopathy acute 1/25/2025    Hepatitis B     Pain 12/30/2011    neck, 6/10    Psychiatric disorder     bipolar, ADHD    Schizophrenia (HCC)        Surgical History   has a past surgical history that includes other and cervical disk and fusion anterior (1/16/2012).    Family History  family history is not on file.    Social History   reports that he has been smoking cigarettes. He has never used smokeless tobacco. He reports that he does not currently use alcohol. He reports that he does not use drugs.    Medications  Home Medications       Reviewed by Taco Ozuna (Pharmacy Tech) on 03/30/25 at 2054  Med List Status: Complete     Medication Last Dose Status   amphetamine-dextroamphetamine (ADDERALL) 20 MG Tab Unknown Active   buPROPion SR (WELLBUTRIN-SR) 150 MG TABLET SR 12 HR  sustained-release tablet Unknown Active   divalproex (DEPAKOTE) 500 MG Tablet Delayed Response Unknown Active   hydrOXYzine HCl (ATARAX) 25 MG Tab Unknown Active   LORazepam (ATIVAN) 1 MG Tab Unknown Active                  Audit from Redirected Encounters    **Home medications have not yet been reviewed for this encounter**       Current Facility-Administered Medications   Medication Dose Route Frequency Provider Last Rate Last Admin    dexmedetomidine (Precedex) 400 mcg/100mL infusion  0.1-1.5 mcg/kg/hr (Ideal) Intravenous Continuous Oumou L. Latona        labetalol (Normodyne/Trandate) injection 10 mg  10 mg Intravenous Q3HRS PRN Oumou L. Latona        hydrALAZINE (Apresoline) injection 10 mg  10 mg Intravenous Q3HRS PRN Oumou L. Latona        niCARdipine (Cardene) 25 mg in  mL Standard Infusion  0-15 mg/hr Intravenous Continuous Oumou L. Latona   Dose not Required at 03/30/25 2315    [START ON 3/31/2025] atorvastatin (Lipitor) tablet 80 mg  80 mg Oral Q EVENING Oumou L. Latona        Respiratory Therapy Consult   Nebulization Continuous RT Oumou L. Latona        acetaminophen (Tylenol) tablet 650 mg  650 mg Oral Q6HRS PRN Oumou L. Latona        [START ON 3/31/2025] senna-docusate (Pericolace Or Senokot S) 8.6-50 MG per tablet 2 Tablet  2 Tablet Oral Q EVENING Oumou L. Latona        And    polyethylene glycol/lytes (Miralax) Packet 1 Packet  1 Packet Oral QDAY PRN Oumou L. Latona        ondansetron (Zofran) syringe/vial injection 4 mg  4 mg Intravenous Q4HRS PRN Oumou L. Latona        ondansetron (Zofran ODT) dispertab 4 mg  4 mg Oral Q4HRS PRN Oumou L. Latona        [START ON 3/31/2025] insulin lispro (HumaLOG,AdmeLOG) subcutaneous injection  1-6 Units Subcutaneous Q6HRS Oumou BILL. Latona        And    dextrose 50 % (D50W) injection 25 g  25 g Intravenous Q15 MIN PRN Oumou L. Latona        acetaminophen (Tylenol) suppository 650 mg  650 mg Rectal Q6HRS PRN Oumou L. Latona           Allergies  Allergies  "  Allergen Reactions    Paroxetine Swelling     Throat     Shellfish Allergy Rash     \"My heart - very painful.\"       Vital Signs last 24 hours  Temp:  [36 °C (96.8 °F)-36.2 °C (97.2 °F)] 36 °C (96.8 °F)  Pulse:  [] 67  Resp:  [14-24] 15  BP: (110-153)/(66-93) 117/69  SpO2:  [89 %-100 %] 89 %    Physical Exam  Physical Exam  Constitutional:       Comments: Somnolent but arouses, follows some commands and answers direct questions, edentulous, mumbled speech   HENT:      Head: Normocephalic.      Nose: Nose normal.      Mouth/Throat:      Mouth: Mucous membranes are moist.   Eyes:      Pupils: Pupils are equal, round, and reactive to light.   Cardiovascular:      Rate and Rhythm: Normal rate and regular rhythm.      Pulses: Normal pulses.   Pulmonary:      Effort: No respiratory distress.      Breath sounds: No wheezing or rales.   Abdominal:      General: There is no distension.      Palpations: Abdomen is soft.      Tenderness: There is no abdominal tenderness.   Musculoskeletal:         General: No swelling or deformity.      Cervical back: Neck supple.   Skin:     General: Skin is warm and dry.      Capillary Refill: Capillary refill takes less than 2 seconds.   Neurological:      Comments: Somnolent but arouses (received Ativan and CT), tracks, follows simple direct commands and answer simple questions, oriented to person place approximate date, mild dysarthria, slight left upper extremity drift, otherwise strength intact, sensation intact         Fluids  No intake or output data in the 24 hours ending 03/30/25 2303    Laboratory  Recent Results (from the past 48 hours)   CBC WITH DIFFERENTIAL    Collection Time: 03/30/25  6:47 PM   Result Value Ref Range    WBC 6.5 4.8 - 10.8 K/uL    RBC 3.97 (L) 4.70 - 6.10 M/uL    Hemoglobin 12.9 (L) 14.0 - 18.0 g/dL    Hematocrit 38.8 (L) 42.0 - 52.0 %    MCV 97.7 81.4 - 97.8 fL    MCH 32.5 27.0 - 33.0 pg    MCHC 33.2 32.3 - 36.5 g/dL    RDW 46.0 35.9 - 50.0 fL    " Platelet Count 237 164 - 446 K/uL    MPV 8.4 (L) 9.0 - 12.9 fL    Neutrophils-Polys 40.30 (L) 44.00 - 72.00 %    Lymphocytes 40.20 22.00 - 41.00 %    Monocytes 13.50 (H) 0.00 - 13.40 %    Eosinophils 4.50 0.00 - 6.90 %    Basophils 0.60 0.00 - 1.80 %    Immature Granulocytes 0.90 0.00 - 0.90 %    Nucleated RBC 0.00 0.00 - 0.20 /100 WBC    Neutrophils (Absolute) 2.62 1.82 - 7.42 K/uL    Lymphs (Absolute) 2.61 1.00 - 4.80 K/uL    Monos (Absolute) 0.88 (H) 0.00 - 0.85 K/uL    Eos (Absolute) 0.29 0.00 - 0.51 K/uL    Baso (Absolute) 0.04 0.00 - 0.12 K/uL    Immature Granulocytes (abs) 0.06 0.00 - 0.11 K/uL    NRBC (Absolute) 0.00 K/uL   COMP METABOLIC PANEL    Collection Time: 03/30/25  6:47 PM   Result Value Ref Range    Sodium 140 135 - 145 mmol/L    Potassium 4.3 3.6 - 5.5 mmol/L    Chloride 106 96 - 112 mmol/L    Co2 22 20 - 33 mmol/L    Anion Gap 12.0 7.0 - 16.0    Glucose 97 65 - 99 mg/dL    Bun 15 8 - 22 mg/dL    Creatinine 0.85 0.50 - 1.40 mg/dL    Calcium 8.8 8.5 - 10.5 mg/dL    Correct Calcium 8.7 8.5 - 10.5 mg/dL    AST(SGOT) 15 12 - 45 U/L    ALT(SGPT) 13 2 - 50 U/L    Alkaline Phosphatase 69 30 - 99 U/L    Total Bilirubin 0.4 0.1 - 1.5 mg/dL    Albumin 4.1 3.2 - 4.9 g/dL    Total Protein 6.8 6.0 - 8.2 g/dL    Globulin 2.7 1.9 - 3.5 g/dL    A-G Ratio 1.5 g/dL   PROTHROMBIN TIME    Collection Time: 03/30/25  6:47 PM   Result Value Ref Range    PT 12.8 12.0 - 14.6 sec    INR 0.96 0.87 - 1.13   APTT    Collection Time: 03/30/25  6:47 PM   Result Value Ref Range    APTT 26.5 24.7 - 36.0 sec   COD (ADULT)    Collection Time: 03/30/25  6:47 PM   Result Value Ref Range    ABO Grouping Only A     Rh Grouping Only POS     Antibody Screen-Cod NEG    TROPONIN    Collection Time: 03/30/25  6:47 PM   Result Value Ref Range    Troponin T 14 6 - 19 ng/L   ESTIMATED GFR    Collection Time: 03/30/25  6:47 PM   Result Value Ref Range    GFR (CKD-EPI) 98 >60 mL/min/1.73 m 2   Infectious Disease Testing (Exposure)    Collection  Time: 25  6:47 PM   Result Value Ref Range    Hepatitis B Surface Antigen Non-Reactive Non-Reactive    Hepatitis C Antibody Non-Reactive Non-Reactive    Exposed MRN [id number]    HIV Rapid Screen (Exposure)    Collection Time: 25  6:47 PM    Specimen: Blood   Result Value Ref Range    HIV Ag/Ab Combo Assay Non-Reactive Non Reactive    Exposed MRN [id number]    EKG (NOW)    Collection Time: 25  7:30 PM   Result Value Ref Range    Report       [facility]    Test Date:  2025  Pt Name:    SADI GUERRERO                Department: ER  MRN:        [id number]                      Room:        02  Gender:     Male                         Technician: [id number]  :        1960                   Requested By:MONISHA ANDREA  Order #:    [id number]                    Reading MD:    Measurements  Intervals                                Axis  Rate:       70                           P:          71  NV:         53                           QRS:        67  QRSD:       91                           T:          63  QT:         434  QTc:        469    Interpretive Statements  Sinus rhythm  Short NV interval  Compared to ECG 2025 22:42:04  Short NV interval now present     DIAGNOSTIC ALCOHOL    Collection Time: 25  9:02 PM   Result Value Ref Range    Diagnostic Alcohol <10.1 <10.1 mg/dL       Imaging  CT-CEREBRAL PERFUSION ANALYSIS   Final Result      1. Cerebral blood flow less than 30% possibly representing completed infarct = 24 mL. Based on distribution of this finding, this is likely to represent artifact.      2. T Max more than 6 seconds possibly representing combination of completed infarct and ischemia = 261 mL. Based on the distribution of this finding, this is likely to represent artifact.      3.  Please note that this cerebral perfusion study and report is Quantitative and targets supratentorial (cerebral) perfusion for evaluation of large vessel territory  "acute ischemia/infarction. For example, lacunar infarcts, and brainstem/posterior fossa    ischemia/infarction are not evaluated on this study.  Data acquisition is subject to artifacts which can yield non-anatomically plausible perfusion maps which may be due to motion, bolus timing, signal to noise ratio, or other technical factors.    Perfusion map abnormalities which show non-anatomic distributions are likely artifact.   This study is not \"stand-alone\" and should only be utilized for diagnosis, management/treatment in correlation with CT, CTA, and/or MRI and clinical factors.         CT-CTA NECK WITH & W/O-POST PROCESSING   Final Result      1. No evidence of flow-limiting stenosis in the cervical carotid or cervical vertebral arteries.      CT-CTA HEAD WITH & W/O-POST PROCESS   Final Result         1. No hemodynamically significant narrowing of the major intracranial vessels.   2. 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 Stable prominent interstitial markings in the perihilar regions of the lungs, suggesting chronic interstitial lung changes, but a component of interstitial edema and/or infiltrates are not excluded.   2. No acute findings.      CT-CEREBRAL PERFUSION ANALYSIS   Final Result      1. Cerebral blood flow less than 30% possibly representing completed infarct = 24 mL. Based on distribution of this finding, this is likely to represent artifact.      2. T Max more than 6 seconds possibly representing combination of completed infarct and ischemia = 261 mL. Based on the distribution of this finding, this is likely to represent artifact.      3. Mismatched volume possibly representing ischemic brain/penumbra= 237 mL      4.  Please note that this cerebral perfusion study and report is Quantitative and targets supratentorial (cerebral) perfusion for evaluation of large vessel territory acute " "ischemia/infarction. For example, lacunar infarcts, and brainstem/posterior fossa    ischemia/infarction are not evaluated on this study.  Data acquisition is subject to artifacts which can yield non-anatomically plausible perfusion maps which may be due to motion, bolus timing, signal to noise ratio, or other technical factors.    Perfusion map abnormalities which show non-anatomic distributions are likely artifact.   This study is not \"stand-alone\" and should only be utilized for diagnosis, management/treatment in correlation with CT, CTA, and/or MRI and clinical factors.         CT-HEAD W/O   Final Result      No acute intracranial abnormality.               MR-BRAIN-W/O    (Results Pending)   CT-HEAD W/O    (Results Pending)   EC-ECHOCARDIOGRAM COMPLETE W/O CONT    (Results Pending)   DX-CHEST-PORTABLE (1 VIEW)    (Results Pending)       Assessment/Plan  * Acute CVA (cerebrovascular accident) (HCC)- (present on admission)  Assessment & Plan  Acute stroke presenting with sudden onset garbled speech and left arm weakness  Received TNKase at 1939  Admit to neuro ICU  Frequent neurochecks per protocol post thrombolytic therapy  Maintain strict BP control <180/105  Hold antiplatelet and blood thinners 24 hours post thrombolytic  Expedited MRI brain  Repeat CT head with any new/worsening neuro deficits  Maintain euvolemia, normothermia, eucapnia  Maintain normal sodium level  High intensity statin  Continuous telemetry monitoring  Echocardiogram  PT/OT/SLP    Emphysema of lung (HCC)- (present on admission)  Assessment & Plan  Unknown severity  RT protocols    Atrial fibrillation (HCC)- (present on admission)  Assessment & Plan  Chart history of atrial fibrillation, currently in sinus rhythm  Not on blood thinners  Trend continuous telemetry    Schizophrenia (HCC)- (present on admission)  Assessment & Plan  Review home medication regimen  Resume as appropriate  Currently with some mild agitation, will initiate " dexmedetomidine infusion if needed      Discussed patient condition and risk of morbidity and/or mortality with RN, RT, Pharmacy, and ERP .    The patient remains critically ill.  Critical care time = 50 minutes in directly providing and coordinating critical care and extensive data review.  No time overlap and excludes procedures.

## 2025-03-31 NOTE — PROGRESS NOTES
Critical Care Progress Note    Date of admission  3/30/2025    Chief Complaint  64 y.o. male with a history of schizophrenia, hepatitis B, emphysema and homelessness who was admitted on 3/30 with left arm weakness and dysarthria.  He was given TNK after normal CT head (he was not calm enough to achieve CTA/CTP) and TNK was given at 1939.    Hospital Course  3/30 - admitted to the acute post TNK at 1939  3/31 - Doing well, continue post TNK protocol    Interval Problem Update  Reviewed last 24 hour events:  Tmax: Afebrile  Diet: Advance per SLP  Vasopressors: None    Infusions:   Precedex    Antibx: None    Intake / Output  Urine Output past 24 hours: 900 mL    Lab Trends  CBC WNL    CMP WNL    A1c 5.5    UDS with amphetamines and benzodiazepines    Review of Systems  Review of Systems   Constitutional:  Negative for chills, fever and malaise/fatigue.   HENT:  Negative for congestion and sore throat.    Eyes: Negative.    Respiratory:  Negative for sputum production and shortness of breath.    Cardiovascular:  Negative for chest pain and palpitations.   Gastrointestinal:  Negative for abdominal pain, nausea and vomiting.   Genitourinary: Negative.    Musculoskeletal: Negative.    Skin: Negative.    Neurological:  Positive for focal weakness. Negative for headaches.   All other systems reviewed and are negative.       Vital Signs for last 24 hours   Temp:  [36 °C (96.8 °F)-36.7 °C (98 °F)] 36.7 °C (98 °F)  Pulse:  [] 59  Resp:  [8-24] 8  BP: ()/(53-93) 94/69  SpO2:  [89 %-100 %] 98 %    Hemodynamic parameters for last 24 hours       Respiratory Information for the last 24 hours       Physical Exam   Physical Exam  Vitals and nursing note reviewed. Exam conducted with a chaperone present.   Constitutional:       General: He is not in acute distress.     Appearance: Normal appearance. He is not ill-appearing.   HENT:      Head: Normocephalic.      Mouth/Throat:      Mouth: Mucous membranes are moist.   Eyes:       Extraocular Movements: Extraocular movements intact.   Cardiovascular:      Rate and Rhythm: Normal rate and regular rhythm.      Pulses: Normal pulses.   Pulmonary:      Effort: Pulmonary effort is normal. No respiratory distress.   Abdominal:      General: There is no distension.      Palpations: Abdomen is soft.      Tenderness: There is no abdominal tenderness. There is no guarding or rebound.   Musculoskeletal:         General: Normal range of motion.      Cervical back: Normal range of motion and neck supple.   Skin:     General: Skin is warm and dry.      Capillary Refill: Capillary refill takes less than 2 seconds.   Neurological:      General: No focal deficit present.      Mental Status: He is alert and oriented to person, place, and time. Mental status is at baseline.         Medications  Current Facility-Administered Medications   Medication Dose Route Frequency Provider Last Rate Last Admin    buPROPion SR (Wellbutrin-SR) tablet 150 mg  150 mg Oral QAM Kolton Dudley M.D.   150 mg at 03/31/25 0948    divalproex (Depakote) delayed-release tablet 500 mg  500 mg Oral BID Kolton Dudley M.D.   500 mg at 03/31/25 0947    dexmedetomidine (Precedex) 400 mcg/100mL infusion  0.1-1.5 mcg/kg/hr (Ideal) Intravenous Continuous Oumou L. Latona   Paused at 03/31/25 1033    labetalol (Normodyne/Trandate) injection 10 mg  10 mg Intravenous Q3HRS PRN Oumou L. Latona        hydrALAZINE (Apresoline) injection 10 mg  10 mg Intravenous Q3HRS PRN Oumou L. Latona        niCARdipine (Cardene) 25 mg in  mL Standard Infusion  0-15 mg/hr Intravenous Continuous Oumou L. Latona   Dose not Required at 03/30/25 2315    atorvastatin (Lipitor) tablet 80 mg  80 mg Oral Q EVENING Oumou L. Latona        Respiratory Therapy Consult   Nebulization Continuous RT Oumou L. Latona        acetaminophen (Tylenol) tablet 650 mg  650 mg Oral Q6HRS PRN Oumou L. Latona        senna-docusate (Pericolace Or Senokot S) 8.6-50 MG per tablet 2  Tablet  2 Tablet Oral Q EVENING Oumou Ward        And    polyethylene glycol/lytes (Miralax) Packet 1 Packet  1 Packet Oral QDAY PRN Oumou Ward        ondansetron (Zofran) syringe/vial injection 4 mg  4 mg Intravenous Q4HRS PRN Oumou Ward        ondansetron (Zofran ODT) dispertab 4 mg  4 mg Oral Q4HRS PRN Oumou Ward        insulin lispro (HumaLOG,AdmeLOG) subcutaneous injection  1-6 Units Subcutaneous Q6HRS Oumou Ward        And    dextrose 50 % (D50W) injection 25 g  25 g Intravenous Q15 MIN PRN Oumou Ward        acetaminophen (Tylenol) suppository 650 mg  650 mg Rectal Q6HRS PRN Oumou Ward MD Alert...ICU Electrolyte Replacement per Pharmacy   Other PHARMACY TO DOSE Maxim Perdue M.D.           Fluids    Intake/Output Summary (Last 24 hours) at 3/31/2025 1427  Last data filed at 3/31/2025 1033  Gross per 24 hour   Intake 277.99 ml   Output 900 ml   Net -622.01 ml       Laboratory          Recent Labs     03/30/25 1847 03/30/25 2102 03/31/25  0315   SODIUM 140  --  138   POTASSIUM 4.3  --  4.0   CHLORIDE 106  --  106   CO2 22  --  21   BUN 15  --  13   CREATININE 0.85  --  0.66   MAGNESIUM  --  2.0 2.0   PHOSPHORUS  --   --  3.9   CALCIUM 8.8  --  8.6     Recent Labs     03/30/25 1847 03/31/25  0315   ALTSGPT 13 13   ASTSGOT 15 22   ALKPHOSPHAT 69 68   TBILIRUBIN 0.4 0.5   GLUCOSE 97 86     Recent Labs     03/30/25 1847 03/31/25  0315   WBC 6.5 8.2   NEUTSPOLYS 40.30* 54.70   LYMPHOCYTES 40.20 29.10   MONOCYTES 13.50* 11.40   EOSINOPHILS 4.50 3.80   BASOPHILS 0.60 0.60   ASTSGOT 15 22   ALTSGPT 13 13   ALKPHOSPHAT 69 68   TBILIRUBIN 0.4 0.5     Recent Labs     03/30/25 1847 03/31/25  0315   RBC 3.97* 3.93*   HEMOGLOBIN 12.9* 12.8*   HEMATOCRIT 38.8* 38.0*   PLATELETCT 237 235   PROTHROMBTM 12.8  --    APTT 26.5  --    INR 0.96  --        Imaging  X-Ray:  I have personally reviewed the images and compared with prior images. and My impression is: Mild interstitial  edema    Assessment/Plan  * Acute CVA (cerebrovascular accident) (HCC)- (present on admission)  Assessment & Plan  Acute stroke presenting with sudden onset garbled speech and left arm weakness  Received TNKase at 1939  Admit to neuro ICU  Frequent neurochecks per protocol post thrombolytic therapy  Maintain strict BP control <180/105  Hold antiplatelet and blood thinners 24 hours post thrombolytic  Expedited MRI brain  Repeat CT head with any new/worsening neuro deficits  Maintain euvolemia, normothermia, eucapnia  Maintain normal sodium level  High intensity statin  Continuous telemetry monitoring  Echocardiogram pending  PT/OT/SLP    Carotid aneurysm, left (HCC)  Assessment & Plan  Distal ICA  2mm  Will discuss with neurology, likely outpatient follow up    Schizophrenia ([facility])- (present on admission)  Assessment & Plan  Review home medication regimen  Resume as appropriate  Currently with some mild agitation, will initiate dexmedetomidine infusion if needed    Abnormal drug screen  Assessment & Plan  Amphetamines and benzodiazepines in his UDS  He does take Adderall and benzos as an outpatient so these could be false positives for abuse    Emphysema of lung ([facility])- (present on admission)  Assessment & Plan  Unknown severity  RT protocols  Not in acute exacerbation    Atrial fibrillation ([facility])- (present on admission)  Assessment & Plan  Chart history of atrial fibrillation, currently in sinus rhythm  Not on blood thinners  Trend continuous telemetry         VTE:   Hold for 24 hours post TNK  Ulcer: Not Indicated  Lines: None    I have performed a physical exam and reviewed and updated ROS and Plan today (3/31/2025). In review of yesterday's note (3/30/2025), there are no changes except as documented above.     Discussed patient condition and risk of morbidity and/or mortality with RN, RT, Pharmacy, Patient, and neurology    The patient remains critically ill.  He requires every hour neurochecks in the ICU post TNK  protocol.  Critical care time = 42 minutes in directly providing and coordinating critical care and extensive data review.  No time overlap and excludes procedures.

## 2025-03-31 NOTE — DISCHARGE PLANNING
Case Management Discharge Planning    Admission Date: 3/30/2025  GMLOS: 3.1  ALOS: 1    6-Clicks ADL Score:    6-Clicks Mobility Score:      Anticipated Discharge Dispo: Discharge Disposition: Disch to IP rehab facility or distinct part unit (62)    DME Needed: No    Action(s) Taken:   Chart review was completed. Patient was discussed during IDT rounds.    Per IDT, pt was admitted to ICU post TNK. PT/OT to evaluate. Per RN, pt's orientation is unable to be assessed d/t slurred speech.     PC was placed to pt's emergency contact/grandparent Marleni at [phone] re: discharge assessment. Phone number is not in service.     Per chart review, pt was unhoused living at [facility] and previously verbalized he did not have any family/friend support in the community. NOK search was requested; pending results.     Social Determinants of Health Community Resources provided on the pt's AVS.     Escalations Completed: None    Medically Clear: No    Next Steps:  CM RN to follow up with medical team to discuss discharge barriers or needs.     Barriers to Discharge: Medical clearance and Pending PT Evaluation

## 2025-03-31 NOTE — ED NOTES
ERP at bedside    Patient answers question, AXO 3 to person, birthday and place    Able to lift left arm and maintained in 10 seconds       NIH 3

## 2025-03-31 NOTE — CARE PLAN
The patient is Watcher - Medium risk of patient condition declining or worsening    Shift Goals  Clinical Goals: Post TNK monitoring  Patient Goals: TIMOTEO  Family Goals: TIMOTEO    Progress made toward(s) clinical / shift goals:  VSS.  Swallow eval completed  Problem: Hemodynamic Monitoring  Goal: Patient's hemodynamics, fluid balance and neurologic status will be stable or improve  Outcome: Progressing     Problem: Dysphagia  Goal: Dysphagia will improve  Outcome: Progressing       Patient is not progressing towards the following goals: Unable to fully assess neuro status      Problem: Knowledge Deficit - Stroke Education  Goal: Patient's knowledge of stroke and risk factors will improve  Outcome: Not Progressing

## 2025-03-31 NOTE — RESPIRATORY CARE
COPD EDUCATION by COPD CLINICAL EDUCATOR  3/31/2025 at 6:11 AM by Teresa Dumont RRT     Patient reviewed by COPD education team. Patient does not have a history or diagnosis of COPD and is a former smoker.  Therefore, patient does not qualify for the COPD program.

## 2025-03-31 NOTE — ED NOTES
"Medication history reviewed with patients home pharmacy. Patient unable to participate in interview.  Med rec is complete.  Allergies reviewed.    No anticoagulants/antibiotics     Dispense history available in EPIC? Through 3/20/25    Unsure how patient is taking Ativan, per pharmacy the directions are \"3 mg daily\"      Taco Ozuna            "

## 2025-03-31 NOTE — ED PROVIDER NOTES
"ED Provider Note    CHIEF COMPLAINT  Dysarthria    EXTERNAL RECORDS REVIEWED  External ED Note patient was seen at [facility] emergency room for altered mental status.  He was ultimately discharged home.  Patient has history of schizophrenia, homelessness    HPI/ROS  LIMITATION TO HISTORY   Select: Dysarthria  OUTSIDE HISTORIAN(S):  EMS    Brian Durham is a 64 y.o. male who presents as a stroke alert.  Patient was apparently in the shower at the homeless shelter when he started having some slurred speech as well as left arm drift.  This started 90 minutes ago.  EMS was called.  His blood sugars 107. Patient has garbled speech and is not able to provide meaningful history. Further HPI is limited.     PAST MEDICAL HISTORY   has a past medical history of ADHD, Arthritis, Cold (01/01/2012), Degenerative disc disease, Emphysema of lung (HCC) (1/24/2025), Encephalopathy acute (1/25/2025), Hepatitis B, Pain (12/30/2011), Psychiatric disorder, and Schizophrenia ([facility]).    SURGICAL HISTORY   has a past surgical history that includes other and cervical disk and fusion anterior (1/16/2012).    FAMILY HISTORY  No family history on file.    SOCIAL HISTORY  Social History     Tobacco Use    Smoking status: Some Days     Current packs/day: 0.25     Types: Cigarettes    Smokeless tobacco: Never    Tobacco comments:     1/2 pack a day.   Vaping Use    Vaping status: Never Used   Substance and Sexual Activity    Alcohol use: Not Currently    Drug use: No    Sexual activity: Not on file       CURRENT MEDICATIONS  Home Medications    **Home medications have not yet been reviewed for this encounter**       Audit from Redirected Encounters    **Home medications have not yet been reviewed for this encounter**         ALLERGIES  Allergies   Allergen Reactions    Paroxetine Swelling     Throat     Shellfish Allergy Rash     \"My heart - very painful.\"       PHYSICAL EXAM  VITAL SIGNS: /69   Pulse 67   Temp 36 °C (96.8 °F) " "(Temporal)   Resp 15   Ht 1.71 m (5' 7.32\")   Wt 86.1 kg (189 lb 13.1 oz)   SpO2 89%   BMI 29.44 kg/m²      Constitutional: Alert, unkempt   HEENT: Normocephalic, Atraumatic,  external ears normal, pharynx pink,  Mucous  Membranes moist, No rhinorrhea or mucosal edema  Eyes: PERRL, EOMI, Conjunctiva normal, No discharge.   Neck: Normal range of motion, No tenderness, Supple, No stridor.   Cardiovascular: Regular Rate and Rhythm, No murmurs,  rubs, or gallops.   Thorax & Lungs: Lungs clear to auscultation bilaterally, No respiratory distress, No wheezes, rhales or rhonchi, No chest wall tenderness.   Abdomen: Soft, non tender, non distended,  No pulsatile masses., no rebound guarding or peritoneal signs.   Skin: Warm, Dry, No erythema, No rash,   Back:  No CVA tenderness,  No spinal tenderness, bony crepitance step offs or instability.   Extremities: Equal, intact distal pulses, No cyanosis, clubbing or edema,  No tenderness.   Musculoskeletal: Good range of motion in all major joints. No tenderness to palpation or major deformities noted.   Neurologic: Profoundly dysarthric, garbled speech, expressive aphasia, Alert and oriented, Symmetric smile, eyes shut tight bilaterally, forehead wrinkles bilaterally, sensation intact to light touch bilateral face, tongue midline, head turn and shoulder shrug with full strength. Hearing intact grossly bilaterally. 5/5 strength shoulder, elbow  b/l. 5/5 strength hip, knee, ankle b/l   SILT biceps, forearms, hands, SILT thighs, shins mid foot   NIH 7    EKG/LABS  Results for orders placed or performed during the hospital encounter of 03/30/25   CBC WITH DIFFERENTIAL    Collection Time: 03/30/25  6:47 PM   Result Value Ref Range    WBC 6.5 4.8 - 10.8 K/uL    RBC 3.97 (L) 4.70 - 6.10 M/uL    Hemoglobin 12.9 (L) 14.0 - 18.0 g/dL    Hematocrit 38.8 (L) 42.0 - 52.0 %    MCV 97.7 81.4 - 97.8 fL    MCH 32.5 27.0 - 33.0 pg    MCHC 33.2 32.3 - 36.5 g/dL    RDW 46.0 35.9 - 50.0 fL "    Platelet Count 237 164 - 446 K/uL    MPV 8.4 (L) 9.0 - 12.9 fL    Neutrophils-Polys 40.30 (L) 44.00 - 72.00 %    Lymphocytes 40.20 22.00 - 41.00 %    Monocytes 13.50 (H) 0.00 - 13.40 %    Eosinophils 4.50 0.00 - 6.90 %    Basophils 0.60 0.00 - 1.80 %    Immature Granulocytes 0.90 0.00 - 0.90 %    Nucleated RBC 0.00 0.00 - 0.20 /100 WBC    Neutrophils (Absolute) 2.62 1.82 - 7.42 K/uL    Lymphs (Absolute) 2.61 1.00 - 4.80 K/uL    Monos (Absolute) 0.88 (H) 0.00 - 0.85 K/uL    Eos (Absolute) 0.29 0.00 - 0.51 K/uL    Baso (Absolute) 0.04 0.00 - 0.12 K/uL    Immature Granulocytes (abs) 0.06 0.00 - 0.11 K/uL    NRBC (Absolute) 0.00 K/uL   COMP METABOLIC PANEL    Collection Time: 03/30/25  6:47 PM   Result Value Ref Range    Sodium 140 135 - 145 mmol/L    Potassium 4.3 3.6 - 5.5 mmol/L    Chloride 106 96 - 112 mmol/L    Co2 22 20 - 33 mmol/L    Anion Gap 12.0 7.0 - 16.0    Glucose 97 65 - 99 mg/dL    Bun 15 8 - 22 mg/dL    Creatinine 0.85 0.50 - 1.40 mg/dL    Calcium 8.8 8.5 - 10.5 mg/dL    Correct Calcium 8.7 8.5 - 10.5 mg/dL    AST(SGOT) 15 12 - 45 U/L    ALT(SGPT) 13 2 - 50 U/L    Alkaline Phosphatase 69 30 - 99 U/L    Total Bilirubin 0.4 0.1 - 1.5 mg/dL    Albumin 4.1 3.2 - 4.9 g/dL    Total Protein 6.8 6.0 - 8.2 g/dL    Globulin 2.7 1.9 - 3.5 g/dL    A-G Ratio 1.5 g/dL   PROTHROMBIN TIME    Collection Time: 03/30/25  6:47 PM   Result Value Ref Range    PT 12.8 12.0 - 14.6 sec    INR 0.96 0.87 - 1.13   APTT    Collection Time: 03/30/25  6:47 PM   Result Value Ref Range    APTT 26.5 24.7 - 36.0 sec   COD (ADULT)    Collection Time: 03/30/25  6:47 PM   Result Value Ref Range    ABO Grouping Only A     Rh Grouping Only POS     Antibody Screen-Cod NEG    TROPONIN    Collection Time: 03/30/25  6:47 PM   Result Value Ref Range    Troponin T 14 6 - 19 ng/L   ESTIMATED GFR    Collection Time: 03/30/25  6:47 PM   Result Value Ref Range    GFR (CKD-EPI) 98 >60 mL/min/1.73 m 2   Infectious Disease Testing (Exposure)     Collection Time: 25  6:47 PM   Result Value Ref Range    Hepatitis B Surface Antigen Non-Reactive Non-Reactive    Hepatitis C Antibody Non-Reactive Non-Reactive    Exposed MRN [id number]    HIV Rapid Screen (Exposure)    Collection Time: 25  6:47 PM    Specimen: Blood   Result Value Ref Range    HIV Ag/Ab Combo Assay Non-Reactive Non Reactive    Exposed MRN [id number]    DIAGNOSTIC ALCOHOL    Collection Time: 25  9:02 PM   Result Value Ref Range    Diagnostic Alcohol <10.1 <10.1 mg/dL   EKG (NOW)    Collection Time: 25 11:03 PM   Result Value Ref Range    Report       [facility] Emergency [facility]    Test Date:  2025  Pt Name:    SADI GUERRERO                Department: ER  MRN:        [id number]                      Room:        02  Gender:     Male                         Technician: [id number]  :        1960                   Requested By:MONISHA LOZOYA  Order #:    [id number]                    Reading MD: Monisha Lozoya    Measurements  Intervals                                Axis  Rate:       70                           P:          71  HI:         53                           QRS:        67  QRSD:       91                           T:          63  QT:         434  QTc:        469    Interpretive Statements  Sinus rhythm  Short HI interval  Normal axis  No ST changes  Compared to ECG 2025 22:42:04  Short HI interval now present  Electronically Signed On 2025 23:03:53 PDT by Monisha Lozoya         I have independently interpreted this EKG    RADIOLOGY/PROCEDURES   I have independently interpreted the diagnostic imaging associated with this visit and am waiting the final reading from the radiologist.   My preliminary interpretation is as follows: no ICH    Radiologist interpretation:  CT-CEREBRAL PERFUSION ANALYSIS   Final Result      1. Cerebral blood flow less than 30% possibly representing completed infarct = 24 mL. Based on distribution of this  "finding, this is likely to represent artifact.      2. T Max more than 6 seconds possibly representing combination of completed infarct and ischemia = 261 mL. Based on the distribution of this finding, this is likely to represent artifact.      3.  Please note that this cerebral perfusion study and report is Quantitative and targets supratentorial (cerebral) perfusion for evaluation of large vessel territory acute ischemia/infarction. For example, lacunar infarcts, and brainstem/posterior fossa    ischemia/infarction are not evaluated on this study.  Data acquisition is subject to artifacts which can yield non-anatomically plausible perfusion maps which may be due to motion, bolus timing, signal to noise ratio, or other technical factors.    Perfusion map abnormalities which show non-anatomic distributions are likely artifact.   This study is not \"stand-alone\" and should only be utilized for diagnosis, management/treatment in correlation with CT, CTA, and/or MRI and clinical factors.         CT-CTA NECK WITH & W/O-POST PROCESSING   Final Result      1. No evidence of flow-limiting stenosis in the cervical carotid or cervical vertebral arteries.      CT-CTA HEAD WITH & W/O-POST PROCESS   Final Result         1. No hemodynamically significant narrowing of the major intracranial vessels.   2. 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 Stable prominent interstitial markings in the perihilar regions of the lungs, suggesting chronic interstitial lung changes, but a component of interstitial edema and/or infiltrates are not excluded.   2. No acute findings.      CT-CEREBRAL PERFUSION ANALYSIS   Final Result      1. Cerebral blood flow less than 30% possibly representing completed infarct = 24 mL. Based on distribution of this finding, this is likely to represent artifact.      2. T Max more than 6 seconds possibly " "representing combination of completed infarct and ischemia = 261 mL. Based on the distribution of this finding, this is likely to represent artifact.      3. Mismatched volume possibly representing ischemic brain/penumbra= 237 mL      4.  Please note that this cerebral perfusion study and report is Quantitative and targets supratentorial (cerebral) perfusion for evaluation of large vessel territory acute ischemia/infarction. For example, lacunar infarcts, and brainstem/posterior fossa    ischemia/infarction are not evaluated on this study.  Data acquisition is subject to artifacts which can yield non-anatomically plausible perfusion maps which may be due to motion, bolus timing, signal to noise ratio, or other technical factors.    Perfusion map abnormalities which show non-anatomic distributions are likely artifact.   This study is not \"stand-alone\" and should only be utilized for diagnosis, management/treatment in correlation with CT, CTA, and/or MRI and clinical factors.         CT-HEAD W/O   Final Result      No acute intracranial abnormality.               MR-BRAIN-W/O    (Results Pending)   CT-HEAD W/O    (Results Pending)   EC-ECHOCARDIOGRAM COMPLETE W/O CONT    (Results Pending)   DX-CHEST-PORTABLE (1 VIEW)    (Results Pending)       COURSE & MEDICAL DECISION MAKING    ASSESSMENT, COURSE AND PLAN  Care Narrative:   This is a 64-year-old male presenting for evaluation as a stroke alert with dysarthria and left arm drift.  Last known well was 90 minutes ago.  On arrival, he was evaluated with the stroke neurologist, Dr. Zuñiga. He is profoundly dysarthric and experiencing expressive aphasia.  There is no left arm drift noted here.  He is moving all 4 extremities without difficulty.  He was taken for CT scanner however he was unable to sit still for the CTs therefore he is given 1 mg of Ativan.  We are only able to get noncontrast CT head.  Discussed with stroke neurology, given his disabling symptoms, we will " proceed with TNK.  Unable to get consent for TNK due to patient's mental status however patient is within time window for TNK and due to his disabling symptoms, this was given emergently.  His NIH score did improve to 3 after TNK.  He was also resting a lot more therefore he was taken for CTA head and neck.  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  Otherwise there is no evidence of LVO.      Thankfully patient did not have any neurologic decline while in the emergency room.  I did perform frequent neurochecks.  I did discuss the patient's case with the intensivist, Dr. Perdue, who will admit him to the ICU.     His labs are reviewed.  His alcohol level is negative.  EKG demonstrates no arrhythmia.  There is no leukocytosis.  Anemia seen but this is improved from 1 month ago.    CRITICAL CARE  The very real possibilty of a deterioration of this patient's condition required the highest level of my preparedness for sudden, emergent intervention.  I provided critical care services, which included medication orders, frequent reevaluations of the patient's condition and response to treatment, ordering and reviewing test results, and discussing the case with various consultants.  The critical care time associated with the care of the patient was 32 minutes. Review chart for interventions. This time is exclusive of any other billable procedures.           STROKE:   Time seen 1855 (Time)     National Institutes of Health (NIH) Stroke Scale   NIH Stroke Scale  1a. Level of Consciousness (Alert, drowsy, etc): 0= Alert     1b. LOC Questions (Month, age): 2= Incorrect     1c. LOC Commands (Open/close eyes make fist/let go): 0= Obeys both correctly     2.   Best Gaze (Eyes open - patient follows examiner's finger on face): 0= Normal     3.   Visual Fields (introduce visual stimulus/threat to patient's field quadrants): 0= No visual loss  4.   Facial Paresis (Show teeth, raise eyebrows and squeeze  eyes shut): 0= Normal             5a. Motor Arm - Left (Elevate arm to 90 degrees if patient is sitting, 45 degrees if  supine): 1= Drift     5b. Motor Arm - Right (Elevate arm to 90 degrees if patient is sitting, 45 degrees if supine): 0= No drift     6a. Motor Leg - Left (Elevate leg 30 degrees with patient supine): 0= No drift     6b. Motor Leg - Right  (Elevate leg 30 degrees with patient supine): 0= No drift     7.   Limb Ataxia (Finger-nose, heel down shin): 0= No ataxia     8.   Sensory (Pin prick to face, arm, trunk and leg - compare side to side): 0= Normal     9.  Best Language (Name item, describe a picture and read sentences): 2= Severe aphasia     10. Dysarthria (Evaluate speech clarity by patient repeating listed words): 2= Near to unintelligible or worse     11. Extinction and Inattention (Use information from prior testing to identify neglect or  double simultaneous stimuli testing): 0= No neglect  NIH score 7     Yes, this patient is a candidate for thrombolytic therapy.          ADDITIONAL PROBLEMS MANAGED  Homelessness    DISPOSITION AND DISCUSSIONS  I have discussed management of the patient with the following physicians and LEA's:    Intensivist - Dr. Perdue  Stroke neurologist - Dr. Zuñiga    Discussion of management with other [facility] or appropriate source(s): None     Escalation of care considered, and ultimately not performed: None    Barriers to care at this time, including but not limited to:  None .     Decision tools and prescription drugs considered including, but not limited to:  None .    DISPOSITION:  Patient will be hospitalized by Dr. Perdue, intensivist, in guarded condition.    FINAL DIAGNOSIS  1. Dysarthria Acute   2. Acute CVA (cerebrovascular accident) (HCC)    3. Expressive aphasia Acute   4. Homelessness unspecified Acute        Electronically signed by: Joan Lozoya M.D., 3/30/2025 7:00 PM

## 2025-03-31 NOTE — ED NOTES
Received patient from trauma nurse    Patient drowsy post ativan meds    Bed alarm placed    Body weight taken and charted

## 2025-03-31 NOTE — ASSESSMENT & PLAN NOTE
Review home medication regimen  Resume as appropriate  Currently with some mild agitation, will initiate dexmedetomidine infusion if needed

## 2025-03-31 NOTE — ED NOTES
Pt in CT scan. Pt not cooperating, pt needing medicated to obtain CT scan, unable to obtain all CT scans. Pt placed on 2-4L [place] oxygen while in CT scan.

## 2025-03-31 NOTE — PROGRESS NOTES
Neurology Progress Note  Neurohospitalist Service, [facility] Neurosciences    Referring Physician: Kolton Dudley M.D.    No chief complaint on file.      HPI: Refer to initial documented Neurology H&P, as detailed in the patient's chart.    Interval History:  No acute events overnight, no new complaints.  Dysarthria has improved although not totally resolved.  Left-sided weakness has resolved.      Review of systems: In addition to what is detailed in the HPI and/or updated in the interval history, all other systems reviewed and are negative.    Past Medical History:    has a past medical history of ADHD, Arthritis, Cold (01/01/2012), Degenerative disc disease, Emphysema of lung (HCC) (1/24/2025), Encephalopathy acute (1/25/2025), Hepatitis B, Pain (12/30/2011), Psychiatric disorder, and Schizophrenia ([facility]).    He has no past medical history of CAD (coronary artery disease), Infectious disease, or Liver disease.    FHx:  family history is not on file.    SHx:   reports that he has quit smoking. His smoking use included cigarettes. He has never used smokeless tobacco. He reports that he does not currently use alcohol. He reports that he does not use drugs.    Medications:    Current Facility-Administered Medications:     buPROPion SR (Wellbutrin-SR) tablet 150 mg, 150 mg, Oral, QAM, Kolton Dudley M.D., 150 mg at 03/31/25 0948    divalproex (Depakote) delayed-release tablet 500 mg, 500 mg, Oral, BID, Kolton Dudley M.D., 500 mg at 03/31/25 0947    dexmedetomidine (Precedex) 400 mcg/100mL infusion, 0.1-1.5 mcg/kg/hr (Ideal), Intravenous, Continuous, Oumou LDesiree Latona, Paused at 03/31/25 1033    labetalol (Normodyne/Trandate) injection 10 mg, 10 mg, Intravenous, Q3HRS PRN, Oumou L. Latona    hydrALAZINE (Apresoline) injection 10 mg, 10 mg, Intravenous, Q3HRS PRN, Oumou L. Latona    niCARdipine (Cardene) 25 mg in  mL Standard Infusion, 0-15 mg/hr, Intravenous, Continuous, Oumou LDesiree Latjessica, Dose not  Required at 03/30/25 2315    atorvastatin (Lipitor) tablet 80 mg, 80 mg, Oral, Q EVENING, Oumou Ward    Respiratory Therapy Consult, , Nebulization, Continuous RT, Oumou Ward    acetaminophen (Tylenol) tablet 650 mg, 650 mg, Oral, Q6HRS PRN, Oumou Ward    senna-docusate (Pericolace Or Senokot S) 8.6-50 MG per tablet 2 Tablet, 2 Tablet, Oral, Q EVENING **AND** polyethylene glycol/lytes (Miralax) Packet 1 Packet, 1 Packet, Oral, QDAY PRN, Oumou Ward    ondansetron (Zofran) syringe/vial injection 4 mg, 4 mg, Intravenous, Q4HRS PRN, Oumou Ward    ondansetron (Zofran ODT) dispertab 4 mg, 4 mg, Oral, Q4HRS PRN, Oumou Ward    insulin lispro (HumaLOG,AdmeLOG) subcutaneous injection, 1-6 Units, Subcutaneous, Q6HRS **AND** POC blood glucose manual result, , , Q6H **AND** NOTIFY MD and PharmD, , , Once **AND** Administer 20 grams of glucose (approximately 8 ounces of fruit juice) every 15 minutes PRN FSBG less than 70 mg/dL, , , PRN **AND** dextrose 50 % (D50W) injection 25 g, 25 g, Intravenous, Q15 MIN PRN, Oumou Ward    acetaminophen (Tylenol) suppository 650 mg, 650 mg, Rectal, Q6HRS PRN, Oumou Ward MD Alert...ICU Electrolyte Replacement per Pharmacy, , Other, PHARMACY TO DOSE, Maxim Perdue M.D.    Physical Examination:    Vitals:    03/31/25 0800 03/31/25 0900 03/31/25 1000 03/31/25 1100   BP: 90/56 105/65 (!) 81/53 (!) 84/56   Pulse: 63 63 61 72   Resp: 18 15 16 (!) 8   Temp: 36.6 °C (97.9 °F)  36.7 °C (98 °F)    TempSrc: Temporal  Temporal    SpO2: 94% 94% 97% 92%   Weight:       Height:           General:   Patient is awake and in no acute distress  Neck: Full range of motion  Eyes: Midline, Pupils reactive to light.  CV: RRR  Lungs: No respiratory distress  Extremities: No cyanosis, warm, no significant edema.    NEUROLOGICAL EXAM:   Mental status: Awake, alert and fully oriented, follows commands  Speech and language: speech is dysarthric.   Cranial nerve exam: Pupils are  equal, round and reactive to light bilaterally. Visual fields are full. Extraocular muscles are intact.   Face is symmetric, facial sensation is intact.   Motor exam: Sustain antigravity in all 4 extremities with no downward drift. Tone is normal. No abnormal movements were seen on exam.  Sensory exam: No sensory deficits identified   Coordination: no gross ataxia noted on exam  Plantar reflexes: Equivocal  Gait: deferred     Objective Data:    Labs:  Lab Results   Component Value Date/Time    PROTHROMBTM 12.8 03/30/2025 06:47 PM    INR 0.96 03/30/2025 06:47 PM      Lab Results   Component Value Date/Time    WBC 8.2 03/31/2025 03:15 AM    RBC 3.93 (L) 03/31/2025 03:15 AM    HEMOGLOBIN 12.8 (L) 03/31/2025 03:15 AM    HEMATOCRIT 38.0 (L) 03/31/2025 03:15 AM    MCV 96.7 03/31/2025 03:15 AM    MCH 32.6 03/31/2025 03:15 AM    MCHC 33.7 03/31/2025 03:15 AM    MPV 8.3 (L) 03/31/2025 03:15 AM    NEUTSPOLYS 54.70 03/31/2025 03:15 AM    LYMPHOCYTES 29.10 03/31/2025 03:15 AM    MONOCYTES 11.40 03/31/2025 03:15 AM    EOSINOPHILS 3.80 03/31/2025 03:15 AM    BASOPHILS 0.60 03/31/2025 03:15 AM    ANISOCYTOSIS 1+ 02/05/2025 11:00 PM      Lab Results   Component Value Date/Time    SODIUM 138 03/31/2025 03:15 AM    POTASSIUM 4.0 03/31/2025 03:15 AM    CHLORIDE 106 03/31/2025 03:15 AM    CO2 21 03/31/2025 03:15 AM    GLUCOSE 86 03/31/2025 03:15 AM    BUN 13 03/31/2025 03:15 AM    CREATININE 0.66 03/31/2025 03:15 AM    CREATININE 0.7 05/08/2007 03:20 AM    BUNCREATRAT 21.3 (H) 02/25/2025 09:07 AM      Lab Results   Component Value Date/Time    CHOLSTRLTOT 187 03/31/2025 12:04 AM     (H) 03/31/2025 12:04 AM    HDL 57 03/31/2025 12:04 AM    TRIGLYCERIDE 95 03/31/2025 12:04 AM       Lab Results   Component Value Date/Time    ALKPHOSPHAT 68 03/31/2025 03:15 AM    ASTSGOT 22 03/31/2025 03:15 AM    ALTSGPT 13 03/31/2025 03:15 AM    TBILIRUBIN 0.5 03/31/2025 03:15 AM        Imaging/Testing:    I interpreted and/or reviewed the  "patient's neuroimaging    [id number] VIEW   Final Result      No radiopaque foreign object preclude MR imaging.      DX-CHEST-PORTABLE (1 VIEW)   Final Result         1. No significant interval change.      CT-CEREBRAL PERFUSION ANALYSIS   Final Result      1. Cerebral blood flow less than 30% possibly representing completed infarct = 24 mL. Based on distribution of this finding, this is likely to represent artifact.      2. T Max more than 6 seconds possibly representing combination of completed infarct and ischemia = 261 mL. Based on the distribution of this finding, this is likely to represent artifact.      3.  Please note that this cerebral perfusion study and report is Quantitative and targets supratentorial (cerebral) perfusion for evaluation of large vessel territory acute ischemia/infarction. For example, lacunar infarcts, and brainstem/posterior fossa    ischemia/infarction are not evaluated on this study.  Data acquisition is subject to artifacts which can yield non-anatomically plausible perfusion maps which may be due to motion, bolus timing, signal to noise ratio, or other technical factors.    Perfusion map abnormalities which show non-anatomic distributions are likely artifact.   This study is not \"stand-alone\" and should only be utilized for diagnosis, management/treatment in correlation with CT, CTA, and/or MRI and clinical factors.         CT-CTA NECK WITH & W/O-POST PROCESSING   Final Result      1. No evidence of flow-limiting stenosis in the cervical carotid or cervical vertebral arteries.      CT-CTA HEAD WITH & W/O-POST PROCESS   Final Result         1. No hemodynamically significant narrowing of the major intracranial vessels.   2. 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 Stable prominent interstitial markings in the perihilar regions of the lungs, suggesting chronic interstitial lung " "changes, but a component of interstitial edema and/or infiltrates are not excluded.   2. No acute findings.      CT-CEREBRAL PERFUSION ANALYSIS   Final Result      1. Cerebral blood flow less than 30% possibly representing completed infarct = 24 mL. Based on distribution of this finding, this is likely to represent artifact.      2. T Max more than 6 seconds possibly representing combination of completed infarct and ischemia = 261 mL. Based on the distribution of this finding, this is likely to represent artifact.      3. Mismatched volume possibly representing ischemic brain/penumbra= 237 mL      4.  Please note that this cerebral perfusion study and report is Quantitative and targets supratentorial (cerebral) perfusion for evaluation of large vessel territory acute ischemia/infarction. For example, lacunar infarcts, and brainstem/posterior fossa    ischemia/infarction are not evaluated on this study.  Data acquisition is subject to artifacts which can yield non-anatomically plausible perfusion maps which may be due to motion, bolus timing, signal to noise ratio, or other technical factors.    Perfusion map abnormalities which show non-anatomic distributions are likely artifact.   This study is not \"stand-alone\" and should only be utilized for diagnosis, management/treatment in correlation with CT, CTA, and/or MRI and clinical factors.         CT-HEAD W/O   Final Result      No acute intracranial abnormality.               MR-BRAIN-W/O    (Results Pending)   CT-HEAD W/O    (Results Pending)   EC-ECHOCARDIOGRAM COMPLETE W/O CONT    (Results Pending)       Assessment and Plan:  Brian Durham is a 64 y.o.   with history of psychiatric disorder and schizophrenia who was brought to emergency room for acute onset of difficulty talking as well as mild left-sided weakness.  The onset of symptom was at 1730 p.m.  NIH scale score was 7,  Brain CT is unremarkable.  CT of the head and neck with no flow-limiting " stenosis.  CT perfusion was artifactual..   He is status post administration of TNK at 1939 on 3/30/2025.         Plan:  - ACUTE TREATMENT WITH TNK as managed with pharmacy and coordinated care  - Neurology checks and vital signs per protocol.  - Maintain blood pressure <180/105 per thrombolytic therapy guidelines, maintain -180.  - Maintain normoglycemia and avoid hypo- or hypernatremia; aim for normothermia  - Hold antiplatelets and any blood thinners for 24 hours status post tNK administration  - High intensity statin when safe to swallow, LDL goal less than 70  - Serum studies for stroke risk factors: LDL is 111 and hemoglobin A1c is 5.5.  - To identify stroke territory, obtain MRI brain without contrast  - Obtain a 2D echocardiogram  - Evaluate and treat with PT/OT/ST; physiatry consult  - Stroke education    The evaluation of the patient, and recommended management, was discussed with Kolton Dudley M.D.    Please note that this dictation was created using voice recognition software. I have made every reasonable attempt to correct obvious errors, but I expect that there are errors of grammar and possibly content that I did not discover before finalizing the note.      Spring Zuñiga MD  [facility]ute [facility] Neu[facility] Services

## 2025-03-31 NOTE — THERAPY
Physical Therapy   Initial Evaluation     Patient Name: Brian Durham  Age:  64 y.o., Sex:  male  Medical Record #: [id number]  Today's Date: 3/31/2025     Precautions  Precautions: Fall Risk;Swallow Precautions    Assessment  Patient is 64 y.o. male admitted w/ slurred speech.  Hx of ADHD, emphysema, Hep B, schizophrenia.  He is homeless and has been staying at [facility].  He is rec'd in bed, lethargic but agreeable to work w/ PT.  Min assist for all mobility as noted below, including ambulating w/ HHA 75 ft.  PT will follow and address goals noted below.  Plan    Physical Therapy Initial Treatment Plan   Treatment Plan : Bed Mobility, Gait Training, Therapeutic Activities  Treatment Frequency: 5 Times per Week  Duration: Until Therapy Goals Met    DC Equipment Recommendations: Unable to determine at this time  Discharge Recommendations: Recommend home health for continued physical therapy services         Objective       03/31/25 1347   Prior Living Situation   Housing / Facility Homeless   Prior Level of Functional Mobility   Bed Mobility Independent   Transfer Status Independent   Ambulation Independent   Assistive Devices Used None   Cognition    Level of Consciousness Responds to voice   Comments lethargic   Strength Lower Body   Comments grossly wnl   Balance Assessment   Sitting Balance (Static) Fair -   Sitting Balance (Dynamic) Fair -   Standing Balance (Static) Fair -   Standing Balance (Dynamic) Poor +   Weight Shift Sitting Fair   Weight Shift Standing Fair   Bed Mobility    Supine to Sit Minimal Assist   Sit to Supine Minimal Assist   Gait Analysis   Gait Level Of Assist Minimal Assist   Assistive Device None   Distance (Feet) 75   Deviation   (unsteady all directions)   Functional Mobility   Sit to Stand Supervised   Short Term Goals    Short Term Goal # 1 Pt to move supine to/from eob w/ spv in 6 visits   Short Term Goal # 2 Pt to move sit to/from stand w/ spv in 6 visits   Physical Therapy  Initial Treatment Plan    Treatment Plan  Bed Mobility;Gait Training;Therapeutic Activities   Treatment Frequency 5 Times per Week   Duration Until Therapy Goals Met   Problem List    Problems Impaired Bed Mobility;Impaired Transfers;Impaired Ambulation;Impaired Balance;Safety Awareness Deficits / Cognition   Anticipated Discharge Equipment and Recommendations   DC Equipment Recommendations Unable to determine at this time   Discharge Recommendations Recommend home health for continued physical therapy services

## 2025-03-31 NOTE — ASSESSMENT & PLAN NOTE
Amphetamines and benzodiazepines in his UDS  He does take Adderall and benzos as an outpatient so these could be false positives for abuse

## 2025-03-31 NOTE — DISCHARGE PLANNING
Brian is lacking D/C resources/support.  Unfortunately, not a candidate for [facility]. [facility] will no longer follow.  Please reach out to myself if a PMR consult referral is needed for medical management or with any questions.

## 2025-03-31 NOTE — DISCHARGE PLANNING
[facility] Transitional Care Coordination     Referral from: NAN Ward  Insurance Provider on Facesheet: MCR/HMO  Potential Rehab Diagnosis: Stroke     Chart review indicates patient may have on going medical management and may have therapy needs to possibly meet inpatient rehab facility criteria with the goal of returning to community.     D/C support: unknown     Physiatry consultation forwarded per protocol.   NIH (7) Pending TX and IRIZARRY  Pending PT/OT evaluations as clinically appropriate  [facility] will follow      Thank you for the referral.

## 2025-04-01 LAB
ALBUMIN SERPL BCP-MCNC: 4.2 G/DL (ref 3.2–4.9)
ALBUMIN/GLOB SERPL: 1.4 G/DL
ALP SERPL-CCNC: 76 U/L (ref 30–99)
ALT SERPL-CCNC: 15 U/L (ref 2–50)
ANION GAP SERPL CALC-SCNC: 13 MMOL/L (ref 7–16)
AST SERPL-CCNC: 25 U/L (ref 12–45)
BASOPHILS # BLD AUTO: 0.7 % (ref 0–1.8)
BASOPHILS # BLD: 0.05 K/UL (ref 0–0.12)
BILIRUB SERPL-MCNC: 0.6 MG/DL (ref 0.1–1.5)
BUN SERPL-MCNC: 17 MG/DL (ref 8–22)
CALCIUM ALBUM COR SERPL-MCNC: 9 MG/DL (ref 8.5–10.5)
CALCIUM SERPL-MCNC: 9.2 MG/DL (ref 8.5–10.5)
CHLORIDE SERPL-SCNC: 103 MMOL/L (ref 96–112)
CO2 SERPL-SCNC: 20 MMOL/L (ref 20–33)
CREAT SERPL-MCNC: 0.78 MG/DL (ref 0.5–1.4)
EOSINOPHIL # BLD AUTO: 0.2 K/UL (ref 0–0.51)
EOSINOPHIL NFR BLD: 3 % (ref 0–6.9)
ERYTHROCYTE [DISTWIDTH] IN BLOOD BY AUTOMATED COUNT: 45 FL (ref 35.9–50)
GFR SERPLBLD CREATININE-BSD FMLA CKD-EPI: 101 ML/MIN/1.73 M 2
GLOBULIN SER CALC-MCNC: 3 G/DL (ref 1.9–3.5)
GLUCOSE SERPL-MCNC: 88 MG/DL (ref 65–99)
HCT VFR BLD AUTO: 40.2 % (ref 42–52)
HGB BLD-MCNC: 13.7 G/DL (ref 14–18)
IMM GRANULOCYTES # BLD AUTO: 0.03 K/UL (ref 0–0.11)
IMM GRANULOCYTES NFR BLD AUTO: 0.4 % (ref 0–0.9)
LYMPHOCYTES # BLD AUTO: 2.4 K/UL (ref 1–4.8)
LYMPHOCYTES NFR BLD: 35.7 % (ref 22–41)
MAGNESIUM SERPL-MCNC: 2.1 MG/DL (ref 1.5–2.5)
MCH RBC QN AUTO: 32.7 PG (ref 27–33)
MCHC RBC AUTO-ENTMCNC: 34.1 G/DL (ref 32.3–36.5)
MCV RBC AUTO: 95.9 FL (ref 81.4–97.8)
MONOCYTES # BLD AUTO: 0.65 K/UL (ref 0–0.85)
MONOCYTES NFR BLD AUTO: 9.7 % (ref 0–13.4)
NEUTROPHILS # BLD AUTO: 3.39 K/UL (ref 1.82–7.42)
NEUTROPHILS NFR BLD: 50.5 % (ref 44–72)
NRBC # BLD AUTO: 0 K/UL
NRBC BLD-RTO: 0 /100 WBC (ref 0–0.2)
PHOSPHATE SERPL-MCNC: 4.5 MG/DL (ref 2.5–4.5)
PLATELET # BLD AUTO: 267 K/UL (ref 164–446)
PMV BLD AUTO: 8.5 FL (ref 9–12.9)
POTASSIUM SERPL-SCNC: 4.3 MMOL/L (ref 3.6–5.5)
PROT SERPL-MCNC: 7.2 G/DL (ref 6–8.2)
RBC # BLD AUTO: 4.19 M/UL (ref 4.7–6.1)
SODIUM SERPL-SCNC: 136 MMOL/L (ref 135–145)
WBC # BLD AUTO: 6.7 K/UL (ref 4.8–10.8)

## 2025-04-01 PROCEDURE — 84100 ASSAY OF PHOSPHORUS: CPT

## 2025-04-01 PROCEDURE — A9270 NON-COVERED ITEM OR SERVICE: HCPCS | Performed by: HOSPITALIST

## 2025-04-01 PROCEDURE — A9270 NON-COVERED ITEM OR SERVICE: HCPCS | Performed by: STUDENT IN AN ORGANIZED HEALTH CARE EDUCATION/TRAINING PROGRAM

## 2025-04-01 PROCEDURE — 700102 HCHG RX REV CODE 250 W/ 637 OVERRIDE(OP): Performed by: NURSE PRACTITIONER

## 2025-04-01 PROCEDURE — 700102 HCHG RX REV CODE 250 W/ 637 OVERRIDE(OP)

## 2025-04-01 PROCEDURE — 80053 COMPREHEN METABOLIC PANEL: CPT

## 2025-04-01 PROCEDURE — A9270 NON-COVERED ITEM OR SERVICE: HCPCS

## 2025-04-01 PROCEDURE — 770020 HCHG ROOM/CARE - TELE (206)

## 2025-04-01 PROCEDURE — A9270 NON-COVERED ITEM OR SERVICE: HCPCS | Performed by: NURSE PRACTITIONER

## 2025-04-01 PROCEDURE — 99232 SBSQ HOSP IP/OBS MODERATE 35: CPT | Performed by: PSYCHIATRY & NEUROLOGY

## 2025-04-01 PROCEDURE — 700102 HCHG RX REV CODE 250 W/ 637 OVERRIDE(OP): Performed by: STUDENT IN AN ORGANIZED HEALTH CARE EDUCATION/TRAINING PROGRAM

## 2025-04-01 PROCEDURE — 85025 COMPLETE CBC W/AUTO DIFF WBC: CPT

## 2025-04-01 PROCEDURE — 700102 HCHG RX REV CODE 250 W/ 637 OVERRIDE(OP): Performed by: HOSPITALIST

## 2025-04-01 PROCEDURE — 97530 THERAPEUTIC ACTIVITIES: CPT | Mod: CQ

## 2025-04-01 PROCEDURE — 83735 ASSAY OF MAGNESIUM: CPT

## 2025-04-01 PROCEDURE — 99223 1ST HOSP IP/OBS HIGH 75: CPT | Performed by: HOSPITALIST

## 2025-04-01 RX ORDER — HYDROMORPHONE HYDROCHLORIDE 1 MG/ML
0.25 INJECTION, SOLUTION INTRAMUSCULAR; INTRAVENOUS; SUBCUTANEOUS
Status: DISCONTINUED | OUTPATIENT
Start: 2025-04-01 | End: 2025-04-03 | Stop reason: HOSPADM

## 2025-04-01 RX ORDER — DEXTROAMPHETAMINE SACCHARATE, AMPHETAMINE ASPARTATE, DEXTROAMPHETAMINE SULFATE AND AMPHETAMINE SULFATE 2.5; 2.5; 2.5; 2.5 MG/1; MG/1; MG/1; MG/1
20 TABLET ORAL 2 TIMES DAILY
Refills: 0 | Status: DISCONTINUED | OUTPATIENT
Start: 2025-04-01 | End: 2025-04-03 | Stop reason: HOSPADM

## 2025-04-01 RX ORDER — OXYCODONE HYDROCHLORIDE 5 MG/1
2.5 TABLET ORAL
Refills: 0 | Status: DISCONTINUED | OUTPATIENT
Start: 2025-04-01 | End: 2025-04-03 | Stop reason: HOSPADM

## 2025-04-01 RX ORDER — OXYCODONE HYDROCHLORIDE 5 MG/1
5 TABLET ORAL
Refills: 0 | Status: DISCONTINUED | OUTPATIENT
Start: 2025-04-01 | End: 2025-04-03 | Stop reason: HOSPADM

## 2025-04-01 RX ADMIN — OXYCODONE HYDROCHLORIDE 5 MG: 5 TABLET ORAL at 20:45

## 2025-04-01 RX ADMIN — ATORVASTATIN CALCIUM 80 MG: 80 TABLET, FILM COATED ORAL at 18:05

## 2025-04-01 RX ADMIN — BUPROPION HYDROCHLORIDE 150 MG: 150 TABLET, FILM COATED, EXTENDED RELEASE ORAL at 05:10

## 2025-04-01 RX ADMIN — SENNOSIDES AND DOCUSATE SODIUM 2 TABLET: 50; 8.6 TABLET ORAL at 18:06

## 2025-04-01 RX ADMIN — DIVALPROEX SODIUM 500 MG: 500 TABLET, DELAYED RELEASE ORAL at 05:10

## 2025-04-01 RX ADMIN — DIVALPROEX SODIUM 500 MG: 500 TABLET, DELAYED RELEASE ORAL at 18:05

## 2025-04-01 RX ADMIN — DEXTROAMPHETAMINE SACCHARATE, AMPHETAMINE ASPARTATE, DEXTROAMPHETAMINE SULFATE, AMPHETAMINE SULFATE TABLETS, 10 MG,CLL 20 MG: 2.5; 2.5; 2.5; 2.5 TABLET ORAL at 16:13

## 2025-04-01 ASSESSMENT — PAIN DESCRIPTION - PAIN TYPE
TYPE: ACUTE PAIN

## 2025-04-01 ASSESSMENT — COGNITIVE AND FUNCTIONAL STATUS - GENERAL
STANDING UP FROM CHAIR USING ARMS: A LITTLE
SUGGESTED CMS G CODE MODIFIER MOBILITY: CK
TURNING FROM BACK TO SIDE WHILE IN FLAT BAD: A LITTLE
WALKING IN HOSPITAL ROOM: A LITTLE
MOVING TO AND FROM BED TO CHAIR: A LITTLE
MOBILITY SCORE: 18
CLIMB 3 TO 5 STEPS WITH RAILING: A LITTLE
MOVING FROM LYING ON BACK TO SITTING ON SIDE OF FLAT BED: A LITTLE

## 2025-04-01 ASSESSMENT — ENCOUNTER SYMPTOMS
COUGH: 0
CHILLS: 0
ABDOMINAL PAIN: 0
NAUSEA: 0
PALPITATIONS: 0
BACK PAIN: 0
HEARTBURN: 0
VOMITING: 0
DIARRHEA: 0
FEVER: 0
LOSS OF CONSCIOUSNESS: 0
HEADACHES: 0
SHORTNESS OF BREATH: 0
DIZZINESS: 0
MYALGIAS: 0

## 2025-04-01 ASSESSMENT — GAIT ASSESSMENTS
GAIT LEVEL OF ASSIST: STANDBY ASSIST
DISTANCE (FEET): 500

## 2025-04-01 ASSESSMENT — FIBROSIS 4 INDEX: FIB4 SCORE: 1.5

## 2025-04-01 NOTE — DISCHARGE PLANNING
Care Transition Team Assessment    CM RN met with pt at the bedside to complete discharge assessment. Pt appeared A/Ox4 and verbalized agreement to this assessment.     Case management role discussed; understanding of case management role verbalized.   Demographics on face sheet verified.   Please see H&P for pertinent PMH and reason for admission.   Housing: Pt is unhoused and stays at [facility] where he plans to return upon discharge.   IADLS/ADLS: Prior to this admission pt was independent with IADLS/ADLS and ambulated with no use of DME at baseline.   Transportation: Pt relies on public transportation and walking. HAN RN confirmed pt has non-emergency transportation benefits available (MTM).   DME: None owned/used   O2: RA at baseline   Discharge support: Pt reports he has no family/friend support in the community.  PCP: Not established   Preferred pharmacy:   Insurance: [employer] Medicare HMO and Medicaid FFS   AD: None on file; declined packet at this time   Discharge planning: SNF vs Home; pending tx course and therapy evaluations.    PASRR was initiated; in manual review (hx. Schizophrenia)     Information Source  Orientation Level: Oriented X4  Information Given By: Patient  Who is responsible for making decisions for patient? : Patient    Readmission Evaluation  Is this a readmission?: No    Elopement Risk  Legal Hold: No  Ambulatory or Self Mobile in Wheelchair: Yes  Disoriented: No  Psychiatric Symptoms: None  History of Wandering: No  Elopement this Admit: No  Vocalizing Wanting to Leave: No  Displays Behaviors, Body Language Wanting to Leave: No-Not at Risk for Elopement  Elopement Risk: Not at Risk for Elopement    Interdisciplinary Discharge Planning  Lives with - Patient's Self Care Capacity:  (homeless)  Patient or legal guardian wants to designate a caregiver: No  Support Systems: None  Housing / Facility: Homeless    Discharge Preparedness  What is your plan after discharge?: Uncertain - pending  medical team collaboration, Skilled nursing facility  What are your discharge supports?: Other (comment)  Prior Functional Level: Ambulatory, Independent with Activities of Daily Living, Independent with Medication Management  Difficulity with ADLs: None  Difficulity with IADLs: None    Functional Assesment  Prior Functional Level: Ambulatory, Independent with Activities of Daily Living, Independent with Medication Management    Finances  Financial Barriers to Discharge: No  Prescription Coverage: Yes    Advance Directive  Advance Directive?: None  Advance Directive offered?: AD Booklet refused    Domestic Abuse  Possible Abuse/Neglect Reported to:: Not Applicable    Psychological Assessment  History of Substance Abuse: Other (comment)  Substance Abuse Comments: abnornal drug screen  History of Psychiatric Problems: Yes  Non-compliant with Treatment: No  Newly Diagnosed Illness: No    Discharge Risks or Barriers  Discharge risks or barriers?: Homeless / couch surfing, Complex medical needs, Lives alone, no community support, Substance abuse  Patient risk factors: Complex medical needs, Homeless, Lack of outside supports, Lives alone and no community support, Substance abuse    Anticipated Discharge Information  Discharge Disposition: D/T to SNF with Medicare cert in anticipation of skilled care (03)

## 2025-04-01 NOTE — PROGRESS NOTES
Pt transferred to 184. Report given to SHANTI Irwin. All questions are addressed. Pt resting in bed, NAD

## 2025-04-01 NOTE — CONSULTS
Hospital Medicine Consultation    Date of Service  4/1/2025    Referring Physician  Kolton Dudley M.D.    Consulting Physician  Douglas Kapadia D.O.    Reason for Consultation  L side deficits    History of Presenting Illness  64 y.o. male who presented 3/30/2025 with a history of schizophrenia, COPD.  He presented on 3/30 with L side weakness and speech deficits.  Of note pt is L hand dominant.  Initial NIHSS 7 with neg CT head was neg however CTA and perfusion studies were not done as pt was unable to remain still.  Given TNK.      ROS on my exam is slightly limited by pt's underlying psychiatric condition however pt reports he's feeling well though slept poorly.  Up and walking and feels like he did well.  Speech back to what he feels is his normal    Review of Systems  Review of Systems   Constitutional:  Negative for chills and fever.   Respiratory:  Negative for cough and shortness of breath.    Cardiovascular:  Negative for chest pain, palpitations and leg swelling.   Gastrointestinal:  Negative for abdominal pain, diarrhea, nausea and vomiting.   Genitourinary:  Negative for dysuria and urgency.   Musculoskeletal:  Negative for back pain.   Skin:  Negative for rash.   Neurological:  Negative for dizziness, loss of consciousness and headaches.       Past Medical History   has a past medical history of ADHD, Arthritis, Cold (01/01/2012), Degenerative disc disease, Emphysema of lung (HCC) (1/24/2025), Encephalopathy acute (1/25/2025), Hepatitis B, Pain (12/30/2011), Psychiatric disorder, and Schizophrenia ([facility]).    He has no past medical history of CAD (coronary artery disease), Infectious disease, or Liver disease.    Surgical History   has a past surgical history that includes other and cervical disk and fusion anterior (1/16/2012).    Family History  family history is not on file.    Social History   reports that he has quit smoking. His smoking use included cigarettes. He has never used smokeless  "tobacco. He reports that he does not currently use alcohol. He reports that he does not use drugs.    Medications  Prior to Admission Medications   Prescriptions Last Dose Informant Patient Reported? Taking?   LORazepam (ATIVAN) 1 MG Tab Unknown Patient Yes No   Sig: Take  by mouth. 3 mg per day   amphetamine-dextroamphetamine (ADDERALL) 20 MG Tab Unknown Patient Yes No   Sig: Take 20 mg by mouth 2 times a day.   buPROPion SR (WELLBUTRIN-SR) 150 MG TABLET SR 12 HR sustained-release tablet Unknown Patient Yes No   Sig: Take 150 mg by mouth every morning.   divalproex (DEPAKOTE) 500 MG Tablet Delayed Response Unknown Patient No No   Sig: Take 1 Tablet by mouth 2 times a day.   hydrOXYzine HCl (ATARAX) 25 MG Tab Unknown Patient No No   Sig: Take 1 Tablet by mouth 3 times a day as needed for Itching.      Facility-Administered Medications: None       Allergies  Allergies   Allergen Reactions    Paroxetine Swelling     Throat     Shellfish Allergy Rash     \"My heart - very painful.\"       Physical Exam  Temp:  [36.5 °C (97.7 °F)-37 °C (98.6 °F)] 36.7 °C (98.1 °F)  Pulse:  [60-86] 74  Resp:  [12-22] 18  BP: ()/(52-80) 107/66  SpO2:  [91 %-98 %] 93 %    Physical Exam  Constitutional:       General: He is not in acute distress.     Appearance: He is well-developed. He is not diaphoretic.   HENT:      Head: Normocephalic and atraumatic.   Eyes:      Conjunctiva/sclera: Conjunctivae normal.   Neck:      Vascular: No JVD.   Cardiovascular:      Rate and Rhythm: Normal rate.      Heart sounds: No murmur heard.     No gallop.   Pulmonary:      Effort: Pulmonary effort is normal. No respiratory distress.      Breath sounds: No stridor. No wheezing or rales.   Abdominal:      Palpations: Abdomen is soft.      Tenderness: There is no abdominal tenderness. There is no guarding or rebound.   Skin:     General: Skin is warm and dry.      Findings: No rash.   Neurological:      Mental Status: He is oriented to person, place, and " time.   Psychiatric:         Mood and Affect: Mood normal.         Speech: Speech is rapid and pressured.         Thought Content: Thought content normal.         Fluids      Laboratory  Recent Labs     03/30/25 1847 03/31/25 0315 04/01/25  0353   WBC 6.5 8.2 6.7   RBC 3.97* 3.93* 4.19*   HEMOGLOBIN 12.9* 12.8* 13.7*   HEMATOCRIT 38.8* 38.0* 40.2*   MCV 97.7 96.7 95.9   MCH 32.5 32.6 32.7   MCHC 33.2 33.7 34.1   RDW 46.0 46.0 45.0   PLATELETCT 237 235 267   MPV 8.4* 8.3* 8.5*     Recent Labs     03/30/25 1847 03/31/25 0315 04/01/25  0353   SODIUM 140 138 136   POTASSIUM 4.3 4.0 4.3   CHLORIDE 106 106 103   CO2 22 21 20   GLUCOSE 97 86 88   BUN 15 13 17   CREATININE 0.85 0.66 0.78   CALCIUM 8.8 8.6 9.2     Recent Labs     03/30/25 1847   APTT 26.5   INR 0.96          Recent Labs     03/31/25  0004   TRIGLYCERIDE 95   HDL 57   *        Imaging  MR-BRAIN-W/O   Final Result      1.  Minimal supratentorial white matter disease consistent with microvascular ischemic change. Common finding for the patient's age.   2.  No evidence of acute infarction, hemorrhage, or mass lesion.   3.  2 mm left supraclinoid ICA aneurysm projecting laterally. Concordant with CTA findings.      GL-NVMEWSA-3 VIEW   Final Result      No radiopaque foreign object preclude MR imaging.      DX-CHEST-PORTABLE (1 VIEW)   Final Result         1. No significant interval change.      CT-CEREBRAL PERFUSION ANALYSIS   Final Result      1. Cerebral blood flow less than 30% possibly representing completed infarct = 24 mL. Based on distribution of this finding, this is likely to represent artifact.      2. T Max more than 6 seconds possibly representing combination of completed infarct and ischemia = 261 mL. Based on the distribution of this finding, this is likely to represent artifact.      3.  Please note that this cerebral perfusion study and report is Quantitative and targets supratentorial (cerebral) perfusion for evaluation of large  "vessel territory acute ischemia/infarction. For example, lacunar infarcts, and brainstem/posterior fossa    ischemia/infarction are not evaluated on this study.  Data acquisition is subject to artifacts which can yield non-anatomically plausible perfusion maps which may be due to motion, bolus timing, signal to noise ratio, or other technical factors.    Perfusion map abnormalities which show non-anatomic distributions are likely artifact.   This study is not \"stand-alone\" and should only be utilized for diagnosis, management/treatment in correlation with CT, CTA, and/or MRI and clinical factors.         CT-CTA NECK WITH & W/O-POST PROCESSING   Final Result      1. No evidence of flow-limiting stenosis in the cervical carotid or cervical vertebral arteries.      CT-CTA HEAD WITH & W/O-POST PROCESS   Final Result         1. No hemodynamically significant narrowing of the major intracranial vessels.   2. 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 Stable prominent interstitial markings in the perihilar regions of the lungs, suggesting chronic interstitial lung changes, but a component of interstitial edema and/or infiltrates are not excluded.   2. No acute findings.      CT-CEREBRAL PERFUSION ANALYSIS   Final Result      1. Cerebral blood flow less than 30% possibly representing completed infarct = 24 mL. Based on distribution of this finding, this is likely to represent artifact.      2. T Max more than 6 seconds possibly representing combination of completed infarct and ischemia = 261 mL. Based on the distribution of this finding, this is likely to represent artifact.      3. Mismatched volume possibly representing ischemic brain/penumbra= 237 mL      4.  Please note that this cerebral perfusion study and report is Quantitative and targets supratentorial (cerebral) perfusion for evaluation of large vessel territory " "acute ischemia/infarction. For example, lacunar infarcts, and brainstem/posterior fossa    ischemia/infarction are not evaluated on this study.  Data acquisition is subject to artifacts which can yield non-anatomically plausible perfusion maps which may be due to motion, bolus timing, signal to noise ratio, or other technical factors.    Perfusion map abnormalities which show non-anatomic distributions are likely artifact.   This study is not \"stand-alone\" and should only be utilized for diagnosis, management/treatment in correlation with CT, CTA, and/or MRI and clinical factors.         CT-HEAD W/O   Final Result      No acute intracranial abnormality.               EC-ECHOCARDIOGRAM COMPLETE W/O CONT    (Results Pending)   IR-NEURO INTERVENTIONAL CONSULT-OP    (Results Pending)       Assessment/Plan  * Acute CVA (cerebrovascular accident) (HCC)- (present on admission)  Assessment & Plan  L side deficits and speech changes  L hand dominant male  S/P TNK  MRI neg  EKG/Tele sinus though carries chart Hx of AFib; not on any blood thinners prior to presentation  TTE done in January benign    A1c 5.5  High dose statin  ASA  PT/OT/SLP  Home vs SNF  BP control  DC on Zio patch  Neurology following    Carotid aneurysm, left (HCC)  Assessment & Plan  Referal for outpt f/u with Neuro IR placed    Abnormal drug screen  Assessment & Plan  Pos amphetamines    Emphysema of lung (HCC)- (present on admission)  Assessment & Plan  Exam benign  O2/RT protocols    Atrial fibrillation (HCC)- (present on admission)  Assessment & Plan  Carries this Dx however has been in sinus thus far on EKG and monitor both of which I have presonally reviewed  Not on any blood thinners prior to presentation    Schizophrenia (HCC)- (present on admission)  Assessment & Plan  Currently on no medications  Consult Psychiatry          "

## 2025-04-01 NOTE — ASSESSMENT & PLAN NOTE
Carries this Dx however has been in sinus thus far on EKG and monitor both of which I have presonally reviewed  Not on any blood thinners prior to presentation

## 2025-04-01 NOTE — CONSULTS
Radiology Consult  Author: BRENT Sims Date & Time created: 4/1/2025  2:47 PM   Date of admission  3/30/2025  Note to reader: this note follows the APSO format rather than the historical SOAP format. Assessment and plan located at the top of the note for ease of use.    Chief Complaint   Brian Durham is a 64 y.o. male admitted 3/30/2025 with left side weakness.    HPI-  Brian Durham is a 64 yom  with a PMHX of schizophrenia, COPD who presented to [facility] on 03/30/2025 with Left sided weakness and speech difficulties. CT head was negative for any significant findings, CT A/P with artifact however did show a 2 mm supraclinoid ICA aneurysm .  He was given TNK at 1939 (03/30/2025).  Initial NIHSS 7. YOBANI was consulted due to the finding of ICA aneurysm. The patient denies headache, visual disturbances, eye movement, facial pain.  I discussed plan of care and imaging with Dr. Matute, as well as the patient at bedside, all questions answered.   I reviewed IDT notes    Assessment/Plan     Principal Problem:    Acute CVA (cerebrovascular accident) (HCC)  Active Problems:    Schizophrenia (HCC)    Atrial fibrillation (HCC)    Emphysema of lung (HCC)    Abnormal drug screen    Carotid aneurysm, left (HCC)      Plan IR    -Small, cavernous supraclinoid ICA aneurysms (generally less than 12 mm), are typically benign with a low risk of rupture, often only requiring observation rather than immediate treatment.   I have discussed this  patients case and imaging with Dr Matute and at this time YOBANI does not recommend endovascular intervention. We recommend the patient follow-up with his primary care provider.     Thank you for allowing Interventional Radiology team to participate in the patients care, if any additonal care or requests are needed in the future please do not hesitate call or place IR order           Review of Systems  Physical Exam   Review of Systems   Constitutional:  Negative for chills and fever.    HENT:  Negative for hearing loss.    Respiratory:  Negative for cough.    Cardiovascular:  Negative for chest pain.   Gastrointestinal:  Negative for abdominal pain, heartburn, nausea and vomiting.   Musculoskeletal:  Negative for myalgias.   Neurological:  Negative for dizziness and headaches.      Vitals:    04/01/25 1206   BP: 102/71   Pulse: 80   Resp: 16   Temp: 37 °C (98.6 °F)   SpO2: 94%      Physical Exam  Vitals and nursing note reviewed.   HENT:      Head: Normocephalic and atraumatic.      Mouth/Throat:      Mouth: Mucous membranes are dry.      Pharynx: Oropharynx is clear.   Eyes:      Pupils: Pupils are equal, round, and reactive to light.   Cardiovascular:      Rate and Rhythm: Normal rate and regular rhythm.   Pulmonary:      Effort: Pulmonary effort is normal. No respiratory distress.   Abdominal:      Palpations: Abdomen is soft.   Skin:     General: Skin is warm and dry.      Capillary Refill: Capillary refill takes less than 2 seconds.   Neurological:      Mental Status: He is alert and oriented to person, place, and time. Mental status is at baseline.      Comments: Moving all extremities-antigravity   Psychiatric:         Behavior: Behavior is cooperative.             Labs reviewed by me today    Recent Labs     03/30/25 1847 03/31/25 0315 04/01/25  0353   WBC 6.5 8.2 6.7   RBC 3.97* 3.93* 4.19*   HEMOGLOBIN 12.9* 12.8* 13.7*   HEMATOCRIT 38.8* 38.0* 40.2*   MCV 97.7 96.7 95.9   MCH 32.5 32.6 32.7   MCHC 33.2 33.7 34.1   RDW 46.0 46.0 45.0   PLATELETCT 237 235 267   MPV 8.4* 8.3* 8.5*     Recent Labs     03/30/25 1847 03/31/25 0315 04/01/25  0353   SODIUM 140 138 136   POTASSIUM 4.3 4.0 4.3   CHLORIDE 106 106 103   CO2 22 21 20   GLUCOSE 97 86 88   BUN 15 13 17   CREATININE 0.85 0.66 0.78   CALCIUM 8.8 8.6 9.2     MR-BRAIN-W/O   Final Result      1.  Minimal supratentorial white matter disease consistent with microvascular ischemic change. Common finding for the patient's age.   2.  No  "evidence of acute infarction, hemorrhage, or mass lesion.   3.  2 mm left supraclinoid ICA aneurysm projecting laterally. Concordant with CTA findings.      UD-[id number]-6 VIEW   Final Result      No radiopaque foreign object preclude MR imaging.      DX-CHEST-PORTABLE (1 VIEW)   Final Result         1. No significant interval change.      CT-CEREBRAL PERFUSION ANALYSIS   Final Result      1. Cerebral blood flow less than 30% possibly representing completed infarct = 24 mL. Based on distribution of this finding, this is likely to represent artifact.      2. T Max more than 6 seconds possibly representing combination of completed infarct and ischemia = 261 mL. Based on the distribution of this finding, this is likely to represent artifact.      3.  Please note that this cerebral perfusion study and report is Quantitative and targets supratentorial (cerebral) perfusion for evaluation of large vessel territory acute ischemia/infarction. For example, lacunar infarcts, and brainstem/posterior fossa    ischemia/infarction are not evaluated on this study.  Data acquisition is subject to artifacts which can yield non-anatomically plausible perfusion maps which may be due to motion, bolus timing, signal to noise ratio, or other technical factors.    Perfusion map abnormalities which show non-anatomic distributions are likely artifact.   This study is not \"stand-alone\" and should only be utilized for diagnosis, management/treatment in correlation with CT, CTA, and/or MRI and clinical factors.         CT-CTA NECK WITH & W/O-POST PROCESSING   Final Result      1. No evidence of flow-limiting stenosis in the cervical carotid or cervical vertebral arteries.      CT-CTA HEAD WITH & W/O-POST PROCESS   Final Result         1. No hemodynamically significant narrowing of the major intracranial vessels.   2. There is a 2 mm aneurysm arising from left distal intracranial ICA (image 86 series 4)      DX-CHEST-PORTABLE (1 VIEW)   Final " "Result      1. Stable prominent interstitial markings in the perihilar regions of the lungs, suggesting chronic interstitial lung changes, but a component of interstitial edema and/or infiltrates are not excluded.   2. No acute findings.      CT-CEREBRAL PERFUSION ANALYSIS   Final Result      1. Cerebral blood flow less than 30% possibly representing completed infarct = 24 mL. Based on distribution of this finding, this is likely to represent artifact.      2. T Max more than 6 seconds possibly representing combination of completed infarct and ischemia = 261 mL. Based on the distribution of this finding, this is likely to represent artifact.      3. Mismatched volume possibly representing ischemic brain/penumbra= 237 mL      4.  Please note that this cerebral perfusion study and report is Quantitative and targets supratentorial (cerebral) perfusion for evaluation of large vessel territory acute ischemia/infarction. For example, lacunar infarcts, and brainstem/posterior fossa    ischemia/infarction are not evaluated on this study.  Data acquisition is subject to artifacts which can yield non-anatomically plausible perfusion maps which may be due to motion, bolus timing, signal to noise ratio, or other technical factors.    Perfusion map abnormalities which show non-anatomic distributions are likely artifact.   This study is not \"stand-alone\" and should only be utilized for diagnosis, management/treatment in correlation with CT, CTA, and/or MRI and clinical factors.         CT-HEAD W/O   Final Result      No acute intracranial abnormality.               IR-NEURO INTERVENTIONAL CONSULT-OP    (Results Pending)     Recent Labs     03/30/25  1847 03/31/25  0315 04/01/25  0353   SODIUM 140 138 136   POTASSIUM 4.3 4.0 4.3   CHLORIDE 106 106 103   CO2 22 21 20   GLUCOSE 97 86 88   BUN 15 13 17     INR   Date Value Ref Range Status   03/30/2025 0.96 0.87 - 1.13 Final     Comment:     INR - Non-therapeutic Reference Range: " "0.87-1.13  INR - Therapeutic Reference Range: 2.0-4.0       No results found for: \"POCINR\"     Intake/Output Summary (Last 24 hours) at 4/1/2025 1447  Last data filed at 4/1/2025 1400  Gross per 24 hour   Intake 1440 ml   Output 3100 ml   Net -1660 ml      Labs not explicitly included in this progress note were reviewed by the author. Radiology/imaging not explicitly included in this progress note was reviewed by the author.     Past Medical History:   Diagnosis Date    ADHD     Arthritis     Cold 01/01/2012    2 weeks ago    Degenerative disc disease     Emphysema of lung (HCC) 1/24/2025    Encephalopathy acute 1/25/2025    Hepatitis B     Pain 12/30/2011    neck, 6/10    Psychiatric disorder     bipolar, ADHD    Schizophrenia (HCC)         Home Medications    Medication Sig Taking? Last Dose Authorizing Provider   buPROPion SR (WELLBUTRIN-SR) 150 MG TABLET SR 12 HR sustained-release tablet Take 150 mg by mouth every morning.  Unknown Physician Outpatient   hydrOXYzine HCl (ATARAX) 25 MG Tab Take 1 Tablet by mouth 3 times a day as needed for Itching.  Unknown Vishal Hendrix D.O.   LORazepam (ATIVAN) 1 MG Tab Take  by mouth. 3 mg per day  Unknown Physician Outpatient   divalproex (DEPAKOTE) 500 MG Tablet Delayed Response Take 1 Tablet by mouth 2 times a day.  Unknown Rachid Aguilar M.D.   amphetamine-dextroamphetamine (ADDERALL) 20 MG Tab Take 20 mg by mouth 2 times a day.  Unknown Physician Outpatient       I have performed a physical exam and reviewed and updated ROS and Plan today (4/1/2025).     45 minutes in directly providing and coordinating care and extensive data review.  No time overlap and excludes procedures.   "

## 2025-04-01 NOTE — THERAPY
"Physical Therapy   Daily Treatment     Patient Name: Brian Durham  Age:  64 y.o., Sex:  male  Medical Record #: [id number]  Today's Date: 4/1/2025     Precautions  Precautions: Fall Risk;Swallow Precautions    Assessment    Pt greeted and seen for PT treatment. Pt greeted sitting up on a flat bed, improved alertness from previous session. He ambulated 500ft no AD and ascend/descend 20 stairs w/o rails and SBA, no overt LOB but unsteady in all directions. Pt currently limited by impaired balance which negatively impacts functional mobility. Pt will continue to benefit from skilled PT to address deficits.       Plan    Treatment Plan Status: Continue Current Treatment Plan  Type of Treatment: Bed Mobility, Gait Training, Therapeutic Activities  Treatment Frequency: 5 Times per Week  Treatment Duration: Until Therapy Goals Met    DC Equipment Recommendations: Unable to determine at this time  Discharge Recommendations: Recommend home health for continued physical therapy services      Subjective  \"What if we took big squashes, big squashes ya know? And put them on our legs with our feet out the bottom.\"       04/01/25 1539   Vitals   Pulse 87   Patient BP Position Sitting   Blood Pressure 132/86   Pulse Oximetry 95 %   O2 (LPM) 0   O2 Delivery Device None - Room Air   Pain 0 - 10 Group   Therapist Pain Assessment Nurse Notified;Post Activity Pain Same as Prior to Activity  (no c/o pain)   Cognition    Level of Consciousness Alert   Comments pleasant, difficult to understand 2/2 not having his dentures present \"some cody took them\". followed all cues   Balance   Sitting Balance (Static) Fair +   Sitting Balance (Dynamic) Fair +   Standing Balance (Static) Fair   Standing Balance (Dynamic) Fair -   Weight Shift Sitting Good   Weight Shift Standing Good   Skilled Intervention Verbal Cuing;Sequencing   Comments no AD   Bed Mobility    Supine to Sit Supervised   Sit to Supine Supervised   Scooting Supervised   Rolling " Supervised   Skilled Intervention Verbal Cuing   Comments flat HOB, no use of rails   Gait Analysis   Gait Level Of Assist Standby Assist   Assistive Device None   Distance (Feet) 500   # of Times Distance was Traveled 1   Deviation   (min unsteady all directions)   # of Stairs Climbed 20  (no rails)   Level of Assist with Stairs Standby Assist   Skilled Intervention Verbal Cuing;Compensatory Strategies   Comments no overt LOB, but unsteady in all directions throughout gait.   Functional Mobility   Sit to Stand Standby Assist   Bed, Chair, Wheelchair Transfer Standby Assist   Transfer Method Stand Step   Mobility bed>buchanan/stairs>bed   Skilled Intervention Verbal Cuing   Comments no AD   Short Term Goals    Short Term Goal # 1 Pt to move supine to/from eob w/ spv in 6 visits   Goal Outcome # 1 Goal met   Short Term Goal # 2 Pt to move sit to/from stand w/ spv in 6 visits   Goal Outcome # 2 Progressing as expected   Short Term Goal # 3 Pt to ambulate 150 ft w/ spv in 6 visits   Goal Outcome # 3 Progressing as expected   Supervising Physical Therapist (PTA Treatments Only)   Supervising Physical Therapist Sarai Draper

## 2025-04-01 NOTE — CONSULTS
Brief PMR consult note to assist with plan of care    Brian is a 64-year-old left handed male with a history of schizophrenia, living in a homeless shelter who had difficulty with speech and left-sided weakness, brought into [facility] ED for stroke workup.  Patient found to have a NIH score of 7.  CT perfusion found 237 mm penumbra.  He was given TNK.  Follow-up MR brain found no evidence of acute infarction.  Currently continues to have deficits with PT and OT    Plan:  -Recommend SNF placement  -Patient lacks stable discharge housing to qualify for IPR  -PMR will sign off, please reconsult or reach out via Voalte if further evaluation or medical management is requested    Dr. Teofilo Hall DO, MS  [facility] - Physical Medicine & Rehabilitation

## 2025-04-01 NOTE — PROGRESS NOTES
Bedside report received from night RN, pt care assumed, assessment completed. Pt is A&O4, pain 0, NSR on the monitor. Updated on POC, questions answered. Bed in lowest, locked position, treaded socks on, call light and belongings within reach.

## 2025-04-01 NOTE — THERAPY
Speech Language Therapy Contact Note    Patient Name: Brian Durham  Age:  64 y.o., Sex:  male  Medical Record #: [id number]  Today's Date: 4/1/2025 04/01/25 0914   Initial Contact Note    Initial Contact Note  Order Received and Verified. Speech Therapy Evaluation NOT Completed Because Patient Does Not Require Acute Speech Therapy at this Time.   Interdisciplinary Plan of Care Collaboration   IDT Collaboration with  Physician   Collaboration Comments Order received for cognitive-linguistic evaluation. MRI Brain negative for acute neuro changes. Physician cleared to cancel evaluation.

## 2025-04-01 NOTE — WOUND TEAM
Wound consult received regarding patient right lateral foot. Patient was seen and evaluated by wound RN Francisca Hdz) on 3/31/25. Same location was pictured and previously identified, POA. Area appears unchanging. Please continue with preventative measures. No further advanced wound care needs at this time.

## 2025-04-01 NOTE — CARE PLAN
The patient is Stable - Low risk of patient condition declining or worsening    Shift Goals  Clinical Goals: Post TNK monitoring  Patient Goals: Rest, eat  Family Goals: TIMOTEO    Progress made toward(s) clinical / shift goals:      Problem: Optimal Care of the Stroke Patient  Goal: Optimal emergency care for the stroke patient  Outcome: Progressing  Note: Post TNK protocol completed.      Problem: Knowledge Deficit - Stroke Education  Goal: Patient's knowledge of stroke and risk factors will improve  Outcome: Progressing     Problem: Neuro Status  Goal: Neuro status will remain stable or improve  Outcome: Progressing  Note: Q4 hour neuro checks in place. Patient is alert and oriented x4. Slurred speech present. Neuro status remained stable.      Problem: Risk for Aspiration  Goal: Patient's risk for aspiration will be absent or decrease  Outcome: Progressing  Note: Level 6 soft and bite sized diet in place per speech.      Problem: Hemodynamic Monitoring  Goal: Patient's hemodynamics, fluid balance and neurologic status will be stable or improve  Outcome: Progressing     Problem: Fall Risk  Goal: Patient will remain free from falls  Outcome: Progressing  Note: Patient was assessed to be a high fall risk. Bed is in the low position and locked, bed alarm is on, and call light is within reach.         Patient is not progressing towards the following goals: N/A

## 2025-04-01 NOTE — WOUND TEAM
[facility]  Inpatient Services  Wound and Skin Care Brief Evaluation    Admission Date: 3/30/2025     Last order of IP CONSULT TO WOUND CARE was found on 3/30/2025 from Hospital Encounter on 3/30/2025     HPI, PMH, SH: Reviewed    No chief complaint on file.    Diagnosis: Acute CVA (cerebrovascular accident) (HCC) [I63.9]    Unit where seen by Wound Team: R114/00     Wound consult placed regarding penis. Chart and images reviewed. This discussed with bedside RN Arlen. This clinician in to assess patient. Patient pleasant and agreeable. Assessed penis. Non-selectively debrided with Moist warm washcloth.   Pt has slight redness around urethral opening.     No pressure injuries or advanced wound care needs identified. Wound consult completed. No further follow up unless indicated and consulted.          PREVENTATIVE INTERVENTIONS:    Q shift Joselito - performed per nursing policy  Q shift pressure point assessments - performed per nursing policy    Surface/Positioning  ICU Low Airloss - Currently in Place  Reposition q 2 hours - Currently in Place  TAPs Turning system - Currently in Place    Offloading/Redistribution  Float Heels off Bed with Pillows - Currently in Place           Respiratory  RA with O2 avilable - Currently in Place    Containment/Moisture Prevention    Dri-fabio pad - Currently in Place  Bedpan and urinal - Currently in Place   Posterior Auricular Interpolation Flap Text: A decision was made to reconstruct the defect utilizing an interpolation axial flap and a staged reconstruction.  A telfa template was made of the defect.  This telfa template was then used to outline the posterior auricular interpolation flap.  The donor area for the pedicle flap was then injected with anesthesia.  The flap was excised through the skin and subcutaneous tissue down to the layer of the underlying musculature.  The pedicle flap was carefully excised within this deep plane to maintain its blood supply.  The edges of the donor site were undermined.   The donor site was closed in a primary fashion.  The pedicle was then rotated into position and sutured.  Once the tube was sutured into place, adequate blood supply was confirmed with blanching and refill.  The pedicle was then wrapped with xeroform gauze and dressed appropriately with a telfa and gauze bandage to ensure continued blood supply and protect the attached pedicle.

## 2025-04-01 NOTE — PROGRESS NOTES
Neurology Progress Note  Neurohospitalist Service, [facility]    Referring Physician: Douglas Kapadia D.O.    No chief complaint on file.      HPI: Refer to initial documented Neurology H&P, as detailed in the patient's chart.    Interval History: No acute events overnight.  No new complaints, no significant changes.  Still has some dysarthria, but may be at his baseline.  Brain MRI revealed no acute infarct.    Review of systems: In addition to what is detailed in the HPI and/or updated in the interval history, all other systems reviewed and are negative.    Past Medical History:    has a past medical history of ADHD, Arthritis, Cold (01/01/2012), Degenerative disc disease, Emphysema of lung (HCC) (1/24/2025), Encephalopathy acute (1/25/2025), Hepatitis B, Pain (12/30/2011), Psychiatric disorder, and Schizophrenia (HCC).    He has no past medical history of CAD (coronary artery disease), Infectious disease, or Liver disease.    FHx:  family history is not on file.    SHx:   reports that he has quit smoking. His smoking use included cigarettes. He has never used smokeless tobacco. He reports that he does not currently use alcohol. He reports that he does not use drugs.    Medications:    Current Facility-Administered Medications:     buPROPion SR (Wellbutrin-SR) tablet 150 mg, 150 mg, Oral, QAM, Kolton Dudley M.D., 150 mg at 04/01/25 0510    divalproex (Depakote) delayed-release tablet 500 mg, 500 mg, Oral, BID, Kolton Dudley M.D., 500 mg at 04/01/25 0510    [DISCONTINUED] insulin lispro (HumaLOG,AdmeLOG) subcutaneous injection, 1-6 Units, Subcutaneous, 4X/DAY ACHS **AND** [CANCELED] POC blood glucose manual result, , , Q AC AND BEDTIME(S) **AND** [CANCELED] NOTIFY MD and PharmD, , , Once **AND** Administer 20 grams of glucose (approximately 8 ounces of fruit juice) every 15 minutes PRN FSBG less than 70 mg/dL, , , PRN **AND** dextrose 50 % (D50W) injection 25 g, 25 g, Intravenous,  Q15 MIN PRN, Kolton Dudley M.D.    aspirin (Asa) chewable tab 81 mg, 81 mg, Oral, DAILY, Jeremy M Gonda, M.D., 81 mg at 03/31/25 2102    labetalol (Normodyne/Trandate) injection 10 mg, 10 mg, Intravenous, Q3HRS PRN, Oumou Ward    hydrALAZINE (Apresoline) injection 10 mg, 10 mg, Intravenous, Q3HRS PRN, Oumou Ward    atorvastatin (Lipitor) tablet 80 mg, 80 mg, Oral, Q EVENING, Oumou Ward, 80 mg at 03/31/25 1730    Respiratory Therapy Consult, , Nebulization, Continuous RT, Oumou Ward    acetaminophen (Tylenol) tablet 650 mg, 650 mg, Oral, Q6HRS PRN, Oumou Ward    senna-docusate (Pericolace Or Senokot S) 8.6-50 MG per tablet 2 Tablet, 2 Tablet, Oral, Q EVENING, 2 Tablet at 03/31/25 1730 **AND** polyethylene glycol/lytes (Miralax) Packet 1 Packet, 1 Packet, Oral, QDAY PRN, Oumou Ward    ondansetron (Zofran) syringe/vial injection 4 mg, 4 mg, Intravenous, Q4HRS PRN, Oumou Ward    ondansetron (Zofran ODT) dispertab 4 mg, 4 mg, Oral, Q4HRS PRN, Oumou Ward    acetaminophen (Tylenol) suppository 650 mg, 650 mg, Rectal, Q6HRS PRN, Oumou Ward MD Alert...ICU Electrolyte Replacement per Pharmacy, , Other, PHARMACY TO DOSE, Maxim Perdue M.D.    Physical Examination:    Vitals:    04/01/25 0400 04/01/25 0500 04/01/25 0600 04/01/25 0942   BP: 105/58 119/75 107/66 116/79   Pulse: 76 77 74 85   Resp: 20 18 18 16   Temp: 36.6 °C (97.9 °F)  36.7 °C (98.1 °F) 36.9 °C (98.4 °F)   TempSrc: Temporal  Temporal Temporal   SpO2: 93% 95% 93% 94%   Weight: 84.3 kg (185 lb 13.6 oz)      Height:           NEUROLOGICAL EXAM:   Mental status: Awake, alert and fully oriented, follows commands  Speech and language: speech is slightly dysarthric.   Cranial nerve exam: Pupils are equal, round and reactive to light bilaterally. Visual fields are full. Extraocular muscles are intact.   Face is symmetric, facial sensation is intact.   Motor exam: Sustain antigravity in all 4 extremities with no downward  drift. Tone is normal. No abnormal movements were seen on exam.  Sensory exam: No sensory deficits identified   Coordination: no gross ataxia noted on exam  Plantar reflexes: Equivocal  Gait: deferred     Objective Data:    Labs:  Lab Results   Component Value Date/Time    PROTHROMBTM 12.8 03/30/2025 06:47 PM    INR 0.96 03/30/2025 06:47 PM      Lab Results   Component Value Date/Time    WBC 6.7 04/01/2025 03:53 AM    RBC 4.19 (L) 04/01/2025 03:53 AM    HEMOGLOBIN 13.7 (L) 04/01/2025 03:53 AM    HEMATOCRIT 40.2 (L) 04/01/2025 03:53 AM    MCV 95.9 04/01/2025 03:53 AM    MCH 32.7 04/01/2025 03:53 AM    MCHC 34.1 04/01/2025 03:53 AM    MPV 8.5 (L) 04/01/2025 03:53 AM    NEUTSPOLYS 50.50 04/01/2025 03:53 AM    LYMPHOCYTES 35.70 04/01/2025 03:53 AM    MONOCYTES 9.70 04/01/2025 03:53 AM    EOSINOPHILS 3.00 04/01/2025 03:53 AM    BASOPHILS 0.70 04/01/2025 03:53 AM    ANISOCYTOSIS 1+ 02/05/2025 11:00 PM      Lab Results   Component Value Date/Time    SODIUM 136 04/01/2025 03:53 AM    POTASSIUM 4.3 04/01/2025 03:53 AM    CHLORIDE 103 04/01/2025 03:53 AM    CO2 20 04/01/2025 03:53 AM    GLUCOSE 88 04/01/2025 03:53 AM    BUN 17 04/01/2025 03:53 AM    CREATININE 0.78 04/01/2025 03:53 AM    CREATININE 0.7 05/08/2007 03:20 AM    BUNCREATRAT 21.3 (H) 02/25/2025 09:07 AM      Lab Results   Component Value Date/Time    CHOLSTRLTOT 187 03/31/2025 12:04 AM     (H) 03/31/2025 12:04 AM    HDL 57 03/31/2025 12:04 AM    TRIGLYCERIDE 95 03/31/2025 12:04 AM       Lab Results   Component Value Date/Time    ALKPHOSPHAT 76 04/01/2025 03:53 AM    ASTSGOT 25 04/01/2025 03:53 AM    ALTSGPT 15 04/01/2025 03:53 AM    TBILIRUBIN 0.6 04/01/2025 03:53 AM        Imaging/Testing:    I interpreted and/or reviewed the patient's neuroimaging    MR-BRAIN-W/O   Final Result      1.  Minimal supratentorial white matter disease consistent with microvascular ischemic change. Common finding for the patient's age.   2.  No evidence of acute infarction,  "hemorrhage, or mass lesion.   3.  2 mm left supraclinoid ICA aneurysm projecting laterally. Concordant with CTA findings.      GY-CGFSSYC-2 VIEW   Final Result      No radiopaque foreign object preclude MR imaging.      DX-CHEST-PORTABLE (1 VIEW)   Final Result         1. No significant interval change.      CT-CEREBRAL PERFUSION ANALYSIS   Final Result      1. Cerebral blood flow less than 30% possibly representing completed infarct = 24 mL. Based on distribution of this finding, this is likely to represent artifact.      2. T Max more than 6 seconds possibly representing combination of completed infarct and ischemia = 261 mL. Based on the distribution of this finding, this is likely to represent artifact.      3.  Please note that this cerebral perfusion study and report is Quantitative and targets supratentorial (cerebral) perfusion for evaluation of large vessel territory acute ischemia/infarction. For example, lacunar infarcts, and brainstem/posterior fossa    ischemia/infarction are not evaluated on this study.  Data acquisition is subject to artifacts which can yield non-anatomically plausible perfusion maps which may be due to motion, bolus timing, signal to noise ratio, or other technical factors.    Perfusion map abnormalities which show non-anatomic distributions are likely artifact.   This study is not \"stand-alone\" and should only be utilized for diagnosis, management/treatment in correlation with CT, CTA, and/or MRI and clinical factors.         CT-CTA NECK WITH & W/O-POST PROCESSING   Final Result      1. No evidence of flow-limiting stenosis in the cervical carotid or cervical vertebral arteries.      CT-CTA HEAD WITH & W/O-POST PROCESS   Final Result         1. No hemodynamically significant narrowing of the major intracranial vessels.   2. 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 Stable prominent " "interstitial markings in the perihilar regions of the lungs, suggesting chronic interstitial lung changes, but a component of interstitial edema and/or infiltrates are not excluded.   2. No acute findings.      CT-CEREBRAL PERFUSION ANALYSIS   Final Result      1. Cerebral blood flow less than 30% possibly representing completed infarct = 24 mL. Based on distribution of this finding, this is likely to represent artifact.      2. T Max more than 6 seconds possibly representing combination of completed infarct and ischemia = 261 mL. Based on the distribution of this finding, this is likely to represent artifact.      3. Mismatched volume possibly representing ischemic brain/penumbra= 237 mL      4.  Please note that this cerebral perfusion study and report is Quantitative and targets supratentorial (cerebral) perfusion for evaluation of large vessel territory acute ischemia/infarction. For example, lacunar infarcts, and brainstem/posterior fossa    ischemia/infarction are not evaluated on this study.  Data acquisition is subject to artifacts which can yield non-anatomically plausible perfusion maps which may be due to motion, bolus timing, signal to noise ratio, or other technical factors.    Perfusion map abnormalities which show non-anatomic distributions are likely artifact.   This study is not \"stand-alone\" and should only be utilized for diagnosis, management/treatment in correlation with CT, CTA, and/or MRI and clinical factors.         CT-HEAD W/O   Final Result      No acute intracranial abnormality.               IR-NEURO INTERVENTIONAL CONSULT-OP    (Results Pending)       Assessment and Plan:  Brian Durham is a 64 y.o.   with history of psychiatric disorder and schizophrenia who was brought to emergency room for acute onset of difficulty talking as well as mild left-sided weakness.  NIH scale score was 7,  Brain CT is unremarkable.  CT of the head and neck with no flow-limiting stenosis.  CT " perfusion was artifactual.  He received TNK at 1939 on 3/30/2025.  Brain MRI with no evidence of acute infarct.        Plan:  -Every 4 hours neurocheck.  -Long-term blood pressure goal is normal 100-130s/60-80.  - Maintain normoglycemia and avoid hypo- or hypernatremia; aim for normothermia  -Aspirin 81 mg daily, atorvastatin 80 mg daily.  - Serum studies for stroke risk factors: LDL is 111 and hemoglobin A1c is 5.5.  - Stroke education  -Okay with discharge if otherwise stable.    The evaluation of the patient, and recommended management, was discussed with Douglas Kapadia, DO    Please note that this dictation was created using voice recognition software. I have made every reasonable attempt to correct obvious errors, but I expect that there are errors of grammar and possibly content that I did not discover before finalizing the note.      Spring Zuñiga MD  Acute [facility] Neurology Services

## 2025-04-01 NOTE — PROGRESS NOTES
4 Eyes Skin Assessment Completed by SHANTI Wong and Romain RN.    Head WDL  Ears WDL  Nose WDL  Mouth WDL  Neck WDL  Breast/Chest WDL  Shoulder Blades WDL  Spine WDL  (R) Arm/Elbow/Hand Redness and Blanching  (L) Arm/Elbow/Hand Redness and Blanching  Abdomen WDL  Groin WDL  Scrotum/Coccyx/Buttocks WDL  (R) Leg Redness, Blanching, and Scab  (L) Leg Redness, Blanching, and Scab  (R) Heel/Foot/Toe Redness and Edema, a wound to lateral R foot  (L) Heel/Foot/Toe Redness, Blanching, and Scab          Devices In Places Tele Box, Pulse Ox, and SCD's      Interventions In Place Pillows, Heels Loaded W/Pillows, and Pressure Redistribution Mattress    Possible Skin Injury Yes    Pictures Uploaded Into Epic Yes  Wound Consult Placed Yes  RN Wound Prevention Protocol Ordered Yes

## 2025-04-01 NOTE — ASSESSMENT & PLAN NOTE
L side deficits and speech changes  L hand dominant male  S/P TNK  MRI neg  EKG/Tele sinus though carries chart Hx of AFib; not on any blood thinners prior to presentation  TTE done in January benign    A1c 5.5  High dose statin  ASA  PT/OT/SLP  Home vs SNF  BP control  DC on Zio patch  Neurology following

## 2025-04-02 PROBLEM — A53.9 SYPHILIS: Status: ACTIVE | Noted: 2025-04-02

## 2025-04-02 LAB
C TRACH DNA SPEC QL NAA+PROBE: NEGATIVE
HBV CORE AB SERPL QL IA: REACTIVE
HBV SURFACE AB SERPL IA-ACNC: 3632 MIU/ML (ref 0–10)
HBV SURFACE AG SER QL: NORMAL
HCV AB SER QL: NORMAL
HIV 1+2 AB+HIV1 P24 AG SERPL QL IA: NORMAL
N GONORRHOEA DNA SPEC QL NAA+PROBE: NEGATIVE
RPR SER QL: NON REACTIVE
SPECIMEN SOURCE: NORMAL
T PALLIDUM AB SER QL IA: REACTIVE

## 2025-04-02 PROCEDURE — 700102 HCHG RX REV CODE 250 W/ 637 OVERRIDE(OP): Performed by: HOSPITALIST

## 2025-04-02 PROCEDURE — 87491 CHLMYD TRACH DNA AMP PROBE: CPT

## 2025-04-02 PROCEDURE — RXMED WILLOW AMBULATORY MEDICATION CHARGE: Performed by: INTERNAL MEDICINE

## 2025-04-02 PROCEDURE — G0475 HIV COMBINATION ASSAY: HCPCS

## 2025-04-02 PROCEDURE — 86803 HEPATITIS C AB TEST: CPT

## 2025-04-02 PROCEDURE — 770020 HCHG ROOM/CARE - TELE (206)

## 2025-04-02 PROCEDURE — A9270 NON-COVERED ITEM OR SERVICE: HCPCS | Performed by: HOSPITALIST

## 2025-04-02 PROCEDURE — 700102 HCHG RX REV CODE 250 W/ 637 OVERRIDE(OP)

## 2025-04-02 PROCEDURE — 86706 HEP B SURFACE ANTIBODY: CPT

## 2025-04-02 PROCEDURE — 86704 HEP B CORE ANTIBODY TOTAL: CPT

## 2025-04-02 PROCEDURE — A9270 NON-COVERED ITEM OR SERVICE: HCPCS | Performed by: INTERNAL MEDICINE

## 2025-04-02 PROCEDURE — G0316 PR PROLONGED IP/OBS E&M EA 15 MIN: HCPCS | Performed by: INTERNAL MEDICINE

## 2025-04-02 PROCEDURE — 97535 SELF CARE MNGMENT TRAINING: CPT

## 2025-04-02 PROCEDURE — 86592 SYPHILIS TEST NON-TREP QUAL: CPT

## 2025-04-02 PROCEDURE — 700102 HCHG RX REV CODE 250 W/ 637 OVERRIDE(OP): Performed by: INTERNAL MEDICINE

## 2025-04-02 PROCEDURE — 700102 HCHG RX REV CODE 250 W/ 637 OVERRIDE(OP): Performed by: NURSE PRACTITIONER

## 2025-04-02 PROCEDURE — 86780 TREPONEMA PALLIDUM: CPT

## 2025-04-02 PROCEDURE — A9270 NON-COVERED ITEM OR SERVICE: HCPCS

## 2025-04-02 PROCEDURE — A9270 NON-COVERED ITEM OR SERVICE: HCPCS | Performed by: STUDENT IN AN ORGANIZED HEALTH CARE EDUCATION/TRAINING PROGRAM

## 2025-04-02 PROCEDURE — 87340 HEPATITIS B SURFACE AG IA: CPT

## 2025-04-02 PROCEDURE — A9270 NON-COVERED ITEM OR SERVICE: HCPCS | Performed by: NURSE PRACTITIONER

## 2025-04-02 PROCEDURE — 700111 HCHG RX REV CODE 636 W/ 250 OVERRIDE (IP): Mod: JZ,TB | Performed by: INTERNAL MEDICINE

## 2025-04-02 PROCEDURE — 99233 SBSQ HOSP IP/OBS HIGH 50: CPT | Performed by: INTERNAL MEDICINE

## 2025-04-02 PROCEDURE — 87591 N.GONORRHOEAE DNA AMP PROB: CPT

## 2025-04-02 PROCEDURE — 97530 THERAPEUTIC ACTIVITIES: CPT | Mod: CQ

## 2025-04-02 PROCEDURE — 700102 HCHG RX REV CODE 250 W/ 637 OVERRIDE(OP): Performed by: STUDENT IN AN ORGANIZED HEALTH CARE EDUCATION/TRAINING PROGRAM

## 2025-04-02 RX ORDER — ATORVASTATIN CALCIUM 80 MG/1
80 TABLET, FILM COATED ORAL EVERY EVENING
Qty: 100 TABLET | Refills: 3 | Status: SHIPPED | OUTPATIENT
Start: 2025-04-02 | End: 2026-05-07

## 2025-04-02 RX ORDER — ASPIRIN 81 MG/1
81 TABLET, CHEWABLE ORAL DAILY
COMMUNITY
Start: 2025-04-03

## 2025-04-02 RX ORDER — DEXTROAMPHETAMINE SACCHARATE, AMPHETAMINE ASPARTATE, DEXTROAMPHETAMINE SULFATE AND AMPHETAMINE SULFATE 5; 5; 5; 5 MG/1; MG/1; MG/1; MG/1
20 TABLET ORAL 2 TIMES DAILY
Qty: 10 EACH | Refills: 0 | Status: SHIPPED | OUTPATIENT
Start: 2025-04-02 | End: 2025-04-07

## 2025-04-02 RX ORDER — AMOXICILLIN 250 MG
2 CAPSULE ORAL EVERY EVENING
COMMUNITY
Start: 2025-04-02

## 2025-04-02 RX ORDER — ACETAMINOPHEN 325 MG/1
650 TABLET ORAL EVERY 6 HOURS PRN
Qty: 30 TABLET | Refills: 0 | Status: SHIPPED | OUTPATIENT
Start: 2025-04-02

## 2025-04-02 RX ORDER — POLYETHYLENE GLYCOL 3350 17 G/17G
17 POWDER, FOR SOLUTION ORAL
COMMUNITY
Start: 2025-04-02

## 2025-04-02 RX ORDER — DIVALPROEX SODIUM 500 MG/1
500 TABLET, DELAYED RELEASE ORAL 2 TIMES DAILY
Qty: 90 TABLET | Refills: 0 | Status: SHIPPED | OUTPATIENT
Start: 2025-04-02

## 2025-04-02 RX ADMIN — PENICILLIN G BENZATHINE 2.4 MILLION UNITS: 2400000 INJECTION, SUSPENSION INTRAMUSCULAR at 17:18

## 2025-04-02 RX ADMIN — BUPROPION HYDROCHLORIDE 150 MG: 150 TABLET, FILM COATED, EXTENDED RELEASE ORAL at 04:20

## 2025-04-02 RX ADMIN — DIVALPROEX SODIUM 500 MG: 500 TABLET, DELAYED RELEASE ORAL at 04:20

## 2025-04-02 RX ADMIN — ATORVASTATIN CALCIUM 80 MG: 80 TABLET, FILM COATED ORAL at 16:32

## 2025-04-02 RX ADMIN — ASPIRIN 81 MG: 81 TABLET, CHEWABLE ORAL at 04:20

## 2025-04-02 RX ADMIN — OXYCODONE HYDROCHLORIDE 5 MG: 5 TABLET ORAL at 00:25

## 2025-04-02 RX ADMIN — OXYCODONE HYDROCHLORIDE 5 MG: 5 TABLET ORAL at 16:32

## 2025-04-02 RX ADMIN — SENNOSIDES AND DOCUSATE SODIUM 2 TABLET: 50; 8.6 TABLET ORAL at 16:33

## 2025-04-02 RX ADMIN — OXYCODONE HYDROCHLORIDE 5 MG: 5 TABLET ORAL at 07:59

## 2025-04-02 RX ADMIN — DEXTROAMPHETAMINE SACCHARATE, AMPHETAMINE ASPARTATE, DEXTROAMPHETAMINE SULFATE, AMPHETAMINE SULFATE TABLETS, 10 MG,CLL 20 MG: 2.5; 2.5; 2.5; 2.5 TABLET ORAL at 04:20

## 2025-04-02 RX ADMIN — DIVALPROEX SODIUM 500 MG: 500 TABLET, DELAYED RELEASE ORAL at 16:32

## 2025-04-02 ASSESSMENT — PAIN DESCRIPTION - PAIN TYPE
TYPE: ACUTE PAIN

## 2025-04-02 ASSESSMENT — COGNITIVE AND FUNCTIONAL STATUS - GENERAL
PERSONAL GROOMING: A LITTLE
MOBILITY SCORE: 24
DRESSING REGULAR LOWER BODY CLOTHING: A LITTLE
DAILY ACTIVITIY SCORE: 21
SUGGESTED CMS G CODE MODIFIER DAILY ACTIVITY: CJ
HELP NEEDED FOR BATHING: A LITTLE
SUGGESTED CMS G CODE MODIFIER MOBILITY: CH

## 2025-04-02 ASSESSMENT — ENCOUNTER SYMPTOMS
FALLS: 0
LOSS OF CONSCIOUSNESS: 0
SPEECH CHANGE: 1
INSOMNIA: 0
NERVOUS/ANXIOUS: 0
WHEEZING: 0
FOCAL WEAKNESS: 1
DIZZINESS: 0
EYE PAIN: 0
PALPITATIONS: 0
HEADACHES: 0
MYALGIAS: 0
EYE REDNESS: 0
CONSTIPATION: 0
DIARRHEA: 0
FEVER: 0
HEMOPTYSIS: 0
WEAKNESS: 0
ABDOMINAL PAIN: 0
TREMORS: 0
NAUSEA: 0
VOMITING: 0
SHORTNESS OF BREATH: 0
BLOOD IN STOOL: 0
SEIZURES: 0
COUGH: 0
CHILLS: 0

## 2025-04-02 ASSESSMENT — GAIT ASSESSMENTS
GAIT LEVEL OF ASSIST: SUPERVISED
DISTANCE (FEET): 500

## 2025-04-02 ASSESSMENT — FIBROSIS 4 INDEX: FIB4 SCORE: 1.5

## 2025-04-02 NOTE — PROGRESS NOTES
Monitor summary: SR 78-97, [place] .17, QRS .10, QT .35 with rare PACs, PVCs, and couplets per strip from monitor room.

## 2025-04-02 NOTE — THERAPY
Occupational Therapy  Daily Treatment     Patient Name: Brian Durham  Age:  64 y.o., Sex:  male  Medical Record #: [id number]  Today's Date: 4/2/2025     Precautions  Precautions: Fall Risk, Swallow Precautions    Assessment    Pt seen for OT session. Progressing w/activity tolerance, balance, strength, and speech. Reports he is near his functional baseline, but has impaired cognition. Completed ADLs/txfs with SBA-SPV and functional ambulation w/o AD and SPV. D/c from OT 2/2 goals met. Patient will not be actively followed for occupational therapy services at this time, however may be seen if requested by physician for 1 more visit within 30 days to address any discharge or equipment needs.     Plan    Treatment Plan Status: Modify Current Treatment Plan  Type of Treatment: Self Care / Activities of Daily Living, Therapeutic Activity  Treatment Frequency: 4 Times per Week  Treatment Duration: Until Therapy Goals Met    DC Equipment Recommendations: Tub / Shower Seat  Discharge Recommendations: Recommend home health for continued occupational therapy services (if able to get, but not a barrier to d/c)     Objective     04/02/25 0948   Precautions   Precautions Fall Risk;Swallow Precautions   Vitals   O2 Delivery Device None - Room Air   Pain 0 - 10 Group   Therapist Pain Assessment Post Activity Pain Same as Prior to Activity;During Activity;Nurse Notified  (no c/o pain)   Cognition    Cognition / Consciousness X   Speech/ Communication Slurred;Word Finding Impairment   Level of Consciousness Alert   Safety Awareness Impaired   New Learning Impaired   Attention Impaired   Comments pleasant and cooperative, but with erratic/off topic statements and poor attention to tasks req frequent redirection; slurred and intermittently difficult to understand but appears related to lack of teeth. reports feels he is back near baseline   Passive ROM Upper Body   Passive ROM Upper Body WDL   Active ROM Upper Body   Active ROM  Upper Body  WDL   Strength Upper Body   Upper Body Strength  WDL   Comments functional for light ADLs   Sensation Upper Body   Upper Extremity Sensation  WDL   Balance   Sitting Balance (Static) Good   Sitting Balance (Dynamic) Fair +   Standing Balance (Static) Fair   Standing Balance (Dynamic) Fair   Weight Shift Sitting Good   Weight Shift Standing Good   Skilled Intervention Verbal Cuing;Compensatory Strategies   Comments no AD; no overt LOB, but slight unsteadiness   Bed Mobility    Supine to Sit Supervised   Sit to Supine Supervised   Scooting Supervised   Rolling Supervised   Skilled Intervention Verbal Cuing   Comments HOB flat   Activities of Daily Living   Eating Supervision   Grooming   (declined need)   Lower Body Dressing Contact Guard Assist   Toileting Supervision   Skilled Intervention Verbal Cuing;Tactile Cuing   Modified Charla (mRS)   Modified [place] Score 1   Functional Mobility   Sit to Stand Supervised   Bed, Chair, Wheelchair Transfer Supervised   Toilet Transfers Standby Assist   Transfer Method Stand Step   Mobility w/o AD in room   Skilled Intervention Verbal Cuing   Activity Tolerance   Comments limited by fatigue and cognition   Patient / Family Goals   Patient / Family Goal #1 none stated   Short Term Goals   Short Term Goal # 1 supervised with UB dressing   Goal Outcome # 1 Progressing as expected   Short Term Goal # 2 supervised with LB dressing   Goal Outcome # 2 Goal met   Short Term Goal # 3 supervised with ADL txfs   Goal Outcome # 3 Goal met   Education Group   Education Provided Role of Occupational Therapist   Role of Occupational Therapist Patient Response Patient;Acceptance;Explanation;Verbal Demonstration;Reinforcement Needed

## 2025-04-02 NOTE — PROGRESS NOTES
Monitor summary: SR, HR 77-81, AK 0.15, QRS 0.07, QT 0.37 with (R)PVC and (R)trigem per strip from monitor room

## 2025-04-02 NOTE — THERAPY
Physical Therapy   Discharge     Patient Name: Brian Durham  Age:  64 y.o., Sex:  male  Medical Record #: [id number]  Today's Date: 4/2/2025     Precautions  Precautions: Fall Risk;Swallow Precautions    Assessment    Pt greeted and seen for follow up PT session. He was able to move in/out of a flat bed, stand w/o AD and ambulated 500ft w/o AD and no LOB, no assist needed. Patient balance has improved, it appears he is at his baseline.      Plan    Reason for Discharge From Therapy: Discharge Secondary to Goals Met    DC Equipment Recommendations: None  Discharge Recommendations: Anticipate that the patient will have no further physical therapy needs after discharge from the hospital       04/02/25 0850   Vitals   Vitals Comments no c/o dizziness   Pain 0 - 10 Group   Therapist Pain Assessment   (no c/o pain)   Cognition    Comments Pleasant and cooperative. Difficult to understand due to erratic statements and lack of teeth and dentures. Appears to be baseline.   Balance   Sitting Balance (Static) Good   Sitting Balance (Dynamic) Fair +   Standing Balance (Static) Fair   Standing Balance (Dynamic) Fair   Weight Shift Sitting Good   Weight Shift Standing Good   Skilled Intervention Verbal Cuing;Compensatory Strategies   Comments no AD   Bed Mobility    Supine to Sit Supervised   Sit to Supine Supervised   Scooting Supervised   Rolling Supervised   Comments HOB flat   Gait Analysis   Gait Level Of Assist Supervised   Assistive Device None   Distance (Feet) 500   # of Times Distance was Traveled 1   Skilled Intervention Verbal Cuing   Comments no LOB. improved steadiness. Able to stand to don underwear without LOB.   Functional Mobility   Sit to Stand Supervised   Bed, Chair, Wheelchair Transfer Supervised   Toilet Transfers Supervised   Transfer Method Stand Step   Mobility bed>stand at toilet> buchanan> bed   Skilled Intervention Verbal Cuing   Comments no AD   Short Term Goals    Short Term Goal # 1 Pt to move  supine to/from eob w/ spv in 6 visits   Goal Outcome # 1 Goal met   Short Term Goal # 2 Pt to move sit to/from stand w/ spv in 6 visits   Goal Outcome # 2 Goal met   Short Term Goal # 3 Pt to ambulate 150 ft w/ spv in 6 visits   Goal Outcome # 3 Goal met   Supervising Physical Therapist (PTA Treatments Only)   Supervising Physical Therapist Marybeth Sanchez

## 2025-04-02 NOTE — DISCHARGE PLANNING
Case Management Discharge Planning    Admission Date: 3/30/2025  GMLOS: 3.1  ALOS: 3    6-Clicks ADL Score: 21  6-Clicks Mobility Score: 24      Anticipated Discharge Dispo: Discharge Disposition: D/T to home under [facility] care in anticipation of covered skilled care (06)    DME Needed: No    Action(s) Taken: Updated Provider/Nurse on Discharge Plan  Pt is anticipated to be medically cleared later today. Pending STD test results. Pt plans to go back to [facility]. Cab voucher provided per request.    Escalations Completed: Provider    Medically Clear: No    Next Steps: RN CM to continue to assist in Pt's discharge needs.     Barriers to Discharge: Medical clearance    Is the patient up for discharge tomorrow: No

## 2025-04-03 ENCOUNTER — PHARMACY VISIT (OUTPATIENT)
Dept: PHARMACY | Facility: MEDICAL CENTER | Age: 65
End: 2025-04-03
Payer: COMMERCIAL

## 2025-04-03 VITALS
OXYGEN SATURATION: 91 % | WEIGHT: 194.89 LBS | HEIGHT: 67 IN | RESPIRATION RATE: 18 BRPM | DIASTOLIC BLOOD PRESSURE: 85 MMHG | TEMPERATURE: 97.5 F | BODY MASS INDEX: 30.59 KG/M2 | HEART RATE: 87 BPM | SYSTOLIC BLOOD PRESSURE: 125 MMHG

## 2025-04-03 LAB
ALBUMIN SERPL BCP-MCNC: 4.2 G/DL (ref 3.2–4.9)
BUN SERPL-MCNC: 18 MG/DL (ref 8–22)
CALCIUM ALBUM COR SERPL-MCNC: 9.3 MG/DL (ref 8.5–10.5)
CALCIUM SERPL-MCNC: 9.5 MG/DL (ref 8.5–10.5)
CHLORIDE SERPL-SCNC: 101 MMOL/L (ref 96–112)
CO2 SERPL-SCNC: 22 MMOL/L (ref 20–33)
CREAT SERPL-MCNC: 0.84 MG/DL (ref 0.5–1.4)
ERYTHROCYTE [DISTWIDTH] IN BLOOD BY AUTOMATED COUNT: 43.6 FL (ref 35.9–50)
GFR SERPLBLD CREATININE-BSD FMLA CKD-EPI: 98 ML/MIN/1.73 M 2
GLUCOSE SERPL-MCNC: 101 MG/DL (ref 65–99)
HBV CORE IGM SER QL: NORMAL
HCT VFR BLD AUTO: 43.3 % (ref 42–52)
HGB BLD-MCNC: 15.1 G/DL (ref 14–18)
MCH RBC QN AUTO: 32.8 PG (ref 27–33)
MCHC RBC AUTO-ENTMCNC: 34.9 G/DL (ref 32.3–36.5)
MCV RBC AUTO: 93.9 FL (ref 81.4–97.8)
PHOSPHATE SERPL-MCNC: 4.8 MG/DL (ref 2.5–4.5)
PLATELET # BLD AUTO: 299 K/UL (ref 164–446)
PMV BLD AUTO: 8.1 FL (ref 9–12.9)
POTASSIUM SERPL-SCNC: 4.2 MMOL/L (ref 3.6–5.5)
RBC # BLD AUTO: 4.61 M/UL (ref 4.7–6.1)
SODIUM SERPL-SCNC: 137 MMOL/L (ref 135–145)
WBC # BLD AUTO: 6.1 K/UL (ref 4.8–10.8)

## 2025-04-03 PROCEDURE — 700102 HCHG RX REV CODE 250 W/ 637 OVERRIDE(OP): Performed by: STUDENT IN AN ORGANIZED HEALTH CARE EDUCATION/TRAINING PROGRAM

## 2025-04-03 PROCEDURE — A9270 NON-COVERED ITEM OR SERVICE: HCPCS | Performed by: INTERNAL MEDICINE

## 2025-04-03 PROCEDURE — 86705 HEP B CORE ANTIBODY IGM: CPT

## 2025-04-03 PROCEDURE — A9270 NON-COVERED ITEM OR SERVICE: HCPCS | Performed by: STUDENT IN AN ORGANIZED HEALTH CARE EDUCATION/TRAINING PROGRAM

## 2025-04-03 PROCEDURE — 80069 RENAL FUNCTION PANEL: CPT

## 2025-04-03 PROCEDURE — 87517 HEPATITIS B DNA QUANT: CPT

## 2025-04-03 PROCEDURE — 700102 HCHG RX REV CODE 250 W/ 637 OVERRIDE(OP): Performed by: INTERNAL MEDICINE

## 2025-04-03 PROCEDURE — A9270 NON-COVERED ITEM OR SERVICE: HCPCS | Performed by: HOSPITALIST

## 2025-04-03 PROCEDURE — 85027 COMPLETE CBC AUTOMATED: CPT

## 2025-04-03 PROCEDURE — 700102 HCHG RX REV CODE 250 W/ 637 OVERRIDE(OP): Performed by: HOSPITALIST

## 2025-04-03 RX ADMIN — ASPIRIN 81 MG: 81 TABLET, CHEWABLE ORAL at 05:09

## 2025-04-03 RX ADMIN — BUPROPION HYDROCHLORIDE 150 MG: 150 TABLET, FILM COATED, EXTENDED RELEASE ORAL at 05:09

## 2025-04-03 RX ADMIN — DEXTROAMPHETAMINE SACCHARATE, AMPHETAMINE ASPARTATE, DEXTROAMPHETAMINE SULFATE, AMPHETAMINE SULFATE TABLETS, 10 MG,CLL 20 MG: 2.5; 2.5; 2.5; 2.5 TABLET ORAL at 13:36

## 2025-04-03 RX ADMIN — DIVALPROEX SODIUM 500 MG: 500 TABLET, DELAYED RELEASE ORAL at 05:09

## 2025-04-03 ASSESSMENT — FIBROSIS 4 INDEX: FIB4 SCORE: 1.5

## 2025-04-03 ASSESSMENT — PAIN DESCRIPTION - PAIN TYPE: TYPE: ACUTE PAIN

## 2025-04-03 NOTE — DISCHARGE INSTRUCTIONS
"Ischemic Stroke  Discharge Instructions    You experienced an Ischemic Stroke.  Ischemic stroke is the most common type of stroke and happens when an artery in the brain becomes blocked by a plaque fragment or blood clot. Typically, these blockages travel from the heart or larger arteries that supply the brain.  The brain needs a constant supply of blood, which carries oxygen and nutrients it needs to function.  A stroke occurs when one of these arteries to the brain is either blocked or bursts. As a result, part of the brain does not get the blood it needs, so it starts to die.     Thrombolytic Treatment for Ischemic Stroke    You received thrombolytic treatment for an Ischemic Stroke. Thrombolytics are medicines that can break up blood clots. These medicines help to treat a stroke when given as soon as possible after stroke symptoms start.  The medicine was given to you through an IV tube.  In some cases, the medicine may go to the affected area through a thin tube (catheter). This tube is usually put in at the top of your leg.    Get help right away if:  You have blood in your vomit, pee, or poop.  You have a serious fall or accident, or you hit your head.  You have symptoms of an allergy to the medicine, such as a rash or trouble breathing.  You have other signs of a stroke, such as:  A sudden, very bad headache with no known cause.  Feeling like you may vomit (nausea).  Vomiting.  A seizure    Stroke Risk Factors    You are at increased risk of having another stroke event.  See your Patient Stroke Guide to help reduce your stroke risk. These are your specific risk factors:  Age - Over 55  Gender - Men are at a higher risk than women     Get help right away if you have any signs of a stroke.  \"BE FAST\" is an easy way to remember the main warning signs of a stroke:  B - Balance. Dizziness, sudden trouble walking, or loss of balance.  E - Eyes. Trouble seeing or a change in how you see.  F - Face. Sudden weakness " or loss of feeling in the face. The face or eyelid may droop on one side.  A - Arms. Weakness or loss of feeling in an arm. This happens all of a sudden and most often on one side of the body.  S - Speech. Sudden trouble speaking, slurred speech, or trouble understanding what people say.  T - Time. Time to call emergency services. Write down what time symptoms started.              Please follow up with neuroIR,  please follow up with primary care physician.

## 2025-04-03 NOTE — PROGRESS NOTES
[facility] Medicine Daily Progress Note    Date of Service  4/2/2025    Chief Complaint  Brian Durham is a 64 y.o. male admitted 3/30/2025 with No chief complaint on file.        Hospital Course  No notes on file    Interval Problem Update  Patient seen and examine at bedside  Medically cleared: No  TOMORROW  Pending: COmnplete STD screen, work-up manage syphilis and HBV.  Estimated Discharge Day:  Disposition: [facility] as he is homeless.     Hospitalized for stroke like symptoms s/p TNK. He is a former smoker and has history of COPD, schizophrenia. Brain CT is unremarkable. CT of the head and neck with no flow-limiting stenosis. MRI no evidence of stroke. He is a former smoker and has a history of schizophrenia. Neurology recommended aspirin, high dose statin. He requested GC screen. His GC Chlamydia PENDING but syphilis POSITIVE. He was nebulous whether he had syphilis before. Nevertheless he is asymptomatic I do not see any chancre, no neurologic symptoms. However he may have possible groin lymph nodes. For now one dose PENICILLIN IM. Also awaiting HBV panel, which core AB was POSITIVE. COBSULT or CURBSIDE ID in the AM.    I visited patient twice explained stroke, TIAs, aspirin and statin, STD screen, syphilis, GC Chlamydia, Hepatitis B.   I reviewed the chart along with vitals, labs, imaging, test (both pending and resulted) and recommendations from specialists and interdisciplinary team.  I have discussed this patient's plan of care and discharge plan at IDT rounds today with Case Management, Nursing, Nursing leadership, and other members of the IDT team.    Consultants/Specialty  Intensivist admitted  Hospitalist  Neurology    Code Status  Full Code    Disposition  The patient is not medically cleared for discharge to home or a post-acute facility.      I have placed the appropriate orders for post-discharge needs.    Review of Systems  Review of Systems   Constitutional:  Negative for chills and  fever.   HENT:  Negative for congestion, hearing loss and nosebleeds.    Eyes:  Negative for pain and redness.   Respiratory:  Negative for cough, hemoptysis, shortness of breath and wheezing.    Cardiovascular:  Negative for chest pain and palpitations.   Gastrointestinal:  Negative for abdominal pain, blood in stool, constipation, diarrhea, nausea and vomiting.   Genitourinary:  Negative for dysuria, frequency and hematuria.   Musculoskeletal:  Negative for falls, joint pain and myalgias.   Skin:  Negative for rash.   Neurological:  Positive for speech change and focal weakness. Negative for dizziness, tremors, seizures, loss of consciousness, weakness and headaches.   Psychiatric/Behavioral:  The patient is not nervous/anxious and does not have insomnia.    All other systems reviewed and are negative.       Physical Exam  Temp:  [36.2 °C (97.2 °F)-36.6 °C (97.9 °F)] 36.5 °C (97.7 °F)  Pulse:  [79-91] 86  Resp:  [15-18] 15  BP: (109-131)/(72-94) 131/94  SpO2:  [93 %-96 %] 94 %    Physical Exam  Vitals and nursing note reviewed.   Constitutional:       Appearance: He is obese.   HENT:      Head: Normocephalic and atraumatic.      Right Ear: External ear normal.      Left Ear: External ear normal.      Nose: Nose normal.      Mouth/Throat:      Mouth: Mucous membranes are moist.   Eyes:      General: No scleral icterus.     Conjunctiva/sclera: Conjunctivae normal.   Cardiovascular:      Rate and Rhythm: Normal rate and regular rhythm.      Heart sounds: No murmur heard.     No friction rub. No gallop.   Pulmonary:      Effort: Pulmonary effort is normal.      Breath sounds: Normal breath sounds.   Abdominal:      General: Abdomen is flat. Bowel sounds are normal. There is no distension.      Palpations: Abdomen is soft.      Tenderness: There is no abdominal tenderness. There is no guarding.   Genitourinary:     Comments: No chancres  FUllness in the groin could be LAD.  Musculoskeletal:         General: Normal  range of motion.      Cervical back: Normal range of motion and neck supple.   Skin:     General: Skin is warm.   Neurological:      Mental Status: He is alert and oriented to person, place, and time. Mental status is at baseline.   Psychiatric:         Mood and Affect: Mood normal.         Behavior: Behavior normal.         Thought Content: Thought content normal.         Judgment: Judgment normal.         Fluids    Intake/Output Summary (Last 24 hours) at 4/2/2025 1741  Last data filed at 4/2/2025 0355  Gross per 24 hour   Intake --   Output 1000 ml   Net -1000 ml        Laboratory  Recent Labs     03/30/25  1847 03/31/25  0315 04/01/25  0353   WBC 6.5 8.2 6.7   RBC 3.97* 3.93* 4.19*   HEMOGLOBIN 12.9* 12.8* 13.7*   HEMATOCRIT 38.8* 38.0* 40.2*   MCV 97.7 96.7 95.9   MCH 32.5 32.6 32.7   MCHC 33.2 33.7 34.1   RDW 46.0 46.0 45.0   PLATELETCT 237 235 267   MPV 8.4* 8.3* 8.5*     Recent Labs     03/30/25  1847 03/31/25 0315 04/01/25  0353   SODIUM 140 138 136   POTASSIUM 4.3 4.0 4.3   CHLORIDE 106 106 103   CO2 22 21 20   GLUCOSE 97 86 88   BUN 15 13 17   CREATININE 0.85 0.66 0.78   CALCIUM 8.8 8.6 9.2     Recent Labs     03/30/25 1847   APTT 26.5   INR 0.96         Recent Labs     03/31/25  0004   TRIGLYCERIDE 95   HDL 57   *       Imaging  MR-BRAIN-W/O   Final Result      1.  Minimal supratentorial white matter disease consistent with microvascular ischemic change. Common finding for the patient's age.   2.  No evidence of acute infarction, hemorrhage, or mass lesion.   3.  2 mm left supraclinoid ICA aneurysm projecting laterally. Concordant with CTA findings.      [id number] VIEW   Final Result      No radiopaque foreign object preclude MR imaging.      DX-CHEST-PORTABLE (1 VIEW)   Final Result         1. No significant interval change.      CT-CEREBRAL PERFUSION ANALYSIS   Final Result      1. Cerebral blood flow less than 30% possibly representing completed infarct = 24 mL. Based on distribution of  "this finding, this is likely to represent artifact.      2. T Max more than 6 seconds possibly representing combination of completed infarct and ischemia = 261 mL. Based on the distribution of this finding, this is likely to represent artifact.      3.  Please note that this cerebral perfusion study and report is Quantitative and targets supratentorial (cerebral) perfusion for evaluation of large vessel territory acute ischemia/infarction. For example, lacunar infarcts, and brainstem/posterior fossa    ischemia/infarction are not evaluated on this study.  Data acquisition is subject to artifacts which can yield non-anatomically plausible perfusion maps which may be due to motion, bolus timing, signal to noise ratio, or other technical factors.    Perfusion map abnormalities which show non-anatomic distributions are likely artifact.   This study is not \"stand-alone\" and should only be utilized for diagnosis, management/treatment in correlation with CT, CTA, and/or MRI and clinical factors.         CT-CTA NECK WITH & W/O-POST PROCESSING   Final Result      1. No evidence of flow-limiting stenosis in the cervical carotid or cervical vertebral arteries.      CT-CTA HEAD WITH & W/O-POST PROCESS   Final Result         1. No hemodynamically significant narrowing of the major intracranial vessels.   2. 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 Stable prominent interstitial markings in the perihilar regions of the lungs, suggesting chronic interstitial lung changes, but a component of interstitial edema and/or infiltrates are not excluded.   2. No acute findings.      CT-CEREBRAL PERFUSION ANALYSIS   Final Result      1. Cerebral blood flow less than 30% possibly representing completed infarct = 24 mL. Based on distribution of this finding, this is likely to represent artifact.      2. T Max more than 6 seconds possibly " "representing combination of completed infarct and ischemia = 261 mL. Based on the distribution of this finding, this is likely to represent artifact.      3. Mismatched volume possibly representing ischemic brain/penumbra= 237 mL      4.  Please note that this cerebral perfusion study and report is Quantitative and targets supratentorial (cerebral) perfusion for evaluation of large vessel territory acute ischemia/infarction. For example, lacunar infarcts, and brainstem/posterior fossa    ischemia/infarction are not evaluated on this study.  Data acquisition is subject to artifacts which can yield non-anatomically plausible perfusion maps which may be due to motion, bolus timing, signal to noise ratio, or other technical factors.    Perfusion map abnormalities which show non-anatomic distributions are likely artifact.   This study is not \"stand-alone\" and should only be utilized for diagnosis, management/treatment in correlation with CT, CTA, and/or MRI and clinical factors.         CT-HEAD W/O   Final Result      No acute intracranial abnormality.                    Assessment/Plan  * Acute CVA (cerebrovascular accident) (HCC)- (present on admission)  Assessment & Plan  L side deficits and speech changes  L hand dominant male  S/P TNK  MRI neg  EKG/Tele sinus though carries chart Hx of AFib; not on any blood thinners prior to presentation  TTE done in January benign    A1c 5.5  High dose statin  ASA  PT/OT/SLP  Home vs SNF  BP control  DC on Zio patch  Neurology following    Syphilis  Assessment & Plan  Could be early syphilis  R/o latent or chornic  I dos penicillin for now  COmplete STD screening including HBV and GC Chlam    Carotid aneurysm, left (HCC)  Assessment & Plan  Referal for outpt f/u with Neuro IR placed    Abnormal drug screen  Assessment & Plan  Pos amphetamines    Emphysema of lung (HCC)- (present on admission)  Assessment & Plan  Exam benign  O2/RT protocols    Atrial fibrillation (HCC)- " (present on admission)  Assessment & Plan  Carries this Dx however has been in sinus thus far on EKG and monitor both of which I have presonally reviewed  Not on any blood thinners prior to presentation    Schizophrenia (HCC)- (present on admission)  Assessment & Plan  Currently on no medications  Consult Psychiatry         VTE prophylaxis: VTE Selection  SCDs  I have performed a physical exam and reviewed and updated ROS and Plan today (4/2/2025). In review of yesterday's note (4/1/2025), there are no changes except as documented above.  Patient is has a high medical complexity, complex decision making and is at high risk for complication, morbidity, and mortality, thus requiring the highest level of my preparedness for sudden, emergent intervention. Medical decision making is therefore complex. I provided  services, which included ordering labs and/or imaging, and discussing the case with various consultants.medication orders, frequent reevaluations of the patient's condition and response to treatment. Time was also devoted to counseling and coordinating care including review of records, pertinent lab data and studies, as well as discussing diagnostic evaluation and work up, planned therapeutic interventions and future disposition of care. Where indicated, the assessment and plan reflect discussion of patient with consultants, other healthcare providers, family members, and additional research needed to obtain further information in formulating the plan of care for rBian Durham. Total time spent was 65 minutes.

## 2025-04-03 NOTE — PROGRESS NOTES
Discharge orders received.  Patient arrived to the discharge lounge.  PIV removed.  Instructions given, medications reviewed and general discharge education provided to patient.  Follow up appointments discussed.  Patient verbalized understanding of dc instructions and prescriptions.  Patient signed discharge instructions.  Patient verbalized understanding had all belongings with him.  Patient received meds, leaving via taxi. Wished patient a speedy recovery.

## 2025-04-03 NOTE — DISCHARGE PLANNING
Care Transition Team Final Discharge Disposition    Actual Discharge Information  Discharge Disposition: Discharged to home/self care (01)    Case Management Discharge Planning    Admission Date: 3/30/2025  GMLOS: 3.1  ALOS: 4    6-Clicks ADL Score: 21  6-Clicks Mobility Score: 24      Anticipated Discharge Dispo: Discharge Disposition: Discharged to home/self care (01)    DME Needed: No    Action(s) Taken: OTHER    Pt discussed in AM rounds, pt anticipated to discharge today back to [facility] with taxi voucher.    Escalations Completed: None    Medically Clear: Yes    Barriers to Discharge: None    Is the patient up for discharge tomorrow: No

## 2025-04-03 NOTE — PROGRESS NOTES
Left a message with scheduling for a follow up stroke appointment, neuro IR appt.  Left a message with scheduling for a follow up PCP appt.

## 2025-04-03 NOTE — CARE PLAN
The patient is Stable - Low risk of patient condition declining or worsening    Shift Goals  Clinical Goals: Safety, Stable neuros  Patient Goals: Rest  Family Goals: TIMOTEO    Progress made toward(s) clinical / shift goals:  Patient is A&Ox4. Educated patient on POC. Patient denies pain at this time. Urinal at bedside for voiding needs. Telesitter in place for safety. Patient mostly calling appropriately for needs. Bed is low and locked. Bed alarm on. Frame alarm on. Call light within reach. Hourly rounding continues.       Problem: Neuro Status  Goal: Neuro status will remain stable or improve  Outcome: Progressing     Problem: Pain - Standard  Goal: Alleviation of pain or a reduction in pain to the patient’s comfort goal  Outcome: Progressing     Problem: Knowledge Deficit - Stroke Education  Goal: Patient's knowledge of stroke and risk factors will improve  Outcome: Progressing       Patient is not progressing towards the following goals:

## 2025-04-03 NOTE — PROGRESS NOTES
Monitor Summary: SR 82-91, WA 0.16, QRS 0.07, QT 0.36, with rare PVCs per strip from monitor room.

## 2025-04-03 NOTE — PROGRESS NOTES
Monitor Summary: SR 73-88, SD 0.16, QRS 0.09, QT 0.39, with rare PVCs per strip from monitor room.

## 2025-04-03 NOTE — ASSESSMENT & PLAN NOTE
Could be early syphilis  R/o latent or chornic  I dos penicillin for now  COmplete STD screening including HBV and GC Chlam

## 2025-04-04 LAB
HBV DNA SERPL NAA+PROBE-ACNC: NOT DETECTED IU/ML
HBV DNA SERPL NAA+PROBE-LOG IU: NOT DETECTED LOG IU/ML
HBV DNA SERPL QL NAA+PROBE: NOT DETECTED

## 2025-04-04 PROCEDURE — 99239 HOSP IP/OBS DSCHRG MGMT >30: CPT | Performed by: INTERNAL MEDICINE

## 2025-04-04 NOTE — DISCHARGE SUMMARY
Discharge Summary    CHIEF COMPLAINT ON ADMISSION  No chief complaint on file.      Reason for Admission  stroke     Admission Date  3/30/2025    CODE STATUS  Prior    HPI & HOSPITAL COURSE  This is a 64 y.o. male here with No chief complaint on file.  Please review Dr. Maxim Perdue M.D. notes for further details of history of present illness, past medical/social/family histories, allergies and medications. Please review Dr. Zuñiga, [facility]. , Hospitalist, [facility], IR consultation notes.  Hospitalized for stroke like symptoms s/p TNK. Brain CT is unremarkable. CT of the head and neck with no flow-limiting stenosis. Small, cavernous supraclinoid ICA aneurysms (generally less than 12 mm) seen, per Dr. Matute, IR are typically benign with a low risk of rupture, often only requiring observation rather than immediate treatment; endovascular intervention NOT recommended. MRI no evidence of stroke. He is a former smoker and has a history of schizophrenia. Neurology recommended aspirin, high dose statin. He requested GC screen. Syphilis POSITIVE. He was nebulous whether he had syphilis before. Nevertheless he is asymptomatic I do not see any chancre, no neurologic symptoms. However he may have possible groin lymph nodes. For now one dose PENICILLIN IM. Also awaiting HBV panel, which core AB was POSITIVE. I spoke to ID, and they agree only one dose penicillinand will need follow-up. Faxed report to [facility]. Patient was nebulous regarding how he got it. I explained stroke, TIAs, aspirin and statin, STD screen, syphilis, GC Chlamydia, Hepatitis B to patient. He is discharged to [facility] as he is homeless. I recommended NO MORE SMOKING, I spent 4 minutes, discussing tobacco dependence and cardiac as well as pulmonary risk. Smoking cessation instructions. Code 24074    At discharge date, Brian Durham afebrile and hemodynamically stable.  Brian Durham wanted to be discharged  "today.      Imaging  MR-BRAIN-W/O   Final Result      1.  Minimal supratentorial white matter disease consistent with microvascular ischemic change. Common finding for the patient's age.   2.  No evidence of acute infarction, hemorrhage, or mass lesion.   3.  2 mm left supraclinoid ICA aneurysm projecting laterally. Concordant with CTA findings.      VT-[id number] VIEW   Final Result      No radiopaque foreign object preclude MR imaging.      DX-CHEST-PORTABLE (1 VIEW)   Final Result         1. No significant interval change.      CT-CEREBRAL PERFUSION ANALYSIS   Final Result      1. Cerebral blood flow less than 30% possibly representing completed infarct = 24 mL. Based on distribution of this finding, this is likely to represent artifact.      2. T Max more than 6 seconds possibly representing combination of completed infarct and ischemia = 261 mL. Based on the distribution of this finding, this is likely to represent artifact.      3.  Please note that this cerebral perfusion study and report is Quantitative and targets supratentorial (cerebral) perfusion for evaluation of large vessel territory acute ischemia/infarction. For example, lacunar infarcts, and brainstem/posterior fossa    ischemia/infarction are not evaluated on this study.  Data acquisition is subject to artifacts which can yield non-anatomically plausible perfusion maps which may be due to motion, bolus timing, signal to noise ratio, or other technical factors.    Perfusion map abnormalities which show non-anatomic distributions are likely artifact.   This study is not \"stand-alone\" and should only be utilized for diagnosis, management/treatment in correlation with CT, CTA, and/or MRI and clinical factors.         CT-CTA NECK WITH & W/O-POST PROCESSING   Final Result      1. No evidence of flow-limiting stenosis in the cervical carotid or cervical vertebral arteries.      CT-CTA HEAD WITH & W/O-POST PROCESS   Final Result         1. No " "hemodynamically significant narrowing of the major intracranial vessels.   2. 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 Stable prominent interstitial markings in the perihilar regions of the lungs, suggesting chronic interstitial lung changes, but a component of interstitial edema and/or infiltrates are not excluded.   2. No acute findings.      CT-CEREBRAL PERFUSION ANALYSIS   Final Result      1. Cerebral blood flow less than 30% possibly representing completed infarct = 24 mL. Based on distribution of this finding, this is likely to represent artifact.      2. T Max more than 6 seconds possibly representing combination of completed infarct and ischemia = 261 mL. Based on the distribution of this finding, this is likely to represent artifact.      3. Mismatched volume possibly representing ischemic brain/penumbra= 237 mL      4.  Please note that this cerebral perfusion study and report is Quantitative and targets supratentorial (cerebral) perfusion for evaluation of large vessel territory acute ischemia/infarction. For example, lacunar infarcts, and brainstem/posterior fossa    ischemia/infarction are not evaluated on this study.  Data acquisition is subject to artifacts which can yield non-anatomically plausible perfusion maps which may be due to motion, bolus timing, signal to noise ratio, or other technical factors.    Perfusion map abnormalities which show non-anatomic distributions are likely artifact.   This study is not \"stand-alone\" and should only be utilized for diagnosis, management/treatment in correlation with CT, CTA, and/or MRI and clinical factors.         CT-HEAD W/O   Final Result      No acute intracranial abnormality.                         Therefore, he is discharged in fair and stable condition to home with close outpatient follow-up.    The patient met 2-midnight criteria for an inpatient " stay at the time of discharge.    Discharge Date  4/3/2025    FOLLOW UP ITEMS POST DISCHARGE      DISCHARGE DIAGNOSES  Principal Problem:    Acute CVA (cerebrovascular accident) (HCC) (POA: Yes)  Active Problems:    Schizophrenia (HCC) (POA: Yes)    Atrial fibrillation (HCC) (POA: Yes)    Emphysema of lung (HCC) (POA: Yes)    Abnormal drug screen (POA: Unknown)    Carotid aneurysm, left (HCC) (POA: Unknown)    Syphilis (POA: Unknown)        FOLLOW UP  No future appointments.  [facility] ([facility] - Primary Care and Family Medicine  [address] [facility] [address]  [phone]  Follow up      [facility] - Primary Care  [address] [facility] [address]  [phone]  Follow up      Pcp Pt States None        Assign and follow up with primary provider or discharge clinic physician in 1 week Follow up with Neurology and stroke clinic in 1 week Follow up with your psychiatrist in 1-2 weeks NURSING provide resources/pamphlets for TIA, hyperlipidemia, Follow up with ID clinic ([facility] in 1 week)     MEDICATIONS ON DISCHARGE     Medication List        START taking these medications        Instructions   acetaminophen 325 MG Tabs  Commonly known as: Tylenol   Take 2 Tablets by mouth every 6 hours as needed for Fever, Moderate Pain or Mild Pain.  Dose: 650 mg     aspirin 81 MG Chew chewable tablet  Commonly known as: Asa   Chew 1 Tablet every day.  Dose: 81 mg     atorvastatin 80 MG tablet  Commonly known as: Lipitor   Take 1 Tablet by mouth every evening.  Dose: 80 mg     polyethylene glycol/lytes Pack  Commonly known as: Miralax   Take 1 Packet by mouth 1 time a day as needed (if no bowel movement in last 2 days).  Dose: 17 g     senna-docusate 8.6-50 MG Tabs  Commonly known as: Pericolace Or Senokot S   Take 2 Tablets by mouth every evening.  Dose: 2 Tablet            CONTINUE taking these medications        Instructions   amphetamine-dextroamphetamine 20 MG Tabs  Commonly known as: Adderall    "Take 1 Tablet by mouth 2 times a day for 5 days.  Dose: 20 mg     buPROPion  MG Tb12 sustained-release tablet  Commonly known as: Wellbutrin-SR   Take 150 mg by mouth every morning.  Dose: 150 mg     divalproex 500 MG Tbec  Commonly known as: Depakote   Take 1 Tablet by mouth 2 times a day.  Dose: 500 mg     hydrOXYzine HCl 25 MG Tabs  Commonly known as: Atarax   Take 1 Tablet by mouth 3 times a day as needed for Itching.  Dose: 25 mg     LORazepam 1 MG Tabs  Commonly known as: Ativan   Take  by mouth. 3 mg per day              Allergies  Allergies   Allergen Reactions    Paroxetine Swelling     Throat     Shellfish Allergy Rash     \"My heart - very painful.\"       DIET  No orders of the defined types were placed in this encounter.      ACTIVITY      CONSULTATIONS  See above    PROCEDURES      LABORATORY  Lab Results   Component Value Date    SODIUM 137 04/03/2025    POTASSIUM 4.2 04/03/2025    CHLORIDE 101 04/03/2025    CO2 22 04/03/2025    GLUCOSE 101 (H) 04/03/2025    BUN 18 04/03/2025    CREATININE 0.84 04/03/2025    CREATININE 0.7 05/08/2007        Lab Results   Component Value Date    WBC 6.1 04/03/2025    HEMOGLOBIN 15.1 04/03/2025    HEMATOCRIT 43.3 04/03/2025    PLATELETCT 299 04/03/2025        Total time of the discharge process exceeds 40 minutes.  "

## 2025-04-05 LAB — T PALLIDUM AB SER QL AGGL: REACTIVE

## 2025-04-09 ENCOUNTER — HOSPITAL ENCOUNTER (EMERGENCY)
Facility: MEDICAL CENTER | Age: 65
End: 2025-04-09
Attending: EMERGENCY MEDICINE
Payer: MEDICARE

## 2025-04-09 VITALS
TEMPERATURE: 97.4 F | RESPIRATION RATE: 16 BRPM | HEIGHT: 67 IN | SYSTOLIC BLOOD PRESSURE: 132 MMHG | HEART RATE: 90 BPM | BODY MASS INDEX: 30.45 KG/M2 | OXYGEN SATURATION: 99 % | WEIGHT: 194 LBS | DIASTOLIC BLOOD PRESSURE: 80 MMHG

## 2025-04-09 DIAGNOSIS — F41.9 ANXIETY: ICD-10-CM

## 2025-04-09 PROCEDURE — 99283 EMERGENCY DEPT VISIT LOW MDM: CPT

## 2025-04-09 RX ORDER — HYDROXYZINE HYDROCHLORIDE 25 MG/1
25 TABLET, FILM COATED ORAL 3 TIMES DAILY PRN
Qty: 30 TABLET | Refills: 0 | Status: SHIPPED | OUTPATIENT
Start: 2025-04-09

## 2025-04-09 ASSESSMENT — FIBROSIS 4 INDEX: FIB4 SCORE: 1.34

## 2025-04-09 NOTE — ED PROVIDER NOTES
ED Provider Note    CHIEF COMPLAINT  Chief Complaint   Patient presents with    Anxiety     BIB EMS from [facility] for extreme anxiety. Pt feels restless and jumpy        EXTERNAL RECORDS REVIEWED  Inpatient Notes patient was admitted to the hospital 3/30/2025 to 4/3/2025 for an acute cerebrovascular accident he received TNK he was found to have a small cavernous supraclinoid aneurysm which was evaluated by interventional radiology and determined to be benign with low risk of rupture and observation was recommended he was placed on aspirin and high-dose statin syphilis test was positive and he was treated with penicillin he was advised to stop smoking his discharge MRI was unremarkable.    HPI/ROS  LIMITATION TO HISTORY   Select: : None  OUTSIDE HISTORIAN(S):   none    Brian Durham is a 64 y.o. male who presents from [facility] today stating that he has anxiety.  He states that his therapist usually gives him Ativan or Klonopin.  He is not suicidal.  He states that he is supposed to have housing of his own at the end of the month.  He denies any other symptoms.    PAST MEDICAL HISTORY   has a past medical history of ADHD, Arthritis, Cold (01/01/2012), Degenerative disc disease, Emphysema of lung (HCC) (1/24/2025), Encephalopathy acute (1/25/2025), Hepatitis B, Pain (12/30/2011), Psychiatric disorder, and Schizophrenia ([facility]).    SURGICAL HISTORY   has a past surgical history that includes other and cervical disk and fusion anterior (1/16/2012).    FAMILY HISTORY  History reviewed. No pertinent family history.    SOCIAL HISTORY  Social History     Tobacco Use    Smoking status: Former     Types: Cigarettes    Smokeless tobacco: Never    Tobacco comments:     1/2 pack a day.   Vaping Use    Vaping status: Never Used   Substance and Sexual Activity    Alcohol use: Not Currently    Drug use: No    Sexual activity: Not on file       CURRENT MEDICATIONS  Home Medications    **Home medications have not yet  "been reviewed for this encounter**         ALLERGIES  Allergies   Allergen Reactions    Paroxetine Swelling     Throat        PHYSICAL EXAM  VITAL SIGNS: /87   Pulse 92   Temp 36.1 °C (97 °F) (Temporal)   Resp 16   Ht 1.702 m (5' 7\")   Wt 88 kg (194 lb)   SpO2 96%   BMI 30.38 kg/m²      Constitutional: Well developed, Well nourished, No acute distress, Non-toxic appearance.   HEENT: Normocephalic, Atraumatic,  external ears normal, pharynx pink,  Mucous  Membranes moist, No rhinorrhea or mucosal edema  Eyes: PERRL, EOMI, Conjunctiva normal, No discharge.   Neck: Normal range of motion, No tenderness, Supple, No stridor.   Cardiovascular: Regular Rate and Rhythm, No murmurs,  rubs, or gallops.   Thorax & Lungs: Lungs clear to auscultation bilaterally, No respiratory distress, No wheezes, rhales or rhonchi, No chest wall tenderness.   Abdomen: Bowel sounds normal, Soft, non tender, non distended,  No pulsatile masses., no rebound guarding or peritoneal signs.   Skin: Warm, Dry, No erythema, No rash,   Extremities: Equal, intact distal pulses, No cyanosis, clubbing or edema,  No tenderness.   Musculoskeletal: Good range of motion in all major joints. No tenderness to palpation or major deformities noted.   Neurologic: Alert & oriented No focal deficits noted.  Psychiatric: Affect normal, Judgment normal, Mood normal.  Slightly pressured speech no suicidal or homicidal ideations      EKG/LABS  none   I have independently interpreted this EKG    RADIOLOGY/PROCEDURES   I have independently interpreted the diagnostic imaging associated with this visit and am waiting the final reading from the radiologist.   My preliminary interpretation is as follows: none    Radiologist interpretation:  No orders to display       COURSE & MEDICAL DECISION MAKING    ASSESSMENT, COURSE AND PLAN  Care Narrative: Brian Durham is a 64 y.o. male who presents from [facility] today stating that he has anxiety.  He states " that his therapist usually gives him Ativan or Klonopin.  He is not suicidal.  He states that he is supposed to have housing of his own at the end of the month.  He denies any other symptoms.  On physical exam the patient is alert awake in no acute distress he is not suicidal or homicidal.              ADDITIONAL PROBLEMS MANAGED      DISPOSITION AND DISCUSSIONS    I told the patient I could prescribe him hydroxyzine for his anxiety.  For Ativan or Klonopin he should follow-up with his psychotherapist.  He states he has an appointment on 25 April.  The patient is stable and safe for discharge home at this time.  I have discussed management of the patient with the following physicians and LEA's: None    Discussion of management with other [facility] or appropriate source(s): None     Escalation of care considered, and ultimately not performed: none    Barriers to care at this time, including but not limited to: Patient does not have established PCP and Patient is homeless.     Decision tools and prescription drugs considered including, but not limited to: Medication modification hydroxizine .      The patient will return for new or worsening symptoms and is stable at the time of discharge.    The patient is referred to a primary physician for blood pressure management, diabetic screening, and for all other preventative health concerns.        DISPOSITION:  Patient will be discharged home in stable condition.    FOLLOW UP:  your psychotherpist      keep scheduled appointment      OUTPATIENT MEDICATIONS:  New Prescriptions    No medications on file   hydroxizine    FINAL DIAGNOSIS  1. Anxiety         Electronically signed by: Shabnam Miller M.D., 4/9/2025 7:56 AM

## 2025-04-09 NOTE — ED TRIAGE NOTES
"Chief Complaint   Patient presents with    Anxiety     BIB EMS from [facility] for extreme anxiety. Pt feels restless and jumpy      Pt ambulatory with steady gait  to triage  Pt is GCS 15,  follows commands and responds appropriately to questions. Resp are even and unlabored.     Pt educated on triage process, updated/ agreeable to plan of care.     /87   Pulse 92   Temp 36.1 °C (97 °F) (Temporal)   Resp 16   Ht 1.702 m (5' 7\")   Wt 88 kg (194 lb)   SpO2 96%   BMI 30.38 kg/m²     "

## 2025-04-29 ENCOUNTER — APPOINTMENT (OUTPATIENT)
Dept: RADIOLOGY | Facility: MEDICAL CENTER | Age: 65
End: 2025-04-29
Attending: EMERGENCY MEDICINE
Payer: MEDICARE

## 2025-04-29 ENCOUNTER — HOSPITAL ENCOUNTER (OUTPATIENT)
Facility: MEDICAL CENTER | Age: 65
End: 2025-04-29
Attending: EMERGENCY MEDICINE | Admitting: STUDENT IN AN ORGANIZED HEALTH CARE EDUCATION/TRAINING PROGRAM
Payer: MEDICARE

## 2025-04-29 ENCOUNTER — PHARMACY VISIT (OUTPATIENT)
Dept: PHARMACY | Facility: MEDICAL CENTER | Age: 65
End: 2025-04-29
Payer: COMMERCIAL
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
## 2025-04-29 DIAGNOSIS — S61.012A LACERATION OF LEFT THUMB WITHOUT FOREIGN BODY WITHOUT DAMAGE TO NAIL, INITIAL ENCOUNTER: ICD-10-CM

## 2025-04-29 DIAGNOSIS — Z78.9 NO CONTRAINDICATION TO DEEP VEIN THROMBOSIS (DVT) PROPHYLAXIS: ICD-10-CM

## 2025-04-29 DIAGNOSIS — S62.613B OPEN DISPLACED FRACTURE OF PROXIMAL PHALANX OF LEFT MIDDLE FINGER, INITIAL ENCOUNTER: ICD-10-CM

## 2025-04-29 DIAGNOSIS — T07.XXXA MULTIPLE LACERATIONS: ICD-10-CM

## 2025-04-29 DIAGNOSIS — S01.01XA LACERATION OF SCALP, INITIAL ENCOUNTER: ICD-10-CM

## 2025-04-29 DIAGNOSIS — S52.501B TYPE I OR II OPEN FRACTURE OF DISTAL END OF RIGHT RADIUS, UNSPECIFIED FRACTURE MORPHOLOGY, INITIAL ENCOUNTER: ICD-10-CM

## 2025-04-29 DIAGNOSIS — S61.215A LACERATION OF LEFT RING FINGER WITHOUT FOREIGN BODY WITHOUT DAMAGE TO NAIL, INITIAL ENCOUNTER: ICD-10-CM

## 2025-04-29 DIAGNOSIS — S51.812A FOREARM LACERATION, LEFT, INITIAL ENCOUNTER: ICD-10-CM

## 2025-04-29 DIAGNOSIS — S61.217A LACERATION OF LEFT LITTLE FINGER WITHOUT FOREIGN BODY WITHOUT DAMAGE TO NAIL, INITIAL ENCOUNTER: ICD-10-CM

## 2025-04-29 DIAGNOSIS — Z59.00 HOMELESSNESS: ICD-10-CM

## 2025-04-29 DIAGNOSIS — Z86.73 HISTORY OF CVA (CEREBROVASCULAR ACCIDENT): ICD-10-CM

## 2025-04-29 DIAGNOSIS — F20.9 SCHIZOPHRENIA, UNSPECIFIED TYPE (HCC): ICD-10-CM

## 2025-04-29 DIAGNOSIS — S51.811A FOREARM LACERATION, RIGHT, INITIAL ENCOUNTER: ICD-10-CM

## 2025-04-29 DIAGNOSIS — S62.502B OPEN AVULSION FRACTURE OF PHALANX OF LEFT THUMB, INITIAL ENCOUNTER: ICD-10-CM

## 2025-04-29 DIAGNOSIS — S62.92XB: ICD-10-CM

## 2025-04-29 PROBLEM — Z75.8 DISCHARGE PLANNING ISSUES: Status: ACTIVE | Noted: 2025-04-29

## 2025-04-29 PROBLEM — T14.90XA TRAUMA: Status: ACTIVE | Noted: 2025-04-29

## 2025-04-29 PROBLEM — F20.89 OTHER SCHIZOPHRENIA (HCC): Status: ACTIVE | Noted: 2025-04-29

## 2025-04-29 LAB
ABO + RH BLD: NORMAL
ABO GROUP BLD: NORMAL
ALBUMIN SERPL BCP-MCNC: 4 G/DL (ref 3.2–4.9)
ALBUMIN/GLOB SERPL: 1.3 G/DL
ALP SERPL-CCNC: 172 U/L (ref 30–99)
ALT SERPL-CCNC: 57 U/L (ref 2–50)
ANION GAP SERPL CALC-SCNC: 12 MMOL/L (ref 7–16)
APTT PPP: 27.3 SEC (ref 24.7–36)
AST SERPL-CCNC: 38 U/L (ref 12–45)
BILIRUB SERPL-MCNC: 0.6 MG/DL (ref 0.1–1.5)
BLD GP AB SCN SERPL QL: NORMAL
BUN SERPL-MCNC: 17 MG/DL (ref 8–22)
CALCIUM ALBUM COR SERPL-MCNC: 9 MG/DL (ref 8.5–10.5)
CALCIUM SERPL-MCNC: 9 MG/DL (ref 8.5–10.5)
CFT BLD TEG: 6 MIN (ref 4.6–9.1)
CFT P HPASE BLD TEG: 4.6 MIN (ref 4.3–8.3)
CHLORIDE SERPL-SCNC: 106 MMOL/L (ref 96–112)
CLOT ANGLE BLD TEG: 75.5 DEGREES (ref 63–78)
CLOT LYSIS 30M P MA LENFR BLD TEG: 1.1 % (ref 0–2.6)
CO2 SERPL-SCNC: 21 MMOL/L (ref 20–33)
CREAT SERPL-MCNC: 0.66 MG/DL (ref 0.5–1.4)
CT.EXTRINSIC BLD ROTEM: 1 MIN (ref 0.8–2.1)
ERYTHROCYTE [DISTWIDTH] IN BLOOD BY AUTOMATED COUNT: 45.7 FL (ref 35.9–50)
ETHANOL BLD-MCNC: <10.1 MG/DL
GFR SERPLBLD CREATININE-BSD FMLA CKD-EPI: 106 ML/MIN/1.73 M 2
GLOBULIN SER CALC-MCNC: 3.1 G/DL (ref 1.9–3.5)
GLUCOSE SERPL-MCNC: 143 MG/DL (ref 65–99)
HCG SERPL QL: NEGATIVE
HCT VFR BLD AUTO: 40.5 % (ref 42–52)
HGB BLD-MCNC: 13.5 G/DL (ref 14–18)
INR PPP: 1.02 (ref 0.87–1.13)
MCF BLD TEG: 62.6 MM (ref 52–69)
MCF.PLATELET INHIB BLD ROTEM: 21.8 MM (ref 15–32)
MCH RBC QN AUTO: 32.3 PG (ref 27–33)
MCHC RBC AUTO-ENTMCNC: 33.3 G/DL (ref 32.3–36.5)
MCV RBC AUTO: 96.9 FL (ref 81.4–97.8)
PA AA BLD-ACNC: 4.7 % (ref 0–11)
PA ADP BLD-ACNC: 31.2 % (ref 0–17)
PLATELET # BLD AUTO: 332 K/UL (ref 164–446)
PMV BLD AUTO: 8.2 FL (ref 9–12.9)
POTASSIUM SERPL-SCNC: 3.6 MMOL/L (ref 3.6–5.5)
PROT SERPL-MCNC: 7.1 G/DL (ref 6–8.2)
PROTHROMBIN TIME: 13.4 SEC (ref 12–14.6)
RBC # BLD AUTO: 4.18 M/UL (ref 4.7–6.1)
RH BLD: NORMAL
SODIUM SERPL-SCNC: 139 MMOL/L (ref 135–145)
TEG ALGORITHM TGALG: ABNORMAL
WBC # BLD AUTO: 12.3 K/UL (ref 4.8–10.8)

## 2025-04-29 PROCEDURE — 700101 HCHG RX REV CODE 250: Performed by: EMERGENCY MEDICINE

## 2025-04-29 PROCEDURE — 303747 HCHG EXTRA SUTURE

## 2025-04-29 PROCEDURE — 36415 COLL VENOUS BLD VENIPUNCTURE: CPT

## 2025-04-29 PROCEDURE — 99285 EMERGENCY DEPT VISIT HI MDM: CPT

## 2025-04-29 PROCEDURE — 73120 X-RAY EXAM OF HAND: CPT | Mod: LT

## 2025-04-29 PROCEDURE — RXMED WILLOW AMBULATORY MEDICATION CHARGE: Performed by: EMERGENCY MEDICINE

## 2025-04-29 PROCEDURE — 700111 HCHG RX REV CODE 636 W/ 250 OVERRIDE (IP): Mod: JZ | Performed by: PHYSICIAN ASSISTANT

## 2025-04-29 PROCEDURE — 302874 HCHG BANDAGE ACE 2 OR 3""

## 2025-04-29 PROCEDURE — 86901 BLOOD TYPING SEROLOGIC RH(D): CPT

## 2025-04-29 PROCEDURE — 96365 THER/PROPH/DIAG IV INF INIT: CPT | Mod: XU

## 2025-04-29 PROCEDURE — 80053 COMPREHEN METABOLIC PANEL: CPT

## 2025-04-29 PROCEDURE — 700105 HCHG RX REV CODE 258: Performed by: SURGERY

## 2025-04-29 PROCEDURE — 770001 HCHG ROOM/CARE - MED/SURG/GYN PRIV*

## 2025-04-29 PROCEDURE — 99222 1ST HOSP IP/OBS MODERATE 55: CPT | Performed by: STUDENT IN AN ORGANIZED HEALTH CARE EDUCATION/TRAINING PROGRAM

## 2025-04-29 PROCEDURE — 304217 HCHG IRRIGATION SYSTEM

## 2025-04-29 PROCEDURE — 29125 APPL SHORT ARM SPLINT STATIC: CPT

## 2025-04-29 PROCEDURE — 85027 COMPLETE CBC AUTOMATED: CPT

## 2025-04-29 PROCEDURE — 700102 HCHG RX REV CODE 250 W/ 637 OVERRIDE(OP): Performed by: PHYSICIAN ASSISTANT

## 2025-04-29 PROCEDURE — 96375 TX/PRO/DX INJ NEW DRUG ADDON: CPT | Mod: XU

## 2025-04-29 PROCEDURE — 700111 HCHG RX REV CODE 636 W/ 250 OVERRIDE (IP): Performed by: SURGERY

## 2025-04-29 PROCEDURE — 85610 PROTHROMBIN TIME: CPT

## 2025-04-29 PROCEDURE — 305948 HCHG GREEN TRAUMA ACT PRE-NOTIFY NO CC

## 2025-04-29 PROCEDURE — 304999 HCHG REPAIR-SIMPLE/INTERMED LEVEL 1

## 2025-04-29 PROCEDURE — 700111 HCHG RX REV CODE 636 W/ 250 OVERRIDE (IP): Mod: JZ | Performed by: EMERGENCY MEDICINE

## 2025-04-29 PROCEDURE — 85347 COAGULATION TIME ACTIVATED: CPT

## 2025-04-29 PROCEDURE — 73090 X-RAY EXAM OF FOREARM: CPT | Mod: LT

## 2025-04-29 PROCEDURE — 84703 CHORIONIC GONADOTROPIN ASSAY: CPT

## 2025-04-29 PROCEDURE — 82077 ASSAY SPEC XCP UR&BREATH IA: CPT

## 2025-04-29 PROCEDURE — A9270 NON-COVERED ITEM OR SERVICE: HCPCS | Performed by: PHYSICIAN ASSISTANT

## 2025-04-29 PROCEDURE — 85730 THROMBOPLASTIN TIME PARTIAL: CPT

## 2025-04-29 PROCEDURE — 86850 RBC ANTIBODY SCREEN: CPT

## 2025-04-29 PROCEDURE — 85576 BLOOD PLATELET AGGREGATION: CPT | Mod: 91

## 2025-04-29 PROCEDURE — 70450 CT HEAD/BRAIN W/O DYE: CPT

## 2025-04-29 PROCEDURE — 85384 FIBRINOGEN ACTIVITY: CPT

## 2025-04-29 PROCEDURE — 99223 1ST HOSP IP/OBS HIGH 75: CPT | Mod: AI | Performed by: SURGERY

## 2025-04-29 PROCEDURE — 305308 HCHG STAPLER,SKIN,DISP.

## 2025-04-29 PROCEDURE — 86900 BLOOD TYPING SEROLOGIC ABO: CPT | Mod: 91

## 2025-04-29 PROCEDURE — 700105 HCHG RX REV CODE 258: Performed by: EMERGENCY MEDICINE

## 2025-04-29 PROCEDURE — 73100 X-RAY EXAM OF WRIST: CPT | Mod: RT

## 2025-04-29 RX ORDER — ACETAMINOPHEN 500 MG
1000 TABLET ORAL EVERY 6 HOURS
Status: DISPENSED | OUTPATIENT
Start: 2025-04-29 | End: 2025-05-04

## 2025-04-29 RX ORDER — ATORVASTATIN CALCIUM 40 MG/1
80 TABLET, FILM COATED ORAL NIGHTLY
Status: DISCONTINUED | OUTPATIENT
Start: 2025-04-29 | End: 2025-05-07 | Stop reason: HOSPADM

## 2025-04-29 RX ORDER — POLYETHYLENE GLYCOL 3350 17 G/17G
1 POWDER, FOR SOLUTION ORAL 2 TIMES DAILY
Status: DISCONTINUED | OUTPATIENT
Start: 2025-04-29 | End: 2025-05-07 | Stop reason: HOSPADM

## 2025-04-29 RX ORDER — ACETAMINOPHEN 500 MG
1000 TABLET ORAL EVERY 6 HOURS PRN
Status: DISCONTINUED | OUTPATIENT
Start: 2025-05-04 | End: 2025-05-07 | Stop reason: HOSPADM

## 2025-04-29 RX ORDER — AMOXICILLIN 250 MG
1 CAPSULE ORAL
Status: DISCONTINUED | OUTPATIENT
Start: 2025-04-29 | End: 2025-05-07 | Stop reason: HOSPADM

## 2025-04-29 RX ORDER — DOCUSATE SODIUM 100 MG/1
100 CAPSULE, LIQUID FILLED ORAL 2 TIMES DAILY
Status: DISCONTINUED | OUTPATIENT
Start: 2025-04-29 | End: 2025-05-07 | Stop reason: HOSPADM

## 2025-04-29 RX ORDER — SODIUM PHOSPHATE,MONO-DIBASIC 19G-7G/118
1 ENEMA (ML) RECTAL
Status: DISCONTINUED | OUTPATIENT
Start: 2025-04-29 | End: 2025-05-07 | Stop reason: HOSPADM

## 2025-04-29 RX ORDER — DIVALPROEX SODIUM 500 MG/1
500 TABLET, FILM COATED, EXTENDED RELEASE ORAL 2 TIMES DAILY
Status: DISCONTINUED | OUTPATIENT
Start: 2025-04-29 | End: 2025-05-07 | Stop reason: HOSPADM

## 2025-04-29 RX ORDER — AMOXICILLIN 250 MG
1 CAPSULE ORAL NIGHTLY
Status: DISCONTINUED | OUTPATIENT
Start: 2025-04-29 | End: 2025-05-07 | Stop reason: HOSPADM

## 2025-04-29 RX ORDER — METAXALONE 800 MG/1
800 TABLET ORAL 3 TIMES DAILY
Status: DISCONTINUED | OUTPATIENT
Start: 2025-04-29 | End: 2025-05-07 | Stop reason: HOSPADM

## 2025-04-29 RX ORDER — CEPHALEXIN 500 MG/1
500 CAPSULE ORAL 4 TIMES DAILY
Qty: 40 CAPSULE | Refills: 0 | Status: ACTIVE | OUTPATIENT
Start: 2025-04-29 | End: 2025-05-09

## 2025-04-29 RX ORDER — GABAPENTIN 100 MG/1
100 CAPSULE ORAL EVERY 8 HOURS
Status: DISCONTINUED | OUTPATIENT
Start: 2025-04-29 | End: 2025-05-07 | Stop reason: HOSPADM

## 2025-04-29 RX ORDER — ASPIRIN 81 MG/1
81 TABLET ORAL DAILY
COMMUNITY

## 2025-04-29 RX ORDER — ONDANSETRON 4 MG/1
4 TABLET, ORALLY DISINTEGRATING ORAL EVERY 4 HOURS PRN
Status: DISCONTINUED | OUTPATIENT
Start: 2025-04-29 | End: 2025-05-07 | Stop reason: HOSPADM

## 2025-04-29 RX ORDER — ATORVASTATIN CALCIUM 80 MG/1
80 TABLET, FILM COATED ORAL NIGHTLY
COMMUNITY

## 2025-04-29 RX ORDER — ENOXAPARIN SODIUM 100 MG/ML
40 INJECTION SUBCUTANEOUS EVERY 12 HOURS
Status: DISCONTINUED | OUTPATIENT
Start: 2025-04-29 | End: 2025-05-07 | Stop reason: HOSPADM

## 2025-04-29 RX ORDER — HYDROMORPHONE HYDROCHLORIDE 1 MG/ML
0.5 INJECTION, SOLUTION INTRAMUSCULAR; INTRAVENOUS; SUBCUTANEOUS EVERY 4 HOURS PRN
Status: DISCONTINUED | OUTPATIENT
Start: 2025-04-29 | End: 2025-05-07 | Stop reason: HOSPADM

## 2025-04-29 RX ORDER — ENOXAPARIN SODIUM 100 MG/ML
40 INJECTION SUBCUTANEOUS EVERY 12 HOURS
Status: DISCONTINUED | OUTPATIENT
Start: 2025-04-30 | End: 2025-04-29

## 2025-04-29 RX ORDER — ONDANSETRON 2 MG/ML
4 INJECTION INTRAMUSCULAR; INTRAVENOUS EVERY 4 HOURS PRN
Status: DISCONTINUED | OUTPATIENT
Start: 2025-04-29 | End: 2025-05-07 | Stop reason: HOSPADM

## 2025-04-29 RX ORDER — PROCHLORPERAZINE EDISYLATE 5 MG/ML
5-10 INJECTION INTRAMUSCULAR; INTRAVENOUS EVERY 4 HOURS PRN
Status: DISCONTINUED | OUTPATIENT
Start: 2025-04-29 | End: 2025-05-07 | Stop reason: HOSPADM

## 2025-04-29 RX ORDER — OXYCODONE HYDROCHLORIDE 5 MG/1
5 TABLET ORAL EVERY 4 HOURS PRN
Refills: 0 | Status: DISCONTINUED | OUTPATIENT
Start: 2025-04-29 | End: 2025-05-07 | Stop reason: HOSPADM

## 2025-04-29 RX ORDER — OXYCODONE HYDROCHLORIDE 10 MG/1
10 TABLET ORAL EVERY 4 HOURS PRN
Refills: 0 | Status: DISCONTINUED | OUTPATIENT
Start: 2025-04-29 | End: 2025-05-07 | Stop reason: HOSPADM

## 2025-04-29 RX ORDER — ENOXAPARIN SODIUM 100 MG/ML
30 INJECTION SUBCUTANEOUS EVERY 12 HOURS
Status: DISCONTINUED | OUTPATIENT
Start: 2025-04-30 | End: 2025-04-29

## 2025-04-29 RX ORDER — MORPHINE SULFATE 4 MG/ML
4 INJECTION INTRAVENOUS ONCE
Status: COMPLETED | OUTPATIENT
Start: 2025-04-29 | End: 2025-04-29

## 2025-04-29 RX ORDER — DIVALPROEX SODIUM 500 MG/1
500 TABLET, FILM COATED, EXTENDED RELEASE ORAL 2 TIMES DAILY
COMMUNITY

## 2025-04-29 RX ORDER — ACETAMINOPHEN 325 MG/1
650 TABLET ORAL EVERY 6 HOURS PRN
COMMUNITY

## 2025-04-29 RX ORDER — BISACODYL 10 MG
10 SUPPOSITORY, RECTAL RECTAL
Status: DISCONTINUED | OUTPATIENT
Start: 2025-04-29 | End: 2025-05-07 | Stop reason: HOSPADM

## 2025-04-29 RX ADMIN — GABAPENTIN 100 MG: 100 CAPSULE ORAL at 14:41

## 2025-04-29 RX ADMIN — SENNOSIDES AND DOCUSATE SODIUM 1 TABLET: 50; 8.6 TABLET ORAL at 22:28

## 2025-04-29 RX ADMIN — MORPHINE SULFATE 4 MG: 4 INJECTION, SOLUTION INTRAMUSCULAR; INTRAVENOUS at 04:12

## 2025-04-29 RX ADMIN — ACETAMINOPHEN 1000 MG: 500 TABLET ORAL at 11:25

## 2025-04-29 RX ADMIN — MAGNESIUM HYDROXIDE 30 ML: 2400 SUSPENSION ORAL at 17:14

## 2025-04-29 RX ADMIN — ENOXAPARIN SODIUM 40 MG: 100 INJECTION SUBCUTANEOUS at 17:14

## 2025-04-29 RX ADMIN — POLYETHYLENE GLYCOL 3350 1 PACKET: 17 POWDER, FOR SOLUTION ORAL at 17:14

## 2025-04-29 RX ADMIN — METAXALONE 800 MG: 800 TABLET ORAL at 17:13

## 2025-04-29 RX ADMIN — CEFAZOLIN 2 G: 2 INJECTION, POWDER, FOR SOLUTION INTRAMUSCULAR; INTRAVENOUS at 11:58

## 2025-04-29 RX ADMIN — CEFAZOLIN 2 G: 2 INJECTION, POWDER, FOR SOLUTION INTRAMUSCULAR; INTRAVENOUS at 03:41

## 2025-04-29 RX ADMIN — OXYCODONE 5 MG: 5 TABLET ORAL at 11:25

## 2025-04-29 RX ADMIN — ACETAMINOPHEN 1000 MG: 500 TABLET ORAL at 17:13

## 2025-04-29 RX ADMIN — Medication 3 ML: at 04:23

## 2025-04-29 RX ADMIN — DOCUSATE SODIUM 100 MG: 100 CAPSULE, LIQUID FILLED ORAL at 11:26

## 2025-04-29 RX ADMIN — DIVALPROEX SODIUM 500 MG: 500 TABLET, EXTENDED RELEASE ORAL at 17:13

## 2025-04-29 RX ADMIN — CEFAZOLIN 2 G: 2 INJECTION, POWDER, FOR SOLUTION INTRAMUSCULAR; INTRAVENOUS at 22:38

## 2025-04-29 RX ADMIN — OXYCODONE HYDROCHLORIDE 10 MG: 10 TABLET ORAL at 17:14

## 2025-04-29 RX ADMIN — GABAPENTIN 100 MG: 100 CAPSULE ORAL at 22:28

## 2025-04-29 RX ADMIN — METAXALONE 800 MG: 800 TABLET ORAL at 11:26

## 2025-04-29 RX ADMIN — ATORVASTATIN CALCIUM 80 MG: 80 TABLET, FILM COATED ORAL at 22:40

## 2025-04-29 RX ADMIN — OXYCODONE HYDROCHLORIDE 10 MG: 10 TABLET ORAL at 22:28

## 2025-04-29 RX ADMIN — POLYETHYLENE GLYCOL 3350 1 PACKET: 17 POWDER, FOR SOLUTION ORAL at 11:58

## 2025-04-29 RX ADMIN — DOCUSATE SODIUM 100 MG: 100 CAPSULE, LIQUID FILLED ORAL at 17:13

## 2025-04-29 SDOH — ECONOMIC STABILITY - HOUSING INSECURITY: HOMELESSNESS UNSPECIFIED: Z59.00

## 2025-04-29 ASSESSMENT — COPD QUESTIONNAIRES
DO YOU EVER COUGH UP ANY MUCUS OR PHLEGM?: NO/ONLY WITH OCCASIONAL COLDS OR INFECTIONS
DURING THE PAST 4 WEEKS HOW MUCH DID YOU FEEL SHORT OF BREATH: NONE/LITTLE OF THE TIME
COPD SCREENING SCORE: 4
HAVE YOU SMOKED AT LEAST 100 CIGARETTES IN YOUR ENTIRE LIFE: YES

## 2025-04-29 ASSESSMENT — PAIN DESCRIPTION - PAIN TYPE
TYPE: ACUTE PAIN
TYPE: ACUTE PAIN
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
## 2025-04-29 NOTE — ASSESSMENT & PLAN NOTE
Per chart review.  Most recent medications per chart review are bupropion  MG daily and divalproex 500 MG BID.  Resume medications when med rec complete.
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
## 2025-04-29 NOTE — ASSESSMENT & PLAN NOTE
Washed out and splinted in ED.  Ancef.  Non-operative management.  Weight bearing status - Platform weightbearing MEKA.  Diogo Mitchell MD. Orthopedic Surgeon. [facility].

## 2025-04-29 NOTE — ED NOTES
Bedside report received from off going RN/tech: Radha, assumed care of patient.  POC discussed with patient. Call light within reach, all needs addressed at this time.       Fall risk interventions in place: Move the patient closer to the nurse's station, Patient's personal possessions are with in their safe reach, Place socks on patient, Place fall risk sign on patient's door, Give patient urinal if applicable, Keep floor surfaces clean and dry, and Accompanied to restroom (all applicable per [facility] Fall risk assessment)   Continuous monitoring: Pulse Ox or Blood Pressure  IVF/IV medications: Not Applicable   Oxygen: Room Air  Bedside sitter: Not Applicable   Isolation: Not Applicable

## 2025-04-29 NOTE — ED NOTES
Medication history reviewed with PT at bedside and historic review.    Med rec is complete per PT reporting and historic review    Allergies reviewed.     Patient denies any outpatient antibiotics in the last 30 days. Historic review confirms    Patient is not taking anticoagulants.    Dispense history is available in [facility].    Preferred pharmacy for this visit - [facility] on [address] ([phone])

## 2025-04-29 NOTE — DISCHARGE PLANNING
Writer spoke with Pt to review his plan what to do post discharge.  Writer has concerns that Pt c/o uncontrolled pain in bilateral arms/hands and that his oxygen saturation had dipped to 90% on RA. Writer spoke with bedside RN and with Dr Reeves , but he informed writer that he tried to get him admitted but was told that he did not meet criteria.  Writer asked Dr Reeves if we could at least  keep him ED OBS for the time being for pain control and oxygen monitoring.

## 2025-04-29 NOTE — PROGRESS NOTES
4 Eyes Skin Assessment Completed by JHONY Cee and JHONY Degroot.    Head laceration to right posterior head with staples  Ears WDL  Nose WDL  Mouth WDL  Neck WDL  Breast/Chest WDL  Shoulder Blades WDL  Spine WDL  (R) Arm/Elbow/Hand Redness, Bruising, and Swelling, splinted with ace wrap in a sling  (L) Arm/Elbow/Hand Redness, Bruising, and Swelling, multiple stiches, Velcro brace  Abdomen WDL  Groin WDL  Scrotum/Coccyx/Buttocks WDL  (R) Leg scatter abrasions  (L) Leg WDL  (R) Heel/Foot/Toe Redness, sore  (L) Heel/Foot/Toe Redness          Devices In Places Blood Pressure Cuff and Pulse Ox      Interventions In Place Pillows    Possible Skin Injury Yes    Pictures Uploaded Into Epic Yes  Wound Consult Placed Yes  RN Wound Prevention Protocol Ordered No

## 2025-04-29 NOTE — ASSESSMENT & PLAN NOTE
Date of admission: 4/29/2025.  Patient is homeless and schizophrenic (medication compliance unknown).  Ortho surgery has elected not to surgically fix orthopedic fractures.  Patient needs solid and safe disposition plan.

## 2025-04-29 NOTE — DISCHARGE PLANNING
SW asked to give resources.   Pt given discharge instructions and has been notified on paper to follow up with [facility] or [facility]. Pt to also get in touch with [facility] for follow up. Pt does not have a phone. MANPREET encouraged Pt to go to [facility] and meet with a  to offer assistance for his follow up. Pt was prescribed antibiotics and they were delivered to bedside.
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
## 2025-04-29 NOTE — ASSESSMENT & PLAN NOTE
Fracture, thumb proximal phalanx head, fracture 5th finger proximal phalanx base.  Washed out and splinted in ED.  Ancef.  Non-operative management.  Weight bearing status - Platform weightbearing HARSHA Mitchell MD. Orthopedic Surgeon. [facility].

## 2025-04-29 NOTE — PROGRESS NOTES
Patient arrived via WC, patient transferred to bed, skin check completed with charge RN, patient oriented to call light, educated on to call when he needs to get up, Bed at lowest position, VSS on room air, A&Ox4.
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
## 2025-04-29 NOTE — ASSESSMENT & PLAN NOTE
Recent admission and diagnosis.  Treated with ASA 81 mg daily and atorvastatin 80 mg nightly.  Resume medications after med rec complete.

## 2025-04-30 VITALS
HEART RATE: 96 BPM | OXYGEN SATURATION: 91 % | RESPIRATION RATE: 16 BRPM | SYSTOLIC BLOOD PRESSURE: 110 MMHG | TEMPERATURE: 98.4 F | WEIGHT: 180 LBS | HEIGHT: 69 IN | BODY MASS INDEX: 26.66 KG/M2 | DIASTOLIC BLOOD PRESSURE: 66 MMHG

## 2025-04-30 PROBLEM — S61.412A LACERATION OF LEFT HAND: Status: ACTIVE | Noted: 2025-04-29

## 2025-04-30 PROBLEM — S01.01XA SCALP LACERATION: Status: ACTIVE | Noted: 2025-04-30

## 2025-04-30 LAB
ANION GAP SERPL CALC-SCNC: 10 MMOL/L (ref 7–16)
BASOPHILS # BLD AUTO: 1 % (ref 0–1.8)
BASOPHILS # BLD: 0.07 K/UL (ref 0–0.12)
BUN SERPL-MCNC: 14 MG/DL (ref 8–22)
CALCIUM SERPL-MCNC: 8.7 MG/DL (ref 8.5–10.5)
CHLORIDE SERPL-SCNC: 106 MMOL/L (ref 96–112)
CO2 SERPL-SCNC: 22 MMOL/L (ref 20–33)
CREAT SERPL-MCNC: 0.58 MG/DL (ref 0.5–1.4)
EOSINOPHIL # BLD AUTO: 0.21 K/UL (ref 0–0.51)
EOSINOPHIL NFR BLD: 3 % (ref 0–6.9)
ERYTHROCYTE [DISTWIDTH] IN BLOOD BY AUTOMATED COUNT: 46.5 FL (ref 35.9–50)
GFR SERPLBLD CREATININE-BSD FMLA CKD-EPI: 110 ML/MIN/1.73 M 2
GLUCOSE SERPL-MCNC: 99 MG/DL (ref 65–99)
HCT VFR BLD AUTO: 37.5 % (ref 42–52)
HGB BLD-MCNC: 12.6 G/DL (ref 14–18)
IMM GRANULOCYTES # BLD AUTO: 0.02 K/UL (ref 0–0.11)
IMM GRANULOCYTES NFR BLD AUTO: 0.3 % (ref 0–0.9)
LYMPHOCYTES # BLD AUTO: 2.07 K/UL (ref 1–4.8)
LYMPHOCYTES NFR BLD: 29.4 % (ref 22–41)
MCH RBC QN AUTO: 32.8 PG (ref 27–33)
MCHC RBC AUTO-ENTMCNC: 33.6 G/DL (ref 32.3–36.5)
MCV RBC AUTO: 97.7 FL (ref 81.4–97.8)
MONOCYTES # BLD AUTO: 0.69 K/UL (ref 0–0.85)
MONOCYTES NFR BLD AUTO: 9.8 % (ref 0–13.4)
NEUTROPHILS # BLD AUTO: 3.99 K/UL (ref 1.82–7.42)
NEUTROPHILS NFR BLD: 56.5 % (ref 44–72)
NRBC # BLD AUTO: 0 K/UL
NRBC BLD-RTO: 0 /100 WBC (ref 0–0.2)
PLATELET # BLD AUTO: 271 K/UL (ref 164–446)
PMV BLD AUTO: 8.1 FL (ref 9–12.9)
POTASSIUM SERPL-SCNC: 3.9 MMOL/L (ref 3.6–5.5)
RBC # BLD AUTO: 3.84 M/UL (ref 4.7–6.1)
SODIUM SERPL-SCNC: 138 MMOL/L (ref 135–145)
WBC # BLD AUTO: 7.1 K/UL (ref 4.8–10.8)

## 2025-04-30 PROCEDURE — 700105 HCHG RX REV CODE 258: Mod: UD | Performed by: SURGERY

## 2025-04-30 PROCEDURE — 700102 HCHG RX REV CODE 250 W/ 637 OVERRIDE(OP): Mod: UD | Performed by: PHYSICIAN ASSISTANT

## 2025-04-30 PROCEDURE — 700111 HCHG RX REV CODE 636 W/ 250 OVERRIDE (IP): Mod: UD | Performed by: SURGERY

## 2025-04-30 PROCEDURE — 97166 OT EVAL MOD COMPLEX 45 MIN: CPT

## 2025-04-30 PROCEDURE — 80048 BASIC METABOLIC PNL TOTAL CA: CPT

## 2025-04-30 PROCEDURE — 85025 COMPLETE CBC W/AUTO DIFF WBC: CPT

## 2025-04-30 PROCEDURE — 97535 SELF CARE MNGMENT TRAINING: CPT

## 2025-04-30 PROCEDURE — G0378 HOSPITAL OBSERVATION PER HR: HCPCS

## 2025-04-30 PROCEDURE — 36415 COLL VENOUS BLD VENIPUNCTURE: CPT

## 2025-04-30 PROCEDURE — 99233 SBSQ HOSP IP/OBS HIGH 50: CPT | Performed by: PHYSICIAN ASSISTANT

## 2025-04-30 PROCEDURE — 700111 HCHG RX REV CODE 636 W/ 250 OVERRIDE (IP): Mod: JZ | Performed by: PHYSICIAN ASSISTANT

## 2025-04-30 PROCEDURE — A9270 NON-COVERED ITEM OR SERVICE: HCPCS | Mod: UD | Performed by: PHYSICIAN ASSISTANT

## 2025-04-30 RX ADMIN — DOCUSATE SODIUM 100 MG: 100 CAPSULE, LIQUID FILLED ORAL at 05:43

## 2025-04-30 RX ADMIN — ACETAMINOPHEN 1000 MG: 500 TABLET ORAL at 22:21

## 2025-04-30 RX ADMIN — POLYETHYLENE GLYCOL 3350 1 PACKET: 17 POWDER, FOR SOLUTION ORAL at 16:41

## 2025-04-30 RX ADMIN — ACETAMINOPHEN 1000 MG: 500 TABLET ORAL at 05:42

## 2025-04-30 RX ADMIN — OXYCODONE HYDROCHLORIDE 10 MG: 10 TABLET ORAL at 15:57

## 2025-04-30 RX ADMIN — ATORVASTATIN CALCIUM 80 MG: 80 TABLET, FILM COATED ORAL at 22:21

## 2025-04-30 RX ADMIN — ENOXAPARIN SODIUM 40 MG: 100 INJECTION SUBCUTANEOUS at 16:41

## 2025-04-30 RX ADMIN — CEFAZOLIN 2 G: 2 INJECTION, POWDER, FOR SOLUTION INTRAMUSCULAR; INTRAVENOUS at 05:46

## 2025-04-30 RX ADMIN — MAGNESIUM HYDROXIDE 30 ML: 2400 SUSPENSION ORAL at 16:41

## 2025-04-30 RX ADMIN — DIVALPROEX SODIUM 500 MG: 500 TABLET, EXTENDED RELEASE ORAL at 16:41

## 2025-04-30 RX ADMIN — ENOXAPARIN SODIUM 40 MG: 100 INJECTION SUBCUTANEOUS at 05:43

## 2025-04-30 RX ADMIN — GABAPENTIN 100 MG: 100 CAPSULE ORAL at 21:41

## 2025-04-30 RX ADMIN — GABAPENTIN 100 MG: 100 CAPSULE ORAL at 13:08

## 2025-04-30 RX ADMIN — SENNOSIDES AND DOCUSATE SODIUM 1 TABLET: 50; 8.6 TABLET ORAL at 21:42

## 2025-04-30 RX ADMIN — METAXALONE 800 MG: 800 TABLET ORAL at 05:42

## 2025-04-30 RX ADMIN — POLYETHYLENE GLYCOL 3350 1 PACKET: 17 POWDER, FOR SOLUTION ORAL at 05:42

## 2025-04-30 RX ADMIN — METAXALONE 800 MG: 800 TABLET ORAL at 13:08

## 2025-04-30 RX ADMIN — GABAPENTIN 100 MG: 100 CAPSULE ORAL at 05:43

## 2025-04-30 RX ADMIN — ACETAMINOPHEN 1000 MG: 500 TABLET ORAL at 13:08

## 2025-04-30 RX ADMIN — DOCUSATE SODIUM 100 MG: 100 CAPSULE, LIQUID FILLED ORAL at 16:41

## 2025-04-30 RX ADMIN — ACETAMINOPHEN 1000 MG: 500 TABLET ORAL at 00:51

## 2025-04-30 RX ADMIN — OXYCODONE HYDROCHLORIDE 10 MG: 10 TABLET ORAL at 21:41

## 2025-04-30 RX ADMIN — CEFAZOLIN 2 G: 2 INJECTION, POWDER, FOR SOLUTION INTRAMUSCULAR; INTRAVENOUS at 21:46

## 2025-04-30 RX ADMIN — DIVALPROEX SODIUM 500 MG: 500 TABLET, EXTENDED RELEASE ORAL at 05:42

## 2025-04-30 RX ADMIN — METAXALONE 800 MG: 800 TABLET ORAL at 16:41

## 2025-04-30 RX ADMIN — CEFAZOLIN 2 G: 2 INJECTION, POWDER, FOR SOLUTION INTRAMUSCULAR; INTRAVENOUS at 15:54

## 2025-04-30 ASSESSMENT — COGNITIVE AND FUNCTIONAL STATUS - GENERAL
SUGGESTED CMS G CODE MODIFIER DAILY ACTIVITY: CK
PERSONAL GROOMING: A LITTLE
STANDING UP FROM CHAIR USING ARMS: A LITTLE
HELP NEEDED FOR BATHING: A LOT
SUGGESTED CMS G CODE MODIFIER DAILY ACTIVITY: CK
DRESSING REGULAR UPPER BODY CLOTHING: A LITTLE
MOVING TO AND FROM BED TO CHAIR: A LITTLE
SUGGESTED CMS G CODE MODIFIER MOBILITY: CJ
MOBILITY SCORE: 20
DRESSING REGULAR LOWER BODY CLOTHING: A LOT
WALKING IN HOSPITAL ROOM: A LITTLE
DAILY ACTIVITIY SCORE: 18
DRESSING REGULAR UPPER BODY CLOTHING: A LOT
DAILY ACTIVITIY SCORE: 15
TOILETING: A LITTLE
EATING MEALS: A LITTLE
DRESSING REGULAR LOWER BODY CLOTHING: A LOT
CLIMB 3 TO 5 STEPS WITH RAILING: A LITTLE
TOILETING: A LITTLE
HELP NEEDED FOR BATHING: A LOT

## 2025-04-30 ASSESSMENT — ENCOUNTER SYMPTOMS
DIZZINESS: 0
ROS GI COMMENTS: BM PTA
CHILLS: 0
SHORTNESS OF BREATH: 0
MYALGIAS: 1
FEVER: 0
HEADACHES: 0
FOCAL WEAKNESS: 0
SENSORY CHANGE: 0
ABDOMINAL PAIN: 0
VOMITING: 0
NAUSEA: 0
WEAKNESS: 0
NECK PAIN: 0
BACK PAIN: 0

## 2025-04-30 ASSESSMENT — PAIN DESCRIPTION - PAIN TYPE
TYPE: ACUTE PAIN
TYPE: ACUTE PAIN;SURGICAL PAIN

## 2025-04-30 NOTE — DISCHARGE PLANNING
Case Management Discharge Planning    Admission Date: 4/29/2025  GMLOS: 2.8  ALOS: 0    6-Clicks ADL Score: 18  6-Clicks Mobility Score: 20      Anticipated Discharge Dispo: Discharge Disposition: D/T to SNF with Medicare cert in anticipation of skilled care (03)     DME Needed: No    Action(s) Taken: LMSW completed chart review. Per OT, pt would benefit from post-acute placement. LMSW sent blanket [place] referrals as DPA is out of office at this time.    Escalations Completed: None    Medically Clear: No    Next Steps: LMSW to follow for any additional CM needs.    Barriers to Discharge: Medical clearance, Pending Placement, and Pending Insurance Authorization    Is the patient up for discharge tomorrow: No

## 2025-04-30 NOTE — CARE PLAN
The patient is Stable - Low risk of patient condition declining or worsening    Shift Goals  Clinical Goals: comfort  Patient Goals: rest  Family Goals: not present    Progress made toward(s) clinical / shift goals:  Pt free from falls. Fall precautions in place. Pt verbalizes pain control at this time.    Patient is not progressing towards the following goals:
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
## 2025-04-30 NOTE — CARE PLAN
The patient is Stable - Low risk of patient condition declining or worsening    Shift Goals  Clinical Goals: comfort, safety  Patient Goals: Unabl to assess  Family Goals: not present    Progress made toward(s) clinical / shift goals:    Problem: Mobility  Goal: Patient's capacity to carry out activities will improve  Outcome: Progressing  Flowsheets (Taken 4/30/2025 1517)  Mobility:   Encouraged mobilization per interdisciplinary team recommendations   Monitored for signs of activity intolerance   Provided assistive devices   Provided rest periods between activities   Administered pain management to allow progressive mobilization   Collaborated with PT/OT     Problem: Self Care  Goal: Patient will have the ability to perform ADLs independently or with assistance (bathe, groom, dress, toilet and feed)  Outcome: Progressing     Problem: Infection - Standard  Goal: Patient will remain free from infection  Outcome: Progressing  Flowsheets (Taken 4/30/2025 1517)  Standard Infection Interventions:   Assessed for signs and symptoms of infection   Provided education on proper hand hygiene and infection prevention measures   Instructed patient/family on signs and symptoms of infection   Assessed for removal IV, central lines, intra-arterial or urinary catheters     Problem: Wound/ / Incision Healing  Goal: Patient's wound/surgical incision will decrease in size and heals properly  Outcome: Progressing       Patient is not progressing towards the following goals:

## 2025-04-30 NOTE — DISCHARGE PLANNING
"Care Transition Team Assessment  PCP: Pt states none  LMSW met with pt at bedside to complete assessment. Pt A&Ox4 and able to verify the information on the face sheet. Please see H&P for medical hx and presenting problem. Pt stated he is currently unhoused and occasionally stays at [facility] and has a  named \"Chen\". Prior to this hospitalization, pt reports being independent at home with ADLs and IADLs. Pt denies any DME at baseline including O2. Pt replies on public transportation and walking. Pt reports his friend \"Tori Bermudez\" as support for him however, he is also homeless and does not know where Tori can be located. Pt receives SSI monthly deposits. Pt denies any substance use. Pt states he has previously been diagnosed with schizophrenia however, denies current mental health concerns. Pt does not have any ACP docs on file. Pt confirmed that he has Medicare and Medicaid FFS insurance. Pt stated he may need assistance with transportation upon DC. Pt does have transportation benefits with [employer].  Information Source  Orientation Level: Oriented X4  Information Given By: Patient  Informant's Name: Timothy  Who is responsible for making decisions for patient? : Patient    Readmission Evaluation  Is this a readmission?: No    Elopement Risk  Legal Hold: No  Ambulatory or Self Mobile in Wheelchair: Yes  Disoriented: No  Psychiatric Symptoms: Impulsivity-at Risk for Elopement  Elopement this Admit: No  Elopement Risk: Not at Risk for Elopement  Picture of Patient on Inside Chart Front Cover: No (See Comments)    Interdisciplinary Discharge Planning  Lives with - Patient's Self Care Capacity: Alone and Able to Care For Self  Patient or legal guardian wants to designate a caregiver: No  Housing / Facility: Homeless    Discharge Preparedness  What is your plan after discharge?: Home with help  What are your discharge supports?: Other (comment) (Friend, Tori Bermudez)  Prior Functional Level: Ambulatory, " Independent with Activities of Daily Living, Independent with Medication Management  Difficulity with ADLs: None  Difficulity with IADLs: None    Functional Assesment  Prior Functional Level: Ambulatory, Independent with Activities of Daily Living, Independent with Medication Management    Finances  Financial Barriers to Discharge: No  Prescription Coverage: Yes    Vision / Hearing Impairment  Vision Impairment : No  Hearing Impairment : No    Advance Directive  Advance Directive?: None    Domestic Abuse  Have you ever been the victim of abuse or violence?: No  Possible Abuse/Neglect Reported to:: Not Applicable    Psychological Assessment  History of Substance Abuse: None  History of Psychiatric Problems: Yes  Non-compliant with Treatment: No  Newly Diagnosed Illness: No    Discharge Risks or Barriers  Discharge risks or barriers?: No PCP, Transportation, Mental health, Homeless / couch surfing  Patient risk factors: Homeless, Lack of outside supports, Mental health, No PCP    Anticipated Discharge Information  Discharge Disposition: Discharged to home/self care (01)

## 2025-04-30 NOTE — PROGRESS NOTES
1430: CNA notified this RN that she found patient at exit to the floor leaving the unit, CNA asked patient if he was ok and if he needed help. Patient taken back to room. MD notified by nurse, ok to place telesitter.
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
## 2025-04-30 NOTE — PROGRESS NOTES
0923: telesitter discontinued, Patient not displaying any impulsive behavior. Provider and charge RN notified.

## 2025-05-01 ENCOUNTER — APPOINTMENT (OUTPATIENT)
Dept: RADIOLOGY | Facility: MEDICAL CENTER | Age: 65
End: 2025-05-01
Attending: SURGERY
Payer: MEDICARE

## 2025-05-01 LAB
ANION GAP SERPL CALC-SCNC: 10 MMOL/L (ref 7–16)
BASOPHILS # BLD AUTO: 0.9 % (ref 0–1.8)
BASOPHILS # BLD: 0.05 K/UL (ref 0–0.12)
BUN SERPL-MCNC: 11 MG/DL (ref 8–22)
CALCIUM SERPL-MCNC: 8.4 MG/DL (ref 8.5–10.5)
CHLORIDE SERPL-SCNC: 100 MMOL/L (ref 96–112)
CO2 SERPL-SCNC: 23 MMOL/L (ref 20–33)
CREAT SERPL-MCNC: 0.69 MG/DL (ref 0.5–1.4)
EOSINOPHIL # BLD AUTO: 0.74 K/UL (ref 0–0.51)
EOSINOPHIL NFR BLD: 13.1 % (ref 0–6.9)
ERYTHROCYTE [DISTWIDTH] IN BLOOD BY AUTOMATED COUNT: 45.6 FL (ref 35.9–50)
GFR SERPLBLD CREATININE-BSD FMLA CKD-EPI: 104 ML/MIN/1.73 M 2
GLUCOSE SERPL-MCNC: 98 MG/DL (ref 65–99)
HCT VFR BLD AUTO: 37.3 % (ref 42–52)
HGB BLD-MCNC: 12.5 G/DL (ref 14–18)
IMM GRANULOCYTES # BLD AUTO: 0.03 K/UL (ref 0–0.11)
IMM GRANULOCYTES NFR BLD AUTO: 0.5 % (ref 0–0.9)
LYMPHOCYTES # BLD AUTO: 1.08 K/UL (ref 1–4.8)
LYMPHOCYTES NFR BLD: 19.1 % (ref 22–41)
MAGNESIUM SERPL-MCNC: 2.1 MG/DL (ref 1.5–2.5)
MCH RBC QN AUTO: 32.3 PG (ref 27–33)
MCHC RBC AUTO-ENTMCNC: 33.5 G/DL (ref 32.3–36.5)
MCV RBC AUTO: 96.4 FL (ref 81.4–97.8)
MONOCYTES # BLD AUTO: 0.49 K/UL (ref 0–0.85)
MONOCYTES NFR BLD AUTO: 8.7 % (ref 0–13.4)
NEUTROPHILS # BLD AUTO: 3.26 K/UL (ref 1.82–7.42)
NEUTROPHILS NFR BLD: 57.7 % (ref 44–72)
NRBC # BLD AUTO: 0 K/UL
NRBC BLD-RTO: 0 /100 WBC (ref 0–0.2)
PHOSPHATE SERPL-MCNC: 3.4 MG/DL (ref 2.5–4.5)
PLATELET # BLD AUTO: 239 K/UL (ref 164–446)
PMV BLD AUTO: 8.4 FL (ref 9–12.9)
POTASSIUM SERPL-SCNC: 3.9 MMOL/L (ref 3.6–5.5)
RBC # BLD AUTO: 3.87 M/UL (ref 4.7–6.1)
SODIUM SERPL-SCNC: 133 MMOL/L (ref 135–145)
WBC # BLD AUTO: 5.7 K/UL (ref 4.8–10.8)

## 2025-05-01 PROCEDURE — 700111 HCHG RX REV CODE 636 W/ 250 OVERRIDE (IP): Mod: UD | Performed by: SURGERY

## 2025-05-01 PROCEDURE — G0378 HOSPITAL OBSERVATION PER HR: HCPCS

## 2025-05-01 PROCEDURE — 700105 HCHG RX REV CODE 258: Mod: UD | Performed by: SURGERY

## 2025-05-01 PROCEDURE — 84100 ASSAY OF PHOSPHORUS: CPT

## 2025-05-01 PROCEDURE — 36415 COLL VENOUS BLD VENIPUNCTURE: CPT

## 2025-05-01 PROCEDURE — 99232 SBSQ HOSP IP/OBS MODERATE 35: CPT | Performed by: PHYSICIAN ASSISTANT

## 2025-05-01 PROCEDURE — 80048 BASIC METABOLIC PNL TOTAL CA: CPT

## 2025-05-01 PROCEDURE — 700111 HCHG RX REV CODE 636 W/ 250 OVERRIDE (IP): Mod: JZ,UD | Performed by: PHYSICIAN ASSISTANT

## 2025-05-01 PROCEDURE — 85025 COMPLETE CBC W/AUTO DIFF WBC: CPT

## 2025-05-01 PROCEDURE — 83735 ASSAY OF MAGNESIUM: CPT

## 2025-05-01 PROCEDURE — A9270 NON-COVERED ITEM OR SERVICE: HCPCS | Mod: UD | Performed by: PHYSICIAN ASSISTANT

## 2025-05-01 PROCEDURE — 700102 HCHG RX REV CODE 250 W/ 637 OVERRIDE(OP): Mod: UD | Performed by: PHYSICIAN ASSISTANT

## 2025-05-01 RX ADMIN — DIVALPROEX SODIUM 500 MG: 500 TABLET, EXTENDED RELEASE ORAL at 05:37

## 2025-05-01 RX ADMIN — ACETAMINOPHEN 1000 MG: 500 TABLET ORAL at 13:39

## 2025-05-01 RX ADMIN — GABAPENTIN 100 MG: 100 CAPSULE ORAL at 05:37

## 2025-05-01 RX ADMIN — ENOXAPARIN SODIUM 40 MG: 100 INJECTION SUBCUTANEOUS at 05:37

## 2025-05-01 RX ADMIN — SENNOSIDES AND DOCUSATE SODIUM 1 TABLET: 50; 8.6 TABLET ORAL at 21:58

## 2025-05-01 RX ADMIN — OXYCODONE HYDROCHLORIDE 10 MG: 10 TABLET ORAL at 21:58

## 2025-05-01 RX ADMIN — ACETAMINOPHEN 1000 MG: 500 TABLET ORAL at 05:37

## 2025-05-01 RX ADMIN — ENOXAPARIN SODIUM 40 MG: 100 INJECTION SUBCUTANEOUS at 17:58

## 2025-05-01 RX ADMIN — DOCUSATE SODIUM 100 MG: 100 CAPSULE, LIQUID FILLED ORAL at 17:58

## 2025-05-01 RX ADMIN — CEFAZOLIN 2 G: 2 INJECTION, POWDER, FOR SOLUTION INTRAMUSCULAR; INTRAVENOUS at 05:39

## 2025-05-01 RX ADMIN — OXYCODONE HYDROCHLORIDE 10 MG: 10 TABLET ORAL at 17:57

## 2025-05-01 RX ADMIN — METAXALONE 800 MG: 800 TABLET ORAL at 05:37

## 2025-05-01 RX ADMIN — CEFAZOLIN 2 G: 2 INJECTION, POWDER, FOR SOLUTION INTRAMUSCULAR; INTRAVENOUS at 13:43

## 2025-05-01 RX ADMIN — ACETAMINOPHEN 1000 MG: 500 TABLET ORAL at 17:58

## 2025-05-01 RX ADMIN — CEFAZOLIN 2 G: 2 INJECTION, POWDER, FOR SOLUTION INTRAMUSCULAR; INTRAVENOUS at 22:06

## 2025-05-01 RX ADMIN — DOCUSATE SODIUM 100 MG: 100 CAPSULE, LIQUID FILLED ORAL at 05:37

## 2025-05-01 RX ADMIN — OXYCODONE HYDROCHLORIDE 10 MG: 10 TABLET ORAL at 08:14

## 2025-05-01 RX ADMIN — DIVALPROEX SODIUM 500 MG: 500 TABLET, EXTENDED RELEASE ORAL at 17:58

## 2025-05-01 RX ADMIN — GABAPENTIN 100 MG: 100 CAPSULE ORAL at 13:39

## 2025-05-01 RX ADMIN — ATORVASTATIN CALCIUM 80 MG: 80 TABLET, FILM COATED ORAL at 21:58

## 2025-05-01 RX ADMIN — GABAPENTIN 100 MG: 100 CAPSULE ORAL at 21:58

## 2025-05-01 RX ADMIN — METAXALONE 800 MG: 800 TABLET ORAL at 17:58

## 2025-05-01 RX ADMIN — METAXALONE 800 MG: 800 TABLET ORAL at 13:39

## 2025-05-01 ASSESSMENT — ENCOUNTER SYMPTOMS
CHILLS: 0
NAUSEA: 0
SENSORY CHANGE: 0
BACK PAIN: 0
VOMITING: 0
HEADACHES: 0
WEAKNESS: 0
ROS GI COMMENTS: BM PTA
DIZZINESS: 0
FEVER: 0
NECK PAIN: 0
MYALGIAS: 1
ABDOMINAL PAIN: 0
FOCAL WEAKNESS: 0
SHORTNESS OF BREATH: 0

## 2025-05-01 ASSESSMENT — PAIN DESCRIPTION - PAIN TYPE
TYPE: ACUTE PAIN

## 2025-05-01 NOTE — CARE PLAN
The patient is   Problem: Mobility  Goal: Patient's capacity to carry out activities will improve  Outcome: Progressing  Flowsheets (Taken 4/30/2025 1517)  Mobility:   Encouraged mobilization per interdisciplinary team recommendations   Monitored for signs of activity intolerance   Provided assistive devices   Provided rest periods between activities   Administered pain management to allow progressive mobilization   Collaborated with PT/OT     Problem: Infection - Standard  Goal: Patient will remain free from infection  Outcome: Progressing  Flowsheets (Taken 4/30/2025 1517)  Standard Infection Interventions:   Assessed for signs and symptoms of infection   Provided education on proper hand hygiene and infection prevention measures   Instructed patient/family on signs and symptoms of infection   Assessed for removal IV, central lines, intra-arterial or urinary catheters     Problem: Fall Risk  Goal: Patient will remain free from falls  Outcome: Progressing  Note: Bed alarm is on       Shift Goals  Clinical Goals: Safety, pain  Patient Goals: Comfort  Family Goals: not present      Patient is not progressing towards the following goals:

## 2025-05-01 NOTE — DISCHARGE PLANNING
"History of Schizophrenia Diagnosis (On Depakote for Psychiatric issues).   Chronically un-housed. Denies substance misuse and was Negative for ETOH upon admission. Limited to no discharge support. He does not have phone and has no contact information for his friend(s). Previous chart review had no success with [facility] search.   OT currently recommending post-acute placement.   [facility] referrals sent per protocol, multiple declinations for \"care exceeds capacity\" and \"Discharge issues\".   [place] PASRR: [id number]    -If we find accepting post-acute facility we will need authorization ([employer] Medicare). Patient does have Medicaid FFS with non-emergent transportation benefits through [place]. RN CM discussed with IDT the importance of having patient ambulate and up for meals to improve/maintain functional status.  SSN:      -RN CM to remain in contact with [facility] Program: [phone], as they assist un-housed patients with mental illness.     Case Management Discharge Planning    Admission Date: 4/29/2025  GMLOS: 2.8  ALOS: 0    6-Clicks ADL Score: 18  6-Clicks Mobility Score: 20    Anticipated Discharge Dispo: Discharge Disposition: D/T to SNF with Medicare cert in anticipation of skilled care (03)    DME Needed: Yes    DME Ordered: No    Action(s) Taken: Updated Provider/Nurse on Discharge Plan    Escalations Completed: PT and Pending Discharge Destination    Medically Clear: Medically clear for post-acute placement.     Next Steps: Accepting post-acute facility vs. Progression towards ability to discharge to location of choice.     Barriers to Discharge: Pending Placement, Pending PT Evaluation, Pending Insurance Authorization, No Social Support, and Homelessness    Is the patient up for discharge tomorrow: Unknown.         "

## 2025-05-01 NOTE — CARE PLAN
The patient is Stable - Low risk of patient condition declining or worsening    Shift Goals  Clinical Goals: safety  Patient Goals: cluster care  Family Goals: MARC    Progress made toward(s) clinical / shift goals:  BM x1, oxy 10 PRN for pain control, bed alarm on    Patient is not progressing towards the following goals: n/a    Problem: Knowledge Deficit - Standard  Goal: Patient and family/care givers will demonstrate understanding of plan of care, disease process/condition, diagnostic tests and medications  Outcome: Progressing     Problem: Pain - Standard  Goal: Alleviation of pain or a reduction in pain to the patient’s comfort goal  Outcome: Progressing     Problem: Mobility  Goal: Patient's capacity to carry out activities will improve  Outcome: Progressing     Problem: Self Care  Goal: Patient will have the ability to perform ADLs independently or with assistance (bathe, groom, dress, toilet and feed)  Outcome: Progressing
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
## 2025-05-02 LAB
ANION GAP SERPL CALC-SCNC: 11 MMOL/L (ref 7–16)
BASOPHILS # BLD AUTO: 0.8 % (ref 0–1.8)
BASOPHILS # BLD: 0.04 K/UL (ref 0–0.12)
BUN SERPL-MCNC: 10 MG/DL (ref 8–22)
CALCIUM SERPL-MCNC: 8.5 MG/DL (ref 8.5–10.5)
CHLORIDE SERPL-SCNC: 101 MMOL/L (ref 96–112)
CO2 SERPL-SCNC: 24 MMOL/L (ref 20–33)
CREAT SERPL-MCNC: 0.76 MG/DL (ref 0.5–1.4)
EOSINOPHIL # BLD AUTO: 1.31 K/UL (ref 0–0.51)
EOSINOPHIL NFR BLD: 24.6 % (ref 0–6.9)
ERYTHROCYTE [DISTWIDTH] IN BLOOD BY AUTOMATED COUNT: 45.4 FL (ref 35.9–50)
GFR SERPLBLD CREATININE-BSD FMLA CKD-EPI: 101 ML/MIN/1.73 M 2
GLUCOSE SERPL-MCNC: 109 MG/DL (ref 65–99)
HCT VFR BLD AUTO: 35.3 % (ref 42–52)
HGB BLD-MCNC: 11.5 G/DL (ref 14–18)
IMM GRANULOCYTES # BLD AUTO: 0.02 K/UL (ref 0–0.11)
IMM GRANULOCYTES NFR BLD AUTO: 0.4 % (ref 0–0.9)
LYMPHOCYTES # BLD AUTO: 1.37 K/UL (ref 1–4.8)
LYMPHOCYTES NFR BLD: 25.7 % (ref 22–41)
MCH RBC QN AUTO: 31.9 PG (ref 27–33)
MCHC RBC AUTO-ENTMCNC: 32.6 G/DL (ref 32.3–36.5)
MCV RBC AUTO: 98.1 FL (ref 81.4–97.8)
MONOCYTES # BLD AUTO: 0.47 K/UL (ref 0–0.85)
MONOCYTES NFR BLD AUTO: 8.8 % (ref 0–13.4)
NEUTROPHILS # BLD AUTO: 2.12 K/UL (ref 1.82–7.42)
NEUTROPHILS NFR BLD: 39.7 % (ref 44–72)
NRBC # BLD AUTO: 0 K/UL
NRBC BLD-RTO: 0 /100 WBC (ref 0–0.2)
PLATELET # BLD AUTO: 231 K/UL (ref 164–446)
PMV BLD AUTO: 8.5 FL (ref 9–12.9)
POTASSIUM SERPL-SCNC: 3.9 MMOL/L (ref 3.6–5.5)
RBC # BLD AUTO: 3.6 M/UL (ref 4.7–6.1)
SODIUM SERPL-SCNC: 136 MMOL/L (ref 135–145)
WBC # BLD AUTO: 5.3 K/UL (ref 4.8–10.8)

## 2025-05-02 PROCEDURE — 97163 PT EVAL HIGH COMPLEX 45 MIN: CPT

## 2025-05-02 PROCEDURE — 700105 HCHG RX REV CODE 258: Mod: UD | Performed by: SURGERY

## 2025-05-02 PROCEDURE — 97535 SELF CARE MNGMENT TRAINING: CPT

## 2025-05-02 PROCEDURE — 99232 SBSQ HOSP IP/OBS MODERATE 35: CPT | Performed by: PHYSICIAN ASSISTANT

## 2025-05-02 PROCEDURE — 700111 HCHG RX REV CODE 636 W/ 250 OVERRIDE (IP): Mod: JZ,UD | Performed by: PHYSICIAN ASSISTANT

## 2025-05-02 PROCEDURE — A9270 NON-COVERED ITEM OR SERVICE: HCPCS | Mod: UD | Performed by: PHYSICIAN ASSISTANT

## 2025-05-02 PROCEDURE — 80048 BASIC METABOLIC PNL TOTAL CA: CPT

## 2025-05-02 PROCEDURE — 36415 COLL VENOUS BLD VENIPUNCTURE: CPT

## 2025-05-02 PROCEDURE — 700111 HCHG RX REV CODE 636 W/ 250 OVERRIDE (IP): Mod: UD | Performed by: SURGERY

## 2025-05-02 PROCEDURE — G0378 HOSPITAL OBSERVATION PER HR: HCPCS

## 2025-05-02 PROCEDURE — 700102 HCHG RX REV CODE 250 W/ 637 OVERRIDE(OP): Mod: UD | Performed by: PHYSICIAN ASSISTANT

## 2025-05-02 PROCEDURE — 85025 COMPLETE CBC W/AUTO DIFF WBC: CPT

## 2025-05-02 RX ADMIN — OXYCODONE HYDROCHLORIDE 10 MG: 10 TABLET ORAL at 06:32

## 2025-05-02 RX ADMIN — METAXALONE 800 MG: 800 TABLET ORAL at 17:33

## 2025-05-02 RX ADMIN — METAXALONE 800 MG: 800 TABLET ORAL at 06:32

## 2025-05-02 RX ADMIN — POLYETHYLENE GLYCOL 3350 1 PACKET: 17 POWDER, FOR SOLUTION ORAL at 18:00

## 2025-05-02 RX ADMIN — ENOXAPARIN SODIUM 40 MG: 100 INJECTION SUBCUTANEOUS at 17:32

## 2025-05-02 RX ADMIN — GABAPENTIN 100 MG: 100 CAPSULE ORAL at 21:40

## 2025-05-02 RX ADMIN — HYDROMORPHONE HYDROCHLORIDE 0.5 MG: 1 INJECTION, SOLUTION INTRAMUSCULAR; INTRAVENOUS; SUBCUTANEOUS at 20:14

## 2025-05-02 RX ADMIN — GABAPENTIN 100 MG: 100 CAPSULE ORAL at 13:49

## 2025-05-02 RX ADMIN — OXYCODONE HYDROCHLORIDE 10 MG: 10 TABLET ORAL at 01:07

## 2025-05-02 RX ADMIN — ACETAMINOPHEN 1000 MG: 500 TABLET ORAL at 01:09

## 2025-05-02 RX ADMIN — ENOXAPARIN SODIUM 40 MG: 100 INJECTION SUBCUTANEOUS at 06:33

## 2025-05-02 RX ADMIN — OXYCODONE HYDROCHLORIDE 10 MG: 10 TABLET ORAL at 22:41

## 2025-05-02 RX ADMIN — CEFAZOLIN 2 G: 2 INJECTION, POWDER, FOR SOLUTION INTRAMUSCULAR; INTRAVENOUS at 06:37

## 2025-05-02 RX ADMIN — ACETAMINOPHEN 1000 MG: 500 TABLET ORAL at 12:01

## 2025-05-02 RX ADMIN — DOCUSATE SODIUM 100 MG: 100 CAPSULE, LIQUID FILLED ORAL at 17:33

## 2025-05-02 RX ADMIN — DIVALPROEX SODIUM 500 MG: 500 TABLET, EXTENDED RELEASE ORAL at 06:32

## 2025-05-02 RX ADMIN — ATORVASTATIN CALCIUM 80 MG: 80 TABLET, FILM COATED ORAL at 20:15

## 2025-05-02 RX ADMIN — OXYCODONE HYDROCHLORIDE 10 MG: 10 TABLET ORAL at 17:33

## 2025-05-02 RX ADMIN — SENNOSIDES AND DOCUSATE SODIUM 1 TABLET: 50; 8.6 TABLET ORAL at 20:15

## 2025-05-02 RX ADMIN — MAGNESIUM HYDROXIDE 30 ML: 2400 SUSPENSION ORAL at 17:33

## 2025-05-02 RX ADMIN — ACETAMINOPHEN 1000 MG: 500 TABLET ORAL at 17:33

## 2025-05-02 RX ADMIN — DIVALPROEX SODIUM 500 MG: 500 TABLET, EXTENDED RELEASE ORAL at 17:33

## 2025-05-02 RX ADMIN — OXYCODONE HYDROCHLORIDE 10 MG: 10 TABLET ORAL at 12:02

## 2025-05-02 RX ADMIN — POLYETHYLENE GLYCOL 3350 1 PACKET: 17 POWDER, FOR SOLUTION ORAL at 06:34

## 2025-05-02 RX ADMIN — METAXALONE 800 MG: 800 TABLET ORAL at 13:49

## 2025-05-02 RX ADMIN — GABAPENTIN 100 MG: 100 CAPSULE ORAL at 06:32

## 2025-05-02 ASSESSMENT — COGNITIVE AND FUNCTIONAL STATUS - GENERAL
STANDING UP FROM CHAIR USING ARMS: A LITTLE
MOVING TO AND FROM BED TO CHAIR: A LITTLE
SUGGESTED CMS G CODE MODIFIER DAILY ACTIVITY: CK
EATING MEALS: A LITTLE
DAILY ACTIVITIY SCORE: 16
MOBILITY SCORE: 18
SUGGESTED CMS G CODE MODIFIER MOBILITY: CK
TOILETING: A LITTLE
PERSONAL GROOMING: A LITTLE
WALKING IN HOSPITAL ROOM: A LITTLE
DRESSING REGULAR LOWER BODY CLOTHING: A LOT
DRESSING REGULAR UPPER BODY CLOTHING: A LITTLE
TURNING FROM BACK TO SIDE WHILE IN FLAT BAD: A LITTLE
HELP NEEDED FOR BATHING: A LOT
CLIMB 3 TO 5 STEPS WITH RAILING: A LITTLE
MOVING FROM LYING ON BACK TO SITTING ON SIDE OF FLAT BED: A LITTLE

## 2025-05-02 ASSESSMENT — ENCOUNTER SYMPTOMS
ROS GI COMMENTS: BM 5/2
CHILLS: 0
NAUSEA: 0
FEVER: 0
FOCAL WEAKNESS: 0
SHORTNESS OF BREATH: 0
SENSORY CHANGE: 0
WEAKNESS: 0
BACK PAIN: 0
VOMITING: 0
HEADACHES: 0
MYALGIAS: 1
ABDOMINAL PAIN: 0
DIZZINESS: 0
NECK PAIN: 0

## 2025-05-02 ASSESSMENT — PAIN DESCRIPTION - PAIN TYPE
TYPE: ACUTE PAIN
TYPE: ACUTE PAIN;SURGICAL PAIN
TYPE: ACUTE PAIN
TYPE: ACUTE PAIN;SURGICAL PAIN
TYPE: ACUTE PAIN

## 2025-05-02 ASSESSMENT — GAIT ASSESSMENTS
DISTANCE (FEET): 15
GAIT LEVEL OF ASSIST: CONTACT GUARD ASSIST

## 2025-05-02 ASSESSMENT — PAIN SCALES - WONG BAKER
WONGBAKER_NUMERICALRESPONSE: DOESN'T HURT AT ALL
WONGBAKER_NUMERICALRESPONSE: DOESN'T HURT AT ALL

## 2025-05-02 ASSESSMENT — PAIN SCALES - PAIN ASSESSMENT IN ADVANCED DEMENTIA (PAINAD)
CONSOLABILITY: NO NEED TO CONSOLE
BREATHING: NORMAL
BREATHING: NORMAL
BODYLANGUAGE: RELAXED
CONSOLABILITY: NO NEED TO CONSOLE
BODYLANGUAGE: RELAXED
FACIALEXPRESSION: SMILING OR INEXPRESSIVE
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
## 2025-05-02 NOTE — CARE PLAN
The patient is Stable - Low risk of patient condition declining or worsening    Shift Goals  Clinical Goals: Safety, pain  Patient Goals: Comfort  Family Goals: not present    Progress made toward(s) clinical / shift goals:    Problem: Knowledge Deficit - Standard  Goal: Patient and family/care givers will demonstrate understanding of plan of care, disease process/condition, diagnostic tests and medications  Outcome: Progressing   Plan of care, treatments and medications discussed with patient   Problem: Mobility  Goal: Patient's capacity to carry out activities will improve  Outcome: Progressing   Patient is mobilizing in room and to bathroom with staff   Problem: Infection - Standard  Goal: Patient will remain free from infection  Outcome: Progressing   Lacerations are assessed every shift for signs of infection, no s/s of infection at this time   Problem: Wound/ / Incision Healing  Goal: Patient's wound/surgical incision will decrease in size and heals properly  Outcome: Progressing   Lacerations are healing well at this time   Problem: Fall Risk  Goal: Patient will remain free from falls  Outcome: Progressing   Patient is a high fall risk, precautions are in place     Patient is not progressing towards the following goals:

## 2025-05-02 NOTE — CARE PLAN
The patient is Stable - Low risk of patient condition declining or worsening    Shift Goals  Clinical Goals: pain conrol, mobility, skin integrity  Patient Goals: comfort, pain control  Family Goals: not present    Progress made toward(s) clinical / shift goals:    Problem: Knowledge Deficit - Standard  Goal: Patient and family/care givers will demonstrate understanding of plan of care, disease process/condition, diagnostic tests and medications  Outcome: Progressing     Problem: Pain - Standard  Goal: Alleviation of pain or a reduction in pain to the patient’s comfort goal  Outcome: Progressing     Problem: Mobility  Goal: Patient's capacity to carry out activities will improve  Outcome: Progressing     Problem: Self Care  Goal: Patient will have the ability to perform ADLs independently or with assistance (bathe, groom, dress, toilet and feed)  Outcome: Progressing     Problem: Infection - Standard  Goal: Patient will remain free from infection  Outcome: Progressing     Problem: Wound/ / Incision Healing  Goal: Patient's wound/surgical incision will decrease in size and heals properly  Outcome: Progressing       Patient is not progressing towards the following goals:

## 2025-05-02 NOTE — PROGRESS NOTES
Tele sitter has been discontinued. Pt verbalizes understanding of using the call light for assistance.
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
## 2025-05-03 LAB
ANION GAP SERPL CALC-SCNC: 12 MMOL/L (ref 7–16)
BASOPHILS # BLD AUTO: 0.9 % (ref 0–1.8)
BASOPHILS # BLD: 0.05 K/UL (ref 0–0.12)
BUN SERPL-MCNC: 11 MG/DL (ref 8–22)
CALCIUM SERPL-MCNC: 9 MG/DL (ref 8.5–10.5)
CHLORIDE SERPL-SCNC: 101 MMOL/L (ref 96–112)
CO2 SERPL-SCNC: 24 MMOL/L (ref 20–33)
COMPONENT CELLULAR 8504CLL: NORMAL
CREAT SERPL-MCNC: 0.55 MG/DL (ref 0.5–1.4)
EOSINOPHIL # BLD AUTO: 1.43 K/UL (ref 0–0.51)
EOSINOPHIL NFR BLD: 24.5 % (ref 0–6.9)
ERYTHROCYTE [DISTWIDTH] IN BLOOD BY AUTOMATED COUNT: 46.1 FL (ref 35.9–50)
GFR SERPLBLD CREATININE-BSD FMLA CKD-EPI: 112 ML/MIN/1.73 M 2
GLUCOSE SERPL-MCNC: 102 MG/DL (ref 65–99)
HCT VFR BLD AUTO: 37.5 % (ref 42–52)
HGB BLD-MCNC: 12.5 G/DL (ref 14–18)
IMM GRANULOCYTES # BLD AUTO: 0.01 K/UL (ref 0–0.11)
IMM GRANULOCYTES NFR BLD AUTO: 0.2 % (ref 0–0.9)
LYMPHOCYTES # BLD AUTO: 1.54 K/UL (ref 1–4.8)
LYMPHOCYTES NFR BLD: 26.4 % (ref 22–41)
MCH RBC QN AUTO: 32.5 PG (ref 27–33)
MCHC RBC AUTO-ENTMCNC: 33.3 G/DL (ref 32.3–36.5)
MCV RBC AUTO: 97.4 FL (ref 81.4–97.8)
MONOCYTES # BLD AUTO: 0.69 K/UL (ref 0–0.85)
MONOCYTES NFR BLD AUTO: 11.8 % (ref 0–13.4)
NEUTROPHILS # BLD AUTO: 2.11 K/UL (ref 1.82–7.42)
NEUTROPHILS NFR BLD: 36.2 % (ref 44–72)
NRBC # BLD AUTO: 0 K/UL
NRBC BLD-RTO: 0 /100 WBC (ref 0–0.2)
PLATELET # BLD AUTO: 240 K/UL (ref 164–446)
PMV BLD AUTO: 8.5 FL (ref 9–12.9)
POTASSIUM SERPL-SCNC: 4.4 MMOL/L (ref 3.6–5.5)
RBC # BLD AUTO: 3.85 M/UL (ref 4.7–6.1)
SODIUM SERPL-SCNC: 137 MMOL/L (ref 135–145)
WBC # BLD AUTO: 5.8 K/UL (ref 4.8–10.8)

## 2025-05-03 PROCEDURE — 99231 SBSQ HOSP IP/OBS SF/LOW 25: CPT | Performed by: PHYSICIAN ASSISTANT

## 2025-05-03 PROCEDURE — 700111 HCHG RX REV CODE 636 W/ 250 OVERRIDE (IP): Mod: JZ,UD | Performed by: PHYSICIAN ASSISTANT

## 2025-05-03 PROCEDURE — 36415 COLL VENOUS BLD VENIPUNCTURE: CPT

## 2025-05-03 PROCEDURE — 80048 BASIC METABOLIC PNL TOTAL CA: CPT

## 2025-05-03 PROCEDURE — A9270 NON-COVERED ITEM OR SERVICE: HCPCS | Mod: UD | Performed by: PHYSICIAN ASSISTANT

## 2025-05-03 PROCEDURE — 85025 COMPLETE CBC W/AUTO DIFF WBC: CPT

## 2025-05-03 PROCEDURE — 700102 HCHG RX REV CODE 250 W/ 637 OVERRIDE(OP): Mod: UD | Performed by: PHYSICIAN ASSISTANT

## 2025-05-03 RX ADMIN — ACETAMINOPHEN 1000 MG: 500 TABLET ORAL at 23:10

## 2025-05-03 RX ADMIN — METAXALONE 800 MG: 800 TABLET ORAL at 11:11

## 2025-05-03 RX ADMIN — OXYCODONE HYDROCHLORIDE 10 MG: 10 TABLET ORAL at 19:12

## 2025-05-03 RX ADMIN — ACETAMINOPHEN 1000 MG: 500 TABLET ORAL at 06:08

## 2025-05-03 RX ADMIN — METAXALONE 800 MG: 800 TABLET ORAL at 16:07

## 2025-05-03 RX ADMIN — METAXALONE 800 MG: 800 TABLET ORAL at 06:08

## 2025-05-03 RX ADMIN — DIVALPROEX SODIUM 500 MG: 500 TABLET, EXTENDED RELEASE ORAL at 06:08

## 2025-05-03 RX ADMIN — ENOXAPARIN SODIUM 40 MG: 100 INJECTION SUBCUTANEOUS at 16:08

## 2025-05-03 RX ADMIN — GABAPENTIN 100 MG: 100 CAPSULE ORAL at 21:37

## 2025-05-03 RX ADMIN — GABAPENTIN 100 MG: 100 CAPSULE ORAL at 13:52

## 2025-05-03 RX ADMIN — GABAPENTIN 100 MG: 100 CAPSULE ORAL at 06:08

## 2025-05-03 RX ADMIN — ACETAMINOPHEN 1000 MG: 500 TABLET ORAL at 11:11

## 2025-05-03 RX ADMIN — POLYETHYLENE GLYCOL 3350 1 PACKET: 17 POWDER, FOR SOLUTION ORAL at 16:07

## 2025-05-03 RX ADMIN — MAGNESIUM HYDROXIDE 30 ML: 2400 SUSPENSION ORAL at 16:08

## 2025-05-03 RX ADMIN — DOCUSATE SODIUM 100 MG: 100 CAPSULE, LIQUID FILLED ORAL at 16:08

## 2025-05-03 RX ADMIN — DIVALPROEX SODIUM 500 MG: 500 TABLET, EXTENDED RELEASE ORAL at 16:08

## 2025-05-03 RX ADMIN — SENNOSIDES AND DOCUSATE SODIUM 1 TABLET: 50; 8.6 TABLET ORAL at 20:27

## 2025-05-03 RX ADMIN — ENOXAPARIN SODIUM 40 MG: 100 INJECTION SUBCUTANEOUS at 06:08

## 2025-05-03 RX ADMIN — HYDROMORPHONE HYDROCHLORIDE 0.5 MG: 1 INJECTION, SOLUTION INTRAMUSCULAR; INTRAVENOUS; SUBCUTANEOUS at 20:27

## 2025-05-03 RX ADMIN — ATORVASTATIN CALCIUM 80 MG: 80 TABLET, FILM COATED ORAL at 20:27

## 2025-05-03 RX ADMIN — OXYCODONE HYDROCHLORIDE 10 MG: 10 TABLET ORAL at 13:53

## 2025-05-03 RX ADMIN — ACETAMINOPHEN 1000 MG: 500 TABLET ORAL at 16:08

## 2025-05-03 ASSESSMENT — ENCOUNTER SYMPTOMS
NECK PAIN: 0
ABDOMINAL PAIN: 0
SHORTNESS OF BREATH: 0
NAUSEA: 0
BACK PAIN: 0
DIZZINESS: 0
CHILLS: 0
HEADACHES: 0
SENSORY CHANGE: 0
MYALGIAS: 1
ROS GI COMMENTS: BM 5/2
FEVER: 0
WEAKNESS: 0
VOMITING: 0
FOCAL WEAKNESS: 0

## 2025-05-03 ASSESSMENT — PAIN DESCRIPTION - PAIN TYPE
TYPE: ACUTE PAIN

## 2025-05-03 ASSESSMENT — PAIN SCALES - PAIN ASSESSMENT IN ADVANCED DEMENTIA (PAINAD)
CONSOLABILITY: NO NEED TO CONSOLE
TOTALSCORE: 0
BODYLANGUAGE: RELAXED
FACIALEXPRESSION: SMILING OR INEXPRESSIVE
BREATHING: NORMAL

## 2025-05-03 ASSESSMENT — PAIN SCALES - WONG BAKER: WONGBAKER_NUMERICALRESPONSE: DOESN'T HURT AT ALL

## 2025-05-03 NOTE — CARE PLAN
Problem: Knowledge Deficit - Standard  Goal: Patient and family/care givers will demonstrate understanding of plan of care, disease process/condition, diagnostic tests and medications  Description: Target End Date:  1-3 days or as soon as patient condition allowsDocument in Patient Education1.  Patient and family/caregiver oriented to unit, equipment, visitation policy and means for communicating concern2.  Complete/review Learning Assessment3.  Assess knowledge level of disease process/condition, treatment plan, diagnostic tests and medications4.  Explain disease process/condition, treatment plan, diagnostic tests and medications  Outcome: Progressing     Problem: Mobility  Goal: Patient's capacity to carry out activities will improve  Description: Target End Date:  Prior to discharge or change in level of care1.  Assess for barriers to mobility/activity2.  Implement activity per interdisciplinary team recommendations3.  Target activity level identified and patient/family/caregiver aware of goal4.  Provide assistive devices5.  Instruct patient/caregiver on proper use of assistive/adaptive devices6.  Schedule activities and rest periods to decrease effects of fatigue7.  Encourage mobilization to extent of ability8.  Maintain proper body alignment9.  Provide adequate pain management to allow progressive [id number]. Implement pace maker precautions as needed  Target End Date:  Prior to discharge or change in level of care1.  Assess for barriers to mobility/activity2.  Implement activity per interdisciplinary team recommendations3.  Target activity level identified and patient/family/caregiver aware of goal4.  Provide assistive devices5.  Instruct patient/caregiver on proper use of assistive/adaptive devices6.  Schedule activities and rest periods to decrease effects of fatigue7.  Encourage mobilization to extent of ability8.  Maintain proper body alignment9.  Provide adequate pain management to allow progressive  [id number]. Implement pace maker precautions as needed  Outcome: Progressing     Problem: Knowledge Deficit - Standard  Goal: Patient and family/care givers will demonstrate understanding of plan of care, disease process/condition, diagnostic tests and medications  Description: Target End Date:  1-3 days or as soon as patient condition allowsDocument in Patient Education1.  Patient and family/caregiver oriented to unit, equipment, visitation policy and means for communicating concern2.  Complete/review Learning Assessment3.  Assess knowledge level of disease process/condition, treatment plan, diagnostic tests and medications4.  Explain disease process/condition, treatment plan, diagnostic tests and medications  Outcome: Progressing     Problem: Mobility  Goal: Patient's capacity to carry out activities will improve  Description: Target End Date:  Prior to discharge or change in level of care1.  Assess for barriers to mobility/activity2.  Implement activity per interdisciplinary team recommendations3.  Target activity level identified and patient/family/caregiver aware of goal4.  Provide assistive devices5.  Instruct patient/caregiver on proper use of assistive/adaptive devices6.  Schedule activities and rest periods to decrease effects of fatigue7.  Encourage mobilization to extent of ability8.  Maintain proper body alignment9.  Provide adequate pain management to allow progressive [id number]. Implement pace maker precautions as needed  Target End Date:  Prior to discharge or change in level of care1.  Assess for barriers to mobility/activity2.  Implement activity per interdisciplinary team recommendations3.  Target activity level identified and patient/family/caregiver aware of goal4.  Provide assistive devices5.  Instruct patient/caregiver on proper use of assistive/adaptive devices6.  Schedule activities and rest periods to decrease effects of fatigue7.  Encourage mobilization to extent of ability8.   Maintain proper body alignment9.  Provide adequate pain management to allow progressive [id number]. Implement pace maker precautions as needed  Outcome: Progressing   The patient is Stable - Low risk of patient condition declining or worsening    Shift Goals  Clinical Goals: Pain control, safe mobility, safety  Patient Goals: pain control, rest, comfort  Family Goals: N/A    Progress made toward(s) clinical / shift goals:      Pt. Vital signs WDL.   Pt. Verbalized understanding on the care provided but needed reinforcement.   Pt. Rated pain between 0 and 7 from 1-10, 10 being the most painful. Pain medications given as ordered.   Pt. Didn't fall under RN care. Fall precautions in place. Pt. Ambulates with min A, hand held.

## 2025-05-03 NOTE — CARE PLAN
The patient is Stable - Low risk of patient condition declining or worsening    Shift Goals  Clinical Goals: pain control, safety  Patient Goals: pain control, safety  Family Goals: n/a    Progress made toward(s) clinical / shift goals:    Problem: Knowledge Deficit - Standard  Goal: Patient and family/care givers will demonstrate understanding of plan of care, disease process/condition, diagnostic tests and medications  Outcome: Progressing     Problem: Pain - Standard  Goal: Alleviation of pain or a reduction in pain to the patient’s comfort goal  Outcome: Progressing     Problem: Mobility  Goal: Patient's capacity to carry out activities will improve  Outcome: Progressing     Pt uses the call light for assistance, bed alarm is on at all times, bed in lowest position. Pt continues to verbalize needs.     Patient is not progressing towards the following goals:
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
## 2025-05-04 LAB
ANION GAP SERPL CALC-SCNC: 11 MMOL/L (ref 7–16)
BASOPHILS # BLD AUTO: 0.8 % (ref 0–1.8)
BASOPHILS # BLD: 0.06 K/UL (ref 0–0.12)
BUN SERPL-MCNC: 13 MG/DL (ref 8–22)
CALCIUM SERPL-MCNC: 9 MG/DL (ref 8.5–10.5)
CHLORIDE SERPL-SCNC: 99 MMOL/L (ref 96–112)
CO2 SERPL-SCNC: 25 MMOL/L (ref 20–33)
CREAT SERPL-MCNC: 0.71 MG/DL (ref 0.5–1.4)
EKG IMPRESSION: NORMAL
EOSINOPHIL # BLD AUTO: 1.35 K/UL (ref 0–0.51)
EOSINOPHIL NFR BLD: 19.1 % (ref 0–6.9)
ERYTHROCYTE [DISTWIDTH] IN BLOOD BY AUTOMATED COUNT: 45.4 FL (ref 35.9–50)
GFR SERPLBLD CREATININE-BSD FMLA CKD-EPI: 103 ML/MIN/1.73 M 2
GLUCOSE SERPL-MCNC: 120 MG/DL (ref 65–99)
HCT VFR BLD AUTO: 39.6 % (ref 42–52)
HGB BLD-MCNC: 12.8 G/DL (ref 14–18)
IMM GRANULOCYTES # BLD AUTO: 0.03 K/UL (ref 0–0.11)
IMM GRANULOCYTES NFR BLD AUTO: 0.4 % (ref 0–0.9)
LYMPHOCYTES # BLD AUTO: 1.93 K/UL (ref 1–4.8)
LYMPHOCYTES NFR BLD: 27.3 % (ref 22–41)
MCH RBC QN AUTO: 31.4 PG (ref 27–33)
MCHC RBC AUTO-ENTMCNC: 32.3 G/DL (ref 32.3–36.5)
MCV RBC AUTO: 97.3 FL (ref 81.4–97.8)
MONOCYTES # BLD AUTO: 0.98 K/UL (ref 0–0.85)
MONOCYTES NFR BLD AUTO: 13.9 % (ref 0–13.4)
NEUTROPHILS # BLD AUTO: 2.71 K/UL (ref 1.82–7.42)
NEUTROPHILS NFR BLD: 38.5 % (ref 44–72)
NRBC # BLD AUTO: 0 K/UL
NRBC BLD-RTO: 0 /100 WBC (ref 0–0.2)
PLATELET # BLD AUTO: 282 K/UL (ref 164–446)
PMV BLD AUTO: 8.6 FL (ref 9–12.9)
POTASSIUM SERPL-SCNC: 4.3 MMOL/L (ref 3.6–5.5)
RBC # BLD AUTO: 4.07 M/UL (ref 4.7–6.1)
SODIUM SERPL-SCNC: 135 MMOL/L (ref 135–145)
WBC # BLD AUTO: 7.1 K/UL (ref 4.8–10.8)

## 2025-05-04 PROCEDURE — 99232 SBSQ HOSP IP/OBS MODERATE 35: CPT | Performed by: PHYSICIAN ASSISTANT

## 2025-05-04 PROCEDURE — 93005 ELECTROCARDIOGRAM TRACING: CPT | Mod: TC | Performed by: PHYSICIAN ASSISTANT

## 2025-05-04 PROCEDURE — 700111 HCHG RX REV CODE 636 W/ 250 OVERRIDE (IP): Mod: JZ,UD | Performed by: PHYSICIAN ASSISTANT

## 2025-05-04 PROCEDURE — A9270 NON-COVERED ITEM OR SERVICE: HCPCS | Mod: UD | Performed by: PHYSICIAN ASSISTANT

## 2025-05-04 PROCEDURE — 85025 COMPLETE CBC W/AUTO DIFF WBC: CPT

## 2025-05-04 PROCEDURE — 36415 COLL VENOUS BLD VENIPUNCTURE: CPT

## 2025-05-04 PROCEDURE — 93010 ELECTROCARDIOGRAM REPORT: CPT | Performed by: INTERNAL MEDICINE

## 2025-05-04 PROCEDURE — 700102 HCHG RX REV CODE 250 W/ 637 OVERRIDE(OP): Mod: UD | Performed by: PHYSICIAN ASSISTANT

## 2025-05-04 PROCEDURE — 80048 BASIC METABOLIC PNL TOTAL CA: CPT

## 2025-05-04 RX ORDER — ASPIRIN 81 MG/1
81 TABLET ORAL DAILY
Status: DISCONTINUED | OUTPATIENT
Start: 2025-05-04 | End: 2025-05-07 | Stop reason: HOSPADM

## 2025-05-04 RX ORDER — LIDOCAINE 4 G/G
1 PATCH TOPICAL
Status: DISCONTINUED | OUTPATIENT
Start: 2025-05-04 | End: 2025-05-07 | Stop reason: HOSPADM

## 2025-05-04 RX ADMIN — METAXALONE 800 MG: 800 TABLET ORAL at 05:51

## 2025-05-04 RX ADMIN — DIVALPROEX SODIUM 500 MG: 500 TABLET, EXTENDED RELEASE ORAL at 05:50

## 2025-05-04 RX ADMIN — DOCUSATE SODIUM 100 MG: 100 CAPSULE, LIQUID FILLED ORAL at 05:50

## 2025-05-04 RX ADMIN — SENNOSIDES AND DOCUSATE SODIUM 1 TABLET: 50; 8.6 TABLET ORAL at 21:55

## 2025-05-04 RX ADMIN — GABAPENTIN 100 MG: 100 CAPSULE ORAL at 12:51

## 2025-05-04 RX ADMIN — ATORVASTATIN CALCIUM 80 MG: 80 TABLET, FILM COATED ORAL at 21:56

## 2025-05-04 RX ADMIN — ACETAMINOPHEN 1000 MG: 500 TABLET ORAL at 05:50

## 2025-05-04 RX ADMIN — ENOXAPARIN SODIUM 40 MG: 100 INJECTION SUBCUTANEOUS at 05:51

## 2025-05-04 RX ADMIN — DOCUSATE SODIUM 100 MG: 100 CAPSULE, LIQUID FILLED ORAL at 16:07

## 2025-05-04 RX ADMIN — POLYETHYLENE GLYCOL 3350 1 PACKET: 17 POWDER, FOR SOLUTION ORAL at 05:51

## 2025-05-04 RX ADMIN — OXYCODONE HYDROCHLORIDE 10 MG: 10 TABLET ORAL at 01:12

## 2025-05-04 RX ADMIN — ASPIRIN 81 MG: 81 TABLET, COATED ORAL at 08:05

## 2025-05-04 RX ADMIN — METAXALONE 800 MG: 800 TABLET ORAL at 16:05

## 2025-05-04 RX ADMIN — METAXALONE 800 MG: 800 TABLET ORAL at 11:01

## 2025-05-04 RX ADMIN — OXYCODONE HYDROCHLORIDE 10 MG: 10 TABLET ORAL at 18:27

## 2025-05-04 RX ADMIN — GABAPENTIN 100 MG: 100 CAPSULE ORAL at 05:50

## 2025-05-04 RX ADMIN — DIVALPROEX SODIUM 500 MG: 500 TABLET, EXTENDED RELEASE ORAL at 16:06

## 2025-05-04 RX ADMIN — ENOXAPARIN SODIUM 40 MG: 100 INJECTION SUBCUTANEOUS at 16:06

## 2025-05-04 RX ADMIN — OXYCODONE HYDROCHLORIDE 10 MG: 10 TABLET ORAL at 14:40

## 2025-05-04 RX ADMIN — OXYCODONE HYDROCHLORIDE 10 MG: 10 TABLET ORAL at 08:29

## 2025-05-04 RX ADMIN — POLYETHYLENE GLYCOL 3350 1 PACKET: 17 POWDER, FOR SOLUTION ORAL at 16:06

## 2025-05-04 RX ADMIN — GABAPENTIN 100 MG: 100 CAPSULE ORAL at 21:55

## 2025-05-04 ASSESSMENT — LIFESTYLE VARIABLES
HOW MANY TIMES IN THE PAST YEAR HAVE YOU HAD 5 OR MORE DRINKS IN A DAY: 0
EVER FELT BAD OR GUILTY ABOUT YOUR DRINKING: NO
TOTAL SCORE: 0
TOTAL SCORE: 0
HAVE YOU EVER FELT YOU SHOULD CUT DOWN ON YOUR DRINKING: NO
ALCOHOL_USE: NO
HAVE PEOPLE ANNOYED YOU BY CRITICIZING YOUR DRINKING: NO
CONSUMPTION TOTAL: NEGATIVE
TOTAL SCORE: 0
EVER HAD A DRINK FIRST THING IN THE MORNING TO STEADY YOUR NERVES TO GET RID OF A HANGOVER: NO
ON A TYPICAL DAY WHEN YOU DRINK ALCOHOL HOW MANY DRINKS DO YOU HAVE: 0
AVERAGE NUMBER OF DAYS PER WEEK YOU HAVE A DRINK CONTAINING ALCOHOL: 0

## 2025-05-04 ASSESSMENT — ENCOUNTER SYMPTOMS
HEADACHES: 0
NECK PAIN: 0
DIZZINESS: 0
VOMITING: 0
BACK PAIN: 0
FOCAL WEAKNESS: 0
WEAKNESS: 0
FEVER: 0
ROS GI COMMENTS: BM 5/4
ABDOMINAL PAIN: 0
SHORTNESS OF BREATH: 0
MYALGIAS: 1
CHILLS: 0
NAUSEA: 0
SENSORY CHANGE: 0

## 2025-05-04 ASSESSMENT — PAIN DESCRIPTION - PAIN TYPE
TYPE: ACUTE PAIN

## 2025-05-04 NOTE — DISCHARGE PLANNING
Case Management Discharge Planning    Admission Date: 4/29/2025  GMLOS: 2.8  ALOS: 0    6-Clicks ADL Score: 16  6-Clicks Mobility Score: 18        Anticipated Discharge Dispo: Discharge Disposition: D/T to SNF with Medicare cert in anticipation of skilled care (03)      Action(s) Taken: RNCM was notified that patient is in need of placement. Patient is currently obs status and being declined by [place] SNF's.     Escalations Completed: Leadership    Medically Clear: Yes    Next Steps: RNCM will escalate to complex.     Barriers to Discharge: Pending Placement and Pending Insurance Authorization    Is the patient up for discharge tomorrow: No

## 2025-05-04 NOTE — CARE PLAN
The patient is Stable - Low risk of patient condition declining or worsening    Shift Goals  Clinical Goals: safety, pain  Patient Goals: pain  Family Goals: na    Progress made toward(s) clinical / shift goals:    Problem: Knowledge Deficit - Standard  Goal: Patient and family/care givers will demonstrate understanding of plan of care, disease process/condition, diagnostic tests and medications  Outcome: Progressing  Note: Patient updated on POC, including pain management and asking ortho PA to look at left arm. No concerns at this time. Personal belongings and call light within reach.        Problem: Pain - Standard  Goal: Alleviation of pain or a reduction in pain to the patient’s comfort goal  Outcome: Progressing  Note: Patient educated on 0-10 pain scale, available pain medications, and non-pharmacological methods of pain management. Verbalizes understanding. See MAR.        Problem: Mobility  Goal: Patient's capacity to carry out activities will improve  Outcome: Progressing  Flowsheets (Taken 5/4/2025 7740)  Mobility:   Provided assistive devices   Encouraged mobilization per interdisciplinary team recommendations   Administered pain management to allow progressive mobilization   Provided rest periods between activities   Monitored for signs of activity intolerance     Problem: Self Care  Goal: Patient will have the ability to perform ADLs independently or with assistance (bathe, groom, dress, toilet and feed)  Outcome: Progressing     Problem: Infection - Standard  Goal: Patient will remain free from infection  Outcome: Progressing     Problem: Wound/ / Incision Healing  Goal: Patient's wound/surgical incision will decrease in size and heals properly  Outcome: Progressing     Problem: Fall Risk  Goal: Patient will remain free from falls  Outcome: Progressing  Note: Fall risk interventions in place for high fall risk patient, including: bed alarm, 3 bed rails up, DME out of room, hourly rounding, room free  of clutter, patient close to nursing station. Patient educated on using call light and to wait for staff to be in the room before attempting to mobilize. Verbalizes understanding        Problem: Communication  Goal: The ability to communicate needs accurately and effectively will improve  Outcome: Progressing     Problem: Nutrition  Goal: Patient's nutritional and fluid intake will be adequate or improve  Outcome: Progressing  Goal: Enteral nutrition will be maintained or improve  Outcome: Progressing  Goal: Enteral nutrition will be maintained or improve  Outcome: Progressing     Problem: Urinary Elimination  Goal: Establish and maintain regular urinary output  Outcome: Progressing  Note: Patient voiding in bathroom appropriately.      Problem: Bowel Elimination  Goal: Establish and maintain regular bowel function  Outcome: Progressing       Patient is not progressing towards the following goals:

## 2025-05-04 NOTE — PROGRESS NOTES
"Pt's left ear started bleeding. Affected area cleansed with soaked normal saline and dried. Upon assessment, pt scratched a scab that made it bleed. Pt stated, \"It was itchy.\" Photo taken and uploaded.   "

## 2025-05-04 NOTE — CARE PLAN
The patient is Watcher - Medium risk of patient condition declining or worsening    Shift Goals  Clinical Goals: safety, pain control  Patient Goals: rest, comfort  Family Goals: no family present    Progress made toward(s) clinical / shift goals:    Problem: Knowledge Deficit - Standard  Goal: Patient and family/care givers will demonstrate understanding of plan of care, disease process/condition, diagnostic tests and medications  Outcome: Progressing    Educated the patient regarding his plan of care.     Problem: Pain - Standard  Goal: Alleviation of pain or a reduction in pain to the patient’s comfort goal  Outcome: Progressing    Administer pain medications per MAR.       Patient is not progressing towards the following goals:
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
## 2025-05-04 NOTE — PROGRESS NOTES
"2330 Patient wants his IV to be removed. \"Its painful\" as verbalized by the patient. IV site assessed. Flushing well with no resistance, no redness in the IV site and with back flow. Educated the patient regarding the importance of IV access but still the patient wants it removed. Patient agrees to put a new IV tomorrow. Charge JHONY Yoon made aware.  "

## 2025-05-05 LAB
ANION GAP SERPL CALC-SCNC: 11 MMOL/L (ref 7–16)
BASOPHILS # BLD AUTO: 0 % (ref 0–1.8)
BASOPHILS # BLD: 0 K/UL (ref 0–0.12)
BUN SERPL-MCNC: 14 MG/DL (ref 8–22)
CALCIUM SERPL-MCNC: 9 MG/DL (ref 8.5–10.5)
CHLORIDE SERPL-SCNC: 101 MMOL/L (ref 96–112)
CO2 SERPL-SCNC: 24 MMOL/L (ref 20–33)
CREAT SERPL-MCNC: 0.69 MG/DL (ref 0.5–1.4)
EOSINOPHIL # BLD AUTO: 1.2 K/UL (ref 0–0.51)
EOSINOPHIL NFR BLD: 18.2 % (ref 0–6.9)
ERYTHROCYTE [DISTWIDTH] IN BLOOD BY AUTOMATED COUNT: 45.5 FL (ref 35.9–50)
GFR SERPLBLD CREATININE-BSD FMLA CKD-EPI: 104 ML/MIN/1.73 M 2
GLUCOSE SERPL-MCNC: 106 MG/DL (ref 65–99)
HCT VFR BLD AUTO: 40.2 % (ref 42–52)
HGB BLD-MCNC: 13.8 G/DL (ref 14–18)
LYMPHOCYTES # BLD AUTO: 1.72 K/UL (ref 1–4.8)
LYMPHOCYTES NFR BLD: 26.1 % (ref 22–41)
MAGNESIUM SERPL-MCNC: 2.2 MG/DL (ref 1.5–2.5)
MANUAL DIFF BLD: NORMAL
MCH RBC QN AUTO: 32.9 PG (ref 27–33)
MCHC RBC AUTO-ENTMCNC: 34.3 G/DL (ref 32.3–36.5)
MCV RBC AUTO: 95.7 FL (ref 81.4–97.8)
MONOCYTES # BLD AUTO: 0.63 K/UL (ref 0–0.85)
MONOCYTES NFR BLD AUTO: 9.6 % (ref 0–13.4)
MORPHOLOGY BLD-IMP: NORMAL
NEUTROPHILS # BLD AUTO: 3.04 K/UL (ref 1.82–7.42)
NEUTROPHILS NFR BLD: 46.1 % (ref 44–72)
NRBC # BLD AUTO: 0 K/UL
NRBC BLD-RTO: 0 /100 WBC (ref 0–0.2)
PHOSPHATE SERPL-MCNC: 4.2 MG/DL (ref 2.5–4.5)
PLATELET # BLD AUTO: 280 K/UL (ref 164–446)
PLATELET BLD QL SMEAR: NORMAL
PMV BLD AUTO: 8.6 FL (ref 9–12.9)
POTASSIUM SERPL-SCNC: 4.2 MMOL/L (ref 3.6–5.5)
RBC # BLD AUTO: 4.2 M/UL (ref 4.7–6.1)
RBC BLD AUTO: NORMAL
SODIUM SERPL-SCNC: 136 MMOL/L (ref 135–145)
WBC # BLD AUTO: 6.6 K/UL (ref 4.8–10.8)

## 2025-05-05 PROCEDURE — A9270 NON-COVERED ITEM OR SERVICE: HCPCS | Mod: UD | Performed by: PHYSICIAN ASSISTANT

## 2025-05-05 PROCEDURE — 83735 ASSAY OF MAGNESIUM: CPT

## 2025-05-05 PROCEDURE — 85027 COMPLETE CBC AUTOMATED: CPT

## 2025-05-05 PROCEDURE — 700111 HCHG RX REV CODE 636 W/ 250 OVERRIDE (IP): Mod: JZ,UD | Performed by: PHYSICIAN ASSISTANT

## 2025-05-05 PROCEDURE — 700102 HCHG RX REV CODE 250 W/ 637 OVERRIDE(OP): Mod: UD | Performed by: PHYSICIAN ASSISTANT

## 2025-05-05 PROCEDURE — 99232 SBSQ HOSP IP/OBS MODERATE 35: CPT | Performed by: PHYSICIAN ASSISTANT

## 2025-05-05 PROCEDURE — 85007 BL SMEAR W/DIFF WBC COUNT: CPT

## 2025-05-05 PROCEDURE — 36415 COLL VENOUS BLD VENIPUNCTURE: CPT

## 2025-05-05 PROCEDURE — 97535 SELF CARE MNGMENT TRAINING: CPT

## 2025-05-05 PROCEDURE — 84100 ASSAY OF PHOSPHORUS: CPT

## 2025-05-05 PROCEDURE — 80048 BASIC METABOLIC PNL TOTAL CA: CPT

## 2025-05-05 RX ADMIN — OXYCODONE HYDROCHLORIDE 10 MG: 10 TABLET ORAL at 18:48

## 2025-05-05 RX ADMIN — ATORVASTATIN CALCIUM 80 MG: 80 TABLET, FILM COATED ORAL at 20:56

## 2025-05-05 RX ADMIN — DIVALPROEX SODIUM 500 MG: 500 TABLET, EXTENDED RELEASE ORAL at 06:01

## 2025-05-05 RX ADMIN — GABAPENTIN 100 MG: 100 CAPSULE ORAL at 06:01

## 2025-05-05 RX ADMIN — SENNOSIDES AND DOCUSATE SODIUM 1 TABLET: 50; 8.6 TABLET ORAL at 20:56

## 2025-05-05 RX ADMIN — OXYCODONE 5 MG: 5 TABLET ORAL at 08:56

## 2025-05-05 RX ADMIN — OXYCODONE HYDROCHLORIDE 10 MG: 10 TABLET ORAL at 13:57

## 2025-05-05 RX ADMIN — METAXALONE 800 MG: 800 TABLET ORAL at 11:44

## 2025-05-05 RX ADMIN — MAGNESIUM HYDROXIDE 30 ML: 2400 SUSPENSION ORAL at 16:48

## 2025-05-05 RX ADMIN — ENOXAPARIN SODIUM 40 MG: 100 INJECTION SUBCUTANEOUS at 06:01

## 2025-05-05 RX ADMIN — DIVALPROEX SODIUM 500 MG: 500 TABLET, EXTENDED RELEASE ORAL at 16:48

## 2025-05-05 RX ADMIN — POLYETHYLENE GLYCOL 3350 1 PACKET: 17 POWDER, FOR SOLUTION ORAL at 16:48

## 2025-05-05 RX ADMIN — GABAPENTIN 100 MG: 100 CAPSULE ORAL at 13:57

## 2025-05-05 RX ADMIN — ASPIRIN 81 MG: 81 TABLET, COATED ORAL at 06:01

## 2025-05-05 RX ADMIN — DOCUSATE SODIUM 100 MG: 100 CAPSULE, LIQUID FILLED ORAL at 16:48

## 2025-05-05 RX ADMIN — GABAPENTIN 100 MG: 100 CAPSULE ORAL at 20:56

## 2025-05-05 RX ADMIN — DOCUSATE SODIUM 100 MG: 100 CAPSULE, LIQUID FILLED ORAL at 06:01

## 2025-05-05 RX ADMIN — METAXALONE 800 MG: 800 TABLET ORAL at 06:01

## 2025-05-05 RX ADMIN — METAXALONE 800 MG: 800 TABLET ORAL at 16:48

## 2025-05-05 RX ADMIN — ENOXAPARIN SODIUM 40 MG: 100 INJECTION SUBCUTANEOUS at 16:48

## 2025-05-05 ASSESSMENT — PAIN SCALES - PAIN ASSESSMENT IN ADVANCED DEMENTIA (PAINAD)
BODYLANGUAGE: RELAXED
FACIALEXPRESSION: SMILING OR INEXPRESSIVE
BREATHING: NORMAL
CONSOLABILITY: NO NEED TO CONSOLE
TOTALSCORE: 0

## 2025-05-05 ASSESSMENT — ENCOUNTER SYMPTOMS
NAUSEA: 0
ROS GI COMMENTS: BM 5/2
BACK PAIN: 0
FEVER: 0
WEAKNESS: 0
FOCAL WEAKNESS: 0
MYALGIAS: 1
CHILLS: 0
SENSORY CHANGE: 0
DIZZINESS: 0
HEADACHES: 0
SHORTNESS OF BREATH: 0
VOMITING: 0
NECK PAIN: 0
ABDOMINAL PAIN: 0

## 2025-05-05 ASSESSMENT — COGNITIVE AND FUNCTIONAL STATUS - GENERAL
DRESSING REGULAR UPPER BODY CLOTHING: A LITTLE
EATING MEALS: A LITTLE
HELP NEEDED FOR BATHING: A LOT
DRESSING REGULAR LOWER BODY CLOTHING: A LOT
TOILETING: A LOT
DAILY ACTIVITIY SCORE: 15
SUGGESTED CMS G CODE MODIFIER DAILY ACTIVITY: CK
PERSONAL GROOMING: A LITTLE

## 2025-05-05 ASSESSMENT — PAIN DESCRIPTION - PAIN TYPE
TYPE: ACUTE PAIN

## 2025-05-05 ASSESSMENT — PAIN SCALES - WONG BAKER
WONGBAKER_NUMERICALRESPONSE: DOESN'T HURT AT ALL

## 2025-05-05 NOTE — CARE PLAN
The patient is Stable - Low risk of patient condition declining or worsening    Shift Goals  Clinical Goals: safety, pain control  Patient Goals: rest comfort  Family Goals: no family present    Progress made toward(s) clinical / shift goals:    Problem: Knowledge Deficit - Standard  Goal: Patient and family/care givers will demonstrate understanding of plan of care, disease process/condition, diagnostic tests and medications  Outcome: Progressing     Problem: Pain - Standard  Goal: Alleviation of pain or a reduction in pain to the patient’s comfort goal  Outcome: Progressing     Problem: Mobility  Goal: Patient's capacity to carry out activities will improve  Outcome: Progressing     Problem: Infection - Standard  Goal: Patient will remain free from infection  Outcome: Progressing       Patient is not progressing towards the following goals:

## 2025-05-05 NOTE — THERAPY
"Occupational Therapy  Daily Treatment     Patient Name: Timothy Powers  Age:  64 y.o., Sex:  male  Medical Record #: [id number]  Today's Date: 5/5/2025     Precautions  Precautions: Fall Risk, Platform Weight Bearing Right Upper Extremity, Platform Weight Bearing Left Upper Extremity  Comments: sling to R UE, velcro wrist splint to L UE    Assessment    Pt seen for OT treatment. Pt required SBA-CGA for functional mobility, CGA for standing toilet hygiene, min A for standing oral care, min A to don/doff pants/underwear, min A to don/doff gown, and max A to don/doff braces. Pt current functional performance limited by impaired balance, pain, and limited use of his UE. Pt will continue to benefit from skilled OT while admitted to acute care.     Plan    Treatment Plan Status: Modify Current Treatment Plan  Type of Treatment: Self Care / Activities of Daily Living, Adaptive Equipment, Neuro Re-Education / Balance, Therapeutic Exercises, Therapeutic Activity  Treatment Frequency: 5 Times per Week (difficulty finding post acute placement)  Treatment Duration: Until Therapy Goals Met    DC Equipment Recommendations: Unable to determine at this time  Discharge Recommendations: Recommend post-acute placement for additional occupational therapy services prior to discharge home (unless it can be confirmed that pt has support for ADLs/IADLs)    Subjective    \"I was in a rehab place once.. They were tough..\"     Objective     05/05/25 1620   Pain   Pain Scales 0 to 10 Scale    Pain 0 - 10 Group   Therapist Pain Assessment During Activity;Nurse Notified  (not rated, agreeable to session)   Non Verbal Descriptors   Non Verbal Scale  Calm   Cognition    Cognition / Consciousness X   Speech/ Communication Dysarthric;Slurred   Level of Consciousness Alert   Safety Awareness Impaired   New Learning Impaired   Attention Impaired   Sequencing Impaired   Comments very pleasant and cooperative, difficult to understand at times   Other " Treatments   Other Treatments Provided Continued education regarding donning/doffing B braces and precautions in relation to ADLs.   Balance   Sitting Balance (Static) Fair +   Sitting Balance (Dynamic) Fair   Standing Balance (Static) Fair   Standing Balance (Dynamic) Fair -   Weight Shift Sitting Fair   Weight Shift Standing Fair   Skilled Intervention Verbal Cuing;Tactile Cuing;Sequencing;Facilitation   Comments w/ no AD   Bed Mobility    Supine to Sit Supervised   Sit to Supine Supervised   Scooting Supervised   Rolling Supervised   Skilled Intervention Verbal Cuing   Comments no use of features   Activities of Daily Living   Grooming Minimal Assist;Standing;Seated  (oral care)   Upper Body Dressing Maximal Assist  (min A to don/doff gown, max A for all brace management)   Lower Body Dressing Minimal Assist  (don/doff pants/underwear)   Toileting Contact Guard Assist  (stood to urinate)   Skilled Intervention Verbal Cuing;Tactile Cuing;Sequencing;Facilitation;Postural Facilitation   Functional Mobility   Sit to Stand Standby Assist   Bed, Chair, Wheelchair Transfer Contact Guard Assist   Toilet Transfers Contact Guard Assist   Transfer Method Stand Step   Mobility EOB>stand for underwear>EOB>stand for pants>EOB>bathroom>bed   Skilled Intervention Verbal Cuing;Tactile Cuing;Sequencing;Facilitation   Comments w/ FWW   Visual Perception   Visual Perception  Not Tested   Activity Tolerance   Sitting in Chair NT, politely declined   Sitting Edge of Bed >5 min   Standing ~5 min   Comments functional for eval   Patient / Family Goals   Patient / Family Goal #1 to get out of the hospital   Goal #1 Outcome Progressing as expected   Short Term Goals   Short Term Goal # 1 Pt will complete ADL txfs with supv   Goal Outcome # 1 Progressing as expected   Short Term Goal # 2 Pt will complete LB dressing with supv using AE PRN   Goal Outcome # 2 Progressing as expected   Short Term Goal # 3 Pt will complete toileting ADLs with  supv   Goal Outcome # 3 Progressing as expected   Short Term Goal # 4 Pt will complete standing g/h routine with supv   Goal Outcome # 4 Progressing as expected   Education Group   Education Provided Role of Occupational Therapist;Activities of Daily Living;Brace Wear and Care;Splints Wear and Care;Weight Bearing Precautions   Role of Occupational Therapist Patient Response Patient;Acceptance;Explanation;Verbal Demonstration   Splints Wear & Care Patient Response Patient;Acceptance;Explanation;Demonstration;No Learning Evidence;Reinforcement Needed   Brace Wear & Care Patient Response Patient;Acceptance;Explanation;Demonstration;Reinforcement Needed;No Learning Evidence   ADL Patient Response Patient;Acceptance;Explanation;Verbal Demonstration;Action Demonstration;Reinforcement Needed   Weight Bearing Precautions Patient Response Patient;Acceptance;Explanation;Verbal Demonstration;Reinforcement Needed;Action Demonstration   Occupational Therapy Treatment Plan    O.T. Treatment Plan Modify Current Treatment Plan   Treatment Frequency 5 Times per Week  (difficulty finding post acute placement)

## 2025-05-05 NOTE — DISCHARGE PLANNING
Case Management Discharge Planning    Admission Date: 4/29/2025  GMLOS: 2.8  ALOS: 0    6-Clicks ADL Score: 16  6-Clicks Mobility Score: 18  PT and/or OT Eval ordered: Yes  Post-acute Referrals Ordered: Yes  Post-acute Choice Obtained: NA  Has referral(s) been sent to post-acute provider:  Yes      Anticipated Discharge Dispo: Discharge Disposition: D/T to SNF with Medicare cert in anticipation of skilled care (03)    DME Needed: No    Action(s) Taken: Patient was discussed in morning trauma rounds.  PT/OT recommending post acute placement. No accepting SNF facilities at this time.  RNCM asked DPA to expand SNF referrals to [place].       1146  Patient was discussed in IDT rounds. Patient is a SBA, tolerating well.   RNCM discussed with care team the necessity of post acute placement.  Per bedside RNs, due to bilateral upper extremities platform WB & RUE splinted and LUE braced--patient having difficulty with independently completing ADLs such as dressing, toileting, etc.      Escalations Completed: None    Medically Clear: No    Next Steps: RN CM to continue to follow for DC planning      Barriers to Discharge: Medical clearance and Pending Placement
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
## 2025-05-05 NOTE — CARE PLAN
The patient is Watcher - Medium risk of patient condition declining or worsening    Shift Goals  Clinical Goals: safety, pain control  Patient Goals: rest, comfort  Family Goals: no family present    Progress made toward(s) clinical / shift goals:    Problem: Pain - Standard  Goal: Alleviation of pain or a reduction in pain to the patient’s comfort goal  Outcome: Progressing    Administer pain medications per MAR.     Problem: Fall Risk  Goal: Patient will remain free from falls  Outcome: Progressing    Kept side rails up and bed to its lowest position. Kept call light with in reach. Bed alarm on.        Patient is not progressing towards the following goals:

## 2025-05-06 LAB
ANION GAP SERPL CALC-SCNC: 12 MMOL/L (ref 7–16)
BASOPHILS # BLD AUTO: 1.1 % (ref 0–1.8)
BASOPHILS # BLD: 0.08 K/UL (ref 0–0.12)
BUN SERPL-MCNC: 15 MG/DL (ref 8–22)
CALCIUM SERPL-MCNC: 9 MG/DL (ref 8.5–10.5)
CHLORIDE SERPL-SCNC: 101 MMOL/L (ref 96–112)
CO2 SERPL-SCNC: 22 MMOL/L (ref 20–33)
CREAT SERPL-MCNC: 0.62 MG/DL (ref 0.5–1.4)
EOSINOPHIL # BLD AUTO: 1 K/UL (ref 0–0.51)
EOSINOPHIL NFR BLD: 14.1 % (ref 0–6.9)
ERYTHROCYTE [DISTWIDTH] IN BLOOD BY AUTOMATED COUNT: 45.6 FL (ref 35.9–50)
GFR SERPLBLD CREATININE-BSD FMLA CKD-EPI: 108 ML/MIN/1.73 M 2
GLUCOSE SERPL-MCNC: 101 MG/DL (ref 65–99)
HCT VFR BLD AUTO: 39.6 % (ref 42–52)
HGB BLD-MCNC: 12.9 G/DL (ref 14–18)
IMM GRANULOCYTES # BLD AUTO: 0.05 K/UL (ref 0–0.11)
IMM GRANULOCYTES NFR BLD AUTO: 0.7 % (ref 0–0.9)
LYMPHOCYTES # BLD AUTO: 2.36 K/UL (ref 1–4.8)
LYMPHOCYTES NFR BLD: 33.4 % (ref 22–41)
MCH RBC QN AUTO: 31.9 PG (ref 27–33)
MCHC RBC AUTO-ENTMCNC: 32.6 G/DL (ref 32.3–36.5)
MCV RBC AUTO: 98 FL (ref 81.4–97.8)
MONOCYTES # BLD AUTO: 1.1 K/UL (ref 0–0.85)
MONOCYTES NFR BLD AUTO: 15.6 % (ref 0–13.4)
NEUTROPHILS # BLD AUTO: 2.48 K/UL (ref 1.82–7.42)
NEUTROPHILS NFR BLD: 35.1 % (ref 44–72)
NRBC # BLD AUTO: 0 K/UL
NRBC BLD-RTO: 0 /100 WBC (ref 0–0.2)
PLATELET # BLD AUTO: 274 K/UL (ref 164–446)
PMV BLD AUTO: 8.7 FL (ref 9–12.9)
POTASSIUM SERPL-SCNC: 4.2 MMOL/L (ref 3.6–5.5)
RBC # BLD AUTO: 4.04 M/UL (ref 4.7–6.1)
SODIUM SERPL-SCNC: 135 MMOL/L (ref 135–145)
WBC # BLD AUTO: 7.1 K/UL (ref 4.8–10.8)

## 2025-05-06 PROCEDURE — 97530 THERAPEUTIC ACTIVITIES: CPT

## 2025-05-06 PROCEDURE — 97535 SELF CARE MNGMENT TRAINING: CPT

## 2025-05-06 PROCEDURE — 80048 BASIC METABOLIC PNL TOTAL CA: CPT

## 2025-05-06 PROCEDURE — A9270 NON-COVERED ITEM OR SERVICE: HCPCS | Mod: UD | Performed by: PHYSICIAN ASSISTANT

## 2025-05-06 PROCEDURE — 36415 COLL VENOUS BLD VENIPUNCTURE: CPT

## 2025-05-06 PROCEDURE — 700111 HCHG RX REV CODE 636 W/ 250 OVERRIDE (IP): Mod: JZ,UD | Performed by: PHYSICIAN ASSISTANT

## 2025-05-06 PROCEDURE — 99232 SBSQ HOSP IP/OBS MODERATE 35: CPT | Performed by: PHYSICIAN ASSISTANT

## 2025-05-06 PROCEDURE — 700102 HCHG RX REV CODE 250 W/ 637 OVERRIDE(OP): Mod: UD | Performed by: PHYSICIAN ASSISTANT

## 2025-05-06 PROCEDURE — 85025 COMPLETE CBC W/AUTO DIFF WBC: CPT

## 2025-05-06 RX ADMIN — ENOXAPARIN SODIUM 40 MG: 100 INJECTION SUBCUTANEOUS at 17:13

## 2025-05-06 RX ADMIN — ENOXAPARIN SODIUM 40 MG: 100 INJECTION SUBCUTANEOUS at 04:19

## 2025-05-06 RX ADMIN — ATORVASTATIN CALCIUM 80 MG: 80 TABLET, FILM COATED ORAL at 20:36

## 2025-05-06 RX ADMIN — METAXALONE 800 MG: 800 TABLET ORAL at 04:18

## 2025-05-06 RX ADMIN — DIVALPROEX SODIUM 500 MG: 500 TABLET, EXTENDED RELEASE ORAL at 04:18

## 2025-05-06 RX ADMIN — METAXALONE 800 MG: 800 TABLET ORAL at 17:13

## 2025-05-06 RX ADMIN — OXYCODONE HYDROCHLORIDE 10 MG: 10 TABLET ORAL at 17:16

## 2025-05-06 RX ADMIN — GABAPENTIN 100 MG: 100 CAPSULE ORAL at 04:18

## 2025-05-06 RX ADMIN — GABAPENTIN 100 MG: 100 CAPSULE ORAL at 20:36

## 2025-05-06 RX ADMIN — DIVALPROEX SODIUM 500 MG: 500 TABLET, EXTENDED RELEASE ORAL at 17:13

## 2025-05-06 RX ADMIN — OXYCODONE HYDROCHLORIDE 10 MG: 10 TABLET ORAL at 21:51

## 2025-05-06 RX ADMIN — DOCUSATE SODIUM 100 MG: 100 CAPSULE, LIQUID FILLED ORAL at 04:18

## 2025-05-06 RX ADMIN — SENNOSIDES AND DOCUSATE SODIUM 1 TABLET: 50; 8.6 TABLET ORAL at 20:36

## 2025-05-06 RX ADMIN — OXYCODONE HYDROCHLORIDE 10 MG: 10 TABLET ORAL at 09:17

## 2025-05-06 RX ADMIN — ASPIRIN 81 MG: 81 TABLET, COATED ORAL at 04:18

## 2025-05-06 RX ADMIN — DOCUSATE SODIUM 100 MG: 100 CAPSULE, LIQUID FILLED ORAL at 17:14

## 2025-05-06 RX ADMIN — METAXALONE 800 MG: 800 TABLET ORAL at 12:48

## 2025-05-06 RX ADMIN — GABAPENTIN 100 MG: 100 CAPSULE ORAL at 12:48

## 2025-05-06 ASSESSMENT — ENCOUNTER SYMPTOMS
FEVER: 0
ABDOMINAL PAIN: 0
WEAKNESS: 0
VOMITING: 0
HEADACHES: 0
SENSORY CHANGE: 0
NECK PAIN: 0
MYALGIAS: 1
DIZZINESS: 0
NAUSEA: 0
CHILLS: 0
FOCAL WEAKNESS: 0
BACK PAIN: 0
ROS GI COMMENTS: BM 5/2
SHORTNESS OF BREATH: 0

## 2025-05-06 ASSESSMENT — COGNITIVE AND FUNCTIONAL STATUS - GENERAL
STANDING UP FROM CHAIR USING ARMS: A LITTLE
SUGGESTED CMS G CODE MODIFIER MOBILITY: CK
HELP NEEDED FOR BATHING: A LITTLE
DRESSING REGULAR UPPER BODY CLOTHING: A LOT
PERSONAL GROOMING: A LITTLE
TOILETING: A LITTLE
DRESSING REGULAR LOWER BODY CLOTHING: A LITTLE
EATING MEALS: A LITTLE
SUGGESTED CMS G CODE MODIFIER DAILY ACTIVITY: CK
MOVING TO AND FROM BED TO CHAIR: A LITTLE
WALKING IN HOSPITAL ROOM: A LITTLE
MOVING FROM LYING ON BACK TO SITTING ON SIDE OF FLAT BED: A LITTLE
DAILY ACTIVITIY SCORE: 17
CLIMB 3 TO 5 STEPS WITH RAILING: A LOT
MOBILITY SCORE: 18

## 2025-05-06 ASSESSMENT — GAIT ASSESSMENTS
DEVIATION: DECREASED BASE OF SUPPORT
GAIT LEVEL OF ASSIST: CONTACT GUARD ASSIST
DISTANCE (FEET): 75

## 2025-05-06 ASSESSMENT — PAIN DESCRIPTION - PAIN TYPE
TYPE: ACUTE PAIN

## 2025-05-06 NOTE — THERAPY
Occupational Therapy  Daily Treatment     Patient Name: Timothy Powers  Age:  64 y.o., Sex:  male  Medical Record #: [id number]  Today's Date: 5/6/2025     Precautions  Precautions: Fall Risk, Platform Weight Bearing Right Upper Extremity, Platform Weight Bearing Left Upper Extremity  Comments: sling to R UE, velcro wrist splint to L UE    Assessment    Session focused on pt progressing with managing his sling and wrist immobilizer. Pt could doff sling without assist and needed Theo with cues to don. Pt has difficulty managing his wrist immobilizer due to the snug fit and limited use of his R fingers as well as a laceration on his L thumb and needed maxA to doff/don. Pt walked around the bed to the chair with CGA. Continue to recommend post-acute placement.     Plan    Treatment Plan Status: Continue Current Treatment Plan  Type of Treatment: Self Care / Activities of Daily Living, Adaptive Equipment, Neuro Re-Education / Balance, Therapeutic Exercises, Therapeutic Activity  Treatment Frequency: 5 Times per Week (difficulty finding post acute placement)  Treatment Duration: Until Therapy Goals Met    DC Equipment Recommendations: Unable to determine at this time  Discharge Recommendations: Recommend post-acute placement for additional occupational therapy services prior to discharge home    Subjective    Pt agreeable to OT session.      Objective     05/06/25 1606   Pain   Intervention Declines   Non Verbal Descriptors   Non Verbal Scale  Calm   Other Treatments   Other Treatments Provided Training on pt managing doffing/donning his R sling and L wrist immobilizer while pt sat EOB. Pt needs Theo for sling and maxA for immobilizer with cues for sequencing.   Balance   Sitting Balance (Static) Fair +   Sitting Balance (Dynamic) Fair +   Standing Balance (Static) Fair -   Standing Balance (Dynamic) Fair -   Weight Shift Sitting Fair   Weight Shift Standing Fair   Comments no AD   Bed Mobility    Supine to Sit  Supervised   Sit to Supine Supervised   Scooting Supervised   Comments HOBE   Activities of Daily Living   Upper Body Dressing Maximal Assist  (assist for sling and wrist immobilizer)   How much help from another person does the patient currently need...   Putting on and taking off regular lower body clothing? 3   Bathing (including washing, rinsing, and drying)? 3   Toileting, which includes using a toilet, bedpan, or urinal? 3   Putting on and taking off regular upper body clothing? 2   Taking care of personal grooming such as brushing teeth? 3   Eating meals? 3   6 Clicks Daily Activity Score 17   Functional Mobility   Sit to Stand Standby Assist   Bed, Chair, Wheelchair Transfer Contact Guard Assist   Transfer Method Stand Step   Mobility EOB, around bed to chair on other side with CGA   Activity Tolerance   Sitting in Chair post   Sitting Edge of Bed 8 min   Patient / Family Goals   Patient / Family Goal #1 to get out of the hospital   Goal #1 Outcome Progressing as expected   Short Term Goals   Short Term Goal # 1 Pt will complete ADL txfs with supv   Goal Outcome # 1 Progressing as expected   Short Term Goal # 2 Pt will complete LB dressing with supv using AE PRN   Goal Outcome # 2 Goal not met   Short Term Goal # 3 Pt will complete toileting ADLs with supv   Goal Outcome # 3 Goal not met   Short Term Goal # 4 Pt will complete standing g/h routine with supv   Goal Outcome # 4 Goal not met   Education Group   Brace Wear & Care Patient Response Patient;Acceptance;Explanation;Demonstration;Verbal Demonstration;Action Demonstration;Reinforcement Needed   Occupational Therapy Treatment Plan    O.T. Treatment Plan Continue Current Treatment Plan   Anticipated Discharge Equipment and Recommendations   DC Equipment Recommendations Unable to determine at this time   Discharge Recommendations Recommend post-acute placement for additional occupational therapy services prior to discharge home   Interdisciplinary Plan of  Care Collaboration   IDT Collaboration with  Nursing;Certified Nursing Assistant   Patient Position at End of Therapy Seated;Chair Alarm On;Call Light within Reach;Tray Table within Reach;Phone within Reach   Collaboration Comments RN updated, CNA present end of session   Session Information   Date / Session Number  5/6 #4 (2/5, 5/12)

## 2025-05-06 NOTE — CARE PLAN
The patient is Stable - Low risk of patient condition declining or worsening    Shift Goals  Clinical Goals: pain control, safety, rest  Patient Goals: pain control, sleep  Family Goals: not present    Progress made toward(s) clinical / shift goals:    Problem: Knowledge Deficit - Standard  Goal: Patient and family/care givers will demonstrate understanding of plan of care, disease process/condition, diagnostic tests and medications  Outcome: Progressing     Problem: Pain - Standard  Goal: Alleviation of pain or a reduction in pain to the patient’s comfort goal  Outcome: Progressing     Problem: Mobility  Goal: Patient's capacity to carry out activities will improve  Outcome: Progressing     Problem: Self Care  Goal: Patient will have the ability to perform ADLs independently or with assistance (bathe, groom, dress, toilet and feed)  Outcome: Progressing       Patient is not progressing towards the following goals:

## 2025-05-06 NOTE — CARE PLAN
The patient is Stable - Low risk of patient condition declining or worsening    Shift Goals  Clinical Goals: Pain, comfort  Patient Goals: Pain  Family Goals: not present    Progress made toward(s) clinical / shift goals:    Problem: Mobility  Goal: Patient's capacity to carry out activities will improve  Outcome: Progressing  Flowsheets (Taken 5/4/2025 0916 by Parul Anaya R.N.)  Mobility:   Provided assistive devices   Encouraged mobilization per interdisciplinary team recommendations   Administered pain management to allow progressive mobilization   Provided rest periods between activities   Monitored for signs of activity intolerance     Problem: Infection - Standard  Goal: Patient will remain free from infection  Outcome: Progressing  Flowsheets (Taken 4/30/2025 1517)  Standard Infection Interventions:   Assessed for signs and symptoms of infection   Provided education on proper hand hygiene and infection prevention measures   Instructed patient/family on signs and symptoms of infection   Assessed for removal IV, central lines, intra-arterial or urinary catheters     Problem: Wound/ / Incision Healing  Goal: Patient's wound/surgical incision will decrease in size and heals properly  Outcome: Progressing       Patient is not progressing towards the following goals:

## 2025-05-06 NOTE — DISCHARGE PLANNING
0939  Referral sent to [facility] via Right[facility]ax.     1014  Referral sent to [place] [facility] via RightFax.     1319  Agency/Facility Name: [facility]  Spoke To: Hilary  Outcome: DPA called regarding insurance auth. It is being started at this time.     7819  Agency/Facility Name: [facility]  Spoke To: Hilary  Outcome: DPA received call from facility stating they have received auth and can take pt tomorrow. DPA to reach out for transport time tomorrow morning.

## 2025-05-06 NOTE — THERAPY
"Physical Therapy   Daily Treatment     Patient Name: Timothy Powers  Age:  64 y.o., Sex:  male  Medical Record #: [id number]  Today's Date: 5/6/2025     Precautions  Precautions: Fall Risk;Platform Weight Bearing Right Upper Extremity;Platform Weight Bearing Left Upper Extremity  Comments: sling to R UE, velcro wrist splint to L UE    Assessment    Pt received in bed and agreeable to PT session. Pt continues to require overall CGA to mobilize with no AD and is intermittently unsteady in standing. Pt limited by fatigue. Continue to recommend post-acute placement. Will follow for acute PT to progress as able.    Plan    Treatment Plan Status: Continue Current Treatment Plan  Type of Treatment: Bed Mobility, Equipment, Gait Training, Family / Caregiver Training, Neuro Re-Education / Balance, Stair Training, Therapeutic Exercise, Therapeutic Activities, Self Care / Home Evaluation  Treatment Frequency: 3 Times per Week  Treatment Duration: Until Therapy Goals Met    DC Equipment Recommendations: Unable to determine at this time  Discharge Recommendations: Recommend post-acute placement for additional physical therapy services prior to discharge home (If placement not attainable would recommend pt DC to supportive environment such as recuperative home with home health PT.)    Subjective    \"Can I get some coffee?\"     Objective       05/06/25 1306   Precautions   Precautions Fall Risk;Platform Weight Bearing Right Upper Extremity;Platform Weight Bearing Left Upper Extremity   Comments sling to R UE, velcro wrist splint to L UE   Vitals   O2 (LPM) 0   O2 Delivery Device None - Room Air   Pain 0 - 10 Group   Therapist Pain Assessment Post Activity Pain Same as Prior to Activity;Nurse Notified;0   Cognition    Speech/ Communication Dysarthric;Slurred   Level of Consciousness Alert   Comments Pleasant and cooperative. Difficult to understand at times. Requires cues for wayfinding and safety   Balance   Sitting Balance " (Static) Fair +   Sitting Balance (Dynamic) Fair   Standing Balance (Static) Fair   Standing Balance (Dynamic) Fair -   Weight Shift Sitting Fair   Weight Shift Standing Fair   Skilled Intervention Verbal Cuing;Compensatory Strategies   Comments no AD, CGA   Bed Mobility    Supine to Sit Supervised   Sit to Supine Supervised   Scooting Supervised   Rolling Supervised   Skilled Intervention Verbal Cuing   Comments HOB flat   Gait Analysis   Gait Level Of Assist Contact Guard Assist   Assistive Device None   Distance (Feet) 75   # of Times Distance was Traveled 2   Deviation Decreased Base Of Support   Skilled Intervention Verbal Cuing;Sequencing   Comments CGA for safety, intermittently unsteady with decreased base and increased trunk sway   Functional Mobility   Sit to Stand Standby Assist   Bed, Chair, Wheelchair Transfer Contact Guard Assist   Toilet Transfers Contact Guard Assist   Mobility up with CGA   Skilled Intervention Verbal Cuing   6 Clicks Assessment - How much HELP from from another person do you currently need... (If the patient hasn't done an activity recently, how much help from another person do you think he/she would need if he/she tried?)   Turning from your back to your side while in a flat bed without using bedrails? 4   Moving from lying on your back to sitting on the side of a flat bed without using bedrails? 3   Moving to and from a bed to a chair (including a wheelchair)? 3   Standing up from a chair using your arms (e.g., wheelchair, or bedside chair)? 3   Walking in hospital room? 3   Climbing 3-5 steps with a railing? 2   6 clicks Mobility Score 18   Short Term Goals    Short Term Goal # 1 Pt will perform supine <> sit without bed features with SPV in 6 visits to progress bed mobility   Goal Outcome # 1 Goal met   Short Term Goal # 2 Pt will perform STS with LRAD and SPV in 6 visits to progress OOB mobility   Goal Outcome # 2 Progressing as expected   Short Term Goal # 3 Pt will perform  stand pivot transfers with LRAD and SPV in 6 visits to progress functional OOB mobility   Goal Outcome # 3 Progressing as expected   Short Term Goal # 4 Pt will ambulate 300 ft with LRAD and SPV in 6 visits to progress functional gait   Goal Outcome # 4 Progressing as expected   Physical Therapy Treatment Plan   Physical Therapy Treatment Plan Continue Current Treatment Plan   Anticipated Discharge Equipment and Recommendations   DC Equipment Recommendations Unable to determine at this time   Discharge Recommendations Recommend post-acute placement for additional physical therapy services prior to discharge home  (If placement not attainable would recommend pt DC to supportive environment such as recuperative home with home health PT.)   Interdisciplinary Plan of Care Collaboration   IDT Collaboration with  Nursing   Patient Position at End of Therapy Seated;In Bed;Bed Alarm On;Call Light within Reach;Tray Table within Reach;Phone within Reach   Collaboration Comments RN updated   Session Information   Date / Session Number  5/6-2 (2/3, 5/8)

## 2025-05-06 NOTE — DISCHARGE PLANNING
Complex Case Management    Order received for Complex Case Management. Patient's chart has been reviewed.     Complex case management following? No    No: Discharge planning recommendations are to send to accepting SNF when appropriate. Consult will be cancelled. Please re-consult as needed.    NOTE: There may be cases that are NOT assigned to a CCM but will be followed for progression of care by leaders of the Complex Discharge Committee.

## 2025-05-06 NOTE — DISCHARGE PLANNING
"Case Management Discharge Planning    Admission Date: 4/29/2025  GMLOS: 2.8  ALOS: 0    6-Clicks ADL Score: 15  6-Clicks Mobility Score: 18    Anticipated Discharge Dispo: Discharge Disposition: D/T to SNF with Medicare cert in anticipation of skilled care (03)    DME Needed: No    Action(s) Taken: Patient was discussed in morning rounds.  Per trauma team, patient remains medically cleared.  Awaiting SNF placement.  RNCM asked DPA to send referrals to  post acute and [facility].  RNCINTHYA asked DPA to follow up with [facility] and [facility] regarding referral to see if they would be able to accept the patient.  ([facility] is still pending and [facility] declined for \"care exceeds capacity\"). LOC completed #[id number]     1048  [facility] accepted the patient.  Hilary-with admissions at [facility]-requested updated PT note.  RNCM met with patient at the bedside to update on accepting facility.  Patient agreeable and signed choice for [facility].  RNCM faxed choice form to DPA.      1140  Patient was discussed in IDT rounds.  Updated care team on acceptance to [facility] and that a PT note is needed.  Charge RN will escalate to PT.     1317  PT note is available.  RNCM asked DPA to call [facility] to start insurance auth.      1531  Per [facility], insurance auth has been approved--starting tomorrow 5/7/25.  RNCM notified provider.  Plan to DC patient to [facility] tomorrow.      Escalations Completed: None    Medically Clear: Yes    Next Steps: RN CM to continue to follow for DC planning      Barriers to Discharge: Pending Placement            "
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
## 2025-05-07 VITALS
DIASTOLIC BLOOD PRESSURE: 71 MMHG | OXYGEN SATURATION: 92 % | RESPIRATION RATE: 16 BRPM | HEIGHT: 69 IN | WEIGHT: 180 LBS | SYSTOLIC BLOOD PRESSURE: 109 MMHG | TEMPERATURE: 97.5 F | BODY MASS INDEX: 26.66 KG/M2 | HEART RATE: 78 BPM

## 2025-05-07 LAB
ANION GAP SERPL CALC-SCNC: 11 MMOL/L (ref 7–16)
BUN SERPL-MCNC: 15 MG/DL (ref 8–22)
CALCIUM SERPL-MCNC: 9.2 MG/DL (ref 8.5–10.5)
CHLORIDE SERPL-SCNC: 100 MMOL/L (ref 96–112)
CO2 SERPL-SCNC: 24 MMOL/L (ref 20–33)
CREAT SERPL-MCNC: 0.68 MG/DL (ref 0.5–1.4)
GFR SERPLBLD CREATININE-BSD FMLA CKD-EPI: 105 ML/MIN/1.73 M 2
GLUCOSE SERPL-MCNC: 103 MG/DL (ref 65–99)
POTASSIUM SERPL-SCNC: 4.1 MMOL/L (ref 3.6–5.5)
SODIUM SERPL-SCNC: 135 MMOL/L (ref 135–145)

## 2025-05-07 PROCEDURE — 36415 COLL VENOUS BLD VENIPUNCTURE: CPT

## 2025-05-07 PROCEDURE — 700102 HCHG RX REV CODE 250 W/ 637 OVERRIDE(OP): Mod: UD | Performed by: PHYSICIAN ASSISTANT

## 2025-05-07 PROCEDURE — 99239 HOSP IP/OBS DSCHRG MGMT >30: CPT | Performed by: NURSE PRACTITIONER

## 2025-05-07 PROCEDURE — A9270 NON-COVERED ITEM OR SERVICE: HCPCS | Mod: UD | Performed by: PHYSICIAN ASSISTANT

## 2025-05-07 PROCEDURE — 80048 BASIC METABOLIC PNL TOTAL CA: CPT

## 2025-05-07 PROCEDURE — 700111 HCHG RX REV CODE 636 W/ 250 OVERRIDE (IP): Mod: JZ,UD | Performed by: PHYSICIAN ASSISTANT

## 2025-05-07 RX ORDER — OXYCODONE HYDROCHLORIDE 5 MG/1
5 TABLET ORAL EVERY 4 HOURS PRN
Qty: 18 TABLET | Refills: 0 | Status: SHIPPED | OUTPATIENT
Start: 2025-05-07 | End: 2025-05-10

## 2025-05-07 RX ORDER — AMOXICILLIN 250 MG
1 CAPSULE ORAL NIGHTLY
Status: SHIPPED
Start: 2025-05-07

## 2025-05-07 RX ORDER — OXYCODONE HYDROCHLORIDE 5 MG/1
5 TABLET ORAL EVERY 4 HOURS PRN
Qty: 18 TABLET | Refills: 0 | Status: SHIPPED | OUTPATIENT
Start: 2025-05-07 | End: 2025-05-07

## 2025-05-07 RX ORDER — METAXALONE 800 MG/1
800 TABLET ORAL 3 TIMES DAILY PRN
Status: SHIPPED
Start: 2025-05-07

## 2025-05-07 RX ORDER — GABAPENTIN 100 MG/1
100 CAPSULE ORAL EVERY 8 HOURS
Status: SHIPPED
Start: 2025-05-07 | End: 2025-05-12

## 2025-05-07 RX ORDER — POLYETHYLENE GLYCOL 3350 17 G/17G
17 POWDER, FOR SOLUTION ORAL 2 TIMES DAILY
Status: SHIPPED
Start: 2025-05-07

## 2025-05-07 RX ADMIN — ENOXAPARIN SODIUM 40 MG: 100 INJECTION SUBCUTANEOUS at 04:59

## 2025-05-07 RX ADMIN — METAXALONE 800 MG: 800 TABLET ORAL at 04:59

## 2025-05-07 RX ADMIN — GABAPENTIN 100 MG: 100 CAPSULE ORAL at 05:05

## 2025-05-07 RX ADMIN — DIVALPROEX SODIUM 500 MG: 500 TABLET, EXTENDED RELEASE ORAL at 04:59

## 2025-05-07 RX ADMIN — OXYCODONE HYDROCHLORIDE 10 MG: 10 TABLET ORAL at 02:17

## 2025-05-07 RX ADMIN — ACETAMINOPHEN 1000 MG: 500 TABLET ORAL at 05:05

## 2025-05-07 RX ADMIN — ASPIRIN 81 MG: 81 TABLET, COATED ORAL at 04:59

## 2025-05-07 RX ADMIN — OXYCODONE HYDROCHLORIDE 10 MG: 10 TABLET ORAL at 09:28

## 2025-05-07 ASSESSMENT — PAIN DESCRIPTION - PAIN TYPE
TYPE: ACUTE PAIN

## 2025-05-07 NOTE — DISCHARGE PLANNING
DC Transport Scheduled    Transport Company Scheduled:  [facility]    Scheduled Date: 5/7/2025  Scheduled Time: 1300    Transport Type: Wheelchair  Destination:   [facility]   Destination address: [address] [facility] [address] , [place] [address]    Notified care team of scheduled transport via [employer].     If there are any changes needed to the DC transportation scheduled, please contact [facility] at ext. [phone] between the hours of 5027-3443. If outside those hours, contact the ED Case Manager at ext. [phone].
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
## 2025-05-07 NOTE — DISCHARGE PLANNING
Case Management Discharge Planning    Admission Date: 4/29/2025  GMLOS: 2.8  ALOS: 0    6-Clicks ADL Score: 17  6-Clicks Mobility Score: 18    Anticipated Discharge Dispo: Discharge Disposition: D/T to SNF with Medicare cert in anticipation of skilled care (03)    DME Needed: No    Action(s) Taken: Pt was discussed during morning rounds. Pt has been accepted at [facility] and insurance auth has been approved as of today. Per DPA, [facility] can accept pt today at 1300. LMSW informed care team. [place] request was submitted and received approval from leadership for ASF as [facility] does not have a contract for w/c transport in this area.     UPDATE 1030  Transportation has been confirmed via [facility] wheelchair to [facility] at 1300. ASF for $172.01, approved by leadership. LMSW met with pt at bedside to inform. Pt was agreeable and signed transfer form. COBRA packet was completed. Anticipating no further CM needs at this time.    Escalations Completed: None    Medically Clear: Yes    Next Steps: LMSW to follow for any additional CM needs.    Barriers to Discharge: None    Is the patient up for discharge tomorrow: No, today.

## 2025-05-07 NOTE — PROGRESS NOTES
1255: report given to lily at [facility], IV removed, patient discharged with [facility] with on large black garbage bag of belongings.

## 2025-05-07 NOTE — PROGRESS NOTES
Assumed care of patient at 1845. Bedside report received. Assessment complete.  A&Ox4. Pt does have expressive aphasia, can be slightly hard to understand as he slurs his words together, but fully oriented and alert and able to communicate. Denies CP/SOB.  Reporting 9/10 pain. Medicated per MAR.   Educated patient regarding pharmacologic and non pharmacologic modalities for pain management.  Skin: L arm in soft cast and sling, R arm in brace, scalp laceration with staples removed. Scattered bruising, abrasions.   Tolerating regular diet. Denies N/V.  + void. Last BM 5/6/25  Pt ambulates with SBA/HHA, gait steady, moderate fall risk per Ne Pinto, bed alarm on.  All needs met at this time. Call light within reach. Pt calls appropriately. Bed low and locked, non skid socks in place. Hourly rounding in place.

## 2025-05-07 NOTE — CARE PLAN
The patient is Stable - Low risk of patient condition declining or worsening    Shift Goals  Clinical Goals: Safety  Patient Goals: Pain  Family Goals: not present    Progress made toward(s) clinical / shift goals:    Problem: Mobility  Goal: Patient's capacity to carry out activities will improve  Outcome: Progressing     Problem: Self Care  Goal: Patient will have the ability to perform ADLs independently or with assistance (bathe, groom, dress, toilet and feed)  Outcome: Progressing     Problem: Infection - Standard  Goal: Patient will remain free from infection  Outcome: Progressing     Problem: Wound/ / Incision Healing  Goal: Patient's wound/surgical incision will decrease in size and heals properly  Outcome: Progressing     Problem: Fall Risk  Goal: Patient will remain free from falls  5/7/2025 0839 by María Ivan, R.N.  Outcome: Progressing  5/7/2025 0839 by María Ivan, R.N.  Outcome: Progressing       Patient is not progressing towards the following goals:

## 2025-05-07 NOTE — CARE PLAN
The patient is Stable - Low risk of patient condition declining or worsening    Shift Goals  Clinical Goals: safety, ambulation, comfort, rest  Patient Goals: pain control, rest  Family Goals: MARC    Progress made toward(s) clinical / shift goals:  Pt medicated per MAR for pain, educated on the different modalities of pain management. Pt encouraged frequent ambulation with staff, pt able to ambulate to restroom. Bed alarm on, bed in low and locked position, call light within reach. Updated on care plan.      Problem: Knowledge Deficit - Standard  Goal: Patient and family/care givers will demonstrate understanding of plan of care, disease process/condition, diagnostic tests and medications  Outcome: Progressing     Problem: Pain - Standard  Goal: Alleviation of pain or a reduction in pain to the patient’s comfort goal  Outcome: Progressing     Problem: Mobility  Goal: Patient's capacity to carry out activities will improve  Outcome: Progressing     Problem: Fall Risk  Goal: Patient will remain free from falls  Outcome: Progressing

## 2025-05-07 NOTE — DISCHARGE PLANNING
0840  Agency/Facility Name: [facility]  Spoke To: Hilary  Outcome: DPA called regarding bed availability. There is a bed available and requested a 1300 transportation time.

## 2025-05-30 NOTE — ED NOTES
Admitting team at pt bedside assessing pt, pt in NAd, will continue to monitor. Continue to warm pt with mayuri hugger and warm blankets.

## 2025-06-24 ENCOUNTER — TELEPHONE (OUTPATIENT)
Dept: NEUROLOGY | Facility: MEDICAL CENTER | Age: 65
End: 2025-06-24
Payer: MEDICARE

## 2025-06-24 NOTE — TELEPHONE ENCOUNTER
Attempted to call patient without success. Modified [place] Score 7.  No working phone number on file for patient or family member.

## 2025-06-30 ENCOUNTER — PHARMACY VISIT (OUTPATIENT)
Dept: PHARMACY | Facility: MEDICAL CENTER | Age: 65
End: 2025-06-30
Payer: COMMERCIAL

## 2025-06-30 ENCOUNTER — HOSPITAL ENCOUNTER (EMERGENCY)
Facility: MEDICAL CENTER | Age: 65
End: 2025-06-30
Attending: STUDENT IN AN ORGANIZED HEALTH CARE EDUCATION/TRAINING PROGRAM
Payer: MEDICARE

## 2025-06-30 VITALS
RESPIRATION RATE: 18 BRPM | BODY MASS INDEX: 29.29 KG/M2 | HEIGHT: 70 IN | HEART RATE: 87 BPM | DIASTOLIC BLOOD PRESSURE: 88 MMHG | TEMPERATURE: 97.8 F | SYSTOLIC BLOOD PRESSURE: 128 MMHG | WEIGHT: 204.59 LBS | OXYGEN SATURATION: 93 %

## 2025-06-30 DIAGNOSIS — M79.601 RIGHT ARM PAIN: Primary | ICD-10-CM

## 2025-06-30 DIAGNOSIS — S62.101S RIGHT WRIST FRACTURE, SEQUELA: ICD-10-CM

## 2025-06-30 PROCEDURE — 99282 EMERGENCY DEPT VISIT SF MDM: CPT

## 2025-06-30 PROCEDURE — RXMED WILLOW AMBULATORY MEDICATION CHARGE: Performed by: STUDENT IN AN ORGANIZED HEALTH CARE EDUCATION/TRAINING PROGRAM

## 2025-06-30 RX ORDER — TRAMADOL HYDROCHLORIDE 50 MG/1
50 TABLET ORAL EVERY 4 HOURS PRN
Qty: 8 TABLET | Refills: 0 | Status: SHIPPED | OUTPATIENT
Start: 2025-06-30 | End: 2025-07-03

## 2025-06-30 ASSESSMENT — FIBROSIS 4 INDEX: FIB4 SCORE: 1.34

## 2025-06-30 ASSESSMENT — PAIN DESCRIPTION - PAIN TYPE: TYPE: ACUTE PAIN

## 2025-06-30 NOTE — ED TRIAGE NOTES
Brian Durham  64 y.o. male  Chief Complaint   Patient presents with    Arm Pain     Right      Patient presents to ED for above complaint. Patient was seen at [facility] today for cast removal. Pt reports increased pain in right arm s/p cast removal. CMS intact, in wrist brace. Pt is ambulatory, A&Ox4. NAD.     Patient educated on triage process and encouraged to alert staff of any changes in condition.

## 2025-07-01 NOTE — ED PROVIDER NOTES
ER Provider Note    Scribed for Dr. Uche Horowitz MD. by Natasha Mcdowell. 6/30/2025  6:17 PM    Primary Care Provider: Pcp Pt States None    CHIEF COMPLAINT  Chief Complaint   Patient presents with    Arm Pain     Right        EXTERNAL RECORDS REVIEWED  Outpatient Notes Patient was seen at [facility] earlier today for a cast removal of a right wrist fracture.     HPI/ROS  LIMITATION TO HISTORY   Select: : None    OUTSIDE HISTORIAN(S):  None    Brian Durham is a 64 y.o. male who presents to the ED for right arm pain onset today. Patient states he was at [facility] earlier today and got a cast removed for a right wrist fracture. He states he is experiencing right wrist pain with certain movements. Patient states he is in physical therapy. He states he is taking ibuprofen for the pain.     PAST MEDICAL HISTORY  Past Medical History[1]    SURGICAL HISTORY  Past Surgical History[2]    FAMILY HISTORY  No family history noted.    SOCIAL HISTORY   reports that he has quit smoking. His smoking use included cigarettes. He has never used smokeless tobacco. He reports that he does not currently use alcohol. He reports that he does not use drugs.    CURRENT MEDICATIONS  Discharge Medication List as of 6/30/2025  7:00 PM        CONTINUE these medications which have NOT CHANGED    Details   hydrOXYzine HCl (ATARAX) 25 MG Tab Take 1 Tablet by mouth 3 times a day as needed for Anxiety., Disp-30 Tablet, R-0, Normal      acetaminophen (TYLENOL) 325 MG Tab Take 2 Tablets by mouth every 6 hours as needed for Fever, Moderate Pain or Mild Pain., Disp-30 Tablet, R-0, Normal      aspirin (ASA) 81 MG Chew Tab chewable tablet Chew 1 Tablet every day., OTC      atorvastatin (LIPITOR) 80 MG tablet Take 1 Tablet by mouth every evening., Disp-100 Tablet, R-3, Normal      divalproex (DEPAKOTE) 500 MG Tablet Delayed Response Take 1 Tablet by mouth 2 times a day., Disp-90 Tablet, R-0, Normal      senna-docusate (PERICOLACE OR SENOKOT S) 8.6-50  "MG Tab Take 2 Tablets by mouth every evening., OTC      polyethylene glycol/lytes (MIRALAX) Pack Take 1 Packet by mouth 1 time a day as needed (if no bowel movement in last 2 days)., OTC      buPROPion SR (WELLBUTRIN-SR) 150 MG TABLET SR 12 HR sustained-release tablet Take 150 mg by mouth every morning., Historical Med      LORazepam (ATIVAN) 1 MG Tab Take  by mouth. 3 mg per day, Historical Med             ALLERGIES  Paroxetine    PHYSICAL EXAM  /88   Pulse 85   Temp 35.8 °C (96.5 °F) (Temporal)   Resp 18   Ht 1.778 m (5' 10\")   Wt 92.8 kg (204 lb 9.4 oz)   SpO2 93%   BMI 29.36 kg/m²   Physical Exam  Vitals and nursing note reviewed.   Constitutional:       Appearance: He is well-developed.   HENT:      Head: Normocephalic.   Cardiovascular:      Rate and Rhythm: Normal rate and regular rhythm.      Heart sounds: No murmur heard.  Pulmonary:      Effort: Pulmonary effort is normal.      Breath sounds: Normal breath sounds.   Abdominal:      Palpations: Abdomen is soft.      Tenderness: There is no abdominal tenderness.   Musculoskeletal:         General: Normal range of motion.      Right wrist: No swelling or deformity.      Right lower leg: No edema.      Left lower leg: No edema.      Comments: Right wrist in a Velcro splint. No ecchymosis.    Skin:     General: Skin is warm.   Neurological:      General: No focal deficit present.      Mental Status: He is alert and oriented to person, place, and time.       COURSE & MEDICAL DECISION MAKING    INITIAL ASSESSMENT AND PLAN  Care Narrative:       6:17 PM - Patient seen and evaluated at bedside.  64-year-old male presenting for right arm pain after cast removal at orthopedic clinic earlier today.  It is now in a Velcro brace but he reports since moving that hand and wrist more frequently he has developed some more pain I discussed the imaging from visit earlier today which show no remaining fracture.  I have discussed with him it is normal to be sore " after a cast removal due to muscle atrophy.  I recommended ice, NSAIDs, Tylenol the patient would like some tramadol as he reports the pain is quite severe.  I discussed plan for discharge and follow up as outlined below. The patient is stable for discharge at this time and will return for any new or worsening symptoms. Patient verbalizes understanding and support with my plan for discharge.     ADDITIONAL PROBLEM LIST AND DISPOSITION                 DISPOSITION AND DISCUSSIONS  I have discussed management of the patient with the following physicians and LEA's: None    Discussion of management with other QHP or appropriate source(s): None     Escalation of care considered, and ultimately not performed: diagnostic imaging.    Barriers to care at this time, including but not limited to: Patient does not have established PCP.     Decision tools and prescription drugs considered including, but not limited to: Tramadol.      I reviewed prescription monitoring program for patient's narcotic use before prescribing a scheduled drug.The patient will not drink alcohol nor drive with prescribed medications. The patient will return for new or worsening symptoms and is stable at the time of discharge.    The patient is referred to a primary physician for blood pressure management, diabetic screening, and for all other preventative health concerns.    In prescribing controlled substances to this patient, I certify that I have obtained and reviewed the medical history of Brian Durham. I have also made a good kelli effort to obtain applicable records from other providers who have treated the patient and records did not demonstrate any increased risk of substance abuse that would prevent me from prescribing controlled substances.     I have conducted a physical exam and documented it. I have reviewed Mr. Durham’s prescription history as maintained by the [place] Prescription Monitoring Program.     I have assessed the  patient’s risk for abuse, dependency, and addiction using the validated Opioid Risk Tool available at https://www.mdcalc.com/fjafja-nvlt-nlkd-ort-narcotic-abuse.     Given the above, I believe the benefits of controlled substance therapy outweigh the risks. The reasons for prescribing controlled substances include non-narcotic, oral analgesic alternatives have been inadequate for pain control. Accordingly, I have discussed the risk and benefits, treatment plan, and alternative therapies with the patient.     DISPOSITION:  Patient will be discharged home in stable condition.    FOLLOW UP:  No follow-up provider specified.    OUTPATIENT MEDICATIONS:  Discharge Medication List as of 6/30/2025  7:00 PM        START taking these medications    Details   traMADol (ULTRAM) 50 MG Tab Take 1 Tablet by mouth every four hours as needed for Severe Pain for up to 3 days., Disp-8 Tablet, R-0, Normal             FINAL IMPRESSION   1. Right arm pain    2. Right wrist fracture, sequela         Natasha ULLOA (Scribe), am scribing for, and in the presence of, Uche Horowitz M.D..    Electronically signed by: Natasha Mcdowell (Scribe), 6/30/2025    Uche ULLOA M.D. personally performed the services described in this documentation, as scribed by Natasha Mcdowell in my presence, and it is both accurate and complete.    The note accurately reflects work and decisions made by me.  Uche Horowitz M.D.  6/30/2025  7:30 PM         [1]   Past Medical History:  Diagnosis Date    ADHD     Arthritis     Cold 01/01/2012    2 weeks ago    Degenerative disc disease     Emphysema of lung (HCC) 1/24/2025    Encephalopathy acute 1/25/2025    Hepatitis B     Pain 12/30/2011    neck, 6/10    Psychiatric disorder     bipolar, ADHD    Schizophrenia (HCC)    [2]   Past Surgical History:  Procedure Laterality Date    CERVICAL DISK AND FUSION ANTERIOR  1/16/2012    Performed by REMY FRANKS at [facility]    OTHER      benign tumor  removed from chest

## 2025-07-11 ENCOUNTER — HOSPITAL ENCOUNTER (EMERGENCY)
Facility: MEDICAL CENTER | Age: 65
End: 2025-07-11
Attending: EMERGENCY MEDICINE
Payer: MEDICARE

## 2025-07-11 ENCOUNTER — APPOINTMENT (OUTPATIENT)
Dept: RADIOLOGY | Facility: MEDICAL CENTER | Age: 65
End: 2025-07-11
Attending: EMERGENCY MEDICINE
Payer: MEDICARE

## 2025-07-11 VITALS
HEART RATE: 76 BPM | WEIGHT: 206.13 LBS | OXYGEN SATURATION: 98 % | DIASTOLIC BLOOD PRESSURE: 67 MMHG | HEIGHT: 70 IN | TEMPERATURE: 98 F | RESPIRATION RATE: 15 BRPM | BODY MASS INDEX: 29.51 KG/M2 | SYSTOLIC BLOOD PRESSURE: 122 MMHG

## 2025-07-11 DIAGNOSIS — M25.531 RIGHT WRIST PAIN: Primary | ICD-10-CM

## 2025-07-11 DIAGNOSIS — S52.501A CLOSED FRACTURE OF DISTAL END OF RIGHT RADIUS, UNSPECIFIED FRACTURE MORPHOLOGY, INITIAL ENCOUNTER: ICD-10-CM

## 2025-07-11 PROCEDURE — 73090 X-RAY EXAM OF FOREARM: CPT | Mod: RT

## 2025-07-11 PROCEDURE — 99283 EMERGENCY DEPT VISIT LOW MDM: CPT

## 2025-07-11 ASSESSMENT — FIBROSIS 4 INDEX: FIB4 SCORE: 1.34

## 2025-07-12 NOTE — ED NOTES
Bedside report received from off going RN/tech: Vaughn GROVE, assumed care of patient.  POC discussed with patient. Call light within reach, all needs addressed at this time.       Fall risk interventions in place: (all applicable per [facility] Fall risk assessment)   Continuous monitoring: Pulse Ox or Blood Pressure  IVF/IV medications: Not Applicable   Oxygen: Room Air  Bedside sitter: Not Applicable   Isolation: Not Applicable

## 2025-07-12 NOTE — ED NOTES
. Discharge instructions reviewed, no further questions at this time. Patient ambulatory to lobby with all belongings.

## 2025-07-12 NOTE — ED TRIAGE NOTES
Vitals:    07/11/25 2034   BP: 126/86   Pulse: 82   Resp: 16   Temp: 36 °C (96.8 °F)   SpO2: 96%     Chief Complaint   Patient presents with    Arm Pain     Pt reports he broke his arm a while ago, had the cast removed, but is still having a lot of arm pain.     Pt is ambulatory to and from triage and is alert and oriented x 4.     Psych hx but calm and cooperative.

## 2025-07-12 NOTE — ED PROVIDER NOTES
ED Provider Note    CHIEF COMPLAINT  Chief Complaint   Patient presents with    Arm Pain        [facility]    Primary care provider: Pcp Pt States None   History obtained from: Patient  History limited by: Select: : None    Brian Durham is a 64 y.o. male who presents to the ED complaining of worsening pain predominantly around the right wrist.  Patient states that he had a fracture last month but no surgery was performed.  He states that the cast was removed a few weeks ago.  He noticed increasing pain this morning and denies any injury or trauma since last month.  The pain radiates from his wrist all the way up to his arm.  Patient reports feeling hot and believes he may have a fever.  Reports that he has shortness of breath at times.  No nausea or vomiting.  He is left-hand-dominant.    REVIEW OF SYSTEMS  Please see HPI for pertinent positives/negatives.  All other systems reviewed and are negative.     PAST MEDICAL HISTORY  Past Medical History:   Diagnosis Date    Encephalopathy acute 1/25/2025    Emphysema of lung (HCC) 1/24/2025    Cold 01/01/2012    2 weeks ago    Pain 12/30/2011    neck, 6/10    ADHD     Arthritis     Degenerative disc disease     Hepatitis B     Psychiatric disorder     bipolar, ADHD    Schizophrenia (HCC)         SURGICAL HISTORY  Past Surgical History[1]     SOCIAL HISTORY  Social History     Tobacco Use    Smoking status: Former     Types: Cigarettes    Smokeless tobacco: Never    Tobacco comments:     1/2 pack a day.   Vaping Use    Vaping status: Never Used   Substance and Sexual Activity    Alcohol use: Not Currently    Drug use: No    Sexual activity: Not on file        FAMILY HISTORY  No family history on file.     CURRENT MEDICATIONS  Home Medications       Reviewed by Cata Pillai R.N. (Registered Nurse) on 07/11/25 at 2053  Med List Status: <None>     Medication Last Dose Status   acetaminophen (TYLENOL) 325 MG Tab   "Active   aspirin (ASA) 81 MG Chew Tab chewable tablet  Active   atorvastatin (LIPITOR) 80 MG tablet  Active   buPROPion SR (WELLBUTRIN-SR) 150 MG TABLET SR 12 HR sustained-release tablet  Active   divalproex (DEPAKOTE) 500 MG Tablet Delayed Response  Active   hydrOXYzine HCl (ATARAX) 25 MG Tab  Active   LORazepam (ATIVAN) 1 MG Tab  Active   polyethylene glycol/lytes (MIRALAX) Pack  Active   senna-docusate (PERICOLACE OR SENOKOT S) 8.6-50 MG Tab  Active                     ALLERGIES  Allergies[2]     PHYSICAL EXAM  VITAL SIGNS: /67   Pulse 76   Temp 36.7 °C (98 °F) (Temporal)   Resp 15   Ht 1.778 m (5' 10\")   Wt 93.5 kg (206 lb 2.1 oz)   SpO2 98%   BMI 29.58 kg/m²  @TRINI[[id number]::@     Pulse ox interpretation: 96% I interpret this pulse ox as normal     Constitutional: Well developed, well nourished, alert in no apparent distress, nontoxic appearance    HENT: No external signs of trauma, normocephalic    Eyes: No discharge, no icterus     Neck: No stridor    Cardiovascular: Strong distal pulses and good perfusion    Thorax & Lungs: No respiratory distress    Extremities: No cyanosis, mild diffuse swelling and tenderness to palpation of right wrist without erythema/warmth/crepitus/fluctuance/streaking/drainage, nontender to palpation distally or proximally, intact distal pulses with brisk cap refill, sensation intact to touch throughout with distal 5/5 strength  Skin: Warm, dry, no pallor/cyanosis, no rash noted    Neuro: A/O times 3, no focal deficits noted    Psychiatric: Cooperative, slightly anxious      DIAGNOSTIC STUDIES / PROCEDURES        LABS  All labs reviewed by me.     Results for orders placed or performed during the hospital encounter of 03/30/25   CBC WITH DIFFERENTIAL    Collection Time: 03/30/25  6:47 PM   Result Value Ref Range    WBC 6.5 4.8 - 10.8 K/uL    RBC 3.97 (L) 4.70 - 6.10 M/uL    Hemoglobin 12.9 (L) 14.0 - 18.0 g/dL    Hematocrit 38.8 (L) 42.0 - 52.0 %    MCV 97.7 81.4 - 97.8 fL "    MCH 32.5 27.0 - 33.0 pg    MCHC 33.2 32.3 - 36.5 g/dL    RDW 46.0 35.9 - 50.0 fL    Platelet Count 237 164 - 446 K/uL    MPV 8.4 (L) 9.0 - 12.9 fL    Neutrophils-Polys 40.30 (L) 44.00 - 72.00 %    Lymphocytes 40.20 22.00 - 41.00 %    Monocytes 13.50 (H) 0.00 - 13.40 %    Eosinophils 4.50 0.00 - 6.90 %    Basophils 0.60 0.00 - 1.80 %    Immature Granulocytes 0.90 0.00 - 0.90 %    Nucleated RBC 0.00 0.00 - 0.20 /100 WBC    Neutrophils (Absolute) 2.62 1.82 - 7.42 K/uL    Lymphs (Absolute) 2.61 1.00 - 4.80 K/uL    Monos (Absolute) 0.88 (H) 0.00 - 0.85 K/uL    Eos (Absolute) 0.29 0.00 - 0.51 K/uL    Baso (Absolute) 0.04 0.00 - 0.12 K/uL    Immature Granulocytes (abs) 0.06 0.00 - 0.11 K/uL    NRBC (Absolute) 0.00 K/uL   COMP METABOLIC PANEL    Collection Time: 03/30/25  6:47 PM   Result Value Ref Range    Sodium 140 135 - 145 mmol/L    Potassium 4.3 3.6 - 5.5 mmol/L    Chloride 106 96 - 112 mmol/L    Co2 22 20 - 33 mmol/L    Anion Gap 12.0 7.0 - 16.0    Glucose 97 65 - 99 mg/dL    Bun 15 8 - 22 mg/dL    Creatinine 0.85 0.50 - 1.40 mg/dL    Calcium 8.8 8.5 - 10.5 mg/dL    Correct Calcium 8.7 8.5 - 10.5 mg/dL    AST(SGOT) 15 12 - 45 U/L    ALT(SGPT) 13 2 - 50 U/L    Alkaline Phosphatase 69 30 - 99 U/L    Total Bilirubin 0.4 0.1 - 1.5 mg/dL    Albumin 4.1 3.2 - 4.9 g/dL    Total Protein 6.8 6.0 - 8.2 g/dL    Globulin 2.7 1.9 - 3.5 g/dL    A-G Ratio 1.5 g/dL   PROTHROMBIN TIME    Collection Time: 03/30/25  6:47 PM   Result Value Ref Range    PT 12.8 12.0 - 14.6 sec    INR 0.96 0.87 - 1.13   APTT    Collection Time: 03/30/25  6:47 PM   Result Value Ref Range    APTT 26.5 24.7 - 36.0 sec   COD (ADULT)    Collection Time: 03/30/25  6:47 PM   Result Value Ref Range    ABO Grouping Only A     Rh Grouping Only POS     Antibody Screen-Cod NEG    TROPONIN    Collection Time: 03/30/25  6:47 PM   Result Value Ref Range    Troponin T 14 6 - 19 ng/L   ESTIMATED GFR    Collection Time: 03/30/25  6:47 PM   Result Value Ref Range    GFR  (CKD-EPI) 98 >60 mL/min/1.73 m 2   Infectious Disease Testing (Exposure)    Collection Time: 25  6:47 PM   Result Value Ref Range    Hepatitis B Surface Antigen Non-Reactive Non-Reactive    Hepatitis C Antibody Non-Reactive Non-Reactive    Exposed MRN [id number]    HIV Rapid Screen (Exposure)    Collection Time: 25  6:47 PM    Specimen: Blood   Result Value Ref Range    HIV Ag/Ab Combo Assay Non-Reactive Non Reactive    Exposed MRN [id number]    DIAGNOSTIC ALCOHOL    Collection Time: 25  9:02 PM   Result Value Ref Range    Diagnostic Alcohol <10.1 <10.1 mg/dL   MAGNESIUM    Collection Time: 25  9:02 PM   Result Value Ref Range    Magnesium 2.0 1.5 - 2.5 mg/dL   EKG (NOW)    Collection Time: 25 11:03 PM   Result Value Ref Range    Report       [facility] Emergency Dept.    Test Date:  2025  Pt Name:    SADI GUERRERO                Department: ER  MRN:        [id number]                      Room:       [facility]  Gender:     Male                         Technician: [id number]  :        [id number]                   Requested By:MONISHA LOZOYA  Order #:    [id number]                    Reading MD: Monisha Lozoya    Measurements  Intervals                                Axis  Rate:       70                           P:          71  AZ:         53                           QRS:        67  QRSD:       91                           T:          63  QT:         434  QTc:        469    Interpretive Statements  Sinus rhythm  Short AZ interval  Normal axis  No ST changes  Compared to ECG 2025 22:42:04  Short AZ interval now present  Electronically Signed On 2025 23:03:53 PDT by Monisha Lozoya     ABO Rh Confirm    Collection Time: 25 12:04 AM   Result Value Ref Range    ABO Rh Confirm A POS    Hemoglobin A1C    Collection Time: 25 12:04 AM   Result Value Ref Range    Glycohemoglobin 5.5 4.0 - 5.6 %    Est Avg Glucose 111 mg/dL   Diagnostic  Alcohol    Collection  Time: 03/31/25 12:04 AM   Result Value Ref Range    Diagnostic Alcohol <10.1 <10.1 mg/dL   Urine Drug Screen    Collection Time: 03/31/25 12:04 AM   Result Value Ref Range    Amphetamines Urine Positive (A) Negative    Barbiturates Negative Negative    Benzodiazepines Positive (A) Negative    Cocaine Metabolite Negative Negative    Fentanyl, Urine Negative Negative    Methadone Negative Negative    Opiates Negative Negative    Oxycodone Negative Negative    Phencyclidine -Pcp Negative Negative    Propoxyphene Negative Negative    Cannabinoid Metab Negative Negative   TSH WITH REFLEX TO FT4    Collection Time: 03/31/25 12:04 AM   Result Value Ref Range    TSH 2.000 0.380 - 5.330 uIU/mL   Lipid Profile    Collection Time: 03/31/25 12:04 AM   Result Value Ref Range    Cholesterol,Tot 187 100 - 199 mg/dL    Triglycerides 95 0 - 149 mg/dL    HDL 57 >=40 mg/dL     (H) <100 mg/dL   POCT glucose device results    Collection Time: 03/31/25 12:43 AM   Result Value Ref Range    POC Glucose, Blood 97 65 - 99 mg/dL   CBC WITH DIFFERENTIAL    Collection Time: 03/31/25  3:15 AM   Result Value Ref Range    WBC 8.2 4.8 - 10.8 K/uL    RBC 3.93 (L) 4.70 - 6.10 M/uL    Hemoglobin 12.8 (L) 14.0 - 18.0 g/dL    Hematocrit 38.0 (L) 42.0 - 52.0 %    MCV 96.7 81.4 - 97.8 fL    MCH 32.6 27.0 - 33.0 pg    MCHC 33.7 32.3 - 36.5 g/dL    RDW 46.0 35.9 - 50.0 fL    Platelet Count 235 164 - 446 K/uL    MPV 8.3 (L) 9.0 - 12.9 fL    Neutrophils-Polys 54.70 44.00 - 72.00 %    Lymphocytes 29.10 22.00 - 41.00 %    Monocytes 11.40 0.00 - 13.40 %    Eosinophils 3.80 0.00 - 6.90 %    Basophils 0.60 0.00 - 1.80 %    Immature Granulocytes 0.40 0.00 - 0.90 %    Nucleated RBC 0.00 0.00 - 0.20 /100 WBC    Neutrophils (Absolute) 4.49 1.82 - 7.42 K/uL    Lymphs (Absolute) 2.38 1.00 - 4.80 K/uL    Monos (Absolute) 0.93 (H) 0.00 - 0.85 K/uL    Eos (Absolute) 0.31 0.00 - 0.51 K/uL    Baso (Absolute) 0.05 0.00 - 0.12 K/uL    Immature Granulocytes (abs) 0.03  0.00 - 0.11 K/uL    NRBC (Absolute) 0.00 K/uL   Comp Metabolic Panel    Collection Time: 03/31/25  3:15 AM   Result Value Ref Range    Sodium 138 135 - 145 mmol/L    Potassium 4.0 3.6 - 5.5 mmol/L    Chloride 106 96 - 112 mmol/L    Co2 21 20 - 33 mmol/L    Anion Gap 11.0 7.0 - 16.0    Glucose 86 65 - 99 mg/dL    Bun 13 8 - 22 mg/dL    Creatinine 0.66 0.50 - 1.40 mg/dL    Calcium 8.6 8.5 - 10.5 mg/dL    Correct Calcium 8.8 8.5 - 10.5 mg/dL    AST(SGOT) 22 12 - 45 U/L    ALT(SGPT) 13 2 - 50 U/L    Alkaline Phosphatase 68 30 - 99 U/L    Total Bilirubin 0.5 0.1 - 1.5 mg/dL    Albumin 3.7 3.2 - 4.9 g/dL    Total Protein 6.4 6.0 - 8.2 g/dL    Globulin 2.7 1.9 - 3.5 g/dL    A-G Ratio 1.4 g/dL   MAGNESIUM    Collection Time: 03/31/25  3:15 AM   Result Value Ref Range    Magnesium 2.0 1.5 - 2.5 mg/dL   PHOSPHORUS    Collection Time: 03/31/25  3:15 AM   Result Value Ref Range    Phosphorus 3.9 2.5 - 4.5 mg/dL   ESTIMATED GFR    Collection Time: 03/31/25  3:15 AM   Result Value Ref Range    GFR (CKD-EPI) 106 >60 mL/min/1.73 m 2   POCT glucose device results    Collection Time: 03/31/25  5:09 AM   Result Value Ref Range    POC Glucose, Blood 93 65 - 99 mg/dL   POCT glucose device results    Collection Time: 03/31/25 12:20 PM   Result Value Ref Range    POC Glucose, Blood 87 65 - 99 mg/dL   POCT glucose device results    Collection Time: 03/31/25  5:26 PM   Result Value Ref Range    POC Glucose, Blood 90 65 - 99 mg/dL   POCT glucose device results    Collection Time: 03/31/25  8:57 PM   Result Value Ref Range    POC Glucose, Blood 99 65 - 99 mg/dL   CBC WITH DIFFERENTIAL    Collection Time: 04/01/25  3:53 AM   Result Value Ref Range    WBC 6.7 4.8 - 10.8 K/uL    RBC 4.19 (L) 4.70 - 6.10 M/uL    Hemoglobin 13.7 (L) 14.0 - 18.0 g/dL    Hematocrit 40.2 (L) 42.0 - 52.0 %    MCV 95.9 81.4 - 97.8 fL    MCH 32.7 27.0 - 33.0 pg    MCHC 34.1 32.3 - 36.5 g/dL    RDW 45.0 35.9 - 50.0 fL    Platelet Count 267 164 - 446 K/uL    MPV 8.5 (L)  9.0 - 12.9 fL    Neutrophils-Polys 50.50 44.00 - 72.00 %    Lymphocytes 35.70 22.00 - 41.00 %    Monocytes 9.70 0.00 - 13.40 %    Eosinophils 3.00 0.00 - 6.90 %    Basophils 0.70 0.00 - 1.80 %    Immature Granulocytes 0.40 0.00 - 0.90 %    Nucleated RBC 0.00 0.00 - 0.20 /100 WBC    Neutrophils (Absolute) 3.39 1.82 - 7.42 K/uL    Lymphs (Absolute) 2.40 1.00 - 4.80 K/uL    Monos (Absolute) 0.65 0.00 - 0.85 K/uL    Eos (Absolute) 0.20 0.00 - 0.51 K/uL    Baso (Absolute) 0.05 0.00 - 0.12 K/uL    Immature Granulocytes (abs) 0.03 0.00 - 0.11 K/uL    NRBC (Absolute) 0.00 K/uL   Comp Metabolic Panel    Collection Time: 04/01/25  3:53 AM   Result Value Ref Range    Sodium 136 135 - 145 mmol/L    Potassium 4.3 3.6 - 5.5 mmol/L    Chloride 103 96 - 112 mmol/L    Co2 20 20 - 33 mmol/L    Anion Gap 13.0 7.0 - 16.0    Glucose 88 65 - 99 mg/dL    Bun 17 8 - 22 mg/dL    Creatinine 0.78 0.50 - 1.40 mg/dL    Calcium 9.2 8.5 - 10.5 mg/dL    Correct Calcium 9.0 8.5 - 10.5 mg/dL    AST(SGOT) 25 12 - 45 U/L    ALT(SGPT) 15 2 - 50 U/L    Alkaline Phosphatase 76 30 - 99 U/L    Total Bilirubin 0.6 0.1 - 1.5 mg/dL    Albumin 4.2 3.2 - 4.9 g/dL    Total Protein 7.2 6.0 - 8.2 g/dL    Globulin 3.0 1.9 - 3.5 g/dL    A-G Ratio 1.4 g/dL   MAGNESIUM    Collection Time: 04/01/25  3:53 AM   Result Value Ref Range    Magnesium 2.1 1.5 - 2.5 mg/dL   PHOSPHORUS    Collection Time: 04/01/25  3:53 AM   Result Value Ref Range    Phosphorus 4.5 2.5 - 4.5 mg/dL   ESTIMATED GFR    Collection Time: 04/01/25  3:53 AM   Result Value Ref Range    GFR (CKD-EPI) 101 >60 mL/min/1.73 m 2   HIV AG/AB Combo Assay Screening    Collection Time: 04/02/25  1:14 PM   Result Value Ref Range    HIV Ag/Ab Combo Assay Non-Reactive Non Reactive   T.Pallidum AB OBNITA (Syphilis Screening)    Collection Time: 04/02/25  1:14 PM   Result Value Ref Range    Syphilis, Treponemal Qual Reactive (A) Non-Reactive   HEP C Virus Antibody    Collection Time: 04/02/25  1:14 PM   Result Value Ref  Range    Hepatitis C Antibody Non-Reactive Non-Reactive   HEP B Surface Antigen    Collection Time: 04/02/25  1:14 PM   Result Value Ref Range    Hepatitis B Surface Antigen Non-Reactive Non-Reactive   HEP B Core AB Total    Collection Time: 04/02/25  1:14 PM   Result Value Ref Range    Hepatitis B Core Ab, Total Reactive (A) Non-Reactive   HEP B Surface Antibody    Collection Time: 04/02/25  1:14 PM   Result Value Ref Range    Hep B Surface Antibody Quant 3632.00 (H) 0.00 - 10.00 mIU/mL   RPR (SYPHILIS)    Collection Time: 04/02/25  1:14 PM   Result Value Ref Range    Rapid Plasma Reagin -Rpr- Non Reactive Non Reactive   ANTIBODY,TREPONEMA PALLIDUM    Collection Time: 04/02/25  1:14 PM   Result Value Ref Range    Mha-Tp Reactive (A) Non Reactive   Chlamydia/GC, PCR (Urine)    Collection Time: 04/02/25  1:20 PM    Specimen: Urine   Result Value Ref Range    C. trachomatis by PCR Negative Negative    Gc By Dna Probe Negative Negative    Source Urine    HBV PCR Quant    Collection Time: 04/03/25  5:39 AM   Result Value Ref Range    HBV (IU/mL) Qnt NAAT Not Detected IU/mL    HBV (log IU/mL) Qnt NAAT Not Detected log IU/mL    HBV Qnt by NAAT Interp Not Detected Not Detected   HEP B CORE AB IGM    Collection Time: 04/03/25  5:39 AM   Result Value Ref Range    Hepatitis B Cors Ab,IgM Non-Reactive Non-Reactive   CBC WITHOUT DIFFERENTIAL    Collection Time: 04/03/25  5:39 AM   Result Value Ref Range    WBC 6.1 4.8 - 10.8 K/uL    RBC 4.61 (L) 4.70 - 6.10 M/uL    Hemoglobin 15.1 14.0 - 18.0 g/dL    Hematocrit 43.3 42.0 - 52.0 %    MCV 93.9 81.4 - 97.8 fL    MCH 32.8 27.0 - 33.0 pg    MCHC 34.9 32.3 - 36.5 g/dL    RDW 43.6 35.9 - 50.0 fL    Platelet Count 299 164 - 446 K/uL    MPV 8.1 (L) 9.0 - 12.9 fL   Renal Function Panel    Collection Time: 04/03/25  5:39 AM   Result Value Ref Range    Sodium 137 135 - 145 mmol/L    Potassium 4.2 3.6 - 5.5 mmol/L    Chloride 101 96 - 112 mmol/L    Co2 22 20 - 33 mmol/L    Glucose 101 (H)  65 - 99 mg/dL    Creatinine 0.84 0.50 - 1.40 mg/dL    Bun 18 8 - 22 mg/dL    Calcium 9.5 8.5 - 10.5 mg/dL    Correct Calcium 9.3 8.5 - 10.5 mg/dL    Phosphorus 4.8 (H) 2.5 - 4.5 mg/dL    Albumin 4.2 3.2 - 4.9 g/dL   ESTIMATED GFR    Collection Time: 04/03/25  5:39 AM   Result Value Ref Range    GFR (CKD-EPI) 98 >60 mL/min/1.73 m 2        RADIOLOGY  I have independently interpreted the diagnostic imaging associated with this visit and am waiting the final reading from the radiologist.   My preliminary interpretation is as follows: Healing distal radius fracture.    DX-FOREARM RIGHT   Final Result      Displaced nonunited distal radial diametaphyseal fracture with surrounding bone callus indicating a subacute to chronic chronicity. No definitely acute fracture is identified.             COURSE & MEDICAL DECISION MAKING  Nursing notes, VS, PMSFHx reviewed in chart.     Review of past medical records shows the patient was last seen in this ED June 30, 2025 regarding right arm pain and right wrist fracture and was prescribed Ultram.  Patient had outpatient visit with orthopedics earlier that day for follow-up regarding open fracture of distal end of right radius.      Differential diagnoses considered include but are not limited to: Fx, strain, sprain, bursitis, tendonitis, neuropathy, radiculopathy, arthritis, cellulitis, DVT/vascular occlusion      ED Observation Status? No; Patient does not meet criteria for ED Observation.       Discussion of management with other [facility] or appropriate source(s): None     Escalation of care considered, and ultimately not performed: acute inpatient care management, however at this time, the patient is most appropriate for outpatient management.     Barriers to care at this time, including but not limited to: Patient does not have established PCP.     Decision tools and prescription drugs considered including, but not limited to: Pain Medications  .        History and physical exam as  above.  This is a 64-year-old male patient with medical history including ADHD, bipolar disorder, schizophrenia, arthritis, emphysema who presents to the ED with above complaints.  X-rays of the right forearm shows healing distal right radius fracture.  His exam does not show evidence for significant neurovascular compromise or compartment syndrome or infectious process.  At this time, no evidence for emergent pathology or indications for admission.  Findings discussed with patient.  He is noted to be in no acute distress and nontoxic in appearance.  Patient will be given Velcro wrist splint to use as needed for support and comfort.  I discussed with patient supportive home care, outpatient follow-up with orthopedics and return to ED precautions.  Patient verbalized understanding and agreed with plan of care with no further questions or concerns.      The patient is referred to a primary physician for blood pressure management, diabetic screening, and for all other preventative health concerns.       FINAL IMPRESSION  1. Right wrist pain Acute   2. Closed fracture of distal end of right radius, unspecified fracture morphology, initial encounter Acute          DISPOSITION  Patient will be discharged home in stable condition.       FOLLOW UP  [facility] ([facility]) - Primary Care and Family Medicine  [address] [facility] [address]  [phone]  Call in 3 days      [facility] - Primary Care  [address] [facility] [address]  [phone]  Call in 3 days      Gary Thacker M.D.  [address] [facility] [address] [phone]    Call in 3 days      [facility], Emergency Dept  [address] [address] [phone]    If symptoms worsen         OUTPATIENT MEDICATIONS  Discharge Medication List as of 7/11/2025 10:59 PM             Electronically signed by: Candido Askew D.O., 7/11/2025 10:13 PM      Portions of this record were made with voice  recognition software.  Despite my review, errors may remain.  Please interpret this chart in the appropriate context.         [1]   Past Surgical History:  Procedure Laterality Date    CERVICAL DISK AND FUSION ANTERIOR  1/16/2012    Performed by REMY FRANKS at [facility]    OTHER      benign tumor removed from chest   [2]   Allergies  Allergen Reactions    Paroxetine Swelling     Throat